# Patient Record
Sex: FEMALE | Race: WHITE | NOT HISPANIC OR LATINO | Employment: FULL TIME | URBAN - METROPOLITAN AREA
[De-identification: names, ages, dates, MRNs, and addresses within clinical notes are randomized per-mention and may not be internally consistent; named-entity substitution may affect disease eponyms.]

---

## 2020-10-06 ENCOUNTER — OFFICE VISIT (OUTPATIENT)
Dept: OBGYN CLINIC | Facility: CLINIC | Age: 30
End: 2020-10-06
Payer: COMMERCIAL

## 2020-10-06 VITALS
HEIGHT: 69 IN | DIASTOLIC BLOOD PRESSURE: 62 MMHG | HEART RATE: 98 BPM | SYSTOLIC BLOOD PRESSURE: 108 MMHG | BODY MASS INDEX: 31.46 KG/M2 | TEMPERATURE: 98.1 F | WEIGHT: 212.4 LBS

## 2020-10-06 DIAGNOSIS — Z3A.09 9 WEEKS GESTATION OF PREGNANCY: ICD-10-CM

## 2020-10-06 DIAGNOSIS — O09.91 HIGH-RISK PREGNANCY, FIRST TRIMESTER: ICD-10-CM

## 2020-10-06 DIAGNOSIS — N91.2 AMENORRHEA: ICD-10-CM

## 2020-10-06 DIAGNOSIS — Z36.89 CONFIRM FETAL CARDIAC ACTIVITY USING ULTRASOUND: ICD-10-CM

## 2020-10-06 DIAGNOSIS — Z32.00 ENCOUNTER FOR CONFIRMATION OF PREGNANCY TEST RESULT WITH PHYSICAL EXAMINATION: Primary | ICD-10-CM

## 2020-10-06 DIAGNOSIS — E10.9 CONTROLLED DIABETES MELLITUS TYPE 1 WITHOUT COMPLICATIONS (HCC): ICD-10-CM

## 2020-10-06 PROBLEM — D27.9 DERMOID CYST OF OVARY AFFECTING PREGNANCY, ANTEPARTUM: Status: ACTIVE | Noted: 2019-03-12

## 2020-10-06 PROBLEM — O34.80 DERMOID CYST OF OVARY AFFECTING PREGNANCY, ANTEPARTUM: Status: ACTIVE | Noted: 2019-03-12

## 2020-10-06 LAB — SL AMB POCT URINE HCG: POSITIVE

## 2020-10-06 PROCEDURE — 99203 OFFICE O/P NEW LOW 30 MIN: CPT | Performed by: OBSTETRICS & GYNECOLOGY

## 2020-10-06 PROCEDURE — 81025 URINE PREGNANCY TEST: CPT | Performed by: OBSTETRICS & GYNECOLOGY

## 2020-10-06 PROCEDURE — 76817 TRANSVAGINAL US OBSTETRIC: CPT | Performed by: OBSTETRICS & GYNECOLOGY

## 2020-10-06 RX ORDER — LANCETS 30 GAUGE
EACH MISCELLANEOUS
COMMUNITY

## 2020-10-06 RX ORDER — INSULIN GLARGINE 100 [IU]/ML
10 INJECTION, SOLUTION SUBCUTANEOUS
COMMUNITY

## 2020-10-07 ENCOUNTER — OB ABSTRACT (OUTPATIENT)
Dept: OBGYN CLINIC | Facility: CLINIC | Age: 30
End: 2020-10-07

## 2020-10-08 ENCOUNTER — TELEPHONE (OUTPATIENT)
Dept: PERINATAL CARE | Facility: CLINIC | Age: 30
End: 2020-10-08

## 2020-10-09 ENCOUNTER — TELEPHONE (OUTPATIENT)
Dept: PERINATAL CARE | Facility: CLINIC | Age: 30
End: 2020-10-09

## 2020-10-12 ENCOUNTER — TELEPHONE (OUTPATIENT)
Dept: PERINATAL CARE | Facility: OTHER | Age: 30
End: 2020-10-12

## 2020-10-12 ENCOUNTER — TELEMEDICINE (OUTPATIENT)
Dept: PERINATAL CARE | Facility: CLINIC | Age: 30
End: 2020-10-12
Payer: COMMERCIAL

## 2020-10-12 VITALS — BODY MASS INDEX: 31.4 KG/M2 | WEIGHT: 212 LBS | HEIGHT: 69 IN

## 2020-10-12 DIAGNOSIS — O24.011 PRE-EXISTING TYPE 1 DIABETES MELLITUS IN PREGNANCY IN FIRST TRIMESTER: ICD-10-CM

## 2020-10-12 DIAGNOSIS — E10.649 HYPOGLYCEMIA DUE TO TYPE 1 DIABETES MELLITUS (HCC): Primary | ICD-10-CM

## 2020-10-12 DIAGNOSIS — Z3A.10 10 WEEKS GESTATION OF PREGNANCY: ICD-10-CM

## 2020-10-12 DIAGNOSIS — O99.211 OBESITY AFFECTING PREGNANCY IN FIRST TRIMESTER: ICD-10-CM

## 2020-10-12 PROBLEM — E10.9 CONTROLLED DIABETES MELLITUS TYPE 1 WITHOUT COMPLICATIONS (HCC): Status: RESOLVED | Noted: 2019-03-12 | Resolved: 2020-10-12

## 2020-10-12 PROCEDURE — 99215 OFFICE O/P EST HI 40 MIN: CPT | Performed by: NURSE PRACTITIONER

## 2020-10-12 RX ORDER — BLOOD-GLUCOSE TRANSMITTER
EACH MISCELLANEOUS
Qty: 1 EACH | Refills: 3 | Status: SHIPPED | OUTPATIENT
Start: 2020-10-12 | End: 2020-10-20 | Stop reason: SDUPTHER

## 2020-10-12 RX ORDER — BLOOD-GLUCOSE,RECEIVER,CONT
EACH MISCELLANEOUS
Qty: 1 DEVICE | Refills: 0 | Status: SHIPPED | OUTPATIENT
Start: 2020-10-12 | End: 2020-10-20 | Stop reason: SDUPTHER

## 2020-10-12 RX ORDER — BLOOD-GLUCOSE SENSOR
EACH MISCELLANEOUS
Qty: 1 EACH | Refills: 12 | Status: SHIPPED | OUTPATIENT
Start: 2020-10-12 | End: 2020-10-20 | Stop reason: SDUPTHER

## 2020-10-13 ENCOUNTER — ULTRASOUND (OUTPATIENT)
Dept: PERINATAL CARE | Facility: OTHER | Age: 30
End: 2020-10-13
Payer: COMMERCIAL

## 2020-10-13 VITALS
HEART RATE: 90 BPM | BODY MASS INDEX: 31.16 KG/M2 | TEMPERATURE: 98.3 F | WEIGHT: 210.4 LBS | HEIGHT: 69 IN | DIASTOLIC BLOOD PRESSURE: 54 MMHG | SYSTOLIC BLOOD PRESSURE: 135 MMHG

## 2020-10-13 DIAGNOSIS — Z3A.10 10 WEEKS GESTATION OF PREGNANCY: ICD-10-CM

## 2020-10-13 DIAGNOSIS — Z3A.09 9 WEEKS GESTATION OF PREGNANCY: ICD-10-CM

## 2020-10-13 DIAGNOSIS — O34.80 DERMOID CYST OF OVARY AFFECTING PREGNANCY, ANTEPARTUM: ICD-10-CM

## 2020-10-13 DIAGNOSIS — E10.9 CONTROLLED DIABETES MELLITUS TYPE 1 WITHOUT COMPLICATIONS (HCC): ICD-10-CM

## 2020-10-13 DIAGNOSIS — O24.011 PRE-EXISTING TYPE 1 DIABETES MELLITUS IN PREGNANCY IN FIRST TRIMESTER: Primary | ICD-10-CM

## 2020-10-13 DIAGNOSIS — D27.9 DERMOID CYST OF OVARY AFFECTING PREGNANCY, ANTEPARTUM: ICD-10-CM

## 2020-10-13 DIAGNOSIS — O09.91 HIGH-RISK PREGNANCY, FIRST TRIMESTER: ICD-10-CM

## 2020-10-13 PROCEDURE — 76801 OB US < 14 WKS SINGLE FETUS: CPT | Performed by: OBSTETRICS & GYNECOLOGY

## 2020-10-13 PROCEDURE — 99241 PR OFFICE CONSULTATION NEW/ESTAB PATIENT 15 MIN: CPT | Performed by: OBSTETRICS & GYNECOLOGY

## 2020-10-14 ENCOUNTER — INITIAL PRENATAL (OUTPATIENT)
Dept: OBGYN CLINIC | Facility: CLINIC | Age: 30
End: 2020-10-14
Payer: COMMERCIAL

## 2020-10-14 VITALS
TEMPERATURE: 97.5 F | HEIGHT: 69 IN | HEART RATE: 88 BPM | DIASTOLIC BLOOD PRESSURE: 72 MMHG | SYSTOLIC BLOOD PRESSURE: 120 MMHG | BODY MASS INDEX: 31.25 KG/M2 | WEIGHT: 211 LBS

## 2020-10-14 DIAGNOSIS — O09.91 SUPERVISION OF HIGH RISK PREGNANCY, ANTEPARTUM, FIRST TRIMESTER: ICD-10-CM

## 2020-10-14 DIAGNOSIS — Z3A.10 10 WEEKS GESTATION OF PREGNANCY: ICD-10-CM

## 2020-10-14 DIAGNOSIS — O99.211 OBESITY AFFECTING PREGNANCY IN FIRST TRIMESTER: ICD-10-CM

## 2020-10-14 DIAGNOSIS — Z11.3 SCREENING FOR STD (SEXUALLY TRANSMITTED DISEASE): ICD-10-CM

## 2020-10-14 DIAGNOSIS — O24.011 PRE-EXISTING TYPE 1 DIABETES MELLITUS IN PREGNANCY IN FIRST TRIMESTER: ICD-10-CM

## 2020-10-14 DIAGNOSIS — Z3A.10 10 WEEKS GESTATION OF PREGNANCY: Primary | ICD-10-CM

## 2020-10-14 DIAGNOSIS — E10.649 HYPOGLYCEMIA DUE TO TYPE 1 DIABETES MELLITUS (HCC): ICD-10-CM

## 2020-10-14 LAB — EXTERNAL CHLAMYDIA SCREEN: NEGATIVE

## 2020-10-14 PROCEDURE — 87591 N.GONORRHOEAE DNA AMP PROB: CPT | Performed by: OBSTETRICS & GYNECOLOGY

## 2020-10-14 PROCEDURE — 87491 CHLMYD TRACH DNA AMP PROBE: CPT | Performed by: OBSTETRICS & GYNECOLOGY

## 2020-10-14 PROCEDURE — 99214 OFFICE O/P EST MOD 30 MIN: CPT | Performed by: OBSTETRICS & GYNECOLOGY

## 2020-10-14 PROCEDURE — OBC: Performed by: OBSTETRICS & GYNECOLOGY

## 2020-10-17 ENCOUNTER — LAB (OUTPATIENT)
Dept: LAB | Facility: MEDICAL CENTER | Age: 30
End: 2020-10-17
Payer: COMMERCIAL

## 2020-10-17 DIAGNOSIS — E10.9 CONTROLLED DIABETES MELLITUS TYPE 1 WITHOUT COMPLICATIONS (HCC): ICD-10-CM

## 2020-10-17 DIAGNOSIS — O24.011 PRE-EXISTING TYPE 1 DIABETES MELLITUS IN PREGNANCY IN FIRST TRIMESTER: ICD-10-CM

## 2020-10-17 DIAGNOSIS — Z3A.10 10 WEEKS GESTATION OF PREGNANCY: ICD-10-CM

## 2020-10-17 DIAGNOSIS — E10.649 HYPOGLYCEMIA DUE TO TYPE 1 DIABETES MELLITUS (HCC): ICD-10-CM

## 2020-10-17 DIAGNOSIS — Z3A.09 9 WEEKS GESTATION OF PREGNANCY: ICD-10-CM

## 2020-10-17 DIAGNOSIS — O09.91 HIGH-RISK PREGNANCY, FIRST TRIMESTER: ICD-10-CM

## 2020-10-17 DIAGNOSIS — O99.211 OBESITY AFFECTING PREGNANCY IN FIRST TRIMESTER: ICD-10-CM

## 2020-10-17 LAB
ABO GROUP BLD: NORMAL
ALBUMIN SERPL BCP-MCNC: 3.8 G/DL (ref 3.5–5)
ALP SERPL-CCNC: 55 U/L (ref 46–116)
ALT SERPL W P-5'-P-CCNC: 16 U/L (ref 12–78)
ANION GAP SERPL CALCULATED.3IONS-SCNC: 4 MMOL/L (ref 4–13)
AST SERPL W P-5'-P-CCNC: 12 U/L (ref 5–45)
BASOPHILS # BLD AUTO: 0.02 THOUSANDS/ΜL (ref 0–0.1)
BASOPHILS NFR BLD AUTO: 0 % (ref 0–1)
BILIRUB SERPL-MCNC: 0.63 MG/DL (ref 0.2–1)
BILIRUB UR QL STRIP: NEGATIVE
BLD GP AB SCN SERPL QL: NEGATIVE
BUN SERPL-MCNC: 7 MG/DL (ref 5–25)
C TRACH DNA SPEC QL NAA+PROBE: NEGATIVE
CALCIUM SERPL-MCNC: 9.2 MG/DL (ref 8.3–10.1)
CHLORIDE SERPL-SCNC: 109 MMOL/L (ref 100–108)
CLARITY UR: ABNORMAL
CO2 SERPL-SCNC: 26 MMOL/L (ref 21–32)
COLOR UR: YELLOW
CREAT SERPL-MCNC: 0.62 MG/DL (ref 0.6–1.3)
CREAT UR-MCNC: 93.8 MG/DL
EOSINOPHIL # BLD AUTO: 0.17 THOUSAND/ΜL (ref 0–0.61)
EOSINOPHIL NFR BLD AUTO: 3 % (ref 0–6)
ERYTHROCYTE [DISTWIDTH] IN BLOOD BY AUTOMATED COUNT: 13.3 % (ref 11.6–15.1)
EST. AVERAGE GLUCOSE BLD GHB EST-MCNC: 103 MG/DL
GFR SERPL CREATININE-BSD FRML MDRD: 123 ML/MIN/1.73SQ M
GLUCOSE SERPL-MCNC: 97 MG/DL (ref 65–140)
GLUCOSE UR STRIP-MCNC: NEGATIVE MG/DL
HBA1C MFR BLD: 5.2 %
HBV SURFACE AG SER QL: NORMAL
HCT VFR BLD AUTO: 37.4 % (ref 34.8–46.1)
HGB BLD-MCNC: 12.5 G/DL (ref 11.5–15.4)
HGB UR QL STRIP.AUTO: NEGATIVE
IMM GRANULOCYTES # BLD AUTO: 0.01 THOUSAND/UL (ref 0–0.2)
IMM GRANULOCYTES NFR BLD AUTO: 0 % (ref 0–2)
KETONES UR STRIP-MCNC: ABNORMAL MG/DL
LEUKOCYTE ESTERASE UR QL STRIP: NEGATIVE
LYMPHOCYTES # BLD AUTO: 1.35 THOUSANDS/ΜL (ref 0.6–4.47)
LYMPHOCYTES NFR BLD AUTO: 20 % (ref 14–44)
MCH RBC QN AUTO: 30.3 PG (ref 26.8–34.3)
MCHC RBC AUTO-ENTMCNC: 33.4 G/DL (ref 31.4–37.4)
MCV RBC AUTO: 91 FL (ref 82–98)
MONOCYTES # BLD AUTO: 0.4 THOUSAND/ΜL (ref 0.17–1.22)
MONOCYTES NFR BLD AUTO: 6 % (ref 4–12)
N GONORRHOEA DNA SPEC QL NAA+PROBE: NEGATIVE
NEUTROPHILS # BLD AUTO: 4.78 THOUSANDS/ΜL (ref 1.85–7.62)
NEUTS SEG NFR BLD AUTO: 71 % (ref 43–75)
NITRITE UR QL STRIP: NEGATIVE
NRBC BLD AUTO-RTO: 0 /100 WBCS
PH UR STRIP.AUTO: 6.5 [PH]
PLATELET # BLD AUTO: 288 THOUSANDS/UL (ref 149–390)
PMV BLD AUTO: 10.5 FL (ref 8.9–12.7)
POTASSIUM SERPL-SCNC: 4.2 MMOL/L (ref 3.5–5.3)
PROT SERPL-MCNC: 7.5 G/DL (ref 6.4–8.2)
PROT UR STRIP-MCNC: NEGATIVE MG/DL
PROT UR-MCNC: 7 MG/DL
PROT/CREAT UR: 0.07 MG/G{CREAT} (ref 0–0.1)
RBC # BLD AUTO: 4.13 MILLION/UL (ref 3.81–5.12)
RH BLD: POSITIVE
RUBV IGG SERPL IA-ACNC: 130.3 IU/ML
SODIUM SERPL-SCNC: 139 MMOL/L (ref 136–145)
SP GR UR STRIP.AUTO: 1.01 (ref 1–1.03)
SPECIMEN EXPIRATION DATE: NORMAL
TSH SERPL DL<=0.05 MIU/L-ACNC: 1.09 UIU/ML (ref 0.36–3.74)
UROBILINOGEN UR QL STRIP.AUTO: 0.2 E.U./DL
WBC # BLD AUTO: 6.73 THOUSAND/UL (ref 4.31–10.16)

## 2020-10-17 PROCEDURE — 84443 ASSAY THYROID STIM HORMONE: CPT

## 2020-10-17 PROCEDURE — 80081 OBSTETRIC PANEL INC HIV TSTG: CPT | Performed by: OBSTETRICS & GYNECOLOGY

## 2020-10-17 PROCEDURE — 84156 ASSAY OF PROTEIN URINE: CPT | Performed by: NURSE PRACTITIONER

## 2020-10-17 PROCEDURE — 87086 URINE CULTURE/COLONY COUNT: CPT | Performed by: OBSTETRICS & GYNECOLOGY

## 2020-10-17 PROCEDURE — 36415 COLL VENOUS BLD VENIPUNCTURE: CPT

## 2020-10-17 PROCEDURE — 81003 URINALYSIS AUTO W/O SCOPE: CPT | Performed by: OBSTETRICS & GYNECOLOGY

## 2020-10-17 PROCEDURE — 82570 ASSAY OF URINE CREATININE: CPT | Performed by: NURSE PRACTITIONER

## 2020-10-17 PROCEDURE — 80053 COMPREHEN METABOLIC PANEL: CPT

## 2020-10-17 PROCEDURE — 83036 HEMOGLOBIN GLYCOSYLATED A1C: CPT

## 2020-10-19 ENCOUNTER — TELEPHONE (OUTPATIENT)
Dept: PERINATAL CARE | Facility: CLINIC | Age: 30
End: 2020-10-19

## 2020-10-19 ENCOUNTER — TELEMEDICINE (OUTPATIENT)
Dept: PERINATAL CARE | Facility: CLINIC | Age: 30
End: 2020-10-19
Payer: COMMERCIAL

## 2020-10-19 DIAGNOSIS — Z3A.11 11 WEEKS GESTATION OF PREGNANCY: ICD-10-CM

## 2020-10-19 DIAGNOSIS — O24.011 PRE-EXISTING TYPE 1 DIABETES MELLITUS DURING PREGNANCY IN FIRST TRIMESTER: Primary | ICD-10-CM

## 2020-10-19 LAB
BACTERIA UR CULT: NORMAL
HIV 1+2 AB+HIV1 P24 AG SERPL QL IA: NORMAL
RPR SER QL: NORMAL

## 2020-10-19 PROCEDURE — G0108 DIAB MANAGE TRN  PER INDIV: HCPCS | Performed by: DIETITIAN, REGISTERED

## 2020-10-20 ENCOUNTER — TELEPHONE (OUTPATIENT)
Dept: PERINATAL CARE | Facility: CLINIC | Age: 30
End: 2020-10-20

## 2020-10-20 ENCOUNTER — DOCUMENTATION (OUTPATIENT)
Dept: PERINATAL CARE | Facility: CLINIC | Age: 30
End: 2020-10-20

## 2020-10-20 DIAGNOSIS — Z3A.11 11 WEEKS GESTATION OF PREGNANCY: ICD-10-CM

## 2020-10-20 DIAGNOSIS — O99.211 OBESITY AFFECTING PREGNANCY IN FIRST TRIMESTER: ICD-10-CM

## 2020-10-20 DIAGNOSIS — E10.649 HYPOGLYCEMIA DUE TO TYPE 1 DIABETES MELLITUS (HCC): ICD-10-CM

## 2020-10-20 DIAGNOSIS — Z3A.10 10 WEEKS GESTATION OF PREGNANCY: ICD-10-CM

## 2020-10-20 DIAGNOSIS — O24.011 PRE-EXISTING TYPE 1 DIABETES MELLITUS IN PREGNANCY IN FIRST TRIMESTER: Primary | ICD-10-CM

## 2020-10-20 DIAGNOSIS — O24.011 PRE-EXISTING TYPE 1 DIABETES MELLITUS IN PREGNANCY IN FIRST TRIMESTER: ICD-10-CM

## 2020-10-20 DIAGNOSIS — Z46.81 INSULIN PUMP TITRATION: ICD-10-CM

## 2020-10-20 DIAGNOSIS — E10.65 PRE-EXISTING TYPE 1 DIABETES MELLITUS WITH HYPERGLYCEMIA DURING PREGNANCY IN FIRST TRIMESTER (HCC): Primary | ICD-10-CM

## 2020-10-20 DIAGNOSIS — Z96.41 INSULIN PUMP IN PLACE: ICD-10-CM

## 2020-10-20 DIAGNOSIS — O24.011 PRE-EXISTING TYPE 1 DIABETES MELLITUS WITH HYPERGLYCEMIA DURING PREGNANCY IN FIRST TRIMESTER (HCC): Primary | ICD-10-CM

## 2020-10-20 RX ORDER — BLOOD-GLUCOSE TRANSMITTER
EACH MISCELLANEOUS
Qty: 1 EACH | Refills: 3 | Status: SHIPPED | OUTPATIENT
Start: 2020-10-20 | End: 2022-03-23 | Stop reason: SDUPTHER

## 2020-10-20 RX ORDER — BLOOD-GLUCOSE,RECEIVER,CONT
EACH MISCELLANEOUS
Qty: 1 DEVICE | Refills: 0 | Status: SHIPPED | OUTPATIENT
Start: 2020-10-20

## 2020-10-20 RX ORDER — BLOOD-GLUCOSE SENSOR
EACH MISCELLANEOUS
Qty: 1 EACH | Refills: 12 | Status: SHIPPED | OUTPATIENT
Start: 2020-10-20 | End: 2022-03-23 | Stop reason: SDUPTHER

## 2020-10-23 ENCOUNTER — TELEPHONE (OUTPATIENT)
Dept: OBGYN CLINIC | Facility: CLINIC | Age: 30
End: 2020-10-23

## 2020-10-26 ENCOUNTER — TELEPHONE (OUTPATIENT)
Dept: OBGYN CLINIC | Facility: CLINIC | Age: 30
End: 2020-10-26

## 2020-10-26 DIAGNOSIS — O26.899 CARPAL TUNNEL SYNDROME DURING PREGNANCY: Primary | ICD-10-CM

## 2020-10-26 DIAGNOSIS — G56.00 CARPAL TUNNEL SYNDROME DURING PREGNANCY: Primary | ICD-10-CM

## 2020-10-27 ENCOUNTER — DOCUMENTATION (OUTPATIENT)
Dept: PERINATAL CARE | Facility: CLINIC | Age: 30
End: 2020-10-27

## 2020-10-27 DIAGNOSIS — O24.011 PRE-EXISTING TYPE 1 DIABETES MELLITUS IN PREGNANCY IN FIRST TRIMESTER: Primary | ICD-10-CM

## 2020-10-27 DIAGNOSIS — E10.649 HYPOGLYCEMIA DUE TO TYPE 1 DIABETES MELLITUS (HCC): ICD-10-CM

## 2020-10-27 PROBLEM — Z3A.12 12 WEEKS GESTATION OF PREGNANCY: Status: ACTIVE | Noted: 2020-10-06

## 2020-11-03 ENCOUNTER — EVALUATION (OUTPATIENT)
Dept: PHYSICAL THERAPY | Facility: CLINIC | Age: 30
End: 2020-11-03
Payer: COMMERCIAL

## 2020-11-03 DIAGNOSIS — O26.899 CARPAL TUNNEL SYNDROME DURING PREGNANCY: ICD-10-CM

## 2020-11-03 DIAGNOSIS — G56.00 CARPAL TUNNEL SYNDROME DURING PREGNANCY: ICD-10-CM

## 2020-11-03 PROCEDURE — 97110 THERAPEUTIC EXERCISES: CPT | Performed by: PHYSICAL THERAPIST

## 2020-11-03 PROCEDURE — 97161 PT EVAL LOW COMPLEX 20 MIN: CPT | Performed by: PHYSICAL THERAPIST

## 2020-11-06 ENCOUNTER — TELEPHONE (OUTPATIENT)
Dept: PERINATAL CARE | Facility: CLINIC | Age: 30
End: 2020-11-06

## 2020-11-09 ENCOUNTER — OFFICE VISIT (OUTPATIENT)
Dept: PHYSICAL THERAPY | Facility: CLINIC | Age: 30
End: 2020-11-09
Payer: COMMERCIAL

## 2020-11-09 DIAGNOSIS — G56.00 CARPAL TUNNEL SYNDROME DURING PREGNANCY: Primary | ICD-10-CM

## 2020-11-09 DIAGNOSIS — O26.899 CARPAL TUNNEL SYNDROME DURING PREGNANCY: Primary | ICD-10-CM

## 2020-11-09 PROCEDURE — 97112 NEUROMUSCULAR REEDUCATION: CPT | Performed by: PHYSICAL THERAPIST

## 2020-11-09 PROCEDURE — 97010 HOT OR COLD PACKS THERAPY: CPT | Performed by: PHYSICAL THERAPIST

## 2020-11-09 PROCEDURE — 97140 MANUAL THERAPY 1/> REGIONS: CPT | Performed by: PHYSICAL THERAPIST

## 2020-11-09 PROCEDURE — 97110 THERAPEUTIC EXERCISES: CPT | Performed by: PHYSICAL THERAPIST

## 2020-11-10 ENCOUNTER — ROUTINE PRENATAL (OUTPATIENT)
Dept: OBGYN CLINIC | Facility: CLINIC | Age: 30
End: 2020-11-10

## 2020-11-10 VITALS
TEMPERATURE: 97.7 F | WEIGHT: 212 LBS | DIASTOLIC BLOOD PRESSURE: 62 MMHG | HEART RATE: 85 BPM | SYSTOLIC BLOOD PRESSURE: 120 MMHG | BODY MASS INDEX: 31.31 KG/M2

## 2020-11-10 DIAGNOSIS — Z3A.14 14 WEEKS GESTATION OF PREGNANCY: ICD-10-CM

## 2020-11-10 DIAGNOSIS — O99.211 OBESITY AFFECTING PREGNANCY IN FIRST TRIMESTER: ICD-10-CM

## 2020-11-10 DIAGNOSIS — G56.00 CARPAL TUNNEL SYNDROME DURING PREGNANCY: ICD-10-CM

## 2020-11-10 DIAGNOSIS — O26.899 CARPAL TUNNEL SYNDROME DURING PREGNANCY: ICD-10-CM

## 2020-11-10 DIAGNOSIS — O09.92 HIGH-RISK PREGNANCY IN SECOND TRIMESTER: Primary | ICD-10-CM

## 2020-11-10 DIAGNOSIS — O24.011 PRE-EXISTING TYPE 1 DIABETES MELLITUS IN PREGNANCY IN FIRST TRIMESTER: ICD-10-CM

## 2020-11-10 PROBLEM — O09.90 HIGH-RISK PREGNANCY: Status: ACTIVE | Noted: 2020-10-06

## 2020-11-10 LAB
SL AMB  POCT GLUCOSE, UA: NEGATIVE
SL AMB POCT URINE PROTEIN: NEGATIVE

## 2020-11-10 PROCEDURE — PNV: Performed by: CLINICAL NURSE SPECIALIST

## 2020-11-10 RX ORDER — ASPIRIN 81 MG/1
162 TABLET, CHEWABLE ORAL DAILY
COMMUNITY
End: 2021-05-02 | Stop reason: HOSPADM

## 2020-11-13 ENCOUNTER — APPOINTMENT (OUTPATIENT)
Dept: PHYSICAL THERAPY | Facility: CLINIC | Age: 30
End: 2020-11-13
Payer: COMMERCIAL

## 2020-11-16 ENCOUNTER — OFFICE VISIT (OUTPATIENT)
Dept: PHYSICAL THERAPY | Facility: CLINIC | Age: 30
End: 2020-11-16
Payer: COMMERCIAL

## 2020-11-16 DIAGNOSIS — O26.899 CARPAL TUNNEL SYNDROME DURING PREGNANCY: Primary | ICD-10-CM

## 2020-11-16 DIAGNOSIS — G56.00 CARPAL TUNNEL SYNDROME DURING PREGNANCY: Primary | ICD-10-CM

## 2020-11-16 PROCEDURE — 97110 THERAPEUTIC EXERCISES: CPT | Performed by: PHYSICAL THERAPIST

## 2020-11-16 PROCEDURE — 97010 HOT OR COLD PACKS THERAPY: CPT | Performed by: PHYSICAL THERAPIST

## 2020-11-16 PROCEDURE — 97112 NEUROMUSCULAR REEDUCATION: CPT | Performed by: PHYSICAL THERAPIST

## 2020-11-16 PROCEDURE — 97140 MANUAL THERAPY 1/> REGIONS: CPT | Performed by: PHYSICAL THERAPIST

## 2020-11-20 ENCOUNTER — OFFICE VISIT (OUTPATIENT)
Dept: PHYSICAL THERAPY | Facility: CLINIC | Age: 30
End: 2020-11-20
Payer: COMMERCIAL

## 2020-11-20 DIAGNOSIS — G56.00 CARPAL TUNNEL SYNDROME DURING PREGNANCY: Primary | ICD-10-CM

## 2020-11-20 DIAGNOSIS — O26.899 CARPAL TUNNEL SYNDROME DURING PREGNANCY: Primary | ICD-10-CM

## 2020-11-20 PROCEDURE — 97112 NEUROMUSCULAR REEDUCATION: CPT | Performed by: PHYSICAL THERAPIST

## 2020-11-20 PROCEDURE — 97140 MANUAL THERAPY 1/> REGIONS: CPT | Performed by: PHYSICAL THERAPIST

## 2020-11-20 PROCEDURE — 97110 THERAPEUTIC EXERCISES: CPT | Performed by: PHYSICAL THERAPIST

## 2020-11-20 PROCEDURE — 97010 HOT OR COLD PACKS THERAPY: CPT | Performed by: PHYSICAL THERAPIST

## 2020-11-23 ENCOUNTER — APPOINTMENT (OUTPATIENT)
Dept: PHYSICAL THERAPY | Facility: CLINIC | Age: 30
End: 2020-11-23
Payer: COMMERCIAL

## 2020-11-27 ENCOUNTER — APPOINTMENT (OUTPATIENT)
Dept: PHYSICAL THERAPY | Facility: CLINIC | Age: 30
End: 2020-11-27
Payer: COMMERCIAL

## 2020-12-07 ENCOUNTER — TELEPHONE (OUTPATIENT)
Dept: PERINATAL CARE | Facility: CLINIC | Age: 30
End: 2020-12-07

## 2020-12-08 ENCOUNTER — ROUTINE PRENATAL (OUTPATIENT)
Dept: PERINATAL CARE | Facility: OTHER | Age: 30
End: 2020-12-08
Payer: COMMERCIAL

## 2020-12-08 ENCOUNTER — TELEPHONE (OUTPATIENT)
Dept: PERINATAL CARE | Facility: CLINIC | Age: 30
End: 2020-12-08

## 2020-12-08 VITALS
WEIGHT: 218.8 LBS | HEART RATE: 80 BPM | BODY MASS INDEX: 32.41 KG/M2 | DIASTOLIC BLOOD PRESSURE: 75 MMHG | SYSTOLIC BLOOD PRESSURE: 107 MMHG | HEIGHT: 69 IN

## 2020-12-08 DIAGNOSIS — O24.011 PRE-EXISTING TYPE 1 DIABETES MELLITUS IN PREGNANCY IN FIRST TRIMESTER: ICD-10-CM

## 2020-12-08 DIAGNOSIS — O99.211 OBESITY AFFECTING PREGNANCY IN FIRST TRIMESTER: ICD-10-CM

## 2020-12-08 DIAGNOSIS — Z3A.18 18 WEEKS GESTATION OF PREGNANCY: ICD-10-CM

## 2020-12-08 DIAGNOSIS — Z36.86 ENCOUNTER FOR ANTENATAL SCREENING FOR CERVICAL LENGTH: ICD-10-CM

## 2020-12-08 PROCEDURE — 76817 TRANSVAGINAL US OBSTETRIC: CPT | Performed by: OBSTETRICS & GYNECOLOGY

## 2020-12-08 PROCEDURE — 99213 OFFICE O/P EST LOW 20 MIN: CPT | Performed by: OBSTETRICS & GYNECOLOGY

## 2020-12-08 PROCEDURE — 76811 OB US DETAILED SNGL FETUS: CPT | Performed by: OBSTETRICS & GYNECOLOGY

## 2020-12-09 ENCOUNTER — ROUTINE PRENATAL (OUTPATIENT)
Dept: OBGYN CLINIC | Facility: CLINIC | Age: 30
End: 2020-12-09

## 2020-12-09 VITALS
HEART RATE: 85 BPM | SYSTOLIC BLOOD PRESSURE: 112 MMHG | TEMPERATURE: 97.5 F | DIASTOLIC BLOOD PRESSURE: 62 MMHG | BODY MASS INDEX: 32.46 KG/M2 | WEIGHT: 219.8 LBS

## 2020-12-09 DIAGNOSIS — O24.011 PRE-EXISTING TYPE 1 DIABETES MELLITUS IN PREGNANCY IN FIRST TRIMESTER: ICD-10-CM

## 2020-12-09 DIAGNOSIS — G56.00 CARPAL TUNNEL SYNDROME DURING PREGNANCY: ICD-10-CM

## 2020-12-09 DIAGNOSIS — E10.649 HYPOGLYCEMIA DUE TO TYPE 1 DIABETES MELLITUS (HCC): ICD-10-CM

## 2020-12-09 DIAGNOSIS — O09.92 HIGH-RISK PREGNANCY IN SECOND TRIMESTER: Primary | ICD-10-CM

## 2020-12-09 DIAGNOSIS — O26.899 CARPAL TUNNEL SYNDROME DURING PREGNANCY: ICD-10-CM

## 2020-12-09 DIAGNOSIS — O99.212 OBESITY AFFECTING PREGNANCY IN SECOND TRIMESTER: ICD-10-CM

## 2020-12-09 DIAGNOSIS — Z3A.18 18 WEEKS GESTATION OF PREGNANCY: ICD-10-CM

## 2020-12-09 LAB
SL AMB  POCT GLUCOSE, UA: NEGATIVE
SL AMB POCT URINE PROTEIN: NEGATIVE

## 2020-12-09 PROCEDURE — PNV: Performed by: OBSTETRICS & GYNECOLOGY

## 2020-12-23 ENCOUNTER — OFFICE VISIT (OUTPATIENT)
Dept: OBGYN CLINIC | Facility: HOSPITAL | Age: 30
End: 2020-12-23
Payer: COMMERCIAL

## 2020-12-23 VITALS
SYSTOLIC BLOOD PRESSURE: 118 MMHG | WEIGHT: 224 LBS | HEART RATE: 84 BPM | HEIGHT: 69 IN | DIASTOLIC BLOOD PRESSURE: 73 MMHG | BODY MASS INDEX: 33.18 KG/M2

## 2020-12-23 DIAGNOSIS — G56.03 CARPAL TUNNEL SYNDROME, BILATERAL: Primary | ICD-10-CM

## 2020-12-23 PROCEDURE — 99204 OFFICE O/P NEW MOD 45 MIN: CPT | Performed by: ORTHOPAEDIC SURGERY

## 2020-12-23 RX ORDER — LIDOCAINE HYDROCHLORIDE AND EPINEPHRINE 10; 10 MG/ML; UG/ML
20 INJECTION, SOLUTION INFILTRATION; PERINEURAL ONCE
Status: CANCELLED | OUTPATIENT
Start: 2020-12-23 | End: 2020-12-23

## 2021-01-06 ENCOUNTER — TELEPHONE (OUTPATIENT)
Dept: PERINATAL CARE | Facility: OTHER | Age: 31
End: 2021-01-06

## 2021-01-06 NOTE — TELEPHONE ENCOUNTER
Attempted to reach patient by phone and left voicemail to confirm appointment for MFM ultrasound  1 support person ( must be over the age of 15) may accompany you for your appointment  If you or your support person have traveled outside the state in the past 2 weeks, please call and notify our office today #[phone]  You and your support person must wear a mask ,covering nose and mouth,during your entire visit  To minimize your exposure in our waiting room, please call our office prior to entering the building  Check in and rooming questions will be done via phone  We will give you directions when to enter for your appointment  [place]: [phone]    IF you are not feeling well- cough, fever, shortness of breath or any flu like symptoms, contact your primary care physician or [phone]  If you are awaiting COVID 19 test results please call and reschedule your appointment    Any questions with these instructions please call Maternal Fetal Medicine nurse line today @ # [phone]

## 2021-01-07 ENCOUNTER — ROUTINE PRENATAL (OUTPATIENT)
Dept: PERINATAL CARE | Facility: OTHER | Age: 31
End: 2021-01-07
Payer: COMMERCIAL

## 2021-01-07 VITALS — HEIGHT: 69 IN | WEIGHT: 231 LBS | BODY MASS INDEX: 34.21 KG/M2

## 2021-01-07 DIAGNOSIS — O99.212 MATERNAL OBESITY, ANTEPARTUM, SECOND TRIMESTER: Primary | ICD-10-CM

## 2021-01-07 DIAGNOSIS — Z3A.22 22 WEEKS GESTATION OF PREGNANCY: ICD-10-CM

## 2021-01-07 DIAGNOSIS — O24.012 PREGNANCY COMPLICATED BY PRE-EXISTING TYPE 1 DIABETES, SECOND TRIMESTER: ICD-10-CM

## 2021-01-07 DIAGNOSIS — Z36.89 ENCOUNTER FOR FETAL ANATOMIC SURVEY: ICD-10-CM

## 2021-01-07 PROCEDURE — 76827 ECHO EXAM OF FETAL HEART: CPT | Performed by: OBSTETRICS & GYNECOLOGY

## 2021-01-07 PROCEDURE — 76816 OB US FOLLOW-UP PER FETUS: CPT | Performed by: OBSTETRICS & GYNECOLOGY

## 2021-01-07 PROCEDURE — 76825 ECHO EXAM OF FETAL HEART: CPT | Performed by: OBSTETRICS & GYNECOLOGY

## 2021-01-07 PROCEDURE — 99214 OFFICE O/P EST MOD 30 MIN: CPT | Performed by: OBSTETRICS & GYNECOLOGY

## 2021-01-07 PROCEDURE — 93325 DOPPLER ECHO COLOR FLOW MAPG: CPT | Performed by: OBSTETRICS & GYNECOLOGY

## 2021-01-07 NOTE — PROGRESS NOTES
Please refer to the [facility] ultrasound report in Ob Procedures for additional information regarding today's visit

## 2021-01-07 NOTE — LETTER
January 7, 2021     Best Rojas, [address] [place] [address] 3460 [address] [facility]    Patient: Justin Philip   YOB: 1990   Date of Visit: 1/7/2021       Dear Dr Madalyn Villarreal: Thank you for referring Justin Philip to me for evaluation  Below are my notes for this consultation  If you have questions, please do not hesitate to call me  I look forward to following your patient along with you  Sincerely,        Humberto Enriquez MD        CC: No Recipients  Humberto Enriquez MD  1/7/2021  8:41 AM  Sign when Signing Visit  Please refer to the [facility] ultrasound report in Ob Procedures for additional information regarding today's visit

## 2021-01-09 ENCOUNTER — LAB (OUTPATIENT)
Dept: LAB | Facility: MEDICAL CENTER | Age: 31
End: 2021-01-09
Payer: COMMERCIAL

## 2021-01-09 DIAGNOSIS — Z3A.18 18 WEEKS GESTATION OF PREGNANCY: ICD-10-CM

## 2021-01-09 LAB
ALBUMIN SERPL BCP-MCNC: 2.9 G/DL (ref 3.5–5)
ALP SERPL-CCNC: 91 U/L (ref 46–116)
ALT SERPL W P-5'-P-CCNC: 17 U/L (ref 12–78)
ANION GAP SERPL CALCULATED.3IONS-SCNC: 5 MMOL/L (ref 4–13)
AST SERPL W P-5'-P-CCNC: 16 U/L (ref 5–45)
BILIRUB SERPL-MCNC: 0.19 MG/DL (ref 0.2–1)
BUN SERPL-MCNC: 8 MG/DL (ref 5–25)
CALCIUM ALBUM COR SERPL-MCNC: 9.9 MG/DL (ref 8.3–10.1)
CALCIUM SERPL-MCNC: 9 MG/DL (ref 8.3–10.1)
CHLORIDE SERPL-SCNC: 109 MMOL/L (ref 100–108)
CO2 SERPL-SCNC: 25 MMOL/L (ref 21–32)
CREAT SERPL-MCNC: 0.68 MG/DL (ref 0.6–1.3)
EST. AVERAGE GLUCOSE BLD GHB EST-MCNC: 94 MG/DL
GFR SERPL CREATININE-BSD FRML MDRD: 119 ML/MIN/1.73SQ M
GLUCOSE P FAST SERPL-MCNC: 125 MG/DL (ref 65–99)
HBA1C MFR BLD: 4.9 %
POTASSIUM SERPL-SCNC: 4 MMOL/L (ref 3.5–5.3)
PROT SERPL-MCNC: 7.1 G/DL (ref 6.4–8.2)
SODIUM SERPL-SCNC: 139 MMOL/L (ref 136–145)

## 2021-01-09 PROCEDURE — 36415 COLL VENOUS BLD VENIPUNCTURE: CPT

## 2021-01-09 PROCEDURE — 80053 COMPREHEN METABOLIC PANEL: CPT

## 2021-01-09 PROCEDURE — 83036 HEMOGLOBIN GLYCOSYLATED A1C: CPT

## 2021-01-13 ENCOUNTER — TELEPHONE (OUTPATIENT)
Dept: PERINATAL CARE | Facility: CLINIC | Age: 31
End: 2021-01-13

## 2021-01-13 ENCOUNTER — ROUTINE PRENATAL (OUTPATIENT)
Dept: OBGYN CLINIC | Facility: CLINIC | Age: 31
End: 2021-01-13

## 2021-01-13 VITALS
BODY MASS INDEX: 34.67 KG/M2 | TEMPERATURE: 97.5 F | HEART RATE: 90 BPM | WEIGHT: 234.8 LBS | SYSTOLIC BLOOD PRESSURE: 122 MMHG | DIASTOLIC BLOOD PRESSURE: 62 MMHG

## 2021-01-13 DIAGNOSIS — O26.899 CARPAL TUNNEL SYNDROME DURING PREGNANCY: ICD-10-CM

## 2021-01-13 DIAGNOSIS — G56.00 CARPAL TUNNEL SYNDROME DURING PREGNANCY: ICD-10-CM

## 2021-01-13 DIAGNOSIS — O24.012 PRE-EXISTING TYPE 1 DIABETES MELLITUS DURING PREGNANCY IN SECOND TRIMESTER: ICD-10-CM

## 2021-01-13 DIAGNOSIS — Z3A.23 23 WEEKS GESTATION OF PREGNANCY: Primary | ICD-10-CM

## 2021-01-13 LAB
SL AMB  POCT GLUCOSE, UA: NEGATIVE
SL AMB POCT URINE PROTEIN: NEGATIVE

## 2021-01-13 PROCEDURE — PNV: Performed by: OBSTETRICS & GYNECOLOGY

## 2021-01-13 NOTE — PROGRESS NOTES
OB/GYN  PN Visit  Puja Sweeney  [id number]  1/13/2021  12:17 PM  Dr Caty Alfredo MD    S: 31 y o  [id number] 23w2d here for PN visit  Chief Complaint   Patient presents with    Routine Prenatal Visit     Labs UTD and declines FLU vaccine  Patient states no problems  OB complaints:  Contractions: no  Leakage: no  Bleeding: no  Fetal movement: yes      O:  /62 (BP Location: Right arm, Cuff Size: Large)   Pulse 90   Temp 97 5 °F (36 4 °C) (Temporal)   Wt 107 kg (234 lb 12 8 oz)   LMP 08/03/2020 (Exact Date)   BMI 34 67 kg/m²       Review of Systems   Constitutional: Negative  HENT: Negative  Eyes: Negative  Respiratory: Negative  Cardiovascular: Negative  Gastrointestinal: Negative  Endocrine: Negative  Genitourinary:        As noted in HPI   Musculoskeletal: Negative  Skin: Negative  Allergic/Immunologic: Negative  Neurological: Negative  Hematological: Negative  Psychiatric/Behavioral: Negative  Physical Exam  Constitutional:       General: She is not in acute distress  Appearance: She is well-developed  Abdominal:      Palpations: Abdomen is soft  Tenderness: There is no abdominal tenderness  There is no guarding  Neurological:      Mental Status: She is alert and oriented to person, place, and time  Skin:     General: Skin is warm and dry     Psychiatric:         Behavior: Behavior normal            Fundal Height (cm): 24 cm  Fetal Heart Rate: 155          A/P:    Problem List Items Addressed This Visit        Endocrine    Pre-existing type 1 diabetes mellitus during pregnancy in second trimester    Relevant Orders    Hemoglobin A1C       Nervous and Auditory    Carpal tunnel syndrome during pregnancy      Other Visit Diagnoses     23 weeks gestation of pregnancy    -  Primary    Relevant Orders    POCT urine dip (Completed)    CBC and differential    RPR          Issues with CGM sharecode and blood glucose reporting but in touch with DM pregnancy program  A1c 4 9  Pt having carpal tunnel surgery under local on 2/3 and 2/23/2020 at [facility] at 26 2/7 weeks under local    Patient had fetal echo at 24 weeks  Ophthalmology evaluation recommended  EKG ordered  On LDASA    NST/VINOD at 32 weeks    Alberta Barnes MD  1/13/2021  12:17 PM      Spoke with MFM (Dr Prisca Nova), if procedure done under local, no fetal monitoring indicated and procedure may be done at [facility]  Will message OB anesthesia to confirm and also notify orthopedic surgeon

## 2021-01-13 NOTE — TELEPHONE ENCOUNTER
Called patient to schedule follow up appointment, as per staff message:  "Marilyn Grover, [address]             Hi,   Please schedule virtual visit with me 60 minutes T1DM on insulin pump  "       Left voicemail request patient to call back to schedule at [phone]

## 2021-01-13 NOTE — PATIENT INSTRUCTIONS
Pregnancy at 23 to 26 [address]:   You are now close to or at the beginning of the third trimester  The third trimester starts at 24 weeks and ends with delivery  As your baby gets larger, you may develop certain symptoms  These may include pain in your back or down the sides of your abdomen  You may also have stretch marks on your abdomen, breasts, thighs, or buttocks  You may also have constipation  DISCHARGE INSTRUCTIONS:   Return to the emergency department if:   · You develop a severe headache that does not go away  · You have new or increased vision changes, such as blurred or spotted vision  · You have new or increased swelling in your face or hands  · You have vaginal spotting or bleeding  · Your water broke or you feel warm water gushing or trickling from your vagina  Contact your healthcare provider if:   · You have abdominal cramps, pressure, or tightening  · You have a change in vaginal discharge  · You have light bleeding  · You have chills or a fever  · You have vaginal itching, burning, or pain  · You have yellow, green, white, or foul-smelling vaginal discharge  · You have pain or burning when you urinate, less urine than usual, or pink or bloody urine  · You have questions or concerns about your condition or care  How to care for yourself at this stage of your pregnancy:   · Eat a variety of healthy foods  Healthy foods include fruits, vegetables, whole-grain breads, low-fat dairy foods, beans, lean meats, and fish  Drink liquids as directed  Ask how much liquid to drink each day and which liquids are best for you  Limit caffeine to less than 200 milligrams each day  Limit your intake of fish to 2 servings each week  Choose fish low in mercury such as canned light tuna, shrimp, salmon, cod, or tilapia  Do not  eat fish high in mercury such as swordfish, tilefish, oscar mackerel, and shark  · Manage back pain    Do not stand for long periods of time or lift heavy items  Use good posture while you stand, squat, or bend  Wear low-heeled shoes with good support  Rest may also help to relieve back pain  Ask your healthcare provider about exercises you can do to strengthen your back muscles  · Take prenatal vitamins as directed  Your need for certain vitamins and minerals, such as folic acid, increases during pregnancy  Prenatal vitamins provide some of the extra vitamins and minerals you need  Prenatal vitamins may also help to decrease the risk of certain birth defects  · Talk to your healthcare provider about exercise  Moderate exercise can help you stay fit  Your healthcare provider will help you plan an exercise program that is safe for you during pregnancy  · Do not smoke  Smoking increases your risk of a miscarriage and other health problems during your pregnancy  Smoking can cause your baby to be born too early or weigh less at birth  Ask your healthcare provider for information if you need help quitting  · Do not drink alcohol  Alcohol passes from your body to your baby through the placenta  It can affect your baby's brain development and cause fetal alcohol syndrome (FAS)  FAS is a group of conditions that causes mental, behavior, and growth problems  · Talk to your healthcare provider before you take any medicines  Many medicines may harm your baby if you take them when you are pregnant  Do not take any medicines, vitamins, herbs, or supplements without first talking to your healthcare provider  Never use illegal or street drugs (such as marijuana or cocaine) while you are pregnant  Safety tips:   · Avoid hot tubs and saunas  Do not use a hot tub or sauna while you are pregnant, especially during your first trimester  Hot tubs and saunas may raise your baby's temperature and increase the risk of birth defects  · Avoid toxoplasmosis    This is an infection caused by eating raw meat or being around infected cat feces  It can cause birth defects, miscarriages, and other problems  Wash your hands after you touch raw meat  Make sure any meat is well-cooked before you eat it  Avoid raw eggs and unpasteurized milk  Use gloves or ask someone else to clean your cat's litter box while you are pregnant  Changes that are happening with your baby:  By 26 weeks, your baby will weigh about 2 pounds  Your baby will be about 10 inches long from the top of the head to the rump (baby's bottom)  Your baby's movements are much stronger now  Your baby's eyes are almost completely formed and can partially open  Your baby also sleeps and wakes up  What you need to know about prenatal care: Your healthcare provider will check your blood pressure and weight  You may also need the following:  · A urine test  may also be done to check for sugar and protein  These can be signs of gestational diabetes or infection  Protein in your urine may also be a sign of preeclampsia  Preeclampsia is a condition that can develop during week 20 or later of your pregnancy  It causes high blood pressure, and it can cause problems with your kidneys and other organs  · Fundal height  is a measurement of your uterus to check your baby's growth  This number is usually the same as the number of weeks that you have been pregnant  · Your baby's heart rate  will be checked  © Copyright [address] Information is for End User's use only and may not be sold, redistributed or otherwise used for commercial purposes  All illustrations and images included in CareNotes® are the copyrighted property of A D A M , Inc  or [facility] Gareth Logan   The above information is an  only  It is not intended as medical advice for individual conditions or treatments  Talk to your doctor, nurse or pharmacist before following any medical regimen to see if it is safe and effective for you

## 2021-01-15 ENCOUNTER — DOCUMENTATION (OUTPATIENT)
Dept: PERINATAL CARE | Facility: CLINIC | Age: 31
End: 2021-01-15

## 2021-01-15 NOTE — PROGRESS NOTES
Areta Canavan is a 31 y o  female on a/an Medtronic 530G insulin pump  Estimated Date of Delivery: 5/10/21   Currently: 12w1d  No diagnosis found  Current Insulin pump settings:  Basal rate: MN@ 0 95 units/hour; 3 AM@ 0 95 units/hour, 6 AM@ 1 45 units/hour; 9 AM@ 1 60 units/hour; 12:30 PM @ 1 45 units/hour; 5 PM @ 1 45 units/hour and 10 PM@ 0 95 units/hour  Insulin to carb ratio:MN@ 9; 9:30 AM @ 9 5  Insulin sensitivity factor:35  BG target:100  Type of insulin:Humalog  Self-monitoring blood glucose reported by patient:       See attachment      CGM: no   Glucose ranges: overnight glucose readings less than 60 noted and 3 to 4 PM glucose readings above goal noted  Most readings are within goal range  Diet:GDM diet with 3 meals and 3 snacks including recommended combination of carb, protein and fat per meal/snack  Discussed with patient in case of malfunctioning of the pump to use basal and bolus therapy as backup which is prescribed to the patient  Also notified patient to call office with any issues  Plan:  Prior basal rate settings adjusted on 10/23/20 per patient [employer] message  Due to hypoglycemia, decrease basal rates at MN@ to 0 85 units/hour; 3 AM to 0 85 units/hour; 6 AM to 1 30 units/hour; 9 AM to 1 45 units/hour; 12:30 PM@ 1 45 units/hour; 5 PM@ 1 45 units/hour and decrease 10 PM to 0 85 units/hour  Report glucose readings on Friday, 10/30/20  Medtronic user name and password requested  Dexcom G6 to be started and share code requested  10/17/20 A1c 5 2%

## 2021-01-18 ENCOUNTER — TELEPHONE (OUTPATIENT)
Dept: PERINATAL CARE | Facility: CLINIC | Age: 31
End: 2021-01-18

## 2021-01-18 NOTE — TELEPHONE ENCOUNTER
Called patient to schedule follow up VIRTUAL appointment, as per staff message:  "Can someone set up a 60 minute appointment with Tika Marcelino, and a 30 minute appointment with dietitian Rosario Hughes, Ang Robb, or Marcelo Contreras) ideally for next week or week after  "    Left voicemail request patient to call back to schedule at [phone]

## 2021-01-19 ENCOUNTER — TELEPHONE (OUTPATIENT)
Dept: OTHER | Facility: HOSPITAL | Age: 31
End: 2021-01-19

## 2021-01-19 NOTE — TELEPHONE ENCOUNTER
----- Message from Kyleigh Guzman MD sent at 1/19/2021 12:33 PM EST -----  Regarding: RE: question  Hi! Not a problem I will communicate with our team here at [place]  I agree that this minor surgery can be performed at [facility] without fetal monitoring  Thanks! Rosalina Betsy  ----- Message -----  From: Severo Alvarez MD  Sent: 1/13/2021  12:33 PM EST  To: Kyleigh Guzman MD  Subject: question                                         Hi Dr Marly Hansen,      I have a patient who is currently 23 weeks pregnant and is about to have surgery at [facility] on February 3rd and February 23rd consecutively for carpal tunnel release, both procedures to be done under local anesthesia by Dr Diony Naqvi  I confirmed with Maternal-Fetal Medicine that this procedure was okay to be performed at [facility] in that there is no indication for fetal monitoring  I just wanted to touch base now to see if you have any additional recommendations and to possibly help us communicate this with Anesthesia team at [facility]? I will forward all this information to her surgeon once we have confirmed      Severo Alvarez MD

## 2021-01-21 ENCOUNTER — TELEMEDICINE (OUTPATIENT)
Dept: PERINATAL CARE | Facility: CLINIC | Age: 31
End: 2021-01-21
Payer: COMMERCIAL

## 2021-01-21 VITALS — WEIGHT: 234 LBS | HEIGHT: 69 IN | BODY MASS INDEX: 34.66 KG/M2

## 2021-01-21 DIAGNOSIS — O99.212 OBESITY AFFECTING PREGNANCY IN SECOND TRIMESTER: ICD-10-CM

## 2021-01-21 DIAGNOSIS — O24.012 PRE-EXISTING TYPE 1 DIABETES MELLITUS DURING PREGNANCY IN SECOND TRIMESTER: Primary | ICD-10-CM

## 2021-01-21 DIAGNOSIS — E10.649 HYPOGLYCEMIA DUE TO TYPE 1 DIABETES MELLITUS (HCC): ICD-10-CM

## 2021-01-21 DIAGNOSIS — Z46.81 INSULIN PUMP TITRATION: ICD-10-CM

## 2021-01-21 PROCEDURE — 99215 OFFICE O/P EST HI 40 MIN: CPT | Performed by: NURSE PRACTITIONER

## 2021-01-21 NOTE — PATIENT INSTRUCTIONS
-Last A1c 4 9% better than goal   -Continue Humalog via Medtronic 530G insulin pump and Dexcom G6   -Try to download insulin pump from home and send in a download every Tuesday  -SMBG fasting, 2 hours post meal, with hypoglycemia and before driving   -Try to follow GDM diet with 3 meals and 3 snacks including recommended combination of carb, protein and fat per meal/snack   -Keep dietitian as scheduled  -Fetal growth ultrasound every 4 weeks or as recommended   -Continue follow up with OB and MFM as recommended   -Follow up in 1 month

## 2021-01-21 NOTE — ASSESSMENT & PLAN NOTE
-Starting weight 212 lbs   -Starting BMI 31 31   -Current weight 234 lbs  -Current BMI 34 56  Keep follow up with dietitian as scheduled

## 2021-01-21 NOTE — ASSESSMENT & PLAN NOTE
-On Humalog via Medtronic 530G basal settings: [email or web address] units/hour; 3 AM@ 2 00 units/hour; 6 AM@ 2 20 units/hour; 9 AM@ 2 70 units/hour; 12:30 PM@ 2 65 units/hour; 4 PM@ 2 65 units/hour and 10 PM@ 1 80 units/hour  Total daily basal 56 8 units  Bolus settings: ICR MN@ 9; 9:30 PM@ 9 5; 5 PM@ 8 5; ISF 35;  and active insulin 3 hours     Lab Results   Component Value Date    HGBA1C 4 9 01/09/2021

## 2021-01-21 NOTE — PROGRESS NOTES
Virtual Regular Visit      Assessment/Plan:    Problem List Items Addressed This Visit        Endocrine    Pre-existing type 1 diabetes mellitus during pregnancy in second trimester - Primary     -On Humalog via Medtronic 530G basal settings: [email or web address] units/hour; 3 AM@ 2 00 units/hour; 6 AM@ 2 20 units/hour; 9 AM@ 2 70 units/hour; 12:30 PM@ 2 65 units/hour; 4 PM@ 2 65 units/hour and 10 PM@ 1 80 units/hour  Total daily basal 56 8 units  Bolus settings: ICR MN@ 9; 9:30 PM@ 9 5; 5 PM@ 8 5; ISF 35;  and active insulin 3 hours  Lab Results   Component Value Date    HGBA1C 4 9 01/09/2021            Hypoglycemia due to type 1 diabetes mellitus ([facility] [address] )       Other    Obesity affecting pregnancy in second trimester     -Starting weight 212 lbs   -Starting BMI 31 31   -Current weight 234 lbs  -Current BMI 34 56  Insulin pump titration    BMI 34 0-34 9,adult        -Last A1c 4 9% better than goal   -Continue Humalog via Medtronic 530G insulin pump and Dexcom G6   -Try to download insulin pump from home and send in a download every Tuesday  -SMBG fasting, 2 hours post meal, with hypoglycemia and before driving   -Try to follow GDM diet with 3 meals and 3 snacks including recommended combination of carb, protein and fat per meal/snack   -Keep dietitian as scheduled  -Fetal growth ultrasound every 4 weeks or as recommended   -Continue follow up with OB and MFM as recommended   -Follow up in 1 month  Pending Medtronic insulin pump download for review for recommendations  No changes to insulin pump settings over the last month  Uses 20% less basal rates during work hours between 5 PM to 11:30 PM   Will need to assess if glucose readings less than 60 are related to basal rates or secondary to bolus settings  Reason for visit is T1DM     Chief Complaint   Patient presents with    Virtual Regular Visit    Diabetes Type 1        Encounter provider Monty Gamboa    Provider located at [place] [address] [facility] [address] [place] [address] [address] [id number] [phone]      Recent Visits  Date Type Provider Dept   01/18/21 Telephone Stephen Reddy, [address]    Showing recent visits within past 7 days and meeting all other requirements     Today's Visits  Date Type Provider Dept   01/21/21 Telemedicine Sol Stark, [facility] today's visits and meeting all other requirements     Future Appointments  No visits were found meeting these conditions  Showing future appointments within next 150 days and meeting all other requirements        The patient was identified by name and date of birth  Darwin Leiva was informed that this is a telemedicine visit and that the visit is being conducted through [employer] and patient was informed that this is a secure, HIPAA-compliant platform  She agrees to proceed     My office door was closed  No one else was in the room  She acknowledged consent and understanding of privacy and security of the video platform  The patient has agreed to participate and understands they can discontinue the visit at any time  Patient is aware this is a billable service  Subjective  Darwin Leiva is a 31 y o  female 24 3/7 weeks gestation, pre-existing T1DM on Humalog via Medtronic 530G and Dexcom G6  Self adjusted basal settings twice and last adjustment done about 1 month ago  During work hours will use temporary basal rates decreased by 20% from 5 PM to 11:30 PM to avoid low glucose readings  Reports eating chips for mid-afternoon snack causing higher glucose readings  Has issues with glucose readings less 60 after getting home from work but only needs 5 to 7 grams of carbohydrates to bring up glucose readings  Prefers to run lower glucose readings overnight and reports CGM does read lower than actual finger stick  SMBG mostly twice a day fasting and in pm after a meal plus if CGM readings less than 60 or above 120  Medtronic pump linked to [employer] account now, no recent download but currently is trying to download uploader for weekly downloads  Will send message if successful for download review  Follow up T1DM  Past Medical History:   Diagnosis Date    Diabetes ([facility] [address] ) 1994    type 1    Diabetes mellitus ([facility] [address] )        Past Surgical History:   Procedure Laterality Date    WISDOM TOOTH EXTRACTION         Current Outpatient Medications   Medication Sig Dispense Refill    acetone, urine, test strip 1 strip by Does not apply route as needed for high blood sugar 25 each 3    aspirin 81 mg chewable tablet Chew 162 mg daily       Continuous Blood Gluc  (Dexcom G6 ) DENIZ Use as directed  1 Device 0    Continuous Blood Gluc Sensor (Dexcom G6 Sensor) MISC 1 box=1 month supply or 3 sensors, use 1 sensor every 10 days  1 each 12    Continuous Blood Gluc Transmit (Dexcom G6 Transmitter) MISC Transmitter change every 90 days  1 each 3    glucose blood test strip 1 each by Other route 6 (six) times a day 8 to 12 times a day  Use as instructed      insulin lispro (HumaLOG) 100 units/mL injection Use up to 200 units via insulin pump daily  60 mL 0    Lancets MISC by Does not apply route      Prenatal Vit-Fe Fumarate-FA (PRENATAL VITAMIN PO) Take 1 tablet by mouth daily      insulin glargine (LANTUS) 100 units/mL subcutaneous injection Inject 10 Units under the skin       No current facility-administered medications for this visit  No Known Allergies    Review of Systems   Constitutional: Positive for fatigue  Negative for fever  Eyes: Negative for visual disturbance  Respiratory: Negative for cough and shortness of breath  Cardiovascular: Negative for chest pain, palpitations and leg swelling  Gastrointestinal: Negative for constipation, nausea and vomiting  Endocrine: Negative for polydipsia, polyphagia and polyuria  Genitourinary: Negative for difficulty urinating and vaginal bleeding  Neurological: Negative for headaches  Carpal tunnel syndrome and will be having surgery  Psychiatric/Behavioral: Positive for sleep disturbance  Video Exam  Refer to attached Dexcom report  No Medtronic download available at this time for adjustments  Vitals:    01/21/21 1034   Weight: 106 kg (234 lb)   Height: 5' 9" (1 753 m)       Physical Exam  Constitutional:       Appearance: She is obese  HENT:      Head: Normocephalic  Nose: Nose normal    Eyes:      Conjunctiva/sclera: Conjunctivae normal    Neck:      Musculoskeletal: Normal range of motion  Pulmonary:      Effort: Pulmonary effort is normal    Neurological:      Mental Status: She is alert and oriented to person, place, and time  Psychiatric:         Mood and Affect: Mood normal          Behavior: Behavior normal          Thought Content: Thought content normal          Judgment: Judgment normal           I spent 55 minutes with patient today in which greater than 50% of the time was spent in counseling/coordination of care regarding 10 minutes  VIRTUAL VISIT DISCLAIMER    Grant Trevino acknowledges that she has consented to an online visit or consultation  She understands that the online visit is based solely on information provided by her, and that, in the absence of a face-to-face physical evaluation by the physician, the diagnosis she receives is both limited and provisional in terms of accuracy and completeness  This is not intended to replace a full medical face-to-face evaluation by the physician  Grant Trevino understands and accepts these terms

## 2021-01-26 ENCOUNTER — TELEPHONE (OUTPATIENT)
Dept: PERINATAL CARE | Facility: CLINIC | Age: 31
End: 2021-01-26

## 2021-01-26 NOTE — TELEPHONE ENCOUNTER
A voicemail message was left for patient to call [facility]  at [phone] for assistance if having difficulty connecting to 11:00 AM telemedicine appointment today  Patient was a no show for today's appointment  An in-basket message was sent to  to try and reschedule patient  A [employer] message was also sent to patient requesting her to download her Medtronic 530 G insulin pump  Noted CGM report which was discussed with MARCO Baig

## 2021-01-27 ENCOUNTER — TELEPHONE (OUTPATIENT)
Dept: PERINATAL CARE | Facility: CLINIC | Age: 31
End: 2021-01-27

## 2021-01-27 NOTE — TELEPHONE ENCOUNTER
Called patient to reschedule VIRTUAL follow up appointment, per staff message:    Petros Butt [address] [facility]; [facility]   Please try to reschedule Antoinette for 1 hour appointment in the next week or so  She was a no show for today's appointment  She can be scheduled with a dietitian,        Left voicemail request patient to call back to schedule at [phone]

## 2021-02-03 ENCOUNTER — HOSPITAL ENCOUNTER (OUTPATIENT)
Facility: HOSPITAL | Age: 31
Setting detail: OUTPATIENT SURGERY
Discharge: HOME | End: 2021-02-03
Attending: ORTHOPAEDIC SURGERY | Admitting: ORTHOPAEDIC SURGERY
Payer: COMMERCIAL

## 2021-02-03 VITALS
OXYGEN SATURATION: 98 % | TEMPERATURE: 97.9 F | RESPIRATION RATE: 20 BRPM | BODY MASS INDEX: 34.66 KG/M2 | HEART RATE: 77 BPM | HEIGHT: 69 IN | WEIGHT: 234 LBS | SYSTOLIC BLOOD PRESSURE: 107 MMHG | DIASTOLIC BLOOD PRESSURE: 57 MMHG

## 2021-02-03 DIAGNOSIS — G56.03 CARPAL TUNNEL SYNDROME, BILATERAL: Primary | ICD-10-CM

## 2021-02-03 LAB — GLUCOSE SERPL-MCNC: 119 MG/DL (ref 65–140)

## 2021-02-03 PROCEDURE — 29848 WRIST ENDOSCOPY/SURGERY: CPT | Performed by: ORTHOPAEDIC SURGERY

## 2021-02-03 PROCEDURE — 82948 REAGENT STRIP/BLOOD GLUCOSE: CPT

## 2021-02-03 PROCEDURE — NC001 PR NO CHARGE: Performed by: ORTHOPAEDIC SURGERY

## 2021-02-03 RX ORDER — NAPROXEN SODIUM 220 MG
220 TABLET ORAL 2 TIMES DAILY WITH MEALS
Qty: 10 TABLET | Refills: 0 | Status: SHIPPED | OUTPATIENT
Start: 2021-02-03 | End: 2021-02-11 | Stop reason: ALTCHOICE

## 2021-02-03 RX ORDER — SENNOSIDES 8.6 MG
650 CAPSULE ORAL EVERY 8 HOURS
Qty: 15 TABLET | Refills: 0 | Status: SHIPPED | OUTPATIENT
Start: 2021-02-03 | End: 2021-02-11 | Stop reason: ALTCHOICE

## 2021-02-03 RX ORDER — MAGNESIUM HYDROXIDE 1200 MG/15ML
LIQUID ORAL AS NEEDED
Status: DISCONTINUED | OUTPATIENT
Start: 2021-02-03 | End: 2021-02-03 | Stop reason: HOSPADM

## 2021-02-03 RX ORDER — LIDOCAINE HYDROCHLORIDE AND EPINEPHRINE 10; 10 MG/ML; UG/ML
20 INJECTION, SOLUTION INFILTRATION; PERINEURAL ONCE
Status: DISCONTINUED | OUTPATIENT
Start: 2021-02-03 | End: 2021-02-03 | Stop reason: HOSPADM

## 2021-02-03 RX ADMIN — SODIUM BICARBONATE: 84 INJECTION, SOLUTION INTRAVENOUS at 10:04

## 2021-02-03 NOTE — H&P (VIEW-ONLY)
H&P Exam - Orthopedics   Charisse Powell 31 y o  female MRN: [id number]  Unit/Bed#: [facility] 09    Assessment/Plan   Assessment:  Bilateral carpal tunnel syndrome  Plan:  Staged carpal tunnel release right then left    History of Present Illness   HPI:  Charisse Powell is a 31 y o  female who presents with bilateral carpal tunnel syndrome with numbness      Historical Information  Review Of Systems:   · Skin: Normal  · Neuro: See HPI  · Musculoskeletal: See HPI  · 14 point review of systems negative except as stated above     Past Medical History:   Past Medical History:   Diagnosis Date    Diabetes ([facility] ) 1994    type 1    Diabetes mellitus ([facility] )        Past Surgical History:   Past Surgical History:   Procedure Laterality Date    WISDOM TOOTH EXTRACTION         Family History:  Family history reviewed and non-contributory  Family History   Problem Relation Age of Onset    Hypertension Father     Other Father         bladder cancer    No Known Problems Sister     No Known Problems Brother     No Known Problems Son     Diabetes type I Maternal Grandfather     Breast cancer Other     Ovarian cancer Neg Hx        Social History:  Social History     Socioeconomic History    Marital status: /Civil Union     Spouse name: Not on file    Number of children: Not on file    Years of education: Not on file    Highest education level: Not on file   Occupational History    Not on file   Social Needs    Financial resource strain: Not on file    Food insecurity     Worry: Not on file     Inability: Not on file   [employer] needs     Medical: Not on file     Non-medical: Not on file   Tobacco Use    Smoking status: Never Smoker    Smokeless tobacco: Never Used   Substance and Sexual Activity    Alcohol use: Not Currently    Drug use: Never    Sexual activity: Yes     Partners: Male   Lifestyle    Physical activity     Days per week: Not on file     Minutes per session: Not on file    Stress: Not on file Relationships    Social connections     Talks on phone: Not on file     Gets together: Not on file     Attends [employer] service: Not on file     Active member of club or organization: Not on file     Attends meetings of clubs or organizations: Not on file     Relationship status: Not on file    Intimate partner violence     Fear of current or ex partner: Not on file     Emotionally abused: Not on file     Physically abused: Not on file     Forced sexual activity: Not on file   Other Topics Concern    Not on file   Social History Narrative    Not on file       Allergies:   No Known Allergies        Labs:  0   Lab Value Date/Time    HCT 37 4 10/17/2020 1137    HGB 12 5 10/17/2020 1137    WBC 6 73 10/17/2020 1137       Meds:    Current Facility-Administered Medications:     lidocaine-epinephrine (XYLOCAINE/EPINEPHRINE) 1 %-1:100,000 20 mL, sodium bicarbonate 2 mEq infiltration, , Infiltration, Once, Soha Carrasquillo MD    lidocaine-epinephrine (XYLOCAINE/EPINEPHRINE) 1 %-1:100,000 injection 20 mL, 20 mL, Infiltration, Once, Soha Carrasquillo MD    Blood Culture:   No results found for: BLOODCX    Wound Culture:   No results found for: WOUNDCULT    Ins and Outs:  No intake/output data recorded  Physical Exam  /81   Pulse 100   Temp 98 7 °F (37 1 °C) (Tympanic)   Resp 18   Ht 5' 9" (1 753 m)   Wt 106 kg (234 lb)   LMP 08/03/2020 (Exact Date)   SpO2 98%   BMI 34 56 kg/m²   /81   Pulse 100   Temp 98 7 °F (37 1 °C) (Tympanic)   Resp 18   Ht 5' 9" (1 753 m)   Wt 106 kg (234 lb)   LMP 08/03/2020 (Exact Date)   SpO2 98%   BMI 34 56 kg/m²   Gen: Alert and oriented to person, place, time  HEENT: EOMI, eyes clear, moist mucus membranes, hearing intact  Respiratory: Bilateral chest rise   No audible wheezing found  Cardiovascular: Regular Rate and Rhythm  Abdomen: soft nontender/nondistended  Ortho Exam: + tinel's, + carpal tunnel compression, decreased sensation median nerve bilaterally  Neuro Exam: ulnar and radial nerve intact    Lab Results: Reviewed  Imaging: Reviewed

## 2021-02-03 NOTE — H&P
H&P Exam - Orthopedics   Leroy Brewster 31 y o  female MRN: [id number]  Unit/Bed#: [facility] 09    Assessment/Plan   Assessment:  Bilateral carpal tunnel syndrome  Plan:  Staged carpal tunnel release right then left    History of Present Illness   HPI:  Leroy Brewster is a 31 y o  female who presents with bilateral carpal tunnel syndrome with numbness      Historical Information  Review Of Systems:   · Skin: Normal  · Neuro: See HPI  · Musculoskeletal: See HPI  · 14 point review of systems negative except as stated above     Past Medical History:   Past Medical History:   Diagnosis Date    Diabetes ([facility] ) 1994    type 1    Diabetes mellitus ([facility] )        Past Surgical History:   Past Surgical History:   Procedure Laterality Date    WISDOM TOOTH EXTRACTION         Family History:  Family history reviewed and non-contributory  Family History   Problem Relation Age of Onset    Hypertension Father     Other Father         bladder cancer    No Known Problems Sister     No Known Problems Brother     No Known Problems Son     Diabetes type I Maternal Grandfather     Breast cancer Other     Ovarian cancer Neg Hx        Social History:  Social History     Socioeconomic History    Marital status: /Civil Union     Spouse name: Not on file    Number of children: Not on file    Years of education: Not on file    Highest education level: Not on file   Occupational History    Not on file   Social Needs    Financial resource strain: Not on file    Food insecurity     Worry: Not on file     Inability: Not on file   [employer] needs     Medical: Not on file     Non-medical: Not on file   Tobacco Use    Smoking status: Never Smoker    Smokeless tobacco: Never Used   Substance and Sexual Activity    Alcohol use: Not Currently    Drug use: Never    Sexual activity: Yes     Partners: Male   Lifestyle    Physical activity     Days per week: Not on file     Minutes per session: Not on file    Stress: Not on file Relationships    Social connections     Talks on phone: Not on file     Gets together: Not on file     Attends Ep[employer] service: Not on file     Active member of club or organization: Not on file     Attends meetings of clubs or organizations: Not on file     Relationship status: Not on file    Intimate partner violence     Fear of current or ex partner: Not on file     Emotionally abused: Not on file     Physically abused: Not on file     Forced sexual activity: Not on file   Other Topics Concern    Not on file   Social History Narrative    Not on file       Allergies:   No Known Allergies        Labs:  0   Lab Value Date/Time    HCT 37 4 10/17/2020 1137    HGB 12 5 10/17/2020 1137    WBC 6 73 10/17/2020 1137       Meds:    Current Facility-Administered Medications:     lidocaine-epinephrine (XYLOCAINE/EPINEPHRINE) 1 %-1:100,000 20 mL, sodium bicarbonate 2 mEq infiltration, , Infiltration, Once, Chyna Box MD    lidocaine-epinephrine (XYLOCAINE/EPINEPHRINE) 1 %-1:100,000 injection 20 mL, 20 mL, Infiltration, Once, Chyna Box MD    Blood Culture:   No results found for: BLOODCX    Wound Culture:   No results found for: WOUNDCULT    Ins and Outs:  No intake/output data recorded  Physical Exam  /81   Pulse 100   Temp 98 7 °F (37 1 °C) (Tympanic)   Resp 18   Ht 5' 9" (1 753 m)   Wt 106 kg (234 lb)   LMP 08/03/2020 (Exact Date)   SpO2 98%   BMI 34 56 kg/m²   /81   Pulse 100   Temp 98 7 °F (37 1 °C) (Tympanic)   Resp 18   Ht 5' 9" (1 753 m)   Wt 106 kg (234 lb)   LMP 08/03/2020 (Exact Date)   SpO2 98%   BMI 34 56 kg/m²   Gen: Alert and oriented to person, place, time  HEENT: EOMI, eyes clear, moist mucus membranes, hearing intact  Respiratory: Bilateral chest rise   No audible wheezing found  Cardiovascular: Regular Rate and Rhythm  Abdomen: soft nontender/nondistended  Ortho Exam: + tinel's, + carpal tunnel compression, decreased sensation median nerve bilaterally  Neuro Exam: ulnar and radial nerve intact    Lab Results: Reviewed  Imaging: Reviewed

## 2021-02-03 NOTE — DISCHARGE INSTRUCTIONS
Post Operative Instructions    You have had surgery on your arm today, please read and follow the information below:  · Elevate your hand above your elbow during the next 24-48 hours to help with swelling  · Place your hand and arm over your head with motion at your shoulder three times a day  · Do not apply any cream/ointment/oil to your incisions including antibiotics  · Do not soak your hands in standing water (dishwater, tubs, Jacuzzi's, pools, etc ) until given permission (typically 2-3 weeks after injury)    Call the office if you notice any:  · Increased numbness or tingling of your hand or fingers that is not relieved with elevation  · Increasing pain that is not controlled with medication  · Difficulty chewing, breathing, swallowing  · Chest pains or shortness of breath  · Fever over 101 4 degrees  Bandage: Remove bandage after 5 days  Motion: Move fingers into a fist 5 times a day, DO NOT move any splinted fingers  Weight bearing status: Avoid heavy lifting (>5 pounds) with the extremity that was operated on until follow up appointment  Normal activities of daily living are OK  Ice: Ice for 10 minutes every hour as needed for swelling x 24 hours  Sling: No sling necessary  Medications:   Naproxen 220 mg two times a day   Tylenol Extended Release 650 mg every 8 hours     Follow-up Appointment: 7-10 days  Please call the office if you have any questions or concerns regarding your post-operative care

## 2021-02-03 NOTE — OP NOTE
OPERATIVE REPORT  PATIENT NAME: Mago Márquez  :  1990  MRN: [id number]  Pt Location: [facility]    SURGERY DATE: 21    Surgeon(s) and Role:     * Darcy Carpenter MD - Primary    Pre-Op Diagnosis:  Carpal tunnel syndrome, bilateral [G56 03]    Post-Op Diagnosis:   Carpal tunnel syndrome, bilateral [G56 03]    Procedure(s) (LRB):  RELEASE CARPAL TUNNEL ENDOSCOPIC (Right)    Specimen(s):  * No orders in the log *    Estimated Blood Loss:   Minimal      Anesthesia Type:   Local    Operative Indications: The patient has a history of Carpal Tunnel Syndrome  right that was recalcitrant to conservative management  The decision was made to bring the patient to the operating room for Endoscopic Carpal Tunnel Release  right  Risks of the procedure were explained which include, but are not limited to bleeding; infection; damage to nerves, arteries,veins, tendons; scar; pain; need for reoperation; failure to give desired result; and risks of anaesthesia  All questions were answered to satisfaction and they were willing to proceed  Operative Findings:  Right carpal tunnel syndrome    Complications:   None    Procedure and Technique:  After the patient, site, and procedure were identified, the patient was brought into the operating room in a supine position  Local anaesthesia was adminstered in the preoperative holding area  A tourniquet was not used  The  right upper extremity was then prepped and drapped in a normal, sterile, orthopedic fashion  After reconfirmation of the patient, site, and surgical procedure, which was agreed upon by the entire surgical team, attention was turned to the right wrist   The sites of the proximal and distal incisions were marked    The emily of the proximal incision was placed horizontally at the midline of the wrist   The distal incision emily was longitudinal extending distally from the point of intersection of the line between the long finger and ring finger and the line along the distal border of the fully abducted thumb  The proximal incision was performed  Subcutaneous tissues were dissected  Then the transverse volar antebrachial fascia was perforated with a scalpel  The edges of the skin incision where retracted and the forearm fascia was incised for approximately 1 5 cm proximally with care taken to identify and protect the median nerve  Retractors were used to inspect the transverse carpal ligament distally  A curved Raymond dissector was used to glide under the transverse carpal ligament and superficial to the median nerve with confirmation via the washboard feeling  Then the curved Raymond was pushed into the palm toward the distal incision site  When the location of the distal skin emily was adequate, the distal incision was made  Then with retraction of the skin, further dissection and perforation of the palmar fascia was performed with the use of tenotomy scissors  The curved Raymond was guided from proximal to distal out the distal incisions without any twisting to allow for dilation of the tract  The curved Raymond was removed, and the cannula for the camera was inserted along the same tract, making sure to keep the alignment post on the cannula perpendicular to the plane of the hand without twisting  Then while keeping the wrist in extension, and holding the cannula of the camera in place, the wrist was placed on the hyperextension board  The scope was inserted distally, and a cotton-tip applicator was used proximally to clean the tract as well as the scope  A curved cutting knife was introduced from proximal to distal while keeping visualization with the use of the camera  Without twisting of the canula, the knife was used to cut the transverse carpal ligament completely, making sure there were no remnant fibers  Then after this was accomplished, the hand was removed from the extension block    Three maneuvers were used to confirm the full release of the transverse carpal ligament  First, the ease of twisting the trocar of the camera confirmed the release of the ligament  Second, the curved Raymond was introduced to make sure there were no remnant fibers that could be felt palmarly  Third, the scope was introduced again to visualize that the whole ligament was released proximally to distally  Additional confirmation of full release included retraction and inspection in the distal and proximal incisions to make sure there were no remnant fibers distally or proximally respectively  At the completion of the procedure, hemostasis was obtained with cautery and direct pressure  The wounds were copiously irrigated with sterile solution  The wounds were closed with Prolene  Sterile dressings were applied, including Xeroform, gauze, tweeners, webril, ACE  Please note, all sponge, needle, and instrument counts were correct prior to closure  Loupe magnification was utilized  The patient tolerated the procedure well       I was present for the entire procedure and A qualified resident physician was not available    Patient Disposition:  [facility] and hemodynamically stable    SIGNATURE: Sonia Matute MD  DATE: 02/03/21  TIME: 11:00 AM

## 2021-02-08 ENCOUNTER — ROUTINE PRENATAL (OUTPATIENT)
Dept: OBGYN CLINIC | Facility: CLINIC | Age: 31
End: 2021-02-08

## 2021-02-08 VITALS
TEMPERATURE: 97.5 F | DIASTOLIC BLOOD PRESSURE: 70 MMHG | SYSTOLIC BLOOD PRESSURE: 120 MMHG | BODY MASS INDEX: 35.21 KG/M2 | WEIGHT: 238.4 LBS | HEART RATE: 91 BPM

## 2021-02-08 DIAGNOSIS — O24.013 PRE-EXISTING TYPE 1 DIABETES MELLITUS DURING PREGNANCY IN THIRD TRIMESTER: ICD-10-CM

## 2021-02-08 DIAGNOSIS — O09.93 HIGH-RISK PREGNANCY IN THIRD TRIMESTER: Primary | ICD-10-CM

## 2021-02-08 DIAGNOSIS — Z3A.27 27 WEEKS GESTATION OF PREGNANCY: ICD-10-CM

## 2021-02-08 DIAGNOSIS — O99.212 OBESITY AFFECTING PREGNANCY IN SECOND TRIMESTER: ICD-10-CM

## 2021-02-08 LAB
SL AMB  POCT GLUCOSE, UA: NEGATIVE
SL AMB POCT URINE PROTEIN: NEGATIVE

## 2021-02-08 PROCEDURE — PNV: Performed by: CLINICAL NURSE SPECIALIST

## 2021-02-08 NOTE — ASSESSMENT & PLAN NOTE
Doing well and at goal r/t BG values  Adjust insulin pump as needed  Still need to download pump dose so MFM can see

## 2021-02-08 NOTE — ASSESSMENT & PLAN NOTE
Weight gain again creeping up- 28 Lbs so far  80 lb weight gain with last pregnancy   She is taking LDA for pre- e ppx

## 2021-02-08 NOTE — ASSESSMENT & PLAN NOTE
27w0d  Doing well in general   Healing well from recent carpal tunnel surgery  Still needs 3rd trimester labs- encouraged to do before next appt  Reviewed rationale for tdap vaccine in 3rd trimester and she did not received Tdap vaccine today  Deferred to next visit     Warning signs in 3rd trimester of pregnancy reviewed and advised patient to notify provider immediately if she experiences any of the following:   · Fever  · vaginal bleeding  · baby moving less than normal or not at all  · nausea, vomiting, diarrhea  lasting more than a day  · S/S pre-eclampsia-severe headache, changes in vision, such as seeing spots or double vision, increased swelling in face or hands, abdominal pain, or weight gain more than 5 pounds in one week

## 2021-02-08 NOTE — PROGRESS NOTES
Prenatal Visit  Subjective:   Hussain Young is a 31 y o  [id number] 27w0d here for Routine Prenatal Visit (Declines FLU vaccine and due for BW  Patient states no problems  )    Patient denies contractions or vaginal bleeding  Also denies LOF or unusual vaginal discharge  She describes the fetus as active        Has not completed 3rd trimester lab work   Type 1 IDDM- appears overdue to download pump/send BG in  When discussing with pt she reports she was able to share glucose readings w/ NP with MFM and they are mostly normal- on the lower side if anything  She does however state she is having a software issue b/t insulin pump and computer to be able to download pump readings  Scheduled for Fetal growth US later this week with  center      She recently had Right Carpal tunnel release surg on 2/3 and is scheduled for Left side   Reports she is doing well and denies complications    Objective:  Pregravid Weight/BMI: 95 3 kg (210 lb) (BMI 31 00)  Current Weight: 108 kg (238 lb 6 4 oz)   Total Weight Gain: 12 9 kg (28 lb 6 4 oz)   Pre- Vitals      Most Recent Value   Prenatal Assessment   Fetal Heart Rate  155   Fundal Height (cm)  28 cm   Movement  Present   Prenatal Vitals   Blood Pressure  120/70   Weight - Scale  108 kg (238 lb 6 4 oz)   Urine Albumin/Glucose   Dilation/Effacement/Station   Vaginal Drainage   Edema         Physical Exam  Constitutional:       General: She is not in acute distress  Appearance: Normal appearance  Genitourinary:      Genitourinary Comments: Pelvic exam deferred     Cardiovascular:      Rate and Rhythm: Normal rate  Pulmonary:      Effort: Pulmonary effort is normal    Abdominal:      Palpations: Abdomen is soft  Comments: Gravid abd  Fundal Height (cm): 28 cm  Fetal Heart Rate: 155   Musculoskeletal: Normal range of motion  Neurological:      Mental Status: She is alert and oriented to person, place, and time  Skin:     General: Skin is warm and dry  Comments: Right wrist/hand -inner side with bandage noted  No bruising or erythema noted  Psychiatric:         Mood and Affect: Mood normal          Behavior: Behavior normal          Assessment & Plan:    1  High-risk pregnancy in third trimester  Assessment & Plan:  27w0d  Doing well in general   Healing well from recent carpal tunnel surgery  Still needs 3rd trimester labs- encouraged to do before next appt  Reviewed rationale for tdap vaccine in 3rd trimester and she did not received Tdap vaccine today  Deferred to next visit  Warning signs in 3rd trimester of pregnancy reviewed and advised patient to notify provider immediately if she experiences any of the following:   · Fever  · vaginal bleeding  · baby moving less than normal or not at all  · nausea, vomiting, diarrhea  lasting more than a day  · S/S pre-eclampsia-severe headache, changes in vision, such as seeing spots or double vision, increased swelling in face or hands, abdominal pain, or weight gain more than 5 pounds in one week      2  Pre-existing type 1 diabetes mellitus during pregnancy in third trimester  Assessment & Plan:  Doing well and at goal r/t BG values  Adjust insulin pump as needed  Still need to download pump dose so MFM can see           3  Obesity affecting pregnancy in second trimester  Assessment & Plan:  Weight gain again creeping up- 28 Lbs so far  80 lb weight gain with last pregnancy   She is taking LDA for pre- e ppx      4  27 weeks gestation of pregnancy  -     POCT urine dip      MARCO Spencer  2/8/2021

## 2021-02-10 ENCOUNTER — OFFICE VISIT (OUTPATIENT)
Dept: OBGYN CLINIC | Facility: HOSPITAL | Age: 31
End: 2021-02-10

## 2021-02-10 ENCOUNTER — TELEPHONE (OUTPATIENT)
Dept: PERINATAL CARE | Facility: CLINIC | Age: 31
End: 2021-02-10

## 2021-02-10 VITALS
HEIGHT: 69 IN | BODY MASS INDEX: 35.25 KG/M2 | HEART RATE: 91 BPM | DIASTOLIC BLOOD PRESSURE: 75 MMHG | SYSTOLIC BLOOD PRESSURE: 133 MMHG | WEIGHT: 238 LBS

## 2021-02-10 DIAGNOSIS — G56.03 CARPAL TUNNEL SYNDROME, BILATERAL: ICD-10-CM

## 2021-02-10 DIAGNOSIS — Z47.89 AFTERCARE FOLLOWING SURGERY OF THE MUSCULOSKELETAL SYSTEM: Primary | ICD-10-CM

## 2021-02-10 PROCEDURE — 99024 POSTOP FOLLOW-UP VISIT: CPT | Performed by: ORTHOPAEDIC SURGERY

## 2021-02-10 NOTE — LETTER
February 10, 2021     Patient: Yusuf Price   YOB: 1990   Date of Visit: 2/10/2021       To Whom it May Concern:    Yusuf Price is under my professional care  She was seen in my office on 2/10/2021  She is not cleared to return to work at this time  She is scheduled for surgery on 2/23/2021  We will see her 2 weeks after the surgery for follow up and make further recommendations  If you have any questions or concerns, please don't hesitate to call           Sincerely,          Alberto Dorantes MD        CC: No Recipients

## 2021-02-10 NOTE — TELEPHONE ENCOUNTER
Spoke with patient and confirmed appointment with [facility]  1 support person ( must be over age of 15) may accompany patient  Will you and your support person be able to wear a mask ,without a valve , during entire appointment? YES   To minimize your exposure in our waiting area,check in and rooming questions will be done via phone  When you arrive in the parking lot please call the following inside line # prior to entering office:    Naun Cabrera: [phone]    Have you or your support person traveled outside the state in the last 2 weeks? NO  If yes, what state did you travel to? Do you or your support person have:  Fever or flu- like symptoms? NO  Symptoms of upper respiratory infection like runny nose, sore throat or cough? NO  Do you have new headache that you have not had in the past?NO  Have you experienced any new shortness of breath recently? NO  Do you have any new loss of taste or smell? NO  Do you have any new diarrhea, nausea or vomiting? NO  Have you recently been in contact with anyone who has been sick or diagnosed with COVID-19 infection? NO  Have you been recommended to quarantine because of an exposure to a confirmed positive COVID19 person? NO  Have you recently been tested for COVID19? NO    Patient verbalized understanding of all instructions   -------------------------------------------------------------

## 2021-02-10 NOTE — PROGRESS NOTES
Assessment:   S/P Release Carpal Tunnel Endoscopic - Right on 2/3/2021    Plan:   Resume activities as tolerated, therapy and scar tissue massage  - patient saw On site hand therapist for directed management     patient is scheduled for carpal tunnel surgery on the left side    Follow Up: After Surgery    To Do Next Visit:  Sutures out      CHIEF COMPLAINT:  Chief Complaint   Patient presents with    Right Wrist - Post-op, Suture / Staple Removal         SUBJECTIVE:  Niurka Chin is a 31 y o  female who presents for follow up after Release Carpal Tunnel Endoscopic - Right on 2/3/2021  Today patient has  Some mild swelling and discomfort  Patient states that the nighttime pain has improved  The numbness and tingling has mostly resolved  She does have some slight tingling in the fingertips  She denies any other acute complaints          PHYSICAL EXAMINATION:  Vital signs: /75   Pulse 91   Ht 5' 9" (1 753 m)   Wt 108 kg (238 lb)   LMP 08/03/2020 (Exact Date)   BMI 35 15 kg/m²   General: well developed and well nourished, alert, oriented times 3 and appears comfortable  Psychiatric: Normal    MUSCULOSKELETAL EXAMINATION:  Incision: Clean, dry, intact  Range of Motion: Limited due to stiffness  Neurovascular status: Neuro intact, good cap refill;   Palmar cutaneous nerve intact  Activity Restrictions: No restrictions  Done today: Sutures out and Steri strips applied      STUDIES REVIEWED:  No Studies to review      PROCEDURES PERFORMED:  Procedures  No Procedures performed today

## 2021-02-11 ENCOUNTER — ULTRASOUND (OUTPATIENT)
Dept: PERINATAL CARE | Facility: OTHER | Age: 31
End: 2021-02-11
Payer: COMMERCIAL

## 2021-02-11 VITALS
SYSTOLIC BLOOD PRESSURE: 134 MMHG | DIASTOLIC BLOOD PRESSURE: 81 MMHG | WEIGHT: 238.9 LBS | BODY MASS INDEX: 35.38 KG/M2 | HEART RATE: 65 BPM | HEIGHT: 69 IN

## 2021-02-11 DIAGNOSIS — O99.212 OBESITY AFFECTING PREGNANCY IN SECOND TRIMESTER: ICD-10-CM

## 2021-02-11 DIAGNOSIS — O24.013 PRE-EXISTING TYPE 1 DIABETES MELLITUS DURING PREGNANCY IN THIRD TRIMESTER: Primary | ICD-10-CM

## 2021-02-11 DIAGNOSIS — Z3A.27 27 WEEKS GESTATION OF PREGNANCY: ICD-10-CM

## 2021-02-11 PROCEDURE — 76816 OB US FOLLOW-UP PER FETUS: CPT | Performed by: OBSTETRICS & GYNECOLOGY

## 2021-02-11 NOTE — PROGRESS NOTES
Antoinette Hernandez has no complaints today  She reports regular fetal movements and does not report any problems  Problem list:  1   type 1 diabetic managed on insulin pump  Last hemoglobin A1c 4 9 on January 9th  2   maternal dermoid    Ultrasound findings: The ultrasound today shows normal interval fetal growth and fluid  We did not review her dermoid today  Pregnancy ultrasound has limitations and is unable to detect all forms of fetal congenital abnormalities  The inaccuracy in the EFW can be off by 1 lb either way in the third trimester  Recommend a follow-up ultrasound for growth in 4 weeks and will start twice weekly NSTs and weekly VINOD in 5 weeks        Jennifer Plata MD

## 2021-02-11 NOTE — LETTER
February 11, 2021     MD Emilio Dominguez [id number]  [address] [facility]    Patient: Hoa Murray   YOB: 1990   Date of Visit: 2/11/2021       Dear Dr Nunu Hart: Thank you for referring Hoa Murray to me for evaluation  Below are my notes for this consultation  If you have questions, please do not hesitate to call me  I look forward to following your patient along with you  Sincerely,        Bianca Benjamin MD        CC: No Recipients  Bianca Benjamin MD  2/11/2021  2:35 PM  Sign when Signing Visit  Hoa Murray has no complaints today  She reports regular fetal movements and does not report any problems  Problem list:  1   type 1 diabetic managed on insulin pump  Last hemoglobin A1c 4 9 on January 9th  2   maternal dermoid    Ultrasound findings: The ultrasound today shows normal interval fetal growth and fluid  We did not review her dermoid today  Pregnancy ultrasound has limitations and is unable to detect all forms of fetal congenital abnormalities  The inaccuracy in the EFW can be off by 1 lb either way in the third trimester  Recommend a follow-up ultrasound for growth in 4 weeks and will start twice weekly NSTs and weekly VINOD in 5 weeks        Bianca Benjamin MD

## 2021-02-16 ENCOUNTER — TELEPHONE (OUTPATIENT)
Dept: OBGYN CLINIC | Facility: HOSPITAL | Age: 31
End: 2021-02-16

## 2021-02-16 NOTE — TELEPHONE ENCOUNTER
DR Eddi Hong  Re: received paperwork?      Patient called stating "attending physician statement" was faxed over multiple times to our office    Patient would like to confirm our disability team received this paper work

## 2021-02-20 ENCOUNTER — LAB (OUTPATIENT)
Dept: LAB | Facility: MEDICAL CENTER | Age: 31
End: 2021-02-20
Payer: COMMERCIAL

## 2021-02-20 ENCOUNTER — CLINICAL SUPPORT (OUTPATIENT)
Dept: URGENT CARE | Facility: MEDICAL CENTER | Age: 31
End: 2021-02-20
Payer: COMMERCIAL

## 2021-02-20 DIAGNOSIS — O24.012 PRE-EXISTING TYPE 1 DIABETES MELLITUS DURING PREGNANCY IN SECOND TRIMESTER: ICD-10-CM

## 2021-02-20 DIAGNOSIS — Z3A.23 23 WEEKS GESTATION OF PREGNANCY: ICD-10-CM

## 2021-02-20 LAB
ATRIAL RATE: 77 BPM
BASOPHILS # BLD AUTO: 0.04 THOUSANDS/ΜL (ref 0–0.1)
BASOPHILS NFR BLD AUTO: 0 % (ref 0–1)
EOSINOPHIL # BLD AUTO: 0.23 THOUSAND/ΜL (ref 0–0.61)
EOSINOPHIL NFR BLD AUTO: 2 % (ref 0–6)
ERYTHROCYTE [DISTWIDTH] IN BLOOD BY AUTOMATED COUNT: 12 % (ref 11.6–15.1)
EST. AVERAGE GLUCOSE BLD GHB EST-MCNC: 100 MG/DL
HBA1C MFR BLD: 5.1 %
HCT VFR BLD AUTO: 33.1 % (ref 34.8–46.1)
HGB BLD-MCNC: 10.8 G/DL (ref 11.5–15.4)
IMM GRANULOCYTES # BLD AUTO: 0.16 THOUSAND/UL (ref 0–0.2)
IMM GRANULOCYTES NFR BLD AUTO: 1 % (ref 0–2)
LYMPHOCYTES # BLD AUTO: 1.65 THOUSANDS/ΜL (ref 0.6–4.47)
LYMPHOCYTES NFR BLD AUTO: 11 % (ref 14–44)
MCH RBC QN AUTO: 28.6 PG (ref 26.8–34.3)
MCHC RBC AUTO-ENTMCNC: 32.6 G/DL (ref 31.4–37.4)
MCV RBC AUTO: 88 FL (ref 82–98)
MONOCYTES # BLD AUTO: 0.85 THOUSAND/ΜL (ref 0.17–1.22)
MONOCYTES NFR BLD AUTO: 6 % (ref 4–12)
NEUTROPHILS # BLD AUTO: 11.73 THOUSANDS/ΜL (ref 1.85–7.62)
NEUTS SEG NFR BLD AUTO: 80 % (ref 43–75)
NRBC BLD AUTO-RTO: 0 /100 WBCS
P AXIS: 27 DEGREES
PLATELET # BLD AUTO: 258 THOUSANDS/UL (ref 149–390)
PMV BLD AUTO: 11.4 FL (ref 8.9–12.7)
PR INTERVAL: 126 MS
QRS AXIS: 62 DEGREES
QRSD INTERVAL: 78 MS
QT INTERVAL: 386 MS
QTC INTERVAL: 436 MS
RBC # BLD AUTO: 3.77 MILLION/UL (ref 3.81–5.12)
T WAVE AXIS: 24 DEGREES
VENTRICULAR RATE: 77 BPM
WBC # BLD AUTO: 14.66 THOUSAND/UL (ref 4.31–10.16)

## 2021-02-20 PROCEDURE — 93005 ELECTROCARDIOGRAM TRACING: CPT

## 2021-02-20 PROCEDURE — 86592 SYPHILIS TEST NON-TREP QUAL: CPT

## 2021-02-20 PROCEDURE — 85025 COMPLETE CBC W/AUTO DIFF WBC: CPT

## 2021-02-20 PROCEDURE — 93010 ELECTROCARDIOGRAM REPORT: CPT | Performed by: INTERNAL MEDICINE

## 2021-02-20 PROCEDURE — 36415 COLL VENOUS BLD VENIPUNCTURE: CPT

## 2021-02-20 PROCEDURE — 83036 HEMOGLOBIN GLYCOSYLATED A1C: CPT

## 2021-02-21 PROBLEM — O99.213 OBESITY AFFECTING PREGNANCY IN THIRD TRIMESTER: Status: ACTIVE | Noted: 2020-10-12

## 2021-02-21 NOTE — PATIENT INSTRUCTIONS
Kick Counts in Pregnancy   WHAT YOU NEED TO KNOW:   Kick counts measure how much your baby is moving in your womb  A kick from your baby can be felt as a twist, turn, swish, roll, or jab  It is common to feel your baby kicking at 26 to 28 weeks of pregnancy  You may feel your baby kick as early as 20 weeks of pregnancy  You may want to start counting at 28 weeks  DISCHARGE INSTRUCTIONS:   Contact your healthcare provider immediately if:   · You feel a change in the number of kicks or movements of your baby  · You feel fewer than 10 kicks within 2 hours  · You have questions or concerns about your baby's movements  Why measure kick counts:  Your baby's movement may provide information about your baby's health  He or she may move less, or not at all, if there are problems  Your baby may move less if he or she is not getting enough oxygen or nutrition from the placenta  Do not smoke while you are pregnant  Smoking decreases the amount of oxygen that gets to your baby  Talk to your healthcare provider if you need help to quit smoking  Tell your healthcare provider as soon as you feel a change in your baby's movements  When to measure kick counts:   · Measure kick counts at the same time every day  · Measure kick counts when your baby is awake and most active  Your baby may be most active in the evening  How to measure kick counts:  Check that your baby is awake before you measure kick counts  You can wake up your baby by lightly pushing on your belly, walking, or drinking something cold  Your healthcare provider may tell you different ways to measure kick counts  You may be told to do the following:  · Use a chart or clock to keep track of the time you start and finish counting  · Sit in a chair or lie on your left side  · Place your hands on the largest part of your belly  · Count until you reach 10 kicks  Write down how much time it takes to count 10 kicks       · It may take 30 minutes to 2 hours to count 10 kicks  It should not take more than 2 hours to count 10 kicks  Follow up with your healthcare provider as directed:  Write down your questions so you remember to ask them during your visits  © Copyright [address] Information is for End User's use only and may not be sold, redistributed or otherwise used for commercial purposes  All illustrations and images included in CareNotes® are the copyrighted property of A D A M , Inc  or Natalia Logan   The above information is an  only  It is not intended as medical advice for individual conditions or treatments  Talk to your doctor, nurse or pharmacist before following any medical regimen to see if it is safe and effective for you  Pregnancy at 27 to 30 [address]:   You may notice new symptoms such as shortness of breath, heartburn, or swelling of your ankles and feet  You may also have trouble sleeping or contractions  DISCHARGE INSTRUCTIONS:   Return to the emergency department if:   · You develop a severe headache that does not go away  · You have new or increased vision changes, such as blurred or spotted vision  · You have new or increased swelling in your face or hands  · You have vaginal spotting or bleeding  · Your water broke or you feel warm water gushing or trickling from your vagina  Contact your healthcare provider if:   · You have more than 5 contractions in 1 hour  · You notice any changes in your baby's movements  · You have abdominal cramps, pressure, or tightening  · You have a change in vaginal discharge  · You have chills or a fever  · You have vaginal itching, burning, or pain  · You have yellow, green, white, or foul-smelling vaginal discharge  · You have pain or burning when you urinate, less urine than usual, or pink or bloody urine  · You have questions or concerns about your condition or care      How to care for yourself at this stage of your pregnancy:   · Eat a variety of healthy foods  Healthy foods include fruits, vegetables, whole-grain breads, low-fat dairy foods, beans, lean meats, and fish  Drink liquids as directed  Ask how much liquid to drink each day and which liquids are best for you  Limit caffeine to less than 200 milligrams each day  Limit your intake of fish to 2 servings each week  Choose fish low in mercury such as canned light tuna, shrimp, salmon, cod, or tilapia  Do not  eat fish high in mercury such as swordfish, tilefish, oscar mackerel, and shark  · Manage heartburn  by eating 4 or 5 small meals each day instead of large meals  Avoid spicy food  · Manage swelling  by lying down and putting your feet up  · Take prenatal vitamins as directed  Your need for certain vitamins and minerals, such as folic acid, increases during pregnancy  Prenatal vitamins provide some of the extra vitamins and minerals you need  Prenatal vitamins may also help to decrease the risk of certain birth defects  · Talk to your healthcare provider about exercise  Moderate exercise can help you stay fit  Your healthcare provider will help you plan an exercise program that is safe for you during pregnancy  · Do not smoke  Smoking increases your risk of a miscarriage and other health problems during your pregnancy  Smoking can cause your baby to be born too early or weigh less at birth  Ask your healthcare provider for information if you need help quitting  · Do not drink alcohol  Alcohol passes from your body to your baby through the placenta  It can affect your baby's brain development and cause fetal alcohol syndrome (FAS)  FAS is a group of conditions that causes mental, behavior, and growth problems  · Talk to your healthcare provider before you take any medicines  Many medicines may harm your baby if you take them when you are pregnant   Do not take any medicines, vitamins, herbs, or supplements without first talking to your healthcare provider  Never use illegal or street drugs (such as marijuana or cocaine) while you are pregnant  Safety tips during pregnancy:   · Avoid hot tubs and saunas  Do not use a hot tub or sauna while you are pregnant, especially during your first trimester  Hot tubs and saunas may raise your baby's temperature and increase the risk of birth defects  · Avoid toxoplasmosis  This is an infection caused by eating raw meat or being around infected cat feces  It can cause birth defects, miscarriages, and other problems  Wash your hands after you touch raw meat  Make sure any meat is well-cooked before you eat it  Avoid raw eggs and unpasteurized milk  Use gloves or ask someone else to clean your cat's litter box while you are pregnant  Changes that are happening with your baby:  By 30 weeks, your baby may weigh more than 3 pounds  Your baby may be about 11 inches long from the top of the head to the rump (baby's bottom)  Your baby's eyes open and close now  Your baby's kicks and movements are more forceful at this time  What you need to know about prenatal care: Your healthcare provider will check your blood pressure and weight  You may also need the following:  · Blood tests  may be done to check for anemia or blood type  · A urine test  may also be done to check for sugar and protein  These can be signs of gestational diabetes or infection  Protein in your urine may also be a sign of preeclampsia  Preeclampsia is a condition that can develop during week 20 or later of your pregnancy  It causes high blood pressure, and it can cause problems with your kidneys and other organs  · A Tdap vaccine and flu vaccine  may be recommended by your healthcare provider  · A gestational diabetes screen  will be done using an oral glucose tolerance test (OGTT)  An OGTT starts with a blood sugar level check after you have not eaten for 8 hours   You are then given a glucose drink  Your blood sugar level is checked after 1 hour, 2 hours, and sometimes 3 hours  Healthcare providers look at how much your blood sugar level increases from the first check  · Fundal height  is a measurement of your uterus to check your baby's growth  This number is usually the same as the number of weeks that you have been pregnant  Your healthcare provider may also check your baby's position  · Your baby's heart rate  will be checked  © Copyright [address] Information is for End User's use only and may not be sold, redistributed or otherwise used for commercial purposes  All illustrations and images included in CareNotes® are the copyrighted property of A D A M , Inc  or [address] [place] [address]  The above information is an  only  It is not intended as medical advice for individual conditions or treatments  Talk to your doctor, nurse or pharmacist before following any medical regimen to see if it is safe and effective for you

## 2021-02-21 NOTE — PROGRESS NOTES
OB/GYN  PN Visit  Diana Ramírez  [id number]  2/22/2021  11:01 AM  Dr Wille Galeazzi, MD     S: 31 y o  [id number] 29 weeks here for PN visit  Chief Complaint   Patient presents with    Routine Prenatal Visit     Due for BW and TDAP, declines FLU vaccine  Patient states no problems  OB complaints:  Contractions: no  Leakage: no  Bleeding: no  Fetal movement: yes      O:  /62 (BP Location: Right arm, Cuff Size: Standard)   Pulse 102   Temp 97 5 °F (36 4 °C)   Wt 110 kg (243 lb 6 4 oz)   LMP 08/03/2020 (Exact Date)   BMI 35 94 kg/m²       Review of Systems   Constitutional: Negative  HENT: Negative  Eyes: Negative  Respiratory: Negative  Cardiovascular: Negative  Gastrointestinal: Negative  Endocrine: Negative  Genitourinary:        As noted in HPI   Musculoskeletal: Negative  Skin: Negative  Allergic/Immunologic: Negative  Neurological: Negative  Hematological: Negative  Psychiatric/Behavioral: Negative  Physical Exam  Constitutional:       General: She is not in acute distress  Appearance: She is well-developed  Abdominal:      Palpations: Abdomen is soft  Tenderness: There is no abdominal tenderness  There is no guarding  Neurological:      Mental Status: She is alert and oriented to person, place, and time  Skin:     General: Skin is warm and dry     Psychiatric:         Behavior: Behavior normal                CBC Hgb 10 8 on 2/20/21  A1c 5 1          A/P:    Problem List Items Addressed This Visit        Endocrine    Pre-existing type 1 diabetes mellitus during pregnancy in third trimester       Nervous and Auditory    Carpal tunnel syndrome, bilateral      Other Visit Diagnoses     Supervision of high-risk pregnancy, third trimester    -  Primary    29 weeks gestation of pregnancy        Relevant Orders    POCT urine dip (Completed)    Need for Tdap vaccination        Relevant Orders    TDAP Vaccine greater than or equal to 6yo Maternal anemia in pregnancy, antepartum, third trimester        Relevant Medications    ferrous sulfate 324 (65 Fe) mg    docusate sodium (COLACE) 100 mg capsule        Advised iron, stool softener, recheck CBC in 1 month    Growth US 3/12  Antepartum surveillance to start 3/16    Pt communicating with DM educator re glucose pump, DEXCOM meeting  Due to have carpal tunnel surgery tomorrow under local     Declines flu vaccine  Agrees to TDAP today    Delivery paperwork  Consent to procedures spontaneous assisted vaginal delivery or operative vaginal delivery or  birth - signed  Visitation policy  Birth certificate information mother's work sheet  Birth plan guide  Authorization for release of protected health information for photographers  Birthing room support person rules and acknowledgement    Education  Perineal massage  Last weeks of pregnancy and when to notify the doctor    Advised NST and VINOD weeky at [facility] and NST and PNV weekly here beginning 32 weeks    Follow-up in 2 weeks        Severo Alvarez MD  2021  11:01 AM

## 2021-02-22 ENCOUNTER — ROUTINE PRENATAL (OUTPATIENT)
Dept: OBGYN CLINIC | Facility: CLINIC | Age: 31
End: 2021-02-22
Payer: COMMERCIAL

## 2021-02-22 VITALS
DIASTOLIC BLOOD PRESSURE: 62 MMHG | TEMPERATURE: 97.5 F | SYSTOLIC BLOOD PRESSURE: 128 MMHG | WEIGHT: 243.4 LBS | BODY MASS INDEX: 35.94 KG/M2 | HEART RATE: 102 BPM

## 2021-02-22 DIAGNOSIS — Z3A.29 29 WEEKS GESTATION OF PREGNANCY: ICD-10-CM

## 2021-02-22 DIAGNOSIS — G56.03 CARPAL TUNNEL SYNDROME, BILATERAL: ICD-10-CM

## 2021-02-22 DIAGNOSIS — O24.013 PRE-EXISTING TYPE 1 DIABETES MELLITUS DURING PREGNANCY IN THIRD TRIMESTER: ICD-10-CM

## 2021-02-22 DIAGNOSIS — Z23 NEED FOR TDAP VACCINATION: ICD-10-CM

## 2021-02-22 DIAGNOSIS — O99.013 MATERNAL ANEMIA IN PREGNANCY, ANTEPARTUM, THIRD TRIMESTER: ICD-10-CM

## 2021-02-22 DIAGNOSIS — O09.93 SUPERVISION OF HIGH-RISK PREGNANCY, THIRD TRIMESTER: Primary | ICD-10-CM

## 2021-02-22 LAB
RPR SER QL: NORMAL
SL AMB  POCT GLUCOSE, UA: NEGATIVE
SL AMB POCT URINE PROTEIN: NEGATIVE

## 2021-02-22 PROCEDURE — 90715 TDAP VACCINE 7 YRS/> IM: CPT | Performed by: OBSTETRICS & GYNECOLOGY

## 2021-02-22 PROCEDURE — PNV: Performed by: OBSTETRICS & GYNECOLOGY

## 2021-02-22 PROCEDURE — 90471 IMMUNIZATION ADMIN: CPT | Performed by: OBSTETRICS & GYNECOLOGY

## 2021-02-22 RX ORDER — FERROUS SULFATE TAB EC 324 MG (65 MG FE EQUIVALENT) 324 (65 FE) MG
325 TABLET DELAYED RESPONSE ORAL
Qty: 30 TABLET | Refills: 6 | Status: SHIPPED | OUTPATIENT
Start: 2021-02-22 | End: 2021-05-02 | Stop reason: HOSPADM

## 2021-02-22 RX ORDER — DOCUSATE SODIUM 100 MG/1
100 CAPSULE, LIQUID FILLED ORAL 2 TIMES DAILY
Qty: 60 CAPSULE | Refills: 6 | Status: SHIPPED | OUTPATIENT
Start: 2021-02-22 | End: 2022-01-20 | Stop reason: ALTCHOICE

## 2021-02-23 ENCOUNTER — HOSPITAL ENCOUNTER (OUTPATIENT)
Facility: HOSPITAL | Age: 31
Setting detail: OUTPATIENT SURGERY
Discharge: HOME | End: 2021-02-23
Attending: ORTHOPAEDIC SURGERY | Admitting: ORTHOPAEDIC SURGERY
Payer: COMMERCIAL

## 2021-02-23 VITALS
DIASTOLIC BLOOD PRESSURE: 83 MMHG | HEART RATE: 75 BPM | SYSTOLIC BLOOD PRESSURE: 141 MMHG | HEIGHT: 69 IN | OXYGEN SATURATION: 100 % | TEMPERATURE: 96.1 F | WEIGHT: 240 LBS | BODY MASS INDEX: 35.55 KG/M2 | RESPIRATION RATE: 20 BRPM

## 2021-02-23 DIAGNOSIS — G56.03 CARPAL TUNNEL SYNDROME, BILATERAL: Primary | ICD-10-CM

## 2021-02-23 LAB — GLUCOSE SERPL-MCNC: 97 MG/DL (ref 65–140)

## 2021-02-23 PROCEDURE — 82948 REAGENT STRIP/BLOOD GLUCOSE: CPT

## 2021-02-23 PROCEDURE — 29848 WRIST ENDOSCOPY/SURGERY: CPT | Performed by: PHYSICIAN ASSISTANT

## 2021-02-23 PROCEDURE — 29848 WRIST ENDOSCOPY/SURGERY: CPT | Performed by: ORTHOPAEDIC SURGERY

## 2021-02-23 RX ORDER — SENNOSIDES 8.6 MG
650 CAPSULE ORAL EVERY 8 HOURS PRN
Qty: 30 EACH | Refills: 0 | Status: SHIPPED | OUTPATIENT
Start: 2021-02-23 | End: 2021-04-09 | Stop reason: ALTCHOICE

## 2021-02-23 RX ORDER — LIDOCAINE HYDROCHLORIDE AND EPINEPHRINE 10; 10 MG/ML; UG/ML
20 INJECTION, SOLUTION INFILTRATION; PERINEURAL ONCE
Status: DISCONTINUED | OUTPATIENT
Start: 2021-02-23 | End: 2021-02-23 | Stop reason: HOSPADM

## 2021-02-23 RX ADMIN — SODIUM BICARBONATE: 84 INJECTION, SOLUTION INTRAVENOUS at 08:05

## 2021-02-23 NOTE — INTERVAL H&P NOTE
Today patient will undergo the LEFT ECTR    H&P reviewed  After examining the patient I find no changes in the patients condition since the H&P had been written  There were no vitals filed for this visit

## 2021-02-23 NOTE — DISCHARGE INSTRUCTIONS
Post Operative Instructions    You have had surgery on your arm today, please read and follow the information below:  · Elevate your hand above your elbow during the next 24-48 hours to help with swelling  · Place your hand and arm over your head with motion at your shoulder three times a day  · Do not apply any cream/ointment/oil to your incisions including antibiotics  · Do not soak your hands in standing water (dishwater, tubs, Jacuzzi's, pools, etc ) until given permission (typically 2-3 weeks after injury)    Call the office if you notice any:  · Increased numbness or tingling of your hand or fingers that is not relieved with elevation  · Increasing pain that is not controlled with medication  · Difficulty chewing, breathing, swallowing  · Chest pains or shortness of breath  · Fever over 101 4 degrees  Bandage: Remove bandage after 5 days  Motion: Move fingers into a fist 5 times a day, DO NOT move any splinted fingers  Weight bearing status: Avoid heavy lifting (>5 pounds) with the extremity that was operated on until follow up appointment  Normal activities of daily living are OK  Ice: Ice for 10 minutes every hour as needed for swelling x 24 hours  Sling: No sling necessary  Medications:   Tylenol Extended Release 650 mg every 8 hours      Follow-up Appointment: 7-10 days  Please call the office if you have any questions or concerns regarding your post-operative care

## 2021-02-23 NOTE — OP NOTE
OPERATIVE REPORT  PATIENT NAME: Yue Esteban  :  1990  MRN: [id number]  Pt Location: [facility]    SURGERY DATE: 21    Surgeon(s) and Role:     * Laura Mendez MD - Primary     * Gertrude Raman PA-C - Assisting    Pre-Op Diagnosis:  Carpal tunnel syndrome, bilateral [G56 03]    Post-Op Diagnosis:   Carpal tunnel syndrome, bilateral [G56 03]    Procedure(s) (LRB):  RELEASE CARPAL TUNNEL ENDOSCOPIC (Left)    Specimen(s):  * No orders in the log *    Estimated Blood Loss:   Minimal      Anesthesia Type:   Local    Operative Indications: The patient has a history of Carpal Tunnel Syndrome  left that was recalcitrant to conservative management  The decision was made to bring the patient to the operating room for Endoscopic Carpal Tunnel Release  left  Risks of the procedure were explained which include, but are not limited to bleeding; infection; damage to nerves, arteries,veins, tendons; scar; pain; need for reoperation; failure to give desired result; and risks of anaesthesia  All questions were answered to satisfaction and they were willing to proceed  Operative Findings:  Left carpal tunnel    Complications:   None    Procedure and Technique:  After the patient, site, and procedure were identified, the patient was brought into the operating room in a supine position  Local anaesthesia was adminstered in the preoperative holding area  A tourniquet was not used  The  left upper extremity was then prepped and drapped in a normal, sterile, orthopedic fashion  After reconfirmation of the patient, site, and surgical procedure, which was agreed upon by the entire surgical team, attention was turned to the left wrist   The sites of the proximal and distal incisions were marked    The emily of the proximal incision was placed horizontally at the midline of the wrist   The distal incision emily was longitudinal extending distally from the point of intersection of the line between the long finger and ring finger and the line along the distal border of the fully abducted thumb  The proximal incision was performed  Subcutaneous tissues were dissected  Then the transverse volar antebrachial fascia was perforated with a scalpel  The edges of the skin incision where retracted and the forearm fascia was incised for approximately 1 5 cm proximally with care taken to identify and protect the median nerve  Retractors were used to inspect the transverse carpal ligament distally  A curved Raymond dissector was used to glide under the transverse carpal ligament and superficial to the median nerve with confirmation via the washboard feeling  Then the curved Raymond was pushed into the palm toward the distal incision site  When the location of the distal skin emily was adequate, the distal incision was made  Then with retraction of the skin, further dissection and perforation of the palmar fascia was performed with the use of tenotomy scissors  The curved Raymond was guided from proximal to distal out the distal incisions without any twisting to allow for dilation of the tract  The curved Raymond was removed, and the cannula for the camera was inserted along the same tract, making sure to keep the alignment post on the cannula perpendicular to the plane of the hand without twisting  Then while keeping the wrist in extension, and holding the cannula of the camera in place, the wrist was placed on the hyperextension board  The scope was inserted distally, and a cotton-tip applicator was used proximally to clean the tract as well as the scope  A curved cutting knife was introduced from proximal to distal while keeping visualization with the use of the camera  Without twisting of the canula, the knife was used to cut the transverse carpal ligament completely, making sure there were no remnant fibers  Then after this was accomplished, the hand was removed from the extension block    Three maneuvers were used to confirm the full release of the transverse carpal ligament  First, the ease of twisting the trocar of the camera confirmed the release of the ligament  Second, the curved Raymond was introduced to make sure there were no remnant fibers that could be felt palmarly  Third, the scope was introduced again to visualize that the whole ligament was released proximally to distally  Additional confirmation of full release included retraction and inspection in the distal and proximal incisions to make sure there were no remnant fibers distally or proximally respectively  1    At the completion of the procedure, hemostasis was obtained with cautery and direct pressure  The wounds were copiously irrigated with sterile solution  The wounds were closed with Prolene  Sterile dressings were applied, including Xeroform, gauze, tweeners, webril, ACE  Please note, all sponge, needle, and instrument counts were correct prior to closure  Loupe magnification was utilized  The patient tolerated the procedure well       I was present for all critical portions of the procedure, A qualified resident physician was not available and A physician assistant was required during the procedure for retraction tissue handling,dissection and suturing    Patient Disposition:  APU and hemodynamically stable    SIGNATURE: Arnoldo Arndt MD  DATE: 02/23/21  TIME: 9:29 AM

## 2021-03-04 ENCOUNTER — OFFICE VISIT (OUTPATIENT)
Dept: OBGYN CLINIC | Facility: HOSPITAL | Age: 31
End: 2021-03-04

## 2021-03-04 VITALS
WEIGHT: 248.2 LBS | HEIGHT: 69 IN | DIASTOLIC BLOOD PRESSURE: 73 MMHG | HEART RATE: 70 BPM | SYSTOLIC BLOOD PRESSURE: 126 MMHG | BODY MASS INDEX: 36.76 KG/M2

## 2021-03-04 DIAGNOSIS — G56.03 CARPAL TUNNEL SYNDROME, BILATERAL: Primary | ICD-10-CM

## 2021-03-04 PROCEDURE — 99024 POSTOP FOLLOW-UP VISIT: CPT | Performed by: PHYSICIAN ASSISTANT

## 2021-03-04 NOTE — PROGRESS NOTES
Assessment:   S/P Release Carpal Tunnel Endoscopic - Left on 2/23/2021    Plan:   Resume activities as tolerated    Follow Up:  PRN    To Do Next Visit:         CHIEF COMPLAINT:  Chief Complaint   Patient presents with    Left Wrist - Post-op         SUBJECTIVE:  Brayan Sanders is a 31 y o  female who presents for follow up after Release Carpal Tunnel Endoscopic - Left on 2/23/2021  Today patient has No Complaints  Her pain and numbness have completely resolved        PHYSICAL EXAMINATION:  Vital signs: /73   Pulse 70   Ht 5' 9" (1 753 m)   Wt 113 kg (248 lb 3 2 oz)   LMP 08/03/2020 (Exact Date)   BMI 36 65 kg/m²   General: well developed and well nourished, alert, oriented times 3 and appears comfortable  Psychiatric: Normal    MUSCULOSKELETAL EXAMINATION:  Incision: Clean, dry, intact  Range of Motion: As expected  Neurovascular status: Neuro intact, good cap refill  Activity Restrictions: No restrictions  Done today: Sutures out and Steri strips applied      STUDIES REVIEWED:  No Studies to review      PROCEDURES PERFORMED:  Procedures  No Procedures performed today

## 2021-03-10 NOTE — PROGRESS NOTES
Please refer to the [facility] ultrasound report in Ob Procedures for additional information regarding today's visit

## 2021-03-11 ENCOUNTER — ROUTINE PRENATAL (OUTPATIENT)
Dept: OBGYN CLINIC | Facility: CLINIC | Age: 31
End: 2021-03-11

## 2021-03-11 ENCOUNTER — ULTRASOUND (OUTPATIENT)
Dept: PERINATAL CARE | Facility: OTHER | Age: 31
End: 2021-03-11
Payer: COMMERCIAL

## 2021-03-11 VITALS
HEART RATE: 102 BPM | BODY MASS INDEX: 38.25 KG/M2 | WEIGHT: 252.4 LBS | SYSTOLIC BLOOD PRESSURE: 138 MMHG | DIASTOLIC BLOOD PRESSURE: 80 MMHG | HEIGHT: 68 IN

## 2021-03-11 VITALS
SYSTOLIC BLOOD PRESSURE: 122 MMHG | WEIGHT: 251.2 LBS | TEMPERATURE: 97.5 F | BODY MASS INDEX: 38.19 KG/M2 | DIASTOLIC BLOOD PRESSURE: 62 MMHG | HEART RATE: 90 BPM

## 2021-03-11 DIAGNOSIS — O24.013 PRE-EXISTING TYPE 1 DIABETES MELLITUS DURING PREGNANCY IN THIRD TRIMESTER: ICD-10-CM

## 2021-03-11 DIAGNOSIS — O24.013 PRE-EXISTING TYPE 1 DIABETES MELLITUS DURING PREGNANCY IN THIRD TRIMESTER: Primary | ICD-10-CM

## 2021-03-11 DIAGNOSIS — O99.213 OBESITY AFFECTING PREGNANCY IN THIRD TRIMESTER: ICD-10-CM

## 2021-03-11 DIAGNOSIS — Z3A.31 31 WEEKS GESTATION OF PREGNANCY: ICD-10-CM

## 2021-03-11 DIAGNOSIS — Z3A.31 31 WEEKS GESTATION OF PREGNANCY: Primary | ICD-10-CM

## 2021-03-11 DIAGNOSIS — O09.93 HIGH-RISK PREGNANCY IN THIRD TRIMESTER: ICD-10-CM

## 2021-03-11 LAB
SL AMB  POCT GLUCOSE, UA: NEGATIVE
SL AMB POCT URINE PROTEIN: NEGATIVE

## 2021-03-11 PROCEDURE — 76816 OB US FOLLOW-UP PER FETUS: CPT | Performed by: OBSTETRICS & GYNECOLOGY

## 2021-03-11 PROCEDURE — 99213 OFFICE O/P EST LOW 20 MIN: CPT | Performed by: OBSTETRICS & GYNECOLOGY

## 2021-03-11 PROCEDURE — PNV: Performed by: CLINICAL NURSE SPECIALIST

## 2021-03-11 NOTE — ASSESSMENT & PLAN NOTE
Doing well obstetrically   No s/s PTL and no s/s Pre-ecalmpsia  Did start on iron due to mild anemia  Also started colace to manage constipation side effect   Will recheck CBC in 4 wks  Is maintaining LDA for pree ppx

## 2021-03-11 NOTE — PROGRESS NOTES
Prenatal Visit  Subjective:   Ashleigh Rod is a 31 y o  [id number] 31w3d here for Routine Prenatal Visit (Labs UTD, already had TDAP, and declines FLU vaccine  Patient states no problems  )     No OB complaints  Denies ctx,LOF or VB and reports fetus is active  Just came from [facility] from having US for fetal growth (nml)  Reports BG are stable- does experience some lows which she is working with diab ed to minimize    Objective:  Pregravid Weight/BMI: 95 3 kg (210 lb) (BMI 31 94)  Current Weight: 114 kg (251 lb 3 2 oz)   Total Weight Gain: 18 7 kg (41 lb 3 2 oz)   Pre-Enoc Vitals      Most Recent Value   Prenatal Assessment   Fetal Heart Rate  140   Fundal Height (cm)  32 cm   Movement  Present   Presentation  Vertex   Prenatal Vitals   Blood Pressure  122/62   Weight - Scale  114 kg (251 lb 3 2 oz)   Urine Albumin/Glucose   Dilation/Effacement/Station   Vaginal Drainage   Edema         Physical Exam  Constitutional:       General: She is not in acute distress  Appearance: Normal appearance  Genitourinary:      Genitourinary Comments: Pelvic exam deferred     Cardiovascular:      Rate and Rhythm: Normal rate  Pulmonary:      Effort: Pulmonary effort is normal    Abdominal:      Palpations: Abdomen is soft  Comments: Gravid abd  Fundal Height (cm): 32 cm  Fetal Heart Rate: 140   Musculoskeletal: Normal range of motion  Right lower leg: No edema  Left lower leg: No edema  Neurological:      Mental Status: She is alert and oriented to person, place, and time  Skin:     General: Skin is warm and dry  Psychiatric:         Mood and Affect: Mood normal          Behavior: Behavior normal          Assessment & Plan:    1  31 weeks gestation of pregnancy  -     POCT urine dip    2  High-risk pregnancy in third trimester  Assessment & Plan:  Doing well obstetrically   No s/s PTL and no s/s Pre-ecalmpsia  Did start on iron due to mild anemia  Also started colace to manage constipation side effect   Will recheck CBC in 4 wks  Is maintaining LDA for pree ppx      3  Pre-existing type 1 diabetes mellitus during pregnancy in third trimester  Assessment & Plan:  Following closely with MFM regarding BG  Denies high BG, but does report some lows  Us fro fetal growth today wnl - no accelerated growth and nml VINOD  Reviewed importance of good BG control as she moved through the 3rd trimester  as that is typically when increased BG variation/less control occur  Due to start a/n testing next week     Pt plans to do NST her with us and VINOD/NST with [address] [place] [address] MARCO Parsons  3/11/2021

## 2021-03-11 NOTE — PATIENT INSTRUCTIONS
Anesthetic History   No history of anesthetic complications            Review of Systems / Medical History  Patient summary reviewed, nursing notes reviewed and pertinent labs reviewed    Pulmonary  Within defined limits                 Neuro/Psych   Within defined limits           Cardiovascular            Dysrhythmias (palpitations)       Exercise tolerance: >4 METS  Comments: No CP/syncope   GI/Hepatic/Renal  Within defined limits              Endo/Other  Within defined limits           Other Findings              Physical Exam    Airway  Mallampati: II  TM Distance: > 6 cm  Neck ROM: normal range of motion   Mouth opening: Normal     Cardiovascular  Regular rate and rhythm,  S1 and S2 normal,  no murmur, click, rub, or gallop             Dental  No notable dental hx       Pulmonary  Breath sounds clear to auscultation               Abdominal         Other Findings            Anesthetic Plan    ASA: 2  Anesthesia type: general            Anesthetic plan and risks discussed with: Patient Kick Counts in Pregnancy   WHAT YOU NEED TO KNOW:   Kick counts measure how much your baby is moving in your womb  A kick from your baby can be felt as a twist, turn, swish, roll, or jab  It is common to feel your baby kicking at 26 to 28 weeks of pregnancy  You may feel your baby kick as early as 20 weeks of pregnancy  You may want to start counting at 28 weeks  DISCHARGE INSTRUCTIONS:   Contact your healthcare provider immediately if:   · You feel a change in the number of kicks or movements of your baby  · You feel fewer than 10 kicks within 2 hours  · You have questions or concerns about your baby's movements  Why measure kick counts:  Your baby's movement may provide information about your baby's health  He or she may move less, or not at all, if there are problems  Your baby may move less if he or she is not getting enough oxygen or nutrition from the placenta  Do not smoke while you are pregnant  Smoking decreases the amount of oxygen that gets to your baby  Talk to your healthcare provider if you need help to quit smoking  Tell your healthcare provider as soon as you feel a change in your baby's movements  When to measure kick counts:   · Measure kick counts at the same time every day  · Measure kick counts when your baby is awake and most active  Your baby may be most active in the evening  How to measure kick counts:  Check that your baby is awake before you measure kick counts  You can wake up your baby by lightly pushing on your belly, walking, or drinking something cold  Your healthcare provider may tell you different ways to measure kick counts  You may be told to do the following:  · Use a chart or clock to keep track of the time you start and finish counting  · Sit in a chair or lie on your left side  · Place your hands on the largest part of your belly  · Count until you reach 10 kicks  Write down how much time it takes to count 10 kicks       · It may take 30 minutes to 2 hours to count 10 kicks  It should not take more than 2 hours to count 10 kicks  Follow up with your healthcare provider as directed:  Write down your questions so you remember to ask them during your visits  © Copyright [address] Information is for End User's use only and may not be sold, redistributed or otherwise used for commercial purposes  All illustrations and images included in CareNotes® are the copyrighted property of A D A M , Inc  or [facility] [place] Gareth Logan   The above information is an  only  It is not intended as medical advice for individual conditions or treatments  Talk to your doctor, nurse or pharmacist before following any medical regimen to see if it is safe and effective for you

## 2021-03-11 NOTE — ASSESSMENT & PLAN NOTE
Following closely with MFM regarding BG  Denies high BG, but does report some lows  Us fro fetal growth today wnl - no accelerated growth and nml VINOD  Reviewed importance of good BG control as she moved through the 3rd trimester  as that is typically when increased BG variation/less control occur  Due to start a/n testing next week     Pt plans to do NST her with us and VINOD/NST with [facility]

## 2021-03-11 NOTE — LETTER
March 11, 2021     MD Emilio Turcios [address]  [address] [facility]    Patient: Mago Márquez   YOB: 1990   Date of Visit: 3/11/2021       Dear Dr Servando Montoya: Thank you for referring Mago Márquez to me for evaluation  Below are my notes for this consultation  If you have questions, please do not hesitate to call me  I look forward to following your patient along with you  Sincerely,        Yuan Menchaca MD        CC: No Recipients  Yuan Menchaca MD  3/10/2021  5:51 PM  Sign when Signing Visit   Please refer to the [facility] ultrasound report in Ob Procedures for additional information regarding today's visit

## 2021-03-16 ENCOUNTER — ULTRASOUND (OUTPATIENT)
Dept: PERINATAL CARE | Facility: OTHER | Age: 31
End: 2021-03-16
Payer: COMMERCIAL

## 2021-03-16 VITALS
SYSTOLIC BLOOD PRESSURE: 130 MMHG | WEIGHT: 256.6 LBS | BODY MASS INDEX: 38.89 KG/M2 | DIASTOLIC BLOOD PRESSURE: 66 MMHG | HEIGHT: 68 IN | HEART RATE: 90 BPM

## 2021-03-16 DIAGNOSIS — O24.013 PRE-EXISTING TYPE 1 DIABETES MELLITUS DURING PREGNANCY IN THIRD TRIMESTER: Primary | ICD-10-CM

## 2021-03-16 PROCEDURE — 76815 OB US LIMITED FETUS(S): CPT | Performed by: OBSTETRICS & GYNECOLOGY

## 2021-03-16 PROCEDURE — 59025 FETAL NON-STRESS TEST: CPT | Performed by: OBSTETRICS & GYNECOLOGY

## 2021-03-16 NOTE — PATIENT INSTRUCTIONS
Nonstress Test for Pregnancy   WHAT YOU NEED TO KNOW:   What do I need to know about a nonstress test?  A nonstress test measures your baby's heart rate and movements  Nonstress means that no stress will be placed on your baby during the test    How do I prepare for a nonstress test?  Your healthcare provider will talk to you about how to prepare for this test  He may tell you to eat and drink plenty of fluids before your test  If you smoke, you may be asked not to smoke within 2 hours before the test  He will also tell you what medicines to take or not take on the day of your test    What will happen during a nonstress test?  You may be asked to lie down or recline back for the test on a bed  One or two belts with sensors will be placed around your abdomen  Your baby's heart rate will be recorded with a machine  If your baby does not move, your baby may be asleep  Your healthcare provider may make a noise near your abdomen to try to wake your baby  The test usually takes about 20 minutes, but can take longer if your baby needs to be awakened  What do I need to know about the test results? Your baby will be expected to move at least twice for a certain amount of time  Your baby's heart rate will be expected to go up by a certain number of beats per minute during movement  If your baby does not move as expected, the test may need to be repeated or you may need other tests  CARE AGREEMENT:   You have the right to help plan your care  Learn about your health condition and how it may be treated  Discuss treatment options with your healthcare providers to decide what care you want to receive  You always have the right to refuse treatment  The above information is an  only  It is not intended as medical advice for individual conditions or treatments  Talk to your doctor, nurse or pharmacist before following any medical regimen to see if it is safe and effective for you    © Copyright [address] [place] [address] Drive Information is for End User's use only and may not be sold, redistributed or otherwise used for commercial purposes   All illustrations and images included in CareNotes® are the copyrighted property of A D A M , Inc  or [facility] Gareth Escobar

## 2021-03-16 NOTE — PROGRESS NOTES
NST procedure and expected outcome explained to patient  Daily fetal kick count discussed with handout given  Patient verbalized understanding of all and was receptive      Mj Feliciano RN

## 2021-03-16 NOTE — PROGRESS NOTES
[address]: Ms Emanuel Mendez was seen today at 32w1d for NST (found under the pregnancy episode) which I reviewed the RN assessment and agree, and VINOD (see ultrasound report under OB procedures tab)  Please don't hesitate to contact our office with any concerns or questions    Yolanda Boyd MD

## 2021-03-18 ENCOUNTER — OFFICE VISIT (OUTPATIENT)
Dept: PERINATAL CARE | Facility: CLINIC | Age: 31
End: 2021-03-18
Payer: COMMERCIAL

## 2021-03-18 ENCOUNTER — ROUTINE PRENATAL (OUTPATIENT)
Dept: PERINATAL CARE | Facility: OTHER | Age: 31
End: 2021-03-18
Payer: COMMERCIAL

## 2021-03-18 VITALS
WEIGHT: 254 LBS | DIASTOLIC BLOOD PRESSURE: 77 MMHG | SYSTOLIC BLOOD PRESSURE: 118 MMHG | HEIGHT: 68 IN | BODY MASS INDEX: 38.49 KG/M2

## 2021-03-18 VITALS
BODY MASS INDEX: 38.55 KG/M2 | HEART RATE: 80 BPM | DIASTOLIC BLOOD PRESSURE: 77 MMHG | WEIGHT: 254.4 LBS | SYSTOLIC BLOOD PRESSURE: 118 MMHG | HEIGHT: 68 IN

## 2021-03-18 DIAGNOSIS — O99.213 OBESITY AFFECTING PREGNANCY IN THIRD TRIMESTER: ICD-10-CM

## 2021-03-18 DIAGNOSIS — O24.013 PRE-EXISTING TYPE 1 DIABETES MELLITUS DURING PREGNANCY IN THIRD TRIMESTER: Primary | ICD-10-CM

## 2021-03-18 DIAGNOSIS — Z46.81 INSULIN PUMP TITRATION: ICD-10-CM

## 2021-03-18 DIAGNOSIS — Z3A.32 32 WEEKS GESTATION OF PREGNANCY: ICD-10-CM

## 2021-03-18 PROCEDURE — 99214 OFFICE O/P EST MOD 30 MIN: CPT | Performed by: NURSE PRACTITIONER

## 2021-03-18 PROCEDURE — 59025 FETAL NON-STRESS TEST: CPT | Performed by: OBSTETRICS & GYNECOLOGY

## 2021-03-18 NOTE — ASSESSMENT & PLAN NOTE
-First appointment weight 212 lbs  -Current weight 254 lbs  -Current BMI 38 62   -Gained 80 lbs with last pregnancy    -Encouraged to follow GDM diet

## 2021-03-18 NOTE — PROGRESS NOTES
Assessment/Plan:     Diagnoses and all orders for this visit:    Pre-existing type 1 diabetes mellitus during pregnancy in third trimester    Obesity affecting pregnancy in third trimester    BMI 38 0-38 9,adult    Insulin pump titration      -Humalog via Medtronic 530G insulin pump and Dexcom G6   -Current basal settings: MN@ 4 55 units/hour; 3 AM@ 4 40 units/hour; 6 AM@ 4 20 units/hour; 9 AM@ 6 85 units/hour; 12:30 PM@ 6 45 units/hour; 5 PM@ 5 30 units/hour; 10 PM@ 4 15 units    -Current bolus settings: ISF 35; ICR MN@ 9; 9:30 AM@ 6 5; 5 PM@ 7 5; BG target 100 and active insulin time 3 hours  Due to 2 hours PP>120, decrease ISF to 10 and ICR to 4  -Encouraged not to correct insulin and only bolus before meals   -Continue GDM diet and SMBG  -Download insulin pump 3/25/21   -NST twice a week and VINOD weekly  -Fetal growth ultrasound every 4 weeks    -Continue follow up with OB and MFM as recommended  First appointment weight 212 lbs  -Current weight 254 lbs  -Current BMI 38 62   -Gained 80 lbs with last pregnancy    -Encouraged to follow GDM diet  Subjective:      Patient ID: Mago Márquez is a 31 y o  female , 28 3/7 weeks gestation, T1DM on Humalog via Medtronic 530G and Dexcom G6  Has been self adjusting insulin pump settings  Per CGM 85% of time glucose readings range   Reports CGM reads lower than actual finger sticks  NST twice a week and VINOD weekly  Positive fetal movement         The following portions of the patient's history were reviewed and updated as appropriate: allergies, current medications, past family history, past medical history, past social history, past surgical history and problem list     No Known Allergies  Current Outpatient Medications on File Prior to Visit   Medication Sig Dispense Refill    acetaminophen (TYLENOL) 650 mg CR tablet Take 1 tablet (650 mg total) by mouth every 8 (eight) hours as needed for mild pain (Patient not taking: Reported on 3/11/2021) 30 each 0  acetone, urine, test strip 1 strip by Does not apply route as needed for high blood sugar 25 each 3    aspirin 81 mg chewable tablet Chew 162 mg daily       Continuous Blood Gluc  (Dexcom G6 ) DENIZ Use as directed  1 Device 0    Continuous Blood Gluc Sensor (Dexcom G6 Sensor) MISC 1 box=1 month supply or 3 sensors, use 1 sensor every 10 days  1 each 12    Continuous Blood Gluc Transmit (Dexcom G6 Transmitter) MISC Transmitter change every 90 days  1 each 3    docusate sodium (COLACE) 100 mg capsule Take 1 capsule (100 mg total) by mouth 2 (two) times a day 60 capsule 6    ferrous sulfate 324 (65 Fe) mg Take 1 tablet (325 mg total) by mouth 2 (two) times a day before meals 30 tablet 6    glucose blood test strip 1 each by Other route 6 (six) times a day 8 to 12 times a day  Use as instructed      insulin glargine (LANTUS) 100 units/mL subcutaneous injection Inject 10 Units under the skin      insulin lispro (HumaLOG) 100 units/mL injection Use up to 200 units via insulin pump daily  60 mL 0    Lancets MISC by Does not apply route      Prenatal Vit-Fe Fumarate-FA (PRENATAL VITAMIN PO) Take 1 tablet by mouth daily       No current facility-administered medications on file prior to visit  Review of Systems   Constitutional: Negative for fatigue and fever  Eyes: Negative for visual disturbance  Respiratory: Negative for cough and shortness of breath  Cardiovascular: Negative for chest pain and palpitations  Gastrointestinal: Negative for constipation, nausea and vomiting  Endocrine: Negative for polydipsia, polyphagia and polyuria  Genitourinary: Negative for vaginal bleeding  Neurological: Negative for headaches  Psychiatric/Behavioral: Negative for sleep disturbance           Objective:        Component Value Date/Time    HGBA1C 5 1 02/20/2021 0900    HGBA1C 4 9 01/09/2021 1051    HGBA1C 5 9 (H) 03/06/2019 1120      /77   Ht 5' 8" (1 727 m)   Wt 115 kg (254 lb)   LMP 08/03/2020 (Exact Date)   BMI 38 62 kg/m²          Physical Exam  Constitutional:       Appearance: She is obese  HENT:      Head: Normocephalic  Pulmonary:      Effort: Pulmonary effort is normal    Neurological:      Mental Status: She is alert and oriented to person, place, and time  Psychiatric:         Mood and Affect: Mood normal          Behavior: Behavior normal          Thought Content: Thought content normal          Judgment: Judgment normal            Time spent with patient 20 minutes and another 30 minutes downloading plus interpreting data

## 2021-03-18 NOTE — PATIENT INSTRUCTIONS
-Continue Humalog via Medtronic 530G and Dexcom G6   -Due to 2 hours post meal above 120, adjust bolus settings ISF 1:10; ICR 1:4    -Please try not to correct high glucose readings and only bolus before meals    -Continue GDM diet and testing  -NST twice a week and VINOD weekly  -Download insulin pump in 1 week  -Continue follow up with OB and MFM as recommended

## 2021-03-18 NOTE — PATIENT INSTRUCTIONS
Kick Counts in Pregnancy   AMBULATORY CARE:   Kick counts  measure how much your baby is moving in your womb  A kick from your baby can be felt as a twist, turn, swish, roll, or jab  It is common to feel your baby kicking at 26 to 28 weeks of pregnancy  You may feel your baby kick as early as 20 weeks of pregnancy  You may want to start counting at 28 weeks  Contact your healthcare provider immediately if:   · You feel a change in the number of kicks or movements of your baby  · You feel fewer than 10 kicks within 2 hours  · You have questions or concerns about your baby's movements  Why measure kick counts:  Your baby's movement may provide information about your baby's health  He or she may move less, or not at all, if there are problems  Your baby may move less if he or she is not getting enough oxygen or nutrition from the placenta  Do not smoke while you are pregnant  Smoking decreases the amount of oxygen that gets to your baby  Talk to your healthcare provider if you need help to quit smoking  Tell your healthcare provider as soon as you feel a change in your baby's movements  When to measure kick counts:   · Measure kick counts at the same time every day  · Measure kick counts when your baby is awake and most active  Your baby may be most active in the evening  How to measure kick counts:  Check that your baby is awake before you measure kick counts  You can wake up your baby by lightly pushing on your belly, walking, or drinking something cold  Your healthcare provider may tell you different ways to measure kick counts  You may be told to do the following:  · Use a chart or clock to keep track of the time you start and finish counting  · Sit in a chair or lie on your left side  · Place your hands on the largest part of your belly  · Count until you reach 10 kicks  Write down how much time it takes to count 10 kicks  · It may take 30 minutes to 2 hours to count 10 kicks  It should not take more than 2 hours to count 10 kicks  Follow up with your healthcare provider as directed:  Write down your questions so you remember to ask them during your visits  © Copyright [address] Information is for End User's use only and may not be sold, redistributed or otherwise used for commercial purposes  All illustrations and images included in CareNotes® are the copyrighted property of A D A M , Inc  or [facility] Gareth Logan   The above information is an  only  It is not intended as medical advice for individual conditions or treatments  Talk to your doctor, nurse or pharmacist before following any medical regimen to see if it is safe and effective for you

## 2021-03-18 NOTE — ASSESSMENT & PLAN NOTE
-Humalog via Medtronic 530G insulin pump and Dexcom G6   -Current basal settings: MN@ 4 55 units/hour; 3 AM@ 4 40 units/hour; 6 AM@ 4 20 units/hour; 9 AM@ 6 85 units/hour; 12:30 PM@ 6 45 units/hour; 5 PM@ 5 30 units/hour; 10 PM@ 4 15 units    -Current bolus settings: ISF 35; ICR MN@ 9; 9:30 AM@ 6 5; 5 PM@ 7 5; BG target 100 and active insulin time 3 hours  Due to 2 hours PP>120, decrease ISF to 10 and ICR to 4  -Encouraged not to correct insulin and only bolus before meals   -Continue GDM diet and SMBG  -Download insulin pump 3/25/21   -NST twice a week and VINOD weekly  -Fetal growth ultrasound every 4 weeks    -Continue follow up with OB and MFM as recommended       Lab Results   Component Value Date    HGBA1C 5 1 02/20/2021

## 2021-03-22 ENCOUNTER — ROUTINE PRENATAL (OUTPATIENT)
Dept: OBGYN CLINIC | Facility: CLINIC | Age: 31
End: 2021-03-22
Payer: COMMERCIAL

## 2021-03-22 ENCOUNTER — TELEPHONE (OUTPATIENT)
Dept: OBGYN CLINIC | Facility: HOSPITAL | Age: 31
End: 2021-03-22

## 2021-03-22 VITALS
DIASTOLIC BLOOD PRESSURE: 62 MMHG | BODY MASS INDEX: 39.53 KG/M2 | WEIGHT: 260 LBS | SYSTOLIC BLOOD PRESSURE: 138 MMHG | HEART RATE: 88 BPM | TEMPERATURE: 97.8 F

## 2021-03-22 DIAGNOSIS — O99.019 MATERNAL ANEMIA, ANTEPARTUM: ICD-10-CM

## 2021-03-22 DIAGNOSIS — O24.013 PRE-EXISTING TYPE 1 DIABETES MELLITUS DURING PREGNANCY IN THIRD TRIMESTER: ICD-10-CM

## 2021-03-22 DIAGNOSIS — Z3A.33 33 WEEKS GESTATION OF PREGNANCY: ICD-10-CM

## 2021-03-22 DIAGNOSIS — O09.93 HIGH-RISK PREGNANCY IN THIRD TRIMESTER: Primary | ICD-10-CM

## 2021-03-22 LAB
SL AMB  POCT GLUCOSE, UA: NEGATIVE
SL AMB POCT URINE PROTEIN: ABNORMAL

## 2021-03-22 PROCEDURE — 59025 FETAL NON-STRESS TEST: CPT | Performed by: CLINICAL NURSE SPECIALIST

## 2021-03-22 PROCEDURE — PNV: Performed by: CLINICAL NURSE SPECIALIST

## 2021-03-22 PROCEDURE — 76815 OB US LIMITED FETUS(S): CPT | Performed by: CLINICAL NURSE SPECIALIST

## 2021-03-22 NOTE — PROGRESS NOTES
Theodore Zimmerman is a 31 y o  [id number] 33w0d here for Routine Prenatal Visit (NST/VINOD- Labs UTD, already had TDAP, and declines FLU vaccine  Patient states no problems  )    Prenatal Visit  Subjective:   Denies unusual vaginal discharge, LOF, VB, or ctx  Reports Fetus is active  Taking iron for anemia- no further c/o constipation since being on colace  Due for repeat CBC  Taking LDA for pre-e ppx     T1 DM- insulin pump and GCM- managed by MFM  Started twice weekly a/n testing- having NST/VINOD today     Objective:  Pregravid Weight/BMI: 95 3 kg (210 lb) (BMI 31 94)  Current Weight: 118 kg (260 lb)   Total Weight Gain: 22 7 kg (50 lb)   Pre-Enoc Vitals      Most Recent Value   Prenatal Assessment   Prenatal Vitals   Blood Pressure  138/62   Weight - Scale  118 kg (260 lb)   Urine Albumin/Glucose   Dilation/Effacement/Station   Vaginal Drainage   Edema         OBGyn Exam    2nd/3rd TRIMESTER OBSTETRIC ULTRASOUND  3/22/2021  Theodore Hernandez is a [id number] at 33w0d  INDICATION: VINOD/T1DM     TECHNIQUE:   Limited Transabdominal imaging was performed  The study includes volumetric sweeps and traditional still imaging technique  FINDINGS:  A single intrauterine gestation is identified  Cardiac activity detected   Fetal presentation:  cephalic  Cervical length:  N/A;  Amniotic Fluid Index: Total 12 94cm  Q1: 2 49cm  Q2: 6 50cm  Q3: 2 02cm  Q4: 1 92cm       IMPRESSION:     Single intrauterine pregnancy at 33w0d  Fetal cardiac activity detected  VINOD 12 94- WNL          Assessment & Plan:    1  High-risk pregnancy in third trimester  Assessment & Plan:    [id number], 33w0d  Reviewed the following benefits of skin-to-skin immediately after the baby is born  [place] good for bonding   helps initiating breastfeeding if desired   keeps the baby warm   calms both mom and baby   helps to maintain the baby's blood sugar and helps to regulate heart rate and respirations       Reminded pt to choose a pediatrician    We again reviewed the S/S PTL and importance of consistent/regular FM  Reviewed process for [facility] and encouraged pt to call with any decreases in fetal movement      Orders:  -     CBC; Future    2  Maternal anemia, antepartum  Assessment & Plan:  Continue iron for anemia  Constipation side effect improved  Colace PRN constipation  Script for repeat CBC given today    Orders:  -     CBC; Future    3  Pre-existing type 1 diabetes mellitus during pregnancy in third trimester  Assessment & Plan:  BG levels reports as well controlled, per MFM notes some changes made to insulin regimen due to pp elevations  Recent MFM US for growth 3/11 EFW 39%  Starting twice weekly a/n testing     Last VINOD 3/16 nml  NST/VINOD Today  [id number] 94  Lab Results   Component Value Date    HGBA1C 5 1 02/20/2021         4  33 weeks gestation of pregnancy  -     POCT urine dip            MARCO Almaraz  3/22/2021

## 2021-03-22 NOTE — ASSESSMENT & PLAN NOTE
Continue iron for anemia  Constipation side effect improved     Colace PRN constipation  Script for repeat CBC given today

## 2021-03-22 NOTE — LETTER
Work Letter    Nguyen Hernandez  1990  [address] [id number]    03/22/21    Your employee is a patient at [facility]  We recommend that all pregnant women:    1  Have a well-ventilated workspace  2  Wear low-heeled shoes  3  Work no more than 40 hours per week  4  Have a 15 minute break every 2 hours and at least 30 minutes for a meal break  5  Use good body mechanics by bending at your knees to avoid back strain and lift no more than 20 pounds without assistance  Will need assistance with lifting over 20 lbs  6  If possible do work in a seated position to aid comfort as pregnancy progresses  7  Have ready access to bathrooms and water  She may continue to work until her due date unless medical complications arise  We anticipate she may return to work in 6-8 weeks after delivery       Sincerely,

## 2021-03-22 NOTE — ASSESSMENT & PLAN NOTE
BG levels reports as well controlled, per MFM notes some changes made to insulin regimen due to pp elevations  Recent MFM US for growth 3/11 EFW 39%  Starting twice weekly a/n testing     Last VINOD 3/16 nml  NST/VINOD Today  [id number] 94  Lab Results   Component Value Date    HGBA1C 5 1 02/20/2021

## 2021-03-22 NOTE — ASSESSMENT & PLAN NOTE
[id number], 33w0d  Reviewed the following benefits of skin-to-skin immediately after the baby is born  Beth Harding good for bonding   helps initiating breastfeeding if desired   keeps the baby warm   calms both mom and baby   helps to maintain the baby's blood sugar and helps to regulate heart rate and respirations  Reminded pt to choose a pediatrician    We again reviewed the S/S PTL and importance of consistent/regular FM   Reviewed process for [address] and encouraged pt to call with any decreases in fetal movement

## 2021-03-22 NOTE — TELEPHONE ENCOUNTER
Caller is requesting a work letter stating she is released to return to work on Thursday 03/25/21  She can  the letter when ready from the [facility] ([facility]) office  Please advise      Callback FN#[phone]

## 2021-03-24 NOTE — TELEPHONE ENCOUNTER
Patient is calling to check on the status of the paperwork for the [employer]  Patient was advised it is in the queue and being worked on  Patient is returning to work tomorrow, 03-25-21 and she doesn't;'t think this paperwork affects that

## 2021-03-25 ENCOUNTER — OFFICE VISIT (OUTPATIENT)
Dept: PERINATAL CARE | Facility: CLINIC | Age: 31
End: 2021-03-25
Payer: COMMERCIAL

## 2021-03-25 ENCOUNTER — ULTRASOUND (OUTPATIENT)
Dept: PERINATAL CARE | Facility: OTHER | Age: 31
End: 2021-03-25
Payer: COMMERCIAL

## 2021-03-25 VITALS
SYSTOLIC BLOOD PRESSURE: 119 MMHG | DIASTOLIC BLOOD PRESSURE: 74 MMHG | HEIGHT: 68 IN | HEART RATE: 98 BPM | WEIGHT: 257.8 LBS | BODY MASS INDEX: 39.07 KG/M2

## 2021-03-25 DIAGNOSIS — Z3A.33 33 WEEKS GESTATION OF PREGNANCY: ICD-10-CM

## 2021-03-25 DIAGNOSIS — O99.213 OBESITY AFFECTING PREGNANCY IN THIRD TRIMESTER: ICD-10-CM

## 2021-03-25 DIAGNOSIS — O24.013 PRE-EXISTING TYPE 1 DIABETES MELLITUS DURING PREGNANCY IN THIRD TRIMESTER: Primary | ICD-10-CM

## 2021-03-25 DIAGNOSIS — E10.649 HYPOGLYCEMIA DUE TO TYPE 1 DIABETES MELLITUS (HCC): ICD-10-CM

## 2021-03-25 PROCEDURE — 76815 OB US LIMITED FETUS(S): CPT | Performed by: OBSTETRICS & GYNECOLOGY

## 2021-03-25 PROCEDURE — 99213 OFFICE O/P EST LOW 20 MIN: CPT | Performed by: NURSE PRACTITIONER

## 2021-03-25 PROCEDURE — 59025 FETAL NON-STRESS TEST: CPT | Performed by: OBSTETRICS & GYNECOLOGY

## 2021-03-25 NOTE — PATIENT INSTRUCTIONS
Kick Counts in Pregnancy   AMBULATORY CARE:   Kick counts  measure how much your baby is moving in your womb  A kick from your baby can be felt as a twist, turn, swish, roll, or jab  It is common to feel your baby kicking at 26 to 28 weeks of pregnancy  You may feel your baby kick as early as 20 weeks of pregnancy  You may want to start counting at 28 weeks  Contact your healthcare provider immediately if:   · You feel a change in the number of kicks or movements of your baby  · You feel fewer than 10 kicks within 2 hours  · You have questions or concerns about your baby's movements  Why measure kick counts:  Your baby's movement may provide information about your baby's health  He or she may move less, or not at all, if there are problems  Your baby may move less if he or she is not getting enough oxygen or nutrition from the placenta  Do not smoke while you are pregnant  Smoking decreases the amount of oxygen that gets to your baby  Talk to your healthcare provider if you need help to quit smoking  Tell your healthcare provider as soon as you feel a change in your baby's movements  When to measure kick counts:   · Measure kick counts at the same time every day  · Measure kick counts when your baby is awake and most active  Your baby may be most active in the evening  How to measure kick counts:  Check that your baby is awake before you measure kick counts  You can wake up your baby by lightly pushing on your belly, walking, or drinking something cold  Your healthcare provider may tell you different ways to measure kick counts  You may be told to do the following:  · Use a chart or clock to keep track of the time you start and finish counting  · Sit in a chair or lie on your left side  · Place your hands on the largest part of your belly  · Count until you reach 10 kicks  Write down how much time it takes to count 10 kicks  · It may take 30 minutes to 2 hours to count 10 kicks  It should not take more than 2 hours to count 10 kicks  Follow up with your healthcare provider as directed:  Write down your questions so you remember to ask them during your visits  © Copyright [address] Information is for End User's use only and may not be sold, redistributed or otherwise used for commercial purposes  All illustrations and images included in CareNotes® are the copyrighted property of A D A M , Inc  or [facility] Gareth Logan   The above information is an  only  It is not intended as medical advice for individual conditions or treatments  Talk to your doctor, nurse or pharmacist before following any medical regimen to see if it is safe and effective for you

## 2021-03-28 NOTE — PROGRESS NOTES
Tatiana Schaefer is a 31 y o  female on a/an Medtronic 530G insulin pump  Estimated Date of Delivery: 5/10/21   Currently: 33w6d  Encounter Diagnosis     ICD-10-CM    1  Pre-existing type 1 diabetes mellitus during pregnancy in third trimester  O24 013    2  Hypoglycemia due to type 1 diabetes mellitus ([facility] [address] )  E10 649    3  Obesity affecting pregnancy in third trimester  O99 213    4  BMI 39 0-39 9,adult  Z68 39         Current Insulin pump settings:  Basal rate: MN@ 4 55 units/hour, 3 AM@ 4 40 units/hour, 6 AM@ 4 20 units/hour, 9 AM@ 6 85 units/hour, 12:30 PM@ 6 45 units/hour, 5 PM@ 5 30 units/hour and 10 PM@ 4 15 units/hour  Insulin to carb ratio:MN 4; 9:30 AM@ 4; 5 PM@ 4  Insulin sensitivity factor:10  BG target:100  Active insulin time 3 hours  Type of insulin:Humalog  Self-monitoring blood glucose: before meals; 2 hours post meal; with hypoglycemia  CGM: yes, Dexcom G6  Glucose ranges: Too tight control noted by CGM report 88% of time  and 8% of the time less than 8% per Dexcom report  Carly Meredith is a 30 yo  female currently 35 3/7 weeks gestation with pre-existing T1DM on Humalog via Medtronic 530G insulin pump and Dexcom G6  Plans to upgrade insulin pump post delivery with T slim  Reports CGM does read lower than actual finger sticks especially with glucose readings less than 60    Episodes of glucose readings less than 60 via finger stick noted post using a dual square bolus for bedtime snack such as butter popcorn  Encouraged to avoid using square bolus dose for popcorn and follow bolus advisor since last visit bolus settings adjustments made to increase bolus insulin units  Recommend including protein with all snacks  Currently still using 70% of basal vs 30% bolus insulin despite bolus settings adjustments made last week  Pre-existing T1DM usually require greater bolus insulin vs basal insulin in 3rd trimester of pregnancy  Carly Meredith has self titrated to current basal settings   Personally believe basal settings are too high and glucose readings via finger stick less than 60 should be avoided for safety reasons  Abrupt changes in glucose readings are not recommended  Cj Blanco has maintained pretty tight glucose control and fetal surveillance have been within normal thus far  Will continue to download insulin pump once a week and make recommendations  Goal is to avoid actual finger stick readings less than 60 and find balance between basal/bolus insulin anywhere from 60 to 40% vs 40 to 60% favoring higher bolus insulin towards end of pregnancy  Increase insulin resistance noted with increase in weight and gestation  Repeat A1c, CMP and spot urine for protein/creatinine ratio needed with next visit  Refer to attachments

## 2021-03-29 ENCOUNTER — TELEPHONE (OUTPATIENT)
Dept: OBGYN CLINIC | Facility: MEDICAL CENTER | Age: 31
End: 2021-03-29

## 2021-03-29 NOTE — TELEPHONE ENCOUNTER
Message sent to patient via my chart as reminder to do CBC prior to next appointment coming up this Friday

## 2021-03-30 ENCOUNTER — LAB (OUTPATIENT)
Dept: LAB | Facility: MEDICAL CENTER | Age: 31
End: 2021-03-30
Payer: COMMERCIAL

## 2021-03-30 ENCOUNTER — ULTRASOUND (OUTPATIENT)
Dept: PERINATAL CARE | Facility: OTHER | Age: 31
End: 2021-03-30
Payer: COMMERCIAL

## 2021-03-30 VITALS
HEART RATE: 90 BPM | SYSTOLIC BLOOD PRESSURE: 134 MMHG | DIASTOLIC BLOOD PRESSURE: 70 MMHG | WEIGHT: 257.6 LBS | BODY MASS INDEX: 38.16 KG/M2 | HEIGHT: 69 IN

## 2021-03-30 DIAGNOSIS — Z3A.34 34 WEEKS GESTATION OF PREGNANCY: ICD-10-CM

## 2021-03-30 DIAGNOSIS — O99.213 OBESITY AFFECTING PREGNANCY IN THIRD TRIMESTER: ICD-10-CM

## 2021-03-30 DIAGNOSIS — O99.019 MATERNAL ANEMIA, ANTEPARTUM: ICD-10-CM

## 2021-03-30 DIAGNOSIS — O24.013 PRE-EXISTING TYPE 1 DIABETES MELLITUS DURING PREGNANCY IN THIRD TRIMESTER: Primary | ICD-10-CM

## 2021-03-30 DIAGNOSIS — O09.93 HIGH-RISK PREGNANCY IN THIRD TRIMESTER: ICD-10-CM

## 2021-03-30 LAB
ERYTHROCYTE [DISTWIDTH] IN BLOOD BY AUTOMATED COUNT: 13.2 % (ref 11.6–15.1)
HCT VFR BLD AUTO: 32 % (ref 34.8–46.1)
HGB BLD-MCNC: 10.2 G/DL (ref 11.5–15.4)
MCH RBC QN AUTO: 26.8 PG (ref 26.8–34.3)
MCHC RBC AUTO-ENTMCNC: 31.9 G/DL (ref 31.4–37.4)
MCV RBC AUTO: 84 FL (ref 82–98)
PLATELET # BLD AUTO: 273 THOUSANDS/UL (ref 149–390)
PMV BLD AUTO: 11.3 FL (ref 8.9–12.7)
RBC # BLD AUTO: 3.8 MILLION/UL (ref 3.81–5.12)
WBC # BLD AUTO: 12.51 THOUSAND/UL (ref 4.31–10.16)

## 2021-03-30 PROCEDURE — 85027 COMPLETE CBC AUTOMATED: CPT

## 2021-03-30 PROCEDURE — 59025 FETAL NON-STRESS TEST: CPT | Performed by: OBSTETRICS & GYNECOLOGY

## 2021-03-30 PROCEDURE — 76815 OB US LIMITED FETUS(S): CPT | Performed by: OBSTETRICS & GYNECOLOGY

## 2021-03-30 PROCEDURE — 36415 COLL VENOUS BLD VENIPUNCTURE: CPT

## 2021-03-30 NOTE — PATIENT INSTRUCTIONS
Kick Counts in Pregnancy   AMBULATORY CARE:   Kick counts  measure how much your baby is moving in your womb  A kick from your baby can be felt as a twist, turn, swish, roll, or jab  It is common to feel your baby kicking at 26 to 28 weeks of pregnancy  You may feel your baby kick as early as 20 weeks of pregnancy  You may want to start counting at 28 weeks  Contact your healthcare provider immediately if:   · You feel a change in the number of kicks or movements of your baby  · You feel fewer than 10 kicks within 2 hours  · You have questions or concerns about your baby's movements  Why measure kick counts:  Your baby's movement may provide information about your baby's health  He or she may move less, or not at all, if there are problems  Your baby may move less if he or she is not getting enough oxygen or nutrition from the placenta  Do not smoke while you are pregnant  Smoking decreases the amount of oxygen that gets to your baby  Talk to your healthcare provider if you need help to quit smoking  Tell your healthcare provider as soon as you feel a change in your baby's movements  When to measure kick counts:   · Measure kick counts at the same time every day  · Measure kick counts when your baby is awake and most active  Your baby may be most active in the evening  How to measure kick counts:  Check that your baby is awake before you measure kick counts  You can wake up your baby by lightly pushing on your belly, walking, or drinking something cold  Your healthcare provider may tell you different ways to measure kick counts  You may be told to do the following:  · Use a chart or clock to keep track of the time you start and finish counting  · Sit in a chair or lie on your left side  · Place your hands on the largest part of your belly  · Count until you reach 10 kicks  Write down how much time it takes to count 10 kicks  · It may take 30 minutes to 2 hours to count 10 kicks  It should not take more than 2 hours to count 10 kicks  Follow up with your healthcare provider as directed:  Write down your questions so you remember to ask them during your visits  © Copyright [address] Information is for End User's use only and may not be sold, redistributed or otherwise used for commercial purposes  All illustrations and images included in CareNotes® are the copyrighted property of A D A M , Inc  or [facility] Gareth Logan   The above information is an  only  It is not intended as medical advice for individual conditions or treatments  Talk to your doctor, nurse or pharmacist before following any medical regimen to see if it is safe and effective for you

## 2021-03-30 NOTE — LETTER
March 30, 2021     Mary Beth Perez, [address] [place], [place] [address] [facility]    Patient: Héctor Verde   YOB: 1990   Date of Visit: 3/30/2021       Dear Dr Ruma Hoyt: Thank you for referring Héctor Verde to me for evaluation  Below are my notes for this consultation  If you have questions, please do not hesitate to call me  I look forward to following your patient along with you  Sincerely,        Callie Novak MD        CC: No Recipients  Callie Novak MD  3/30/2021  9:59 PM  Sign when Signing Visit   NST is reactive   Callie Novak MD

## 2021-03-31 NOTE — RESULT ENCOUNTER NOTE
No improvement in H/H  Slight drop, but still above 10 (hgb)   message sent via my chart-to determine if she is taking regularly etc, Advised pt results and possible need for IV iron infusions  can discuss this at upcoming appointment tomorrow with Dr Desire Walker   (sent as FYI)

## 2021-04-01 ENCOUNTER — ROUTINE PRENATAL (OUTPATIENT)
Dept: OBGYN CLINIC | Facility: CLINIC | Age: 31
End: 2021-04-01
Payer: COMMERCIAL

## 2021-04-01 VITALS
HEART RATE: 96 BPM | TEMPERATURE: 97.5 F | SYSTOLIC BLOOD PRESSURE: 132 MMHG | DIASTOLIC BLOOD PRESSURE: 68 MMHG | BODY MASS INDEX: 38.19 KG/M2 | WEIGHT: 258.6 LBS

## 2021-04-01 DIAGNOSIS — O09.93 SUPERVISION OF HIGH-RISK PREGNANCY, THIRD TRIMESTER: Primary | ICD-10-CM

## 2021-04-01 DIAGNOSIS — Z3A.34 34 WEEKS GESTATION OF PREGNANCY: ICD-10-CM

## 2021-04-01 DIAGNOSIS — O99.019 MATERNAL ANEMIA, ANTEPARTUM: ICD-10-CM

## 2021-04-01 DIAGNOSIS — O24.013 PRE-EXISTING TYPE 1 DIABETES MELLITUS DURING PREGNANCY IN THIRD TRIMESTER: ICD-10-CM

## 2021-04-01 LAB
SL AMB  POCT GLUCOSE, UA: NEGATIVE
SL AMB POCT URINE PROTEIN: NEGATIVE

## 2021-04-01 PROCEDURE — 59025 FETAL NON-STRESS TEST: CPT | Performed by: OBSTETRICS & GYNECOLOGY

## 2021-04-01 PROCEDURE — 99910: CPT | Performed by: OBSTETRICS & GYNECOLOGY

## 2021-04-01 PROCEDURE — PNV: Performed by: OBSTETRICS & GYNECOLOGY

## 2021-04-01 NOTE — PROGRESS NOTES
OB/GYN  PN Visit  Go Zimmerman  [id number]  4/1/2021  11:36 AM  Dr Ramon Galindo MD    S: 31 y o  [id number] 34w3d here for PN visit  Chief Complaint   Patient presents with    Routine Prenatal Visit     NST/VINOD -  Labs UTD, already had TDAP, and declines FLU vaccine  Patient states no problems  OB complaints:  Contractions: no  Leakage: no  Bleeding: no  Fetal movement: yes      O:  /68 (BP Location: Left arm, Cuff Size: Large)   Pulse 96   Temp 97 5 °F (36 4 °C) (Temporal)   Wt 117 kg (258 lb 9 6 oz)   LMP 08/03/2020 (Exact Date)   BMI 38 19 kg/m²       Review of Systems   Constitutional: Negative  HENT: Negative  Eyes: Negative  Respiratory: Negative  Cardiovascular: Negative  Gastrointestinal: Negative  Endocrine: Negative  Genitourinary:        As noted in HPI   Musculoskeletal: Negative  Skin: Negative  Allergic/Immunologic: Negative  Neurological: Negative  Hematological: Negative  Psychiatric/Behavioral: Negative  Physical Exam  Constitutional:       General: She is not in acute distress  Appearance: She is well-developed  Abdominal:      Palpations: Abdomen is soft  Tenderness: There is no abdominal tenderness  There is no guarding  Neurological:      Mental Status: She is alert and oriented to person, place, and time  Skin:     General: Skin is warm and dry     Psychiatric:         Behavior: Behavior normal            Fundal Height (cm): 35 cm  Fetal Heart Rate: 120          A/P:    Problem List Items Addressed This Visit        Endocrine    Pre-existing type 1 diabetes mellitus during pregnancy in third trimester    Relevant Orders    HEMOGLOBIN A1C W/ EAG ESTIMATION    Comprehensive metabolic panel       Other    Maternal anemia, antepartum    Relevant Orders    CBC      Other Visit Diagnoses     Supervision of high-risk pregnancy, third trimester    -  Primary    34 weeks gestation of pregnancy        Relevant Orders POCT urine dip (Completed)        Had VINOD at [facility] 3/30 and has another appt on 4/5      Pt works at [place] [address] and requested restrictions at work ([employer] and wishes to have paperwork    Filled out restrictions per 3/22/21 letter  Employees may seek FMLA as they see fit, keeping in mind that FMLA (when eligible) is finite in length and affords job protection (not income protection)  We encourage you to discuss with your employer reasonable job accomodations if you feel they are necessary  Work accomodations are modifications that allow a person to continue to safely perform the essential functions of the job, to allow continued pay and benefits  Please note that there is no federal law guaranteeing comprehensive accomdations for pregnant women and postpartum workers  Please also note that if your employer cannot suhas accomodations then you may be placed on medical leave, and if you are not eligible for medical leave then you may be terminated       Advised iron twice daily, with vitamin C, CBC in 2 weeks for Hgb 10 2, states she was only taking once daily    Discussed breech presentation,e expectant management for césar Medrano MD  4/1/2021  11:36 AM

## 2021-04-05 ENCOUNTER — ULTRASOUND (OUTPATIENT)
Dept: PERINATAL CARE | Facility: OTHER | Age: 31
End: 2021-04-05
Payer: COMMERCIAL

## 2021-04-05 ENCOUNTER — ROUTINE PRENATAL (OUTPATIENT)
Dept: PERINATAL CARE | Facility: OTHER | Age: 31
End: 2021-04-05
Payer: COMMERCIAL

## 2021-04-05 VITALS
HEART RATE: 82 BPM | SYSTOLIC BLOOD PRESSURE: 126 MMHG | WEIGHT: 264 LBS | DIASTOLIC BLOOD PRESSURE: 78 MMHG | BODY MASS INDEX: 39.1 KG/M2 | HEIGHT: 69 IN

## 2021-04-05 DIAGNOSIS — O24.013 PRE-EXISTING TYPE 1 DIABETES MELLITUS DURING PREGNANCY IN THIRD TRIMESTER: Primary | ICD-10-CM

## 2021-04-05 DIAGNOSIS — Z36.89 ENCOUNTER FOR ULTRASOUND TO CHECK FETAL GROWTH: ICD-10-CM

## 2021-04-05 PROCEDURE — 59025 FETAL NON-STRESS TEST: CPT | Performed by: OBSTETRICS & GYNECOLOGY

## 2021-04-05 PROCEDURE — 76816 OB US FOLLOW-UP PER FETUS: CPT | Performed by: OBSTETRICS & GYNECOLOGY

## 2021-04-05 PROCEDURE — NC001 PR NO CHARGE: Performed by: OBSTETRICS & GYNECOLOGY

## 2021-04-05 NOTE — LETTER
April 5, 2021     Jeanne Joseph, [address] [place] [id number] [facility]    Patient: Judy Ortiz   YOB: 1990   Date of Visit: 4/5/2021       Dear Dr Tracy Calix: Thank you for referring Judy Ortiz to me for evaluation  Below are my notes for this consultation  If you have questions, please do not hesitate to call me  I look forward to following your patient along with you  Sincerely,        Vanessa Posey MD        CC: No Recipients  Vanessa Posey MD  4/5/2021  2:03 PM  Sign when Signing Visit  [address] [employer]: Ms Floresita Francisco was seen today at 35w0d for NST (found under the pregnancy episode) which I reviewed the RN assessment and agree, and fetal growth ultrasound (see ultrasound report under OB procedures tab)  Please don't hesitate to contact our office with any concerns or questions    Vanessa Posey MD Tranexamic Acid Counseling:  Patient advised of the small risk of bleeding problems with tranexamic acid. They were also instructed to call if they developed any nausea, vomiting or diarrhea. All of the patient's questions and concerns were addressed.

## 2021-04-05 NOTE — PROGRESS NOTES
[address]: Ms Ruperto Maddox was seen today at 35w0d for NST (found under the pregnancy episode) which I reviewed the RN assessment and agree, and fetal growth ultrasound (see ultrasound report under OB procedures tab)  Please don't hesitate to contact our office with any concerns or questions    Janice King MD

## 2021-04-05 NOTE — PATIENT INSTRUCTIONS
Thank you for choosing us for your  care today  If you have any questions about your ultrasound or care, please do not hesitate to contact us or your primary obstetrician  Please communicate your blood sugars at least weekly with the diabetic educators at the [address] [place] [address] Diabetes in Pregnancy program   The telephone number is [phone]  The e-mail address is [email or web address]  If you do not hear back from the program within 48 hours of sending your blood sugars, please call MARCO Lee at [phone]  Thank you  Some general instructions for your pregnancy are:     Protect against coronavirus: Continue to practice social distancing, wear a mask, and wash your hands often  Pregnant women are increased risk of severe COVID  Notify your primary care doctor if you have any symptoms including cough, shortness of breath or difficulty breathing, fever, chills, muscle pain, sore throat, or loss of taste or smell  Pregnant women can receive the coronavirus vaccine   Exercise: Aim for 22 minutes per day (150 minutes per week) of regular exercise  Walking is great!  Nutrition: aim for calcium-rich and iron-rich foods as well as healthy sources of protein   Protect against the flu: get yourself and your entire household vaccinated against influenza  This will protect your baby   Learn about Preeclampsia: preeclampsia is a common, serious high blood pressure complication in pregnancy  A blood pressure of 118ZCLQ (systolic or top number) or 87PISI (diastolic or bottom number) is not normal and needs evaluation by your doctor   If you smoke, try to reduce how many cigarettes you smoke or try to quit completely  Do not vape       Other warning signs to watch out for in pregnancy or postpartum: chest pain, obstructed breathing or shortness of breath, seizures, thoughts of hurting yourself or your baby, bleeding, a painful or swollen leg, fever, or headache (see [facility] POST-BIRTH Warning Signs campaign)  If these happen call 911  Itching is also not normal in pregnancy and if you experience this, especially over your hands and feet, potentially worse at night, notify your doctors   Lastly, if you are contacted regarding participation in a survey about your experience in our office, please know that we take any feedback you provide seriously and use it to improve how we deliver care through our center

## 2021-04-09 ENCOUNTER — ROUTINE PRENATAL (OUTPATIENT)
Dept: OBGYN CLINIC | Facility: CLINIC | Age: 31
End: 2021-04-09

## 2021-04-09 VITALS
DIASTOLIC BLOOD PRESSURE: 68 MMHG | BODY MASS INDEX: 38.69 KG/M2 | WEIGHT: 262 LBS | HEART RATE: 85 BPM | TEMPERATURE: 97.5 F | SYSTOLIC BLOOD PRESSURE: 134 MMHG

## 2021-04-09 DIAGNOSIS — Z3A.35 35 WEEKS GESTATION OF PREGNANCY: ICD-10-CM

## 2021-04-09 DIAGNOSIS — O09.93 HIGH-RISK PREGNANCY IN THIRD TRIMESTER: Primary | ICD-10-CM

## 2021-04-09 DIAGNOSIS — O99.019 MATERNAL ANEMIA, ANTEPARTUM: ICD-10-CM

## 2021-04-09 DIAGNOSIS — O99.213 OBESITY AFFECTING PREGNANCY IN THIRD TRIMESTER: ICD-10-CM

## 2021-04-09 DIAGNOSIS — O24.013 PRE-EXISTING TYPE 1 DIABETES MELLITUS DURING PREGNANCY IN THIRD TRIMESTER: ICD-10-CM

## 2021-04-09 PROCEDURE — 87150 DNA/RNA AMPLIFIED PROBE: CPT | Performed by: CLINICAL NURSE SPECIALIST

## 2021-04-09 PROCEDURE — PNV: Performed by: CLINICAL NURSE SPECIALIST

## 2021-04-09 NOTE — PATIENT INSTRUCTIONS
How Will I Know if Im in Labor?  Abdominal contractions will be strong and regular   The contractions will be strong enough that it will be difficult to walk, talk or breathe through them   Your water breaks - may be a gush or a slow leak    o To differentiate between amniotic fluid or urine perform a kegel exercise  - If its urine, when performing a kegel, the flow of urine will stop  - If its amniotic fluid, when performing a kegel, the flow of fluid will not stop    For a first time mom, think of the 511 rule   Your contractions are 5 minutes apart   The contractions last 1 minutes each   The contractions have been in that pattern for 1 hour    For a mom who has been through the birth process before   Contractions that occur every at least every 5-8 minutes apart and are becoming progressively  more uncomfortable  Please always call the office prior to going to the hospital   Pt currently denies HA, vision changes or RUQ pain  No significant swelling noted  BP:  Urine : Wt: Signs and symptoms of Pre-eclampsia - a disorder in pregnancy involving elevated Blood pressure in pregnancy  Please call immediately with any of the following:   Severe headache that does not improve with tylenol/rest/increased fluids   Vision disturances such as blurry vision, white spots or flashing lights in vision   Abdominal pain in the right upper quadrant- unrelated to fetal movement   Weight gain > 2-3 pounds in one week   Severe swelling- particularly of the hands or face          Pregnancy at 35 to 38 Weeks   AMBULATORY CARE:   What changes are happening to your body: You are considered full term at the beginning of 37 weeks  Your breathing may be easier if your baby has moved down into a head-down position  You may need to urinate more often because the baby may be pressing on your bladder  You may also feel more discomfort and get tired easily     Seek care immediately if:   · You develop a severe headache that does not go away  · You have new or increased vision changes, such as blurred or spotted vision  · You have new or increased swelling in your face or hands  · You have vaginal spotting or bleeding  · Your water broke or you feel warm water gushing or trickling from your vagina  Contact your healthcare provider if:   · You have more than 5 contractions in 1 hour  · You notice any changes in your baby's movements  · You have abdominal cramps, pressure, or tightening  · You have a change in vaginal discharge  · You have chills or a fever  · You have vaginal itching, burning, or pain  · You have yellow, green, white, or foul-smelling vaginal discharge  · You have pain or burning when you urinate, less urine than usual, or pink or bloody urine  · You have questions or concerns about your condition or care  How to care for yourself at this stage of your pregnancy:   · Eat a variety of healthy foods  Healthy foods include fruits, vegetables, whole-grain breads, low-fat dairy foods, beans, lean meats, and fish  Drink liquids as directed  Ask how much liquid to drink each day and which liquids are best for you  Limit caffeine to less than 200 milligrams each day  Limit your intake of fish to 2 servings each week  Choose fish low in mercury such as canned light tuna, shrimp, salmon, cod, or tilapia  Do not  eat fish high in mercury such as swordfish, tilefish, oscar mackerel, and shark  · Take prenatal vitamins as directed  Your need for certain vitamins and minerals, such as folic acid, increases during pregnancy  Prenatal vitamins provide some of the extra vitamins and minerals you need  Prenatal vitamins may also help to decrease the risk of certain birth defects  · Rest as needed  Put your feet up if you have swelling in your ankles and feet  · Do not smoke    Smoking increases your risk of a miscarriage and other health problems during your pregnancy  Smoking can cause your baby to be born early or weigh less at birth  Ask your healthcare provider for information if you need help quitting  · Do not drink alcohol  Alcohol passes from your body to your baby through the placenta  It can affect your baby's brain development and cause fetal alcohol syndrome (FAS)  FAS is a group of conditions that causes mental, behavior, and growth problems  · Talk to your healthcare provider before you take any medicines  Many medicines may harm your baby if you take them when you are pregnant  Do not take any medicines, vitamins, herbs, or supplements without first talking to your healthcare provider  Never use illegal or street drugs (such as marijuana or cocaine) while you are pregnant  · Talk to your healthcare provider before you travel  You may not be able to travel in an airplane after 36 weeks  He may also recommend that you avoid long road trips  Safety tips during pregnancy:   · Avoid hot tubs and saunas  Do not use a hot tub or sauna while you are pregnant, especially during your first trimester  Hot tubs and saunas may raise your baby's temperature and increase the risk of birth defects  · Avoid toxoplasmosis  This is an infection caused by eating raw meat or being around infected cat feces  It can cause birth defects, miscarriages, and other problems  Wash your hands after you touch raw meat  Make sure any meat is well-cooked before you eat it  Avoid raw eggs and unpasteurized milk  Use gloves or ask someone else to clean your cat's litter box while you are pregnant  · Ask your healthcare provider about travel  The most comfortable time to travel is during the second trimester  Ask your healthcare provider if you can travel after 36 weeks  You may not be able to travel in an airplane after 36 weeks  He may also recommend that you avoid long road trips      Changes that are happening with your baby:  By 38 weeks, your baby may weigh between 6 and 9 pounds  Your baby may be about 14 inches long from the top of the head to the rump (baby's bottom)  Your baby hears well enough to know your voice  As your baby gets larger, you may feel fewer kicks and more stretching and rolling  Your baby may move into a head-down position  Your baby will also rest lower in your abdomen  What you need to know about prenatal care: Your healthcare provider will check your blood pressure and weight  You may also need the following:  · A urine test  may also be done to check for sugar and protein  These can be signs of gestational diabetes or infection  Protein in your urine may also be a sign of preeclampsia  Preeclampsia is a condition that can develop during week 20 or later of your pregnancy  It causes high blood pressure, and it can cause problems with your kidneys and other organs  · A blood test  may be done to check for anemia (low iron level)  · A Tdap vaccine  may be recommended by your healthcare provider  · A group B strep test  is a test that is done to check for group B strep infection  Group B strep is a type of bacteria that may be found in the vagina or rectum  It can be passed to your baby during delivery if you have it  Your healthcare provider will take swab your vagina or rectum and send the sample to the lab for tests  · Fundal height  is a measurement of your uterus to check your baby's growth  This number is usually the same as the number of weeks that you have been pregnant  Your healthcare provider may also check your baby's position  · Your baby's heart rate  will be checked  © Copyright [address] Information is for End User's use only and may not be sold, redistributed or otherwise used for commercial purposes  All illustrations and images included in CareNotes® are the copyrighted property of [employer] [facility]  or [facility] Gareth Logan   The above information is an  only   It is not intended as medical advice for individual conditions or treatments  Talk to your doctor, nurse or pharmacist before following any medical regimen to see if it is safe and effective for you

## 2021-04-09 NOTE — ASSESSMENT & PLAN NOTE
Recent growth US 4/5- showing EFW 76% no accelerated AC growth and nml VINOD  Pt reports good glucose control and working closely with MFM regarding DM management  As long as BG control remains good w/ nml A/N testing and barring any other complications will plan IOL around 39 wks   Will d/w Dr Aaron Caraballo next week

## 2021-04-09 NOTE — PROGRESS NOTES
Prenatal Visit  Subjective:   Shaji Rubio is a 31 y o  [id number] 35w4d here for Routine Prenatal Visit (NST/IVNOD- Due for GBS and BW, already had TDAP and decline FLU vaccine  Patient states no problems  )    Ob Complaints:   Denies regular ctx  Denies VB or LOF  Fetus is reported as active  IDDM with insulin pump and CGM  BG control appears to remain at goal       now taking iron twice daily  Any side effects  Taking Colace prophylactically to prevent constipation and doing well so far      Objective:  Pregravid Weight/BMI: 95 3 kg (210 lb) (BMI 31 00)  Current Weight: 119 kg (262 lb)   Total Weight Gain: 23 6 kg (52 lb)   Pre-Enoc Vitals      Most Recent Value   Prenatal Assessment   Fetal Heart Rate  NST   Fundal Height (cm)  36 cm   Movement  Present   Prenatal Vitals   Blood Pressure  134/68   Weight - Scale  119 kg (262 lb)   Urine Albumin/Glucose   Dilation/Effacement/Station   Cervical Dilation  0   Cervical Effacement  0   Fetal Station  -5   Vaginal Drainage   Edema   LLE Edema  +1   RLE Edema  +1   Facial Edema  None         Physical Exam  Constitutional:       General: She is not in acute distress  Appearance: Normal appearance  She is obese  Genitourinary:      Genitourinary Comments: Cervix closed/th/hi     Cardiovascular:      Rate and Rhythm: Normal rate  Pulmonary:      Effort: Pulmonary effort is normal    Abdominal:      Palpations: Abdomen is soft  Comments: Gravid abd  Fundal Height (cm): 36 cm  Fetal Heart Rate: NST   Musculoskeletal: Normal range of motion  Right lower leg: Edema (+1) present  Left lower leg: Edema (+1) present  Neurological:      Mental Status: She is alert and oriented to person, place, and time  Skin:     General: Skin is warm and dry  Psychiatric:         Mood and Affect: Mood normal          Behavior: Behavior normal          Assessment & Plan:    1   High-risk pregnancy in third trimester  Assessment & Plan:  No S/S Labor  GBS culture was collected at this visit  Rescreen for GT/CT was not collected/ordered-not indicated    We reviewed the following today:  · Labor: I have reviewed the signs and symptoms of labor with the patient, including contractions q4-5 minutes for greater than 1 hour, vaginal bleeding, leaking fluid and decreased fetal movement  I have emphasized the continued importance of paying close attention to the baby's movements  I have instructed the patient to call the office with any of the above symptoms prior to coming to the hospital    · Anesthesia: I have reviewed the options for anesthesia in labor, including IV medications in early labor, regional anesthesia with an epidural or spinal, local anesthesia or general anesthesia if needed for a   I have reviewed that the anesthesia staff will see every patient on the labor floor to be prepared in the event of an emergency  They will review the risks and benefits of each option and sign an anesthesia consent  · Breastfeeding on Demand: I have reviewed the benefits to both mom and baby of breastfeeding on demand as well as exclusive breastfeeding within the first 6 months of life  2  Pre-existing type 1 diabetes mellitus during pregnancy in third trimester  Assessment & Plan:  Recent growth US - showing EFW 76% no accelerated AC growth and nml VINOD  Pt reports good glucose control and working closely with MFM regarding DM management  As long as BG control remains good w/ nml A/N testing and barring any other complications will plan IOL around 39 wks  Will d/w Dr Pinky Mac next week      3  Maternal anemia, antepartum  Assessment & Plan:    Recent CBC showed decrease from the month prior  Discussed at last visit and was increased iron supplementation from once daily to twice daily     She is compliant taking vitamin-C /orange juice and is already on Colace for constipation prevention  Component      Latest Ref Rng & Units 2021 3/30/2021   Hemoglobin 11 5 - 15 4 g/dL 10 8 (L) 10 2 (L)   HCT      34 8 - 46 1 % 33 1 (L) 32 0 (L)         4  Obesity affecting pregnancy in third trimester    5  35 weeks gestation of pregnancy  -     POCT urine dip  -     Strep B DNA probe, amplification      MARCO Garcia  4/12/2021

## 2021-04-09 NOTE — ASSESSMENT & PLAN NOTE
No S/S Labor  GBS culture was collected at this visit  Rescreen for GT/CT was not collected/ordered-not indicated    We reviewed the following today:  · Labor: I have reviewed the signs and symptoms of labor with the patient, including contractions q4-5 minutes for greater than 1 hour, vaginal bleeding, leaking fluid and decreased fetal movement  I have emphasized the continued importance of paying close attention to the baby's movements  I have instructed the patient to call the office with any of the above symptoms prior to coming to the hospital    · Anesthesia: I have reviewed the options for anesthesia in labor, including IV medications in early labor, regional anesthesia with an epidural or spinal, local anesthesia or general anesthesia if needed for a   I have reviewed that the anesthesia staff will see every patient on the labor floor to be prepared in the event of an emergency  They will review the risks and benefits of each option and sign an anesthesia consent  · Breastfeeding on Demand: I have reviewed the benefits to both mom and baby of breastfeeding on demand as well as exclusive breastfeeding within the first 6 months of life

## 2021-04-09 NOTE — ASSESSMENT & PLAN NOTE
Recent CBC showed decrease from the month prior  Discussed at last visit and was increased iron supplementation from once daily to twice daily     She is compliant taking vitamin-C /orange juice and is already on Colace for constipation prevention  Component      Latest Ref Rng & Units 2/20/2021 3/30/2021   Hemoglobin      11 5 - 15 4 g/dL 10 8 (L) 10 2 (L)   HCT      34 8 - 46 1 % 33 1 (L) 32 0 (L)

## 2021-04-12 PROBLEM — O99.820 GBS (GROUP B STREPTOCOCCUS CARRIER), +RV CULTURE, CURRENTLY PREGNANT: Status: ACTIVE | Noted: 2021-04-12

## 2021-04-12 LAB — GP B STREP DNA SPEC QL NAA+PROBE: POSITIVE

## 2021-04-13 ENCOUNTER — ULTRASOUND (OUTPATIENT)
Dept: PERINATAL CARE | Facility: OTHER | Age: 31
End: 2021-04-13
Payer: COMMERCIAL

## 2021-04-13 ENCOUNTER — TELEPHONE (OUTPATIENT)
Dept: PERINATAL CARE | Facility: CLINIC | Age: 31
End: 2021-04-13

## 2021-04-13 VITALS
DIASTOLIC BLOOD PRESSURE: 77 MMHG | WEIGHT: 264 LBS | BODY MASS INDEX: 39.1 KG/M2 | HEART RATE: 101 BPM | HEIGHT: 69 IN | SYSTOLIC BLOOD PRESSURE: 116 MMHG

## 2021-04-13 DIAGNOSIS — O99.213 OBESITY AFFECTING PREGNANCY IN THIRD TRIMESTER: ICD-10-CM

## 2021-04-13 DIAGNOSIS — Z46.81 INSULIN PUMP TITRATION: ICD-10-CM

## 2021-04-13 DIAGNOSIS — O99.820 GBS (GROUP B STREPTOCOCCUS CARRIER), +RV CULTURE, CURRENTLY PREGNANT: ICD-10-CM

## 2021-04-13 DIAGNOSIS — Z3A.36 36 WEEKS GESTATION OF PREGNANCY: ICD-10-CM

## 2021-04-13 DIAGNOSIS — G56.03 CARPAL TUNNEL SYNDROME, BILATERAL: ICD-10-CM

## 2021-04-13 DIAGNOSIS — O99.019 MATERNAL ANEMIA, ANTEPARTUM: ICD-10-CM

## 2021-04-13 DIAGNOSIS — O24.013 PRE-EXISTING TYPE 1 DIABETES MELLITUS DURING PREGNANCY IN THIRD TRIMESTER: Primary | ICD-10-CM

## 2021-04-13 DIAGNOSIS — E10.649 HYPOGLYCEMIA DUE TO TYPE 1 DIABETES MELLITUS (HCC): ICD-10-CM

## 2021-04-13 PROCEDURE — 59025 FETAL NON-STRESS TEST: CPT | Performed by: OBSTETRICS & GYNECOLOGY

## 2021-04-13 PROCEDURE — 76815 OB US LIMITED FETUS(S): CPT | Performed by: OBSTETRICS & GYNECOLOGY

## 2021-04-13 NOTE — PATIENT INSTRUCTIONS
Kick Counts in Pregnancy   AMBULATORY CARE:   Kick counts  measure how much your baby is moving in your womb  A kick from your baby can be felt as a twist, turn, swish, roll, or jab  It is common to feel your baby kicking at 26 to 28 weeks of pregnancy  You may feel your baby kick as early as 20 weeks of pregnancy  You may want to start counting at 28 weeks  Contact your healthcare provider immediately if:   · You feel a change in the number of kicks or movements of your baby  · You feel fewer than 10 kicks within 2 hours  · You have questions or concerns about your baby's movements  Why measure kick counts:  Your baby's movement may provide information about your baby's health  He or she may move less, or not at all, if there are problems  Your baby may move less if he or she is not getting enough oxygen or nutrition from the placenta  Do not smoke while you are pregnant  Smoking decreases the amount of oxygen that gets to your baby  Talk to your healthcare provider if you need help to quit smoking  Tell your healthcare provider as soon as you feel a change in your baby's movements  When to measure kick counts:   · Measure kick counts at the same time every day  · Measure kick counts when your baby is awake and most active  Your baby may be most active in the evening  How to measure kick counts:  Check that your baby is awake before you measure kick counts  You can wake up your baby by lightly pushing on your belly, walking, or drinking something cold  Your healthcare provider may tell you different ways to measure kick counts  You may be told to do the following:  · Use a chart or clock to keep track of the time you start and finish counting  · Sit in a chair or lie on your left side  · Place your hands on the largest part of your belly  · Count until you reach 10 kicks  Write down how much time it takes to count 10 kicks  · It may take 30 minutes to 2 hours to count 10 kicks  It should not take more than 2 hours to count 10 kicks  Follow up with your healthcare provider as directed:  Write down your questions so you remember to ask them during your visits  © Copyright [address] Information is for End User's use only and may not be sold, redistributed or otherwise used for commercial purposes  All illustrations and images included in CareNotes® are the copyrighted property of A D A M , Inc  or [facility] Gareth Logan   The above information is an  only  It is not intended as medical advice for individual conditions or treatments  Talk to your doctor, nurse or pharmacist before following any medical regimen to see if it is safe and effective for you

## 2021-04-16 ENCOUNTER — ROUTINE PRENATAL (OUTPATIENT)
Dept: OBGYN CLINIC | Facility: CLINIC | Age: 31
End: 2021-04-16

## 2021-04-16 VITALS
SYSTOLIC BLOOD PRESSURE: 120 MMHG | DIASTOLIC BLOOD PRESSURE: 78 MMHG | TEMPERATURE: 97.5 F | WEIGHT: 265.6 LBS | BODY MASS INDEX: 39.22 KG/M2 | HEART RATE: 113 BPM

## 2021-04-16 DIAGNOSIS — G56.03 CARPAL TUNNEL SYNDROME, BILATERAL: ICD-10-CM

## 2021-04-16 DIAGNOSIS — O24.013 PRE-EXISTING TYPE 1 DIABETES MELLITUS DURING PREGNANCY IN THIRD TRIMESTER: ICD-10-CM

## 2021-04-16 DIAGNOSIS — Z46.81 INSULIN PUMP TITRATION: ICD-10-CM

## 2021-04-16 DIAGNOSIS — Z3A.36 36 WEEKS GESTATION OF PREGNANCY: ICD-10-CM

## 2021-04-16 DIAGNOSIS — O99.019 MATERNAL ANEMIA, ANTEPARTUM: ICD-10-CM

## 2021-04-16 DIAGNOSIS — E10.649 HYPOGLYCEMIA DUE TO TYPE 1 DIABETES MELLITUS (HCC): ICD-10-CM

## 2021-04-16 DIAGNOSIS — O09.93 HIGH-RISK PREGNANCY IN THIRD TRIMESTER: Primary | ICD-10-CM

## 2021-04-16 DIAGNOSIS — O99.820 GBS (GROUP B STREPTOCOCCUS CARRIER), +RV CULTURE, CURRENTLY PREGNANT: ICD-10-CM

## 2021-04-16 DIAGNOSIS — O99.213 OBESITY AFFECTING PREGNANCY IN THIRD TRIMESTER: ICD-10-CM

## 2021-04-16 LAB
SL AMB  POCT GLUCOSE, UA: NEGATIVE
SL AMB POCT URINE PROTEIN: NEGATIVE

## 2021-04-16 PROCEDURE — PNV: Performed by: CLINICAL NURSE SPECIALIST

## 2021-04-16 NOTE — ASSESSMENT & PLAN NOTE
Patient has insulin pump and Dexcom CGM  She has started turning CGM off overnight due to low readings  She does not feel these are accurate as blood sugar her fingersticks do not correlate with lows which is what she tends to go by for management  Encouraged to follow closely with MFM_Diab   Unless otherwise indicated will plan for IOL at 39 wks (would be around 5/3)  NST reactive today

## 2021-04-16 NOTE — PROGRESS NOTES
Prenatal Visit  Subjective:   Maru Velarde is a 31 y o  [id number] 36w4d here for Routine Prenatal Visit (NST- GBS positive and due for BW, already had TDAP and decline FLU vaccine  )    Here for routine visit and NST due to T1DM in pregnancy   She denies any regular persistent contractions  Denies LOF or VB  Reports fetus as active  GBS done last visit and Positive  BG not elevated- having some lows-particularly overnight, using CGM and Insulin pump to manage  Objective:  Pregravid Weight/BMI: 95 3 kg (210 lb) (BMI 31 00)  Current Weight: 120 kg (265 lb 9 6 oz)   Total Weight Gain: 25 2 kg (55 lb 9 6 oz)   Pre-Enoc Vitals      Most Recent Value   Prenatal Assessment   Fetal Heart Rate     Movement  Present   Presentation  Vertex   Prenatal Vitals   Blood Pressure  120/78   Weight - Scale  120 kg (265 lb 9 6 oz)   Urine Albumin/Glucose   Dilation/Effacement/Station   Vaginal Drainage   Edema         OBGyn Exam  Physical Exam  General: Not in acute distress and appearing well nourished and well groomed  Genitourinary:          Pelvic exam exam deferred   Cardiovascular:   Rate and Rhythm: Normal rate  Pulmonary:    Effort: normal, not labored  Abdominal:  Abdomen is soft and gravid    Musculoskeletal: Active movement of all extremities, no gross limitations of ROM     Edema: None  Neurological:   Mental Status: She is alert and oriented to person, place, and time  Skin: General: Skin is warm and dry  Psychiatric:    Mood and Affect: Mood normal       Behavior: Behavior normal      Assessment & Plan:    1  High-risk pregnancy in third trimester    2  Pre-existing type 1 diabetes mellitus during pregnancy in third trimester    3  36 weeks gestation of pregnancy  -     POCT urine dip    4  Maternal anemia, antepartum    5  Carpal tunnel syndrome, bilateral    6  Insulin pump titration    7  BMI 39 0-39 9,adult    8   Hypoglycemia due to type 1 diabetes mellitus ([facility] [address] )    9  Obesity affecting pregnancy in third trimester    10   GBS (group B Streptococcus carrier), +RV culture, currently pregnant        Karin MARCO Johnston  4/16/2021

## 2021-04-16 NOTE — ASSESSMENT & PLAN NOTE
Patient is doing well overall  We reviewed the results of her GBS culture from last visit and implications regarding delivery  Will need antibiotics in labor to prevent transmission to baby  no signs and symptoms of labor or preeclampsia      Reviewed signs and symptoms of labor to call for as well as handout in AVS

## 2021-04-16 NOTE — ASSESSMENT & PLAN NOTE
Continues iron as ordered    Will be due for repeat testing in approximately 2 weeks order given at last appointment

## 2021-04-17 ENCOUNTER — LAB (OUTPATIENT)
Dept: LAB | Facility: MEDICAL CENTER | Age: 31
End: 2021-04-17
Payer: COMMERCIAL

## 2021-04-17 DIAGNOSIS — O24.013 PRE-EXISTING TYPE 1 DIABETES MELLITUS DURING PREGNANCY IN THIRD TRIMESTER: ICD-10-CM

## 2021-04-17 DIAGNOSIS — O99.019 MATERNAL ANEMIA, ANTEPARTUM: ICD-10-CM

## 2021-04-17 LAB
ALBUMIN SERPL BCP-MCNC: 2.7 G/DL (ref 3.5–5)
ALP SERPL-CCNC: 165 U/L (ref 46–116)
ALT SERPL W P-5'-P-CCNC: 12 U/L (ref 12–78)
ANION GAP SERPL CALCULATED.3IONS-SCNC: 11 MMOL/L (ref 4–13)
AST SERPL W P-5'-P-CCNC: 13 U/L (ref 5–45)
BILIRUB SERPL-MCNC: 0.31 MG/DL (ref 0.2–1)
BUN SERPL-MCNC: 10 MG/DL (ref 5–25)
CALCIUM ALBUM COR SERPL-MCNC: 9.8 MG/DL (ref 8.3–10.1)
CALCIUM SERPL-MCNC: 8.8 MG/DL (ref 8.3–10.1)
CHLORIDE SERPL-SCNC: 106 MMOL/L (ref 100–108)
CO2 SERPL-SCNC: 21 MMOL/L (ref 21–32)
CREAT SERPL-MCNC: 0.64 MG/DL (ref 0.6–1.3)
ERYTHROCYTE [DISTWIDTH] IN BLOOD BY AUTOMATED COUNT: 13.5 % (ref 11.6–15.1)
EST. AVERAGE GLUCOSE BLD GHB EST-MCNC: 103 MG/DL
GFR SERPL CREATININE-BSD FRML MDRD: 122 ML/MIN/1.73SQ M
GLUCOSE P FAST SERPL-MCNC: 88 MG/DL (ref 65–99)
HBA1C MFR BLD: 5.2 %
HCT VFR BLD AUTO: 32.6 % (ref 34.8–46.1)
HGB BLD-MCNC: 10.1 G/DL (ref 11.5–15.4)
MCH RBC QN AUTO: 25.7 PG (ref 26.8–34.3)
MCHC RBC AUTO-ENTMCNC: 31 G/DL (ref 31.4–37.4)
MCV RBC AUTO: 83 FL (ref 82–98)
PLATELET # BLD AUTO: 261 THOUSANDS/UL (ref 149–390)
PMV BLD AUTO: 11.7 FL (ref 8.9–12.7)
POTASSIUM SERPL-SCNC: 3.8 MMOL/L (ref 3.5–5.3)
PROT SERPL-MCNC: 6.9 G/DL (ref 6.4–8.2)
RBC # BLD AUTO: 3.93 MILLION/UL (ref 3.81–5.12)
SODIUM SERPL-SCNC: 138 MMOL/L (ref 136–145)
WBC # BLD AUTO: 11.13 THOUSAND/UL (ref 4.31–10.16)

## 2021-04-17 PROCEDURE — 36415 COLL VENOUS BLD VENIPUNCTURE: CPT

## 2021-04-17 PROCEDURE — 80053 COMPREHEN METABOLIC PANEL: CPT

## 2021-04-17 PROCEDURE — 83036 HEMOGLOBIN GLYCOSYLATED A1C: CPT

## 2021-04-17 PROCEDURE — 85027 COMPLETE CBC AUTOMATED: CPT

## 2021-04-20 ENCOUNTER — ULTRASOUND (OUTPATIENT)
Dept: PERINATAL CARE | Facility: OTHER | Age: 31
End: 2021-04-20
Payer: COMMERCIAL

## 2021-04-20 VITALS
WEIGHT: 266 LBS | SYSTOLIC BLOOD PRESSURE: 133 MMHG | BODY MASS INDEX: 39.4 KG/M2 | DIASTOLIC BLOOD PRESSURE: 87 MMHG | HEART RATE: 80 BPM | HEIGHT: 69 IN

## 2021-04-20 DIAGNOSIS — O24.013 PRE-EXISTING TYPE 1 DIABETES MELLITUS DURING PREGNANCY IN THIRD TRIMESTER: Primary | ICD-10-CM

## 2021-04-20 PROCEDURE — 76815 OB US LIMITED FETUS(S): CPT | Performed by: OBSTETRICS & GYNECOLOGY

## 2021-04-20 PROCEDURE — 59025 FETAL NON-STRESS TEST: CPT | Performed by: OBSTETRICS & GYNECOLOGY

## 2021-04-22 NOTE — PATIENT INSTRUCTIONS
Thank you for choosing us for your  care today  If you have any questions about your ultrasound or care, please do not hesitate to contact us or your primary obstetrician  Some general instructions for your pregnancy are:     Protect against coronavirus: Continue to practice social distancing, wear a mask, and wash your hands often  Pregnant women are increased risk of severe COVID  Notify your primary care doctor if you have any symptoms including cough, shortness of breath or difficulty breathing, fever, chills, muscle pain, sore throat, or loss of taste or smell  Pregnant women can receive the coronavirus vaccine   Exercise: Aim for 22 minutes per day (150 minutes per week) of regular exercise  Walking is great!  Nutrition: aim for calcium-rich and iron-rich foods as well as healthy sources of protein   Protect against the flu: get yourself and your entire household vaccinated against influenza  This will protect your baby   Learn about Preeclampsia: preeclampsia is a common, serious high blood pressure complication in pregnancy  A blood pressure of 580OXKJ (systolic or top number) or 66GTXV (diastolic or bottom number) is not normal and needs evaluation by your doctor   If you smoke, try to reduce how many cigarettes you smoke or try to quit completely  Do not vape   Other warning signs to watch out for in pregnancy or postpartum: chest pain, obstructed breathing or shortness of breath, seizures, thoughts of hurting yourself or your baby, bleeding, a painful or swollen leg, fever, or headache (see [employer] POST-BIRTH Warning Signs campaign)  If these happen call 911  Itching is also not normal in pregnancy and if you experience this, especially over your hands and feet, potentially worse at night, notify your doctors     Lastly, if you are contacted regarding participation in a survey about your experience in our office, please know that we take any feedback you provide seriously and use it to improve how we deliver care through our center

## 2021-04-22 NOTE — PROGRESS NOTES
[address]: Ms Solomon [facility] was seen today at 37w1d for NST (found under the pregnancy episode) which I reviewed the RN assessment and agree, and VINOD (see ultrasound report under OB procedures tab)  Please don't hesitate to contact our office with any concerns or questions    Chantel Obrien MD

## 2021-04-23 ENCOUNTER — ROUTINE PRENATAL (OUTPATIENT)
Dept: OBGYN CLINIC | Facility: CLINIC | Age: 31
End: 2021-04-23
Payer: COMMERCIAL

## 2021-04-23 VITALS
DIASTOLIC BLOOD PRESSURE: 62 MMHG | BODY MASS INDEX: 39.46 KG/M2 | WEIGHT: 267.2 LBS | HEART RATE: 84 BPM | SYSTOLIC BLOOD PRESSURE: 126 MMHG | TEMPERATURE: 97.5 F

## 2021-04-23 DIAGNOSIS — Z3A.37 37 WEEKS GESTATION OF PREGNANCY: ICD-10-CM

## 2021-04-23 DIAGNOSIS — O99.019 MATERNAL ANEMIA, ANTEPARTUM: ICD-10-CM

## 2021-04-23 DIAGNOSIS — O99.213 OBESITY AFFECTING PREGNANCY IN THIRD TRIMESTER: ICD-10-CM

## 2021-04-23 DIAGNOSIS — O09.93 HIGH-RISK PREGNANCY IN THIRD TRIMESTER: Primary | ICD-10-CM

## 2021-04-23 DIAGNOSIS — O99.820 GBS (GROUP B STREPTOCOCCUS CARRIER), +RV CULTURE, CURRENTLY PREGNANT: ICD-10-CM

## 2021-04-23 DIAGNOSIS — O24.013 PRE-EXISTING TYPE 1 DIABETES MELLITUS DURING PREGNANCY IN THIRD TRIMESTER: ICD-10-CM

## 2021-04-23 LAB
SL AMB  POCT GLUCOSE, UA: NEGATIVE
SL AMB POCT URINE PROTEIN: ABNORMAL

## 2021-04-23 PROCEDURE — 59025 FETAL NON-STRESS TEST: CPT | Performed by: OBSTETRICS & GYNECOLOGY

## 2021-04-23 PROCEDURE — PNV: Performed by: OBSTETRICS & GYNECOLOGY

## 2021-04-23 PROCEDURE — 76815 OB US LIMITED FETUS(S): CPT | Performed by: OBSTETRICS & GYNECOLOGY

## 2021-04-23 NOTE — PROGRESS NOTES
OB/GYN  PN Visit  Flynn Jacobo  [id number]  4/23/2021  12:58 PM  Dr Glenys Mccartney MD    S: 31 y o  [id number] 37w4d here for PN visit  Chief Complaint   Patient presents with    Routine Prenatal Visit     NST, VINOD-  Labs UTD, GBS Positive, Already had TDAP, and Declines FLU vaccine  Patient states no problems  OB complaints:  Contractions: no  Leakage: no  Bleeding: no  Fetal movement: yes      O:  /62 (BP Location: Left arm, Cuff Size: Large)   Pulse 84   Temp 97 5 °F (36 4 °C) (Temporal)   Wt 121 kg (267 lb 3 2 oz)   LMP 08/03/2020 (Exact Date)   BMI 39 46 kg/m²       Review of Systems   Constitutional: Negative  HENT: Negative  Eyes: Negative  Respiratory: Negative  Cardiovascular: Negative  Gastrointestinal: Negative  Endocrine: Negative  Genitourinary:        As noted in HPI   Musculoskeletal: Negative  Skin: Negative  Allergic/Immunologic: Negative  Neurological: Negative  Hematological: Negative  Psychiatric/Behavioral: Negative  Physical Exam  Constitutional:       General: She is not in acute distress  Appearance: She is well-developed  Abdominal:      Palpations: Abdomen is soft  Tenderness: There is no abdominal tenderness  There is no guarding  Neurological:      Mental Status: She is alert and oriented to person, place, and time  Skin:     General: Skin is warm and dry  Psychiatric:         Behavior: Behavior normal                          A/P:    Problem List Items Addressed This Visit        Other    High-risk pregnancy - Primary    Obesity affecting pregnancy in third trimester    Maternal anemia, antepartum    GBS (group B Streptococcus carrier), +RV culture, currently pregnant      Other Visit Diagnoses     37 weeks gestation of pregnancy        Relevant Orders    POCT urine dip (Completed)          IOL 5/6/21 8PM, pt wishes to wait closer to her TRI  Induction of labor process discussed      Discussed indication for induction such as elective (> or equal to 39 weeks), medical/obstetric indications and alternatives which is to await spontaneous labor  Discussed cervical ripening if cervix is unfavorable with prostaglandin (vaginal misoprostol) or mechanical dilation with insertion of balloon catheter  Induction consent signed    GBS positive    Follow-up in 4 days for NST and PNV      Discussed IV iron for anemia, pt declines, will continue po iron twice daily, recent Hgb 10 1              Angelo Cheema MD  4/23/2021  12:58 PM

## 2021-04-23 NOTE — PATIENT INSTRUCTIONS
Induction of Labor   WHAT YOU NEED TO KNOW:   What is induction of labor? Induction of labor is a procedure to induce (start) your labor before it begins on its own  Medicines and other methods are used to start contractions and help your cervix soften, thin (efface), and dilate (open)  You may be given antibiotics to fight a bacterial infection you have or prevent an infection during delivery  Why might I need induction of labor? · A health problem you have, such as high blood pressure or diabetes    · A health problem your baby has, such as a slow heartbeat or poor growth inside the womb     · Problems related to your pregnancy, such as infection of the amniotic fluid, your water breaks before labor begins, or you have too little amniotic fluid    What happens during induction of labor? Your healthcare provider may use one or more of the following to induce labor:  · Cervical ripening  is a process that helps to soften and thin out your cervix  Medicines called prostaglandins may be used to ripen your cervix  These medicines can be inserted into your vagina or taken as a pill  Other methods can also be used to dilate the cervix  This includes a catheter with an inflatable balloon on the end that is inserted into your cervix  Saline injected through the catheter helps the balloon to expand  A substance that absorbs water may also be inserted into your cervix to help dilate it  · Stripping the membranes  is a procedure that causes your body to release prostaglandins naturally  Prostaglandins soften the cervix and may help to cause contractions  Your healthcare provider will sweep a gloved finger over the membranes that connect the amniotic sac to the uterus wall  · Rupturing the amniotic sac  is a procedure that is used to cause your water to break  Your healthcare provider will use a small tool to make a hole in your amniotic sac  This may help contractions to start       · Oxytocin  may be given through an IV to cause contractions to start and stay strong and regular  What are the risks of induction of labor? Medicines used to induce labor may cause too many contractions  This can lower your baby's heartbeat and decrease his or her oxygen supply  Induction of labor also increases the risk of umbilical cord prolapse  This condition causes the umbilical cord to slip back into the vagina before delivery  It can compress the cord and decrease your baby's oxygen supply  Medical induction may cause an infection in you or your baby  Medical induction may also increase your risk for a  section (), especially if it is the first time you give birth  Your uterus may rupture if you have had a  before  CARE AGREEMENT:   You have the right to help plan your care  Learn about your health condition and how it may be treated  Discuss treatment options with your healthcare providers to decide what care you want to receive  You always have the right to refuse treatment  The above information is an  only  It is not intended as medical advice for individual conditions or treatments  Talk to your doctor, nurse or pharmacist before following any medical regimen to see if it is safe and effective for you  © Copyright [address] [place] [address] Information is for End User's use only and may not be sold, redistributed or otherwise used for commercial purposes  All illustrations and images included in CareNotes® are the copyrighted property of [employer]  or [facility] Gareth Logan           Induction of Labor    What is Induction of Labor? Induction of labor is when labor is started (induced) before it begins on its own  Medicines and other methods are used to start contractions and help your cervix soften, thin (efface), and dilate (open)  You may be given antibiotics to fight a bacterial infection you have or prevent an infection during delivery    There are two types:   When labor is started due to a medical problem you or the baby have, this is called an indicated induction  This may happen if the risk of a longer pregnancy is greater then delivering the baby early   If labor is started for convenience it is called an elective induction  When can you have an elective induction? Before a decision can be made for an elective induction of labor, you healthcare provider must assess the following information about your pregnancy:   Due date   At least 44 weeks pregnant   Is your cervix showing signs of being ready for labor?  Confirm that you have not had a previous  section or major surgery on your uterus  How is labor induced? There are several ways to induce labor  Your doctor will choose what's best for you   Stripping membranes   o The doctor uses the tip of a finger to separate the amniotic membrane from the wall of your uterus while checking on your cervix  This causes your body to release hormones called prostaglandins  Prostaglandins help the cervix become soft, thin and ready for labor   Breaking your water  o The doctor uses a plastic hook to make a small hole in the amniotic sac (bag of water)   Murphy Balloon  o This is a small catheter with a balloon on the end which is inserted into the vagina  The balloon is inflated with saline to help the cervix expand  The intention is to mechanically dilate the cervix to a point where it is safe to start Pitocin  You may be out of bed with this method  In fact it is encouraged since gravity will assist in moving the balloon down through your cervix   Medications  o Cytotec  - This is used when your cervix is still closed and thick  It is a small tablet inserted into your vagina by the physician  This tablet may be inserted every three hours as needed until your cervix opens and thins  After the medication is placed you will be on the fetal heart monitor for a period of time  You will need to be in bed for this   After monitoring is over you will be allowed to walk or sit in a chair  The fetal monitoring can be performed intermittently thereafter, unless you go into active labor or there is a change in your or the baby's condition  o Pitocin  - This is an IV medication that is administered slowly through a pump and increased in small increments until a productive pattern of labor is achieved  The goal is to cause your contractions to begin and to stay strong and regular  Your baby will need to be constantly monitored on this medication  You may either be in the bed, a chair or on the birthing ball, as long as the baby's heartrate can be monitored safely  What are the risks of an induction?  The baby may need to go to the  Intensive Care Unit (NICU)  o The baby's lungs may not be mature  o The baby may have trouble with control of body temperature   You may have a longer labor   You have an increased risk of having a  section   Medicines used to induce labor may cause too many contractions  This can lower your baby's heartbeat and decrease his or her oxygen supply  Induction of labor may also increase the risk of umbilical cord prolapse  This condition causes the umbilical cord to slip into the vagina before delivery  It can compress the cord and decrease your baby's oxygen supply  If this were to happen, you will need a    Medical induction may cause an infection in you or your baby  Preparing for your Induction    Overnight Induction    ? When you are scheduled for an overnight induction, it is most likely because your cervix is closed  Your body is not ready for labor and therefore you may need medication overnight to prepare your cervix for the Pitocin in the morning  This type of induction can take a day, or more of labor  This is normal  We are asking  your body do something it is not quite ready to do yet  ?  For an overnight induction please eat a regular dinner before you come in  Once you arrive at the hospital you will be on a clear liquid diet until you deliver the baby  That will consist of juices (not orange juice), soda, broths, popsicles, Jello and water ice  Morning Induction  ? Eat a regular diet up until 2 hours before your induction  Ater that, only clear fluids (see above)  Please be prepared for an induction to take a day or two until you deliver your baby  As mentioned earlier, we are asking your body to do something it is not quite ready to do yet

## 2021-04-26 NOTE — PROGRESS NOTES
OB/GYN  PN Visit  Sarah Beth Pepe  [id number]  4/27/2021  4:37 PM  Dr Tomasa Medrano MD    S: 31 y o  [id number] 38w0d here for PN visit  Chief Complaint   Patient presents with    Routine Prenatal Visit     NST, VINOD  Labs UTD, GBS postive, already received tdap  Patient reports no problems         OB complaints:  Contractions: no  Leakage: no  Bleeding: no  Fetal movement: yes      O:  /82 (BP Location: Left arm, Cuff Size: Large)   Pulse 83   Temp 97 7 °F (36 5 °C) (Temporal)   Wt 123 kg (270 lb 6 4 oz)   LMP 08/03/2020 (Exact Date)   BMI 39 93 kg/m²       Review of Systems   Constitutional: Negative  HENT: Negative  Eyes: Negative  Respiratory: Negative  Cardiovascular: Negative  Gastrointestinal: Negative  Endocrine: Negative  Genitourinary:        As noted in HPI   Musculoskeletal: Negative  Skin: Negative  Allergic/Immunologic: Negative  Neurological: Negative  Hematological: Negative  Psychiatric/Behavioral: Negative  Physical Exam  Constitutional:       General: She is not in acute distress  Appearance: Normal appearance  She is well-developed  Genitourinary:      Pelvic exam was performed with patient in the lithotomy position  Vulva, urethra, bladder, vagina, uterus and rectum normal       No vulval condylomata, lesion, tenderness, ulcerations, Bartholin's cyst or rash noted  No signs of labial injury  No labial fusion  No posterior fourchette tenderness, injury, rash or lesion present  No inguinal adenopathy present in the right or left side  No lesions in the vagina  No vaginal discharge, tenderness, bleeding, atrophy or prolapse  No cervical motion tenderness, friability, lesion or polyp  Uterus is not enlarged or tender  No right or left adnexal mass present  Right adnexa not tender or full  Left adnexa not tender or full        Genitourinary Comments:      Rectum:      No external hemorrhoid  HENT:      Head: Normocephalic  Nose: Nose normal    Eyes:      Conjunctiva/sclera: Conjunctivae normal    Neck:      Musculoskeletal: Neck supple  No muscular tenderness  Thyroid: No thyromegaly  Cardiovascular:      Rate and Rhythm: Normal rate and regular rhythm  Heart sounds: Normal heart sounds  No murmur  Pulmonary:      Effort: Pulmonary effort is normal  No respiratory distress  Breath sounds: Normal breath sounds  No wheezing or rales  Chest:      Breasts:         Right: No mass, nipple discharge, skin change or tenderness  Left: No mass, nipple discharge, skin change or tenderness  Abdominal:      General: There is no distension  Palpations: Abdomen is soft  There is no mass  Tenderness: There is no abdominal tenderness  There is no guarding or rebound  Hernia: There is no hernia in the left inguinal area or right inguinal area  Musculoskeletal:         General: No swelling, tenderness or deformity  Lymphadenopathy:      Cervical: No cervical adenopathy  Lower Body: No right inguinal adenopathy  No left inguinal adenopathy  Neurological:      General: No focal deficit present  Mental Status: She is alert and oriented to person, place, and time  Skin:     General: Skin is warm and dry  Psychiatric:         Mood and Affect: Mood normal          Behavior: Behavior normal    Vitals signs reviewed                Fetal Heart Rate: 120          A/P:    Problem List Items Addressed This Visit        Endocrine    Pre-existing type 1 diabetes mellitus during pregnancy in third trimester    Hypoglycemia due to type 1 diabetes mellitus (HCC)       Nervous and Auditory    Carpal tunnel syndrome, bilateral       Other    High-risk pregnancy - Primary    Obesity affecting pregnancy in third trimester    Insulin pump titration    BMI 39 0-39 9,adult    Maternal anemia, antepartum    GBS (group B Streptococcus carrier), +RV culture, currently pregnant      Other Visit Diagnoses     38 weeks gestation of pregnancy        Relevant Orders    POCT urine dip (Completed)        NST performed today was reactive  VINOD was 12 7 cm  She is returning to  in 2 days for additional testing  She is scheduled for induction on May 6, 2021 for cervical ripening  She will need another appointment in the office early next week for another NST with her without VINOD    Labor precautions  Continue fetal kick counts    Patient has not established care with a new endocrinologist   Will plan to consult Endocrine after delivery in the hospital and arrange for outpatient follow-up  GBS prophylaxis in labor      Anurdaha Vora MD  2021  4:37 PM

## 2021-04-27 ENCOUNTER — ROUTINE PRENATAL (OUTPATIENT)
Dept: OBGYN CLINIC | Facility: CLINIC | Age: 31
End: 2021-04-27
Payer: COMMERCIAL

## 2021-04-27 VITALS
WEIGHT: 270.4 LBS | HEART RATE: 83 BPM | SYSTOLIC BLOOD PRESSURE: 126 MMHG | TEMPERATURE: 97.7 F | BODY MASS INDEX: 39.93 KG/M2 | DIASTOLIC BLOOD PRESSURE: 82 MMHG

## 2021-04-27 DIAGNOSIS — O09.93 HIGH-RISK PREGNANCY IN THIRD TRIMESTER: Primary | ICD-10-CM

## 2021-04-27 DIAGNOSIS — G56.03 CARPAL TUNNEL SYNDROME, BILATERAL: ICD-10-CM

## 2021-04-27 DIAGNOSIS — E10.649 HYPOGLYCEMIA DUE TO TYPE 1 DIABETES MELLITUS (HCC): ICD-10-CM

## 2021-04-27 DIAGNOSIS — O99.019 MATERNAL ANEMIA, ANTEPARTUM: ICD-10-CM

## 2021-04-27 DIAGNOSIS — Z46.81 INSULIN PUMP TITRATION: ICD-10-CM

## 2021-04-27 DIAGNOSIS — O24.013 PRE-EXISTING TYPE 1 DIABETES MELLITUS DURING PREGNANCY IN THIRD TRIMESTER: ICD-10-CM

## 2021-04-27 DIAGNOSIS — O99.820 GBS (GROUP B STREPTOCOCCUS CARRIER), +RV CULTURE, CURRENTLY PREGNANT: ICD-10-CM

## 2021-04-27 DIAGNOSIS — O99.213 OBESITY AFFECTING PREGNANCY IN THIRD TRIMESTER: ICD-10-CM

## 2021-04-27 DIAGNOSIS — Z3A.38 38 WEEKS GESTATION OF PREGNANCY: ICD-10-CM

## 2021-04-27 LAB
SL AMB  POCT GLUCOSE, UA: NEGATIVE
SL AMB POCT URINE PROTEIN: NEGATIVE

## 2021-04-27 PROCEDURE — PNV: Performed by: OBSTETRICS & GYNECOLOGY

## 2021-04-27 PROCEDURE — 59025 FETAL NON-STRESS TEST: CPT | Performed by: OBSTETRICS & GYNECOLOGY

## 2021-04-29 ENCOUNTER — ULTRASOUND (OUTPATIENT)
Dept: PERINATAL CARE | Facility: OTHER | Age: 31
End: 2021-04-29
Payer: COMMERCIAL

## 2021-04-29 VITALS
BODY MASS INDEX: 40.02 KG/M2 | DIASTOLIC BLOOD PRESSURE: 82 MMHG | WEIGHT: 270.2 LBS | HEART RATE: 94 BPM | HEIGHT: 69 IN | SYSTOLIC BLOOD PRESSURE: 131 MMHG

## 2021-04-29 DIAGNOSIS — O24.013 PRE-EXISTING TYPE 1 DIABETES MELLITUS DURING PREGNANCY IN THIRD TRIMESTER: Primary | ICD-10-CM

## 2021-04-29 DIAGNOSIS — Z3A.38 38 WEEKS GESTATION OF PREGNANCY: ICD-10-CM

## 2021-04-29 PROCEDURE — 76815 OB US LIMITED FETUS(S): CPT | Performed by: OBSTETRICS & GYNECOLOGY

## 2021-04-29 PROCEDURE — 59025 FETAL NON-STRESS TEST: CPT | Performed by: OBSTETRICS & GYNECOLOGY

## 2021-04-29 PROCEDURE — 99212 OFFICE O/P EST SF 10 MIN: CPT | Performed by: OBSTETRICS & GYNECOLOGY

## 2021-04-29 NOTE — PROGRESS NOTES
[address]: Ms Zully Michael was seen today at 38w3d for NST (found under the pregnancy episode) which I reviewed the RN assessment and agree, and VINOD (see ultrasound report under OB procedures tab)  Please don't hesitate to contact our office with any concerns or questions    Yahaira Banegas MD

## 2021-04-29 NOTE — LETTER
April 29, 2021     Erika Ellis, [address] [place] [id number]    Patient: Cookie Rene   YOB: 1990   Date of Visit: 4/29/2021       Dear Dr Vlad Patrick: Thank you for referring Cookie Rene to me for evaluation  Below are my notes for this consultation  Can you check her VINOD tomorrow (and also when you see her Monday)>  Thank you so much! If you have questions, please do not hesitate to call me  I look forward to following your patient along with you  Sincerely,        Da Kearney MD        CC: No Recipients  Da Kearney MD  4/29/2021 11:14 AM  Sign when Signing Visit  [address]: Ms Jian Hawkins was seen today at 38w3d for NST (found under the pregnancy episode) which I reviewed the RN assessment and agree, and VINOD (see ultrasound report under OB procedures tab)  Please don't hesitate to contact our office with any concerns or questions    Da Kearney MD

## 2021-04-29 NOTE — PROGRESS NOTES
OB/GYN  PN Visit  Flynn Jacobo  [id number]  4/30/2021  10:32 AM  Dr Glenys Mccartney MD    S: 31 y o  [id number] 38w4d here for PN visit  Chief Complaint   Patient presents with    Routine Prenatal Visit     VINOD         OB complaints:  Contractions: no  Leakage: no  Bleeding: no  Fetal movement: yes      O:  /68 (BP Location: Left arm, Cuff Size: Large)   Pulse 101   Temp 97 7 °F (36 5 °C) (Temporal)   Wt 123 kg (271 lb 9 6 oz)   LMP 08/03/2020 (Exact Date)   BMI 40 11 kg/m²       Review of Systems   Constitutional: Negative  HENT: Negative  Eyes: Negative  Respiratory: Negative  Cardiovascular: Negative  Gastrointestinal: Negative  Endocrine: Negative  Genitourinary:        As noted in HPI   Musculoskeletal: Negative  Skin: Negative  Allergic/Immunologic: Negative  Neurological: Negative  Hematological: Negative  Psychiatric/Behavioral: Negative  Physical Exam  Constitutional:       General: She is not in acute distress  Appearance: She is well-developed  Abdominal:      Palpations: Abdomen is soft  Tenderness: There is no abdominal tenderness  There is no guarding  Neurological:      Mental Status: She is alert and oriented to person, place, and time  Skin:     General: Skin is warm and dry     Psychiatric:         Behavior: Behavior normal                Fetal Heart Rate: 129 Vertex  VINOD 1 1 51  VINOD 2 2 22  VINOD 3 1 42  VINOD 4 0 89  Total 6 03 cm    Cervix 1/30/-3/medium/posterior    A/P:    Problem List Items Addressed This Visit        Endocrine    Pre-existing type 1 diabetes mellitus during pregnancy in third trimester       Other    High-risk pregnancy - Primary    Obesity affecting pregnancy in third trimester      Other Visit Diagnoses     38 weeks gestation of pregnancy        Relevant Orders    POCT urine dip (Completed)        Diabetes well controlled  AF was 5 7 cm, VINOD 6 LVP 2 2  NST reactive    /90, advised pt to proceed to [facility] AND for preeclampsia Labs, blood pressure monitoring  Patient meets criteria for gestational hypertension, she will need induction of labor  Since diabetes is well controlled, induction of labor is recommended after 39 weeks  Discussed plan with MFM (Dr Terrel Burkitt)    If patient  Is discharged, she will need to return daily for NST, VINOD and blood pressure check to Labor and delivery until induction of labor can be set up on Monday  Patient will need cervical ripening  Patient was instructed to collect her belongings at home and then proceed to Labor and delivery      Notified L and D and OBGYN resident        Waldemar Hall MD  4/30/2021  10:32 AM

## 2021-04-29 NOTE — PATIENT INSTRUCTIONS
Thank you for choosing us for your  care today  If you have any questions about your ultrasound or care, please do not hesitate to contact us or your primary obstetrician  Some general instructions for your pregnancy are:     Protect against coronavirus: Continue to practice social distancing, wear a mask, and wash your hands often  Pregnant women are increased risk of severe COVID  Notify your primary care doctor if you have any symptoms including cough, shortness of breath or difficulty breathing, fever, chills, muscle pain, sore throat, or loss of taste or smell  Pregnant women can receive the coronavirus vaccine   Exercise: Aim for 22 minutes per day (150 minutes per week) of regular exercise  Walking is great!  Nutrition: aim for calcium-rich and iron-rich foods as well as healthy sources of protein   Protect against the flu: get yourself and your entire household vaccinated against influenza  This will protect your baby   Learn about Preeclampsia: preeclampsia is a common, serious high blood pressure complication in pregnancy  A blood pressure of 982LJQG (systolic or top number) or 19LLOC (diastolic or bottom number) is not normal and needs evaluation by your doctor   If you smoke, try to reduce how many cigarettes you smoke or try to quit completely  Do not vape   Other warning signs to watch out for in pregnancy or postpartum: chest pain, obstructed breathing or shortness of breath, seizures, thoughts of hurting yourself or your baby, bleeding, a painful or swollen leg, fever, or headache (see [facility] POST-BIRTH Warning Signs campaign)  If these happen call 911  Itching is also not normal in pregnancy and if you experience this, especially over your hands and feet, potentially worse at night, notify your doctors     Lastly, if you are contacted regarding participation in a survey about your experience in our office, please know that we take any feedback you provide seriously and use it to improve how we deliver care through our center

## 2021-04-30 ENCOUNTER — ROUTINE PRENATAL (OUTPATIENT)
Dept: OBGYN CLINIC | Facility: CLINIC | Age: 31
End: 2021-04-30
Payer: COMMERCIAL

## 2021-04-30 ENCOUNTER — HOSPITAL ENCOUNTER (INPATIENT)
Facility: HOSPITAL | Age: 31
LOS: 1 days | Discharge: HOME | End: 2021-05-02
Attending: OBSTETRICS & GYNECOLOGY | Admitting: OBSTETRICS & GYNECOLOGY
Payer: COMMERCIAL

## 2021-04-30 ENCOUNTER — TELEPHONE (OUTPATIENT)
Dept: PERINATAL CARE | Facility: CLINIC | Age: 31
End: 2021-04-30

## 2021-04-30 VITALS
DIASTOLIC BLOOD PRESSURE: 90 MMHG | WEIGHT: 271.6 LBS | BODY MASS INDEX: 40.11 KG/M2 | HEART RATE: 101 BPM | SYSTOLIC BLOOD PRESSURE: 150 MMHG | TEMPERATURE: 97.7 F

## 2021-04-30 DIAGNOSIS — O99.213 OBESITY AFFECTING PREGNANCY IN THIRD TRIMESTER: ICD-10-CM

## 2021-04-30 DIAGNOSIS — O24.013 PRE-EXISTING TYPE 1 DIABETES MELLITUS DURING PREGNANCY IN THIRD TRIMESTER: Primary | ICD-10-CM

## 2021-04-30 DIAGNOSIS — Z46.81 INSULIN PUMP TITRATION: ICD-10-CM

## 2021-04-30 DIAGNOSIS — Z3A.38 38 WEEKS GESTATION OF PREGNANCY: ICD-10-CM

## 2021-04-30 DIAGNOSIS — O24.013 PRE-EXISTING TYPE 1 DIABETES MELLITUS DURING PREGNANCY IN THIRD TRIMESTER: ICD-10-CM

## 2021-04-30 DIAGNOSIS — O09.93 HIGH-RISK PREGNANCY IN THIRD TRIMESTER: Primary | ICD-10-CM

## 2021-04-30 PROBLEM — O13.3 GESTATIONAL HYPERTENSION, THIRD TRIMESTER: Status: ACTIVE | Noted: 2021-04-30

## 2021-04-30 LAB
ABO GROUP BLD: NORMAL
ALBUMIN SERPL BCP-MCNC: 2.7 G/DL (ref 3.5–5)
ALP SERPL-CCNC: 194 U/L (ref 46–116)
ALT SERPL W P-5'-P-CCNC: 22 U/L (ref 12–78)
ANION GAP SERPL CALCULATED.3IONS-SCNC: 9 MMOL/L (ref 4–13)
AST SERPL W P-5'-P-CCNC: 35 U/L (ref 5–45)
BASOPHILS # BLD AUTO: 0.04 THOUSANDS/ΜL (ref 0–0.1)
BASOPHILS NFR BLD AUTO: 0 % (ref 0–1)
BILIRUB SERPL-MCNC: 0.32 MG/DL (ref 0.2–1)
BLD GP AB SCN SERPL QL: NEGATIVE
BUN SERPL-MCNC: 10 MG/DL (ref 5–25)
CALCIUM ALBUM COR SERPL-MCNC: 9.9 MG/DL (ref 8.3–10.1)
CALCIUM SERPL-MCNC: 8.9 MG/DL (ref 8.3–10.1)
CHLORIDE SERPL-SCNC: 105 MMOL/L (ref 100–108)
CO2 SERPL-SCNC: 23 MMOL/L (ref 21–32)
CREAT SERPL-MCNC: 0.62 MG/DL (ref 0.6–1.3)
CREAT UR-MCNC: 69.9 MG/DL
EOSINOPHIL # BLD AUTO: 0.15 THOUSAND/ΜL (ref 0–0.61)
EOSINOPHIL NFR BLD AUTO: 1 % (ref 0–6)
ERYTHROCYTE [DISTWIDTH] IN BLOOD BY AUTOMATED COUNT: 14 % (ref 11.6–15.1)
GFR SERPL CREATININE-BSD FRML MDRD: 123 ML/MIN/1.73SQ M
GLUCOSE SERPL-MCNC: 34 MG/DL (ref 65–140)
GLUCOSE SERPL-MCNC: 45 MG/DL (ref 65–140)
GLUCOSE SERPL-MCNC: 52 MG/DL (ref 65–140)
GLUCOSE SERPL-MCNC: 57 MG/DL (ref 65–140)
GLUCOSE SERPL-MCNC: 76 MG/DL (ref 65–140)
GLUCOSE SERPL-MCNC: 77 MG/DL (ref 65–140)
GLUCOSE SERPL-MCNC: 82 MG/DL (ref 65–140)
GLUCOSE SERPL-MCNC: 87 MG/DL (ref 65–140)
HCT VFR BLD AUTO: 33.1 % (ref 34.8–46.1)
HGB BLD-MCNC: 10.7 G/DL (ref 11.5–15.4)
IMM GRANULOCYTES # BLD AUTO: 0.25 THOUSAND/UL (ref 0–0.2)
IMM GRANULOCYTES NFR BLD AUTO: 2 % (ref 0–2)
LYMPHOCYTES # BLD AUTO: 1.18 THOUSANDS/ΜL (ref 0.6–4.47)
LYMPHOCYTES NFR BLD AUTO: 10 % (ref 14–44)
MCH RBC QN AUTO: 26.2 PG (ref 26.8–34.3)
MCHC RBC AUTO-ENTMCNC: 32.3 G/DL (ref 31.4–37.4)
MCV RBC AUTO: 81 FL (ref 82–98)
MONOCYTES # BLD AUTO: 0.77 THOUSAND/ΜL (ref 0.17–1.22)
MONOCYTES NFR BLD AUTO: 6 % (ref 4–12)
NEUTROPHILS # BLD AUTO: 9.78 THOUSANDS/ΜL (ref 1.85–7.62)
NEUTS SEG NFR BLD AUTO: 81 % (ref 43–75)
NRBC BLD AUTO-RTO: 0 /100 WBCS
PLATELET # BLD AUTO: 284 THOUSANDS/UL (ref 149–390)
PMV BLD AUTO: 11.3 FL (ref 8.9–12.7)
POTASSIUM SERPL-SCNC: 4.4 MMOL/L (ref 3.5–5.3)
PROT SERPL-MCNC: 7.1 G/DL (ref 6.4–8.2)
PROT UR-MCNC: 14 MG/DL
PROT/CREAT UR: 0.2 MG/G{CREAT} (ref 0–0.1)
RBC # BLD AUTO: 4.08 MILLION/UL (ref 3.81–5.12)
RH BLD: POSITIVE
SL AMB  POCT GLUCOSE, UA: NEGATIVE
SL AMB POCT URINE PROTEIN: ABNORMAL
SODIUM SERPL-SCNC: 137 MMOL/L (ref 136–145)
SPECIMEN EXPIRATION DATE: NORMAL
WBC # BLD AUTO: 12.17 THOUSAND/UL (ref 4.31–10.16)

## 2021-04-30 PROCEDURE — 86850 RBC ANTIBODY SCREEN: CPT | Performed by: OBSTETRICS & GYNECOLOGY

## 2021-04-30 PROCEDURE — G0379 DIRECT REFER HOSPITAL OBSERV: HCPCS

## 2021-04-30 PROCEDURE — PNV: Performed by: OBSTETRICS & GYNECOLOGY

## 2021-04-30 PROCEDURE — 82570 ASSAY OF URINE CREATININE: CPT | Performed by: OBSTETRICS & GYNECOLOGY

## 2021-04-30 PROCEDURE — 59025 FETAL NON-STRESS TEST: CPT | Performed by: OBSTETRICS & GYNECOLOGY

## 2021-04-30 PROCEDURE — 86900 BLOOD TYPING SEROLOGIC ABO: CPT | Performed by: OBSTETRICS & GYNECOLOGY

## 2021-04-30 PROCEDURE — 80053 COMPREHEN METABOLIC PANEL: CPT | Performed by: OBSTETRICS & GYNECOLOGY

## 2021-04-30 PROCEDURE — 76815 OB US LIMITED FETUS(S): CPT | Performed by: OBSTETRICS & GYNECOLOGY

## 2021-04-30 PROCEDURE — NC001 PR NO CHARGE: Performed by: OBSTETRICS & GYNECOLOGY

## 2021-04-30 PROCEDURE — 86592 SYPHILIS TEST NON-TREP QUAL: CPT | Performed by: OBSTETRICS & GYNECOLOGY

## 2021-04-30 PROCEDURE — 82948 REAGENT STRIP/BLOOD GLUCOSE: CPT

## 2021-04-30 PROCEDURE — 4A1HXCZ MONITORING OF PRODUCTS OF CONCEPTION, CARDIAC RATE, EXTERNAL APPROACH: ICD-10-PCS | Performed by: OBSTETRICS & GYNECOLOGY

## 2021-04-30 PROCEDURE — G0463 HOSPITAL OUTPT CLINIC VISIT: HCPCS

## 2021-04-30 PROCEDURE — 3E033VJ INTRODUCTION OF OTHER HORMONE INTO PERIPHERAL VEIN, PERCUTANEOUS APPROACH: ICD-10-PCS | Performed by: OBSTETRICS & GYNECOLOGY

## 2021-04-30 PROCEDURE — 99202 OFFICE O/P NEW SF 15 MIN: CPT

## 2021-04-30 PROCEDURE — 86901 BLOOD TYPING SEROLOGIC RH(D): CPT | Performed by: OBSTETRICS & GYNECOLOGY

## 2021-04-30 PROCEDURE — 3E0P7VZ INTRODUCTION OF HORMONE INTO FEMALE REPRODUCTIVE, VIA NATURAL OR ARTIFICIAL OPENING: ICD-10-PCS | Performed by: OBSTETRICS & GYNECOLOGY

## 2021-04-30 PROCEDURE — 84156 ASSAY OF PROTEIN URINE: CPT | Performed by: OBSTETRICS & GYNECOLOGY

## 2021-04-30 PROCEDURE — 85025 COMPLETE CBC W/AUTO DIFF WBC: CPT | Performed by: OBSTETRICS & GYNECOLOGY

## 2021-04-30 RX ORDER — SODIUM CHLORIDE 9 MG/ML
125 INJECTION, SOLUTION INTRAVENOUS CONTINUOUS
Status: DISCONTINUED | OUTPATIENT
Start: 2021-04-30 | End: 2021-05-01

## 2021-04-30 RX ADMIN — SODIUM CHLORIDE 2.5 MILLION UNITS: 9 INJECTION, SOLUTION INTRAVENOUS at 20:27

## 2021-04-30 RX ADMIN — MISOPROSTOL 25 MCG: 100 TABLET ORAL at 19:22

## 2021-04-30 RX ADMIN — SODIUM CHLORIDE 5 MILLION UNITS: 0.9 INJECTION, SOLUTION INTRAVENOUS at 16:19

## 2021-04-30 RX ADMIN — MISOPROSTOL 25 MCG: 100 TABLET ORAL at 16:38

## 2021-04-30 RX ADMIN — SODIUM CHLORIDE 125 ML/HR: 0.9 INJECTION, SOLUTION INTRAVENOUS at 16:21

## 2021-04-30 NOTE — PROGRESS NOTES
L&D Triage Note - OB/GYN  Rashaun Garcia 31 y o  female MRN: [id number]  Unit/Bed#: L&D 321-01 Encounter: [id number]        Patient is seen by Dr Kylah Phelps    ASSESSMENT/PLAN  Rashaun Garcia is a 31 y o  [id number] at 38w4d who is being seen for r/o pre-eclampsia  Plan for 4 hours of observation  1) R/o Pre-Eclampsia     Results from last 7 days   Lab Units 21  1259   WBC Thousand/uL 12 17*   HEMOGLOBIN g/dL 10 7*   MCV fL 81*   PLATELETS Thousands/uL 284     Results from last 7 days   Lab Units 21  1258   POTASSIUM mmol/L 4 4   CHLORIDE mmol/L 105   CO2 mmol/L 23   BUN mg/dL 10   CREATININE mg/dL 0 62   EGFR ml/min/1 73sq m 123     Results from last 7 days   Lab Units 21  1258   AST U/L 35   ALT U/L 22   ALK PHOS U/L 194*   ALBUMIN g/dL 2 7*     Results from last 7 days   Lab Units 21  1259   PROT/CREAT RATIO UR  0 20*         No results found for: Ellie Soliman  Results from last 7 days   Lab Units 21  0923   GLUCOSE UA  Negative             SVE:  Cervical Dilation: 1  Cervical Effacement: 30  Cervical Consistency: Firm  Fetal Station: -3  Presentation: Vertex  Method: Manual  OB Examiner: Nav    FHT:  Baseline Rate: 135 bpm  Variability: Moderate 6-25 bpm  Accelerations: 15 x 15 or greater  Decelerations: None  FHR Category: Category I    TOCO:   Contraction Frequency (minutes): occasional w/ irritability  Contraction Duration (seconds): 40  Contraction Quality: Mild    D/w Dr Luna Arita  ______________    SUBJECTIVE    TRI: Estimated Date of Delivery: 5/10/21    HPI:  31 y o  [id number] 38w4d presents from the office for further evaluation and 4 hours of observation on Labor and delivery  She is a well-controlled type 1 diabetic, on insulin pump  Her VINOD yesterday was 5 7 at the  center  Her VINOD for Dr Kylah Phelps was 6, with an LVP of 2 2 cm  She had a blood pressure of 150/90 in the office and was sent over for a preeclampsia workup      Contractions: no  Leakage of fluid: no  Vaginal Bleeding: no  Fetal movement: present    Her current obstetrical history is significant for pre-existing type 1 diabetes, BMI 40, GBS positive    Her past obstetrical history is significant for SVDx1, pelvis is proven to 6 lb 9 oz      ROS:  Constitutional: Negative  Respiratory: Negative  Cardiovascular: Negative    Gastrointestinal: Negative    Physical Exam  GEN:  Well appearing, in no apparent distress   ABD:  Gravid and soft  SVE: Cervical Dilation: 1  Cervical Effacement: 30  Cervical Consistency: Firm  Fetal Station: -3  Presentation: Vertex  Method: Manual  OB Examiner: Nav     OBJECTIVE:  /69   Pulse 96   Temp 98 2 °F (36 8 °C) (Oral)   Resp 19   Ht 5' 9" (1 753 m)   Wt 122 kg (270 lb)   LMP 08/03/2020 (Exact Date)   BMI 39 87 kg/m²   Body mass index is 39 87 kg/m²    Labs:   Recent Results (from the past 24 hour(s))   POCT urine dip    Collection Time: 04/30/21  9:23 AM   Result Value Ref Range    POCT URINE PROTEIN Trace     GLUCOSE, UA Negative    Comprehensive metabolic panel    Collection Time: 04/30/21 12:58 PM   Result Value Ref Range    Sodium 137 136 - 145 mmol/L    Potassium 4 4 3 5 - 5 3 mmol/L    Chloride 105 100 - 108 mmol/L    CO2 23 21 - 32 mmol/L    ANION GAP 9 4 - 13 mmol/L    BUN 10 5 - 25 mg/dL    Creatinine 0 62 0 60 - 1 30 mg/dL    Glucose 77 65 - 140 mg/dL    Calcium 8 9 8 3 - 10 1 mg/dL    Corrected Calcium 9 9 8 3 - 10 1 mg/dL    AST 35 5 - 45 U/L    ALT 22 12 - 78 U/L    Alkaline Phosphatase 194 (H) 46 - 116 U/L    Total Protein 7 1 6 4 - 8 2 g/dL    Albumin 2 7 (L) 3 5 - 5 0 g/dL    Total Bilirubin 0 32 0 20 - 1 00 mg/dL    eGFR 123 ml/min/1 73sq m   Type and screen    Collection Time: 04/30/21 12:58 PM   Result Value Ref Range    ABO Grouping O     Rh Factor Positive     Antibody Screen Negative     Specimen Expiration Date [id number]    CBC and differential    Collection Time: 04/30/21 12:59 PM   Result Value Ref Range    WBC 12 17 (H) 4 31 - 10 16 Thousand/uL    RBC 4  08 3 81 - 5 12 Million/uL    Hemoglobin 10 7 (L) 11 5 - 15 4 g/dL    Hematocrit 33 1 (L) 34 8 - 46 1 %    MCV 81 (L) 82 - 98 fL    MCH 26 2 (L) 26 8 - 34 3 pg    MCHC 32 3 31 4 - 37 4 g/dL    RDW 14 0 11 6 - 15 1 %    MPV 11 3 8 9 - 12 7 fL    Platelets 192 042 - 645 Thousands/uL    nRBC 0 /100 WBCs    Neutrophils Relative 81 (H) 43 - 75 %    Immat GRANS % 2 0 - 2 %    Lymphocytes Relative 10 (L) 14 - 44 %    Monocytes Relative 6 4 - 12 %    Eosinophils Relative 1 0 - 6 %    Basophils Relative 0 0 - 1 %    Neutrophils Absolute 9 78 (H) 1 85 - 7 62 Thousands/µL    Immature Grans Absolute 0 25 (H) 0 00 - 0 20 Thousand/uL    Lymphocytes Absolute 1 18 0 60 - 4 47 Thousands/µL    Monocytes Absolute 0 77 0 17 - 1 22 Thousand/µL    Eosinophils Absolute 0 15 0 00 - 0 61 Thousand/µL    Basophils Absolute 0 04 0 00 - 0 10 Thousands/µL   Protein / creatinine ratio, urine    Collection Time: 04/30/21 12:59 PM   Result Value Ref Range    Creatinine, Ur 69 9 mg/dL    Protein Urine Random 14 mg/dL    Prot/Creat Ratio, Ur 0 20 (H) 0 00 - 0 10   Fingerstick Glucose (POCT)    Collection Time: 04/30/21  3:07 PM   Result Value Ref Range    POC Glucose 45 (L) 65 - 140 mg/dl   Fingerstick Glucose (POCT)    Collection Time: 04/30/21  4:37 PM   Result Value Ref Range    POC Glucose 87 65 - 140 mg/dl   Fingerstick Glucose (POCT)    Collection Time: 04/30/21  6:57 PM   Result Value Ref Range    POC Glucose 34 (LL) 65 - 140 mg/dl   Fingerstick Glucose (POCT)    Collection Time: 04/30/21  7:18 PM   Result Value Ref Range    POC Glucose 57 (L) 65 - 140 mg/dl   Fingerstick Glucose (POCT)    Collection Time: 04/30/21  8:28 PM   Result Value Ref Range    POC Glucose 52 (L) 65 - 140 mg/dl         Jessica House DO  OB/GYN PGY-1  4/30/2021  9:07 PM

## 2021-04-30 NOTE — OB LABOR/OXYTOCIN SAFETY PROGRESS
Labor Progress Note Silver Pearl 31 y o  female MRN: [id number]    Unit/Bed#: L&D 321-01 Encounter: [id number]       Contraction Frequency (minutes): irritability  Contraction Quality: Not applicable  Tachysystole: No   Cervical Dilation: 1        Cervical Effacement: 30  Fetal Station: -3  Baseline Rate: 120 bpm  Fetal Heart Rate: 140 BPM  FHR Category: Category I               Vital Signs:   Vitals:    04/30/21 1844   BP: 126/69   Pulse: 96   Resp:    Temp:            Notes/comments:   Patient with episode of asymptomatic hypoglycemia 34, after snack her glucose level increased to 57   Cervical check is unchanged, currently patient is not liban, 2nd dose of Cytotec have been placed without complication, fetal tracing is category Neto Schultz MD 4/30/2021 7:30 PM

## 2021-04-30 NOTE — TELEPHONE ENCOUNTER
Spoke with Dr Pinky Mac today regarding Inge Fish who I advised come to office today for VINOD which was normal but BP was elevated  Agreed w sending to L&D for evaluation where she apparently met criteria for gHTN so I agree with delivery given GA of 38+4  Pt requires ripening and is not yet NPO so agree with feeding her and let her stay on pump for now; once NPO would halve basal rate  Copied Dr Michele Rhodes who is on call this weekend for input    Florence Kulkarni MD

## 2021-04-30 NOTE — PROGRESS NOTES
Cytotec inserted vaginally  Induction consent signed  reviewed with patient plan of care and insulin management

## 2021-04-30 NOTE — PROGRESS NOTES
H&P Exam - Obstetrics   Kimberly Bonilla 31 y o  female MRN: [id number]  Unit/Bed#: L&D 321-01 Encounter: [id number]      History of Present Illness     Chief Complaint: Induction of labor    HPI:  Kimberly Bonilla is a 31 y o   female with an TRI of 5/10/2021, by Last Menstrual Period at 38w4d weeks gestation who is being admitted for induction of labor in setting of gestational hypertension  Patient was evaluated initially in the office with evidence of elevated blood pressures, at this time meeting criteria for gestational hypertension  Preeclamptic labs at admission with protein creatinine 0 20, CBC and CMP within normal limits  In addition pregnancy complicated by  GBS positive, Maternal anemia with hemoglobin of 10 7,  type 1 diabetes mellitus on insulin pump, maternal obesity with BMI of 39    Last estimated fetal weight 2021 percentile 76, head circumference percentile 90, abdominal circumference percentile 84, vertex presentation placenta posterior  GBS positive  Rh positive    Contractions: no  Loss of fluid: no  Vaginal bleeding: no  Fetal movement: yes    She is Dr Celestino Briones patient  PREGNANCY COMPLICATIONS:   1) type 1 diabetes  2) GBS positive  3) BMI 39  4) maternal anemia  5) gestational hypertension    OB History    Para Term  AB Living   4 1 1   2 1   SAB TAB Ectopic Multiple Live Births   1 1     1      # Outcome Date GA Lbr Abrahan/2nd Weight Sex Delivery Anes PTL Lv   4 Current            3 Term 19 38w0d 05:45 / 02:51 2985 g (6 lb 9 3 oz) M Vag-Spont EPI  ELROY   2 SAB 18 10w3d          1 TAB 2013               Baby complications/comments: Last estimated fetal weight 2021 percentile 76, head circumference percentile 90, abdominal circumference percentile 84, vertex presentation placenta posterior      Review of Systems   Constitutional: Negative  HENT: Negative  Eyes: Negative  Respiratory: Negative  Cardiovascular: Negative      Gastrointestinal: Negative  Endocrine: Negative  Genitourinary: Negative  Musculoskeletal: Negative  Allergic/Immunologic: Negative  Neurological: Negative  Hematological: Negative  Psychiatric/Behavioral: Negative  Historical Information   Past Medical History:   Diagnosis Date    Diabetes ([facility] [address] ) 1994    type 1    [facility]      Past Surgical History:   Procedure Laterality Date    OR WRIST Gene Soles LIG Right 2/3/2021    Procedure: RELEASE CARPAL TUNNEL ENDOSCOPIC;  Surgeon: Maribel Rowan MD;  Location: [facility];  Service: Orthopedics    OR WRIST Gene Soles LIG Left 2/23/2021    Procedure: RELEASE CARPAL TUNNEL ENDOSCOPIC;  Surgeon: Maribel Rowan MD;  Location[facility];  Service: Orthopedics    WISDOM TOOTH EXTRACTION       Social History   Social History     Substance and Sexual Activity   Alcohol Use Not Currently     Social History     Substance and Sexual Activity   Drug Use Never     Social History     Tobacco Use   Smoking Status Never Smoker   Smokeless Tobacco Never Used     Family History: non-contributory    Meds/Allergies      Medications Prior to Admission   Medication    aspirin 81 mg chewable tablet    Continuous Blood Gluc  (Dexcom G6 ) DENIZ    Continuous Blood Gluc Sensor (Dexcom G6 Sensor) MISC    Continuous Blood Gluc Transmit (Dexcom G6 Transmitter) MISC    docusate sodium (COLACE) 100 mg capsule    ferrous sulfate 324 (65 Fe) mg    glucose blood test strip    insulin lispro (HumaLOG) 100 units/mL injection    insulin lispro (HumaLOG) 100 units/mL injection    Lancets MISC    Prenatal Vit-Fe Fumarate-FA (PRENATAL VITAMIN PO)    acetone, urine, test strip    insulin glargine (LANTUS) 100 units/mL subcutaneous injection      No Known Allergies    OBJECTIVE:    Vitals: Blood pressure 127/68, pulse 86, temperature 98 °F (36 7 °C), temperature source Oral, resp   rate 18, height 5' 9" (1 753 m), weight 122 kg (270 lb), last menstrual period 08/03/2020  Body mass index is 39 87 kg/m²  Physical Exam  Constitutional:       Appearance: She is well-developed  HENT:      Head: Normocephalic and atraumatic  Eyes:      Pupils: Pupils are equal, round, and reactive to light  Neck:      Musculoskeletal: Normal range of motion  Cardiovascular:      Rate and Rhythm: Normal rate  Pulmonary:      Effort: Pulmonary effort is normal    Abdominal:      Palpations: Abdomen is soft  Genitourinary:     Vagina: Normal    Neurological:      Mental Status: She is alert and oriented to person, place, and time           Cervix:  Cervical Dilation: 1  Cervical Effacement: 30  Cervical Consistency: Medium  Fetal Station: -3  Presentation: Vertex  Method: Manual  OB Examiner: Martha Bedoya         Fetal heart rate:   Baseline Rate: 135 bpm  Variability: Moderate 6-25 bpm  Accelerations: 15 x 15 or greater  Decelerations: None    [place]:   Contraction Frequency (minutes): Irregular  Contraction Duration (seconds): 40-90  Contraction Quality: Mild    EFW: 7 5-8 0    GBS: positive    Prenatal Labs:   Blood Type:   Lab Results   Component Value Date/Time    ABO Grouping O 04/30/2021 12:58 PM     , D (Rh type):   Lab Results   Component Value Date/Time    Rh Factor Positive 04/30/2021 12:58 PM     , Antibody Screen: No results found for: ANTIBODYSCR , HCT/HGB:   Lab Results   Component Value Date/Time    Hematocrit 33 1 (L) 04/30/2021 12:59 PM    Hemoglobin 10 7 (L) 04/30/2021 12:59 PM      , MCV:   Lab Results   Component Value Date/Time    MCV 81 (L) 04/30/2021 12:59 PM      , Platelets:   Lab Results   Component Value Date/Time    Platelets 284 17/87/5721 12:59 PM      , 1 hour Glucola: No results found for: [id number], Rubella:   Lab Results   Component Value Date/Time    Rubella IgG Quant 130 3 10/17/2020 11:37 AM        , VDRL/RPR:   Lab Results   Component Value Date/Time    RPR Non-Reactive 02/20/2021 09:00 AM      , Hep B: Lab Results   Component Value Date/Time    Hepatitis B Surface Ag Non-reactive 10/17/2020 11:37 AM     , HIV:   Lab Results   Component Value Date/Time    HIV-1/HIV-2 Ab Non-Reactive 10/17/2020 11:37 AM     , Gonorrhea:   Lab Results   Component Value Date/Time    N gonorrhoeae, DNA Probe Negative 10/14/2020 11:26 AM     , Group B Strep:    Lab Results   Component Value Date/Time    Strep Grp B PCR Positive (A) 2021 10:39 AM          Invasive Devices     Peripheral Intravenous Line            Peripheral IV 21 Dorsal (posterior); Right Forearm less than 1 day                  Assessment/Plan     ASSESSMENT:  33yo  at 38w4d weeks gestation who is being admitted for induction of labor in setting of gestational hypertension  Pregnancy complicated by:  See above  SVE: 1/30/-3  FHT: 120  Clinical EFW: 7 5-8 0 ; Vertex confirmed by U/S  GBS status: positive  Rh: positive    PLAN:    - Admit  - CBC, RPR, Blood Type  - Start with cytotec  - GBS positive status:  PCN for prophylaxis   - Analgesia and/or epidural at patient request  - Anticipate     Discussed with Dr Alberto Mims       This patient will be an INPATIENT  and I certify the anticipated length of stay is >2 Midnights      Geoffery Phoenix MD  2021  3:00 PM

## 2021-05-01 ENCOUNTER — ANESTHESIA (OUTPATIENT)
Dept: ANESTHESIOLOGY | Facility: HOSPITAL | Age: 31
End: 2021-05-01
Payer: COMMERCIAL

## 2021-05-01 ENCOUNTER — ANESTHESIA EVENT (OUTPATIENT)
Dept: ANESTHESIOLOGY | Facility: HOSPITAL | Age: 31
End: 2021-05-01
Payer: COMMERCIAL

## 2021-05-01 LAB
BASE EXCESS BLDCOA CALC-SCNC: -4.1 MMOL/L (ref 3–11)
BASE EXCESS BLDCOV CALC-SCNC: -5.9 MMOL/L (ref 1–9)
GLUCOSE SERPL-MCNC: 108 MG/DL (ref 65–140)
GLUCOSE SERPL-MCNC: 136 MG/DL (ref 65–140)
GLUCOSE SERPL-MCNC: 169 MG/DL (ref 65–140)
GLUCOSE SERPL-MCNC: 190 MG/DL (ref 65–140)
GLUCOSE SERPL-MCNC: 45 MG/DL (ref 65–140)
GLUCOSE SERPL-MCNC: 52 MG/DL (ref 65–140)
GLUCOSE SERPL-MCNC: 73 MG/DL (ref 65–140)
GLUCOSE SERPL-MCNC: 82 MG/DL (ref 65–140)
GLUCOSE SERPL-MCNC: 83 MG/DL (ref 65–140)
HCO3 BLDCOA-SCNC: 22.5 MMOL/L (ref 17.3–27.3)
HCO3 BLDCOV-SCNC: 18.5 MMOL/L (ref 12.2–28.6)
O2 CT VFR BLDCOA CALC: 9 ML/DL
OXYHGB MFR BLDCOA: 43.2 %
OXYHGB MFR BLDCOV: 79.2 %
PCO2 BLDCOA: 46.4 MM[HG] (ref 30–60)
PCO2 BLDCOV: 33.4 MM HG (ref 27–43)
PH BLDCOA: 7.3 [PH] (ref 7.23–7.43)
PH BLDCOV: 7.36 [PH] (ref 7.19–7.49)
PO2 BLDCOA: 20.4 MM HG (ref 5–25)
PO2 BLDCOV: 34.4 MM HG (ref 15–45)
SAO2 % BLDCOV: 16.5 ML/DL

## 2021-05-01 PROCEDURE — 59400 OBSTETRICAL CARE: CPT | Performed by: OBSTETRICS & GYNECOLOGY

## 2021-05-01 PROCEDURE — 82948 REAGENT STRIP/BLOOD GLUCOSE: CPT

## 2021-05-01 PROCEDURE — 0HQ9XZZ REPAIR PERINEUM SKIN, EXTERNAL APPROACH: ICD-10-PCS | Performed by: OBSTETRICS & GYNECOLOGY

## 2021-05-01 PROCEDURE — 82805 BLOOD GASES W/O2 SATURATION: CPT | Performed by: OBSTETRICS & GYNECOLOGY

## 2021-05-01 PROCEDURE — 99024 POSTOP FOLLOW-UP VISIT: CPT | Performed by: OBSTETRICS & GYNECOLOGY

## 2021-05-01 RX ORDER — ROPIVACAINE HYDROCHLORIDE 2 MG/ML
INJECTION, SOLUTION EPIDURAL; INFILTRATION; PERINEURAL
Status: COMPLETED | OUTPATIENT
Start: 2021-05-01 | End: 2021-05-01

## 2021-05-01 RX ORDER — SIMETHICONE 80 MG
80 TABLET,CHEWABLE ORAL EVERY 6 HOURS PRN
Status: DISCONTINUED | OUTPATIENT
Start: 2021-05-01 | End: 2021-05-02 | Stop reason: HOSPADM

## 2021-05-01 RX ORDER — DIPHENHYDRAMINE HCL 25 MG
25 TABLET ORAL EVERY 6 HOURS PRN
Status: DISCONTINUED | OUTPATIENT
Start: 2021-05-01 | End: 2021-05-02 | Stop reason: HOSPADM

## 2021-05-01 RX ORDER — INSULIN GLARGINE 100 [IU]/ML
10 INJECTION, SOLUTION SUBCUTANEOUS
Status: DISCONTINUED | OUTPATIENT
Start: 2021-05-01 | End: 2021-05-01

## 2021-05-01 RX ORDER — ACETAMINOPHEN 325 MG/1
650 TABLET ORAL EVERY 6 HOURS PRN
Status: DISCONTINUED | OUTPATIENT
Start: 2021-05-01 | End: 2021-05-02 | Stop reason: HOSPADM

## 2021-05-01 RX ORDER — DIAPER,BRIEF,INFANT-TODD,DISP
1 EACH MISCELLANEOUS 4 TIMES DAILY PRN
Status: DISCONTINUED | OUTPATIENT
Start: 2021-05-01 | End: 2021-05-02 | Stop reason: HOSPADM

## 2021-05-01 RX ORDER — CALCIUM CARBONATE 200(500)MG
500 TABLET,CHEWABLE ORAL DAILY PRN
Status: DISCONTINUED | OUTPATIENT
Start: 2021-05-01 | End: 2021-05-01

## 2021-05-01 RX ORDER — ROPIVACAINE HYDROCHLORIDE 5 MG/ML
INJECTION, SOLUTION EPIDURAL; INFILTRATION; PERINEURAL AS NEEDED
Status: DISCONTINUED | OUTPATIENT
Start: 2021-05-01 | End: 2021-05-01 | Stop reason: HOSPADM

## 2021-05-01 RX ORDER — ONDANSETRON 2 MG/ML
4 INJECTION INTRAMUSCULAR; INTRAVENOUS EVERY 6 HOURS PRN
Status: DISCONTINUED | OUTPATIENT
Start: 2021-05-01 | End: 2021-05-02 | Stop reason: HOSPADM

## 2021-05-01 RX ORDER — BISACODYL 10 MG
10 SUPPOSITORY, RECTAL RECTAL DAILY PRN
Status: DISCONTINUED | OUTPATIENT
Start: 2021-05-01 | End: 2021-05-02 | Stop reason: HOSPADM

## 2021-05-01 RX ORDER — OXYTOCIN/RINGER'S LACTATE 30/500 ML
1-30 PLASTIC BAG, INJECTION (ML) INTRAVENOUS
Status: DISCONTINUED | OUTPATIENT
Start: 2021-05-01 | End: 2021-05-01

## 2021-05-01 RX ORDER — FENTANYL CITRATE 50 UG/ML
INJECTION, SOLUTION INTRAMUSCULAR; INTRAVENOUS AS NEEDED
Status: DISCONTINUED | OUTPATIENT
Start: 2021-05-01 | End: 2021-05-01 | Stop reason: HOSPADM

## 2021-05-01 RX ORDER — IBUPROFEN 600 MG/1
600 TABLET ORAL EVERY 6 HOURS PRN
Status: DISCONTINUED | OUTPATIENT
Start: 2021-05-01 | End: 2021-05-02 | Stop reason: HOSPADM

## 2021-05-01 RX ORDER — DOCUSATE SODIUM 100 MG/1
100 CAPSULE, LIQUID FILLED ORAL 2 TIMES DAILY
Status: DISCONTINUED | OUTPATIENT
Start: 2021-05-01 | End: 2021-05-02 | Stop reason: HOSPADM

## 2021-05-01 RX ORDER — FENTANYL CITRATE 50 UG/ML
INJECTION, SOLUTION INTRAMUSCULAR; INTRAVENOUS
Status: COMPLETED
Start: 2021-05-01 | End: 2021-05-01

## 2021-05-01 RX ORDER — CALCIUM CARBONATE 200(500)MG
1000 TABLET,CHEWABLE ORAL 3 TIMES DAILY PRN
Status: DISCONTINUED | OUTPATIENT
Start: 2021-05-01 | End: 2021-05-02 | Stop reason: HOSPADM

## 2021-05-01 RX ORDER — ONDANSETRON 2 MG/ML
4 INJECTION INTRAMUSCULAR; INTRAVENOUS EVERY 4 HOURS PRN
Status: DISCONTINUED | OUTPATIENT
Start: 2021-05-01 | End: 2021-05-01

## 2021-05-01 RX ORDER — ROPIVACAINE HYDROCHLORIDE 2 MG/ML
INJECTION, SOLUTION EPIDURAL; INFILTRATION; PERINEURAL
Status: COMPLETED
Start: 2021-05-01 | End: 2021-05-01

## 2021-05-01 RX ADMIN — SODIUM CHLORIDE 2.5 MILLION UNITS: 9 INJECTION, SOLUTION INTRAVENOUS at 03:54

## 2021-05-01 RX ADMIN — WITCH HAZEL 1 PAD: 500 SOLUTION RECTAL; TOPICAL at 10:24

## 2021-05-01 RX ADMIN — BENZOCAINE AND LEVOMENTHOL: 200; 5 SPRAY TOPICAL at 10:24

## 2021-05-01 RX ADMIN — SODIUM CHLORIDE 2.5 MILLION UNITS: 9 INJECTION, SOLUTION INTRAVENOUS at 08:21

## 2021-05-01 RX ADMIN — ROPIVACAINE HYDROCHLORIDE: 2 INJECTION, SOLUTION EPIDURAL; INFILTRATION at 08:23

## 2021-05-01 RX ADMIN — CALCIUM CARBONATE (ANTACID) CHEW TAB 500 MG 500 MG: 500 CHEW TAB at 05:33

## 2021-05-01 RX ADMIN — IBUPROFEN 600 MG: 600 TABLET, FILM COATED ORAL at 15:43

## 2021-05-01 RX ADMIN — MISOPROSTOL 25 MCG: 100 TABLET ORAL at 00:00

## 2021-05-01 RX ADMIN — FENTANYL CITRATE 100 MCG: 50 INJECTION INTRAMUSCULAR; INTRAVENOUS at 08:50

## 2021-05-01 RX ADMIN — ROPIVACAINE HYDROCHLORIDE 5 ML: 5 INJECTION, SOLUTION EPIDURAL; INFILTRATION; PERINEURAL at 08:50

## 2021-05-01 RX ADMIN — SODIUM CHLORIDE 125 ML/HR: 0.9 INJECTION, SOLUTION INTRAVENOUS at 08:21

## 2021-05-01 RX ADMIN — Medication 2 MILLI-UNITS/MIN: at 04:00

## 2021-05-01 RX ADMIN — ONDANSETRON 4 MG: 2 INJECTION INTRAMUSCULAR; INTRAVENOUS at 07:59

## 2021-05-01 RX ADMIN — ROPIVACAINE HYDROCHLORIDE 10 ML: 2 INJECTION, SOLUTION EPIDURAL; INFILTRATION; PERINEURAL at 08:17

## 2021-05-01 RX ADMIN — DOCUSATE SODIUM 100 MG: 100 CAPSULE, LIQUID FILLED ORAL at 17:57

## 2021-05-01 RX ADMIN — IBUPROFEN 600 MG: 600 TABLET, FILM COATED ORAL at 21:39

## 2021-05-01 RX ADMIN — SODIUM CHLORIDE 2.5 MILLION UNITS: 9 INJECTION, SOLUTION INTRAVENOUS at 00:00

## 2021-05-01 RX ADMIN — DOCUSATE SODIUM 100 MG: 100 CAPSULE, LIQUID FILLED ORAL at 10:24

## 2021-05-01 RX ADMIN — HYDROCORTISONE 1 APPLICATION: 1 CREAM TOPICAL at 21:39

## 2021-05-01 NOTE — L&D DELIVERY NOTE
Vaginal Delivery Summary - OB/GYN   Mary Andrade 31 y o  female MRN: [id number]  Unit/Bed#: L&D 321-01 Encounter: [id number]          Predelivery Diagnosis:  1  Pregnancy at 38w5d  2  Type 1 diabetes on insulin pump  3  Gestational hypertension  4  GBS carrier status   5  Anemia  6  Obesity, BMI 39    Postdelivery Diagnosis:  1  Same as above  2  Delivery of term     Procedure: Spontaneous Vaginal Delivery, repair of first degree laceration    Attending: Tai    Assistant: Faisal    Anesthesia: Epidural    Quantified blood loss (mL): 197  Admission Hg:   Lab Results   Component Value Date    HGB 10 7 (L) 2021      Admission platelets:   Lab Results   Component Value Date             Complications: none apparent    Specimens: cord blood, arterial and venous cord blood gasses, placenta to storage    Findings:   1  Viable male at 0932 21, with APGARS of 9 and 9 at 1 and 5 minutes respectively,  2  Spontaneous delivery of intact placenta at 0935  3  1 degree laceration repaired with 3-0 Vicryl Rapide  4  Blood gases:    Umbilical Cord Venous Blood Gas:  Results from last 7 days   Lab Units 21  0933   PH COV  7 361   PCO2 COV mm HG 33 4   HCO3 COV mmol/L 18 5   BASE EXC COV mmol/L -5 9*   O2 CT CD VB mL/dL 16 5   O2 HGB, VENOUS CORD % 06 9     Umbilical Cord Arterial Blood Gas:  Results from last 7 days   Lab Units 21  0933   PH COA  7 303   PCO2 COA  46 4   PO2 COA mm HG 20 4   HCO3 COA mmol/L 22 5   BASE EXC COA mmol/L -4 1*   O2 CONTENT CORD ART ml/dl 9 0   O2 HGB, ARTERIAL CORD % 43 2       Disposition:  Patient tolerated the procedure well and was recovering in labor and delivery room     Brief history and labor course:  Ms Mary Andrade is a 31 y o  [id number] at 38wk5d  She presented to labor and delivery with elevated blood pressures in the office  She met criteria for gestational hypertension, and recommendation was made to proceed with induction   She was started on penicillin for GBS carrier status  She received three doses of cytotec and a galindo balloon for cervical ripening  She was then started on pitocin for induction  She had SROM and had an epidural for analgesia  She progressed well, with good glycemic control, and was complete at 0925 on 21 and started pushing  Description of procedure    Warm compresses were applied during pushing and perineal massage was performed  After pushing for 7 minutes, at 0932 patient delivered a viable male , wt pending, apgars of 9 (1 min) and 9 (5 min)  The fetal vertex delivered spontaneously  Baby was checked for nuchal  Loose loop present, delivered through  The anterior shoulder delivered atraumatically with maternal expulsive forces and the assistance of gentle downward traction  The posterior shoulder delivered with maternal expulsive forces and the assistance of gentle upward traction  The remainder of the fetus delivered spontaneously  Upon delivery, the infant was placed on the mothers abdomen and the cord was clamped and cut  Delayed cord clamping was performed  The infant was noted to cry spontaneously and had all signs of life  There was no evidence for injury  Awaiting nurse resuscitators evaluated the   Arterial and venous cord blood gases and cord blood was collected for analysis  These were promptly sent to the lab  In the immediate post-partum, 30 units of IV pitocin was administered, and the uterus was noted to contract down well with massage and pitocin  The placenta delivered spontaneously at 0935 and was noted to have a centrally inserted 3 vessel cord  The vagina, cervix, perineum, and rectum were inspected and there was noted to be a first degree laceration requiring repair  The apex of the vaginal laceration was identified and a suture of 3-0 Vicryl was placed 1cm above the apex   The vaginal mucosa and underlying rectovaginal fascia were closed using a running suture to re-approximate architecture  Hemostasis was achieved  At the conclusion of the procedure, all needle, sponge, and instrument counts were noted to be correct  David Pastrana showed no retained sponges  Patient tolerated the procedure well and was allowed to recover in labor and delivery room with family and  before being transferred to the post-partum floor  Dr Morgan Vidal was present and participated in all key portions of the case          Red Velasco MD  2021  10:04 AM

## 2021-05-01 NOTE — DISCHARGE SUMMARY
Discharge Summary Karla Salas 31 y o  female MRN: [id number]    Unit/Bed#: L&D 321-01 Encounter: [id number]    Admission Date: 2021     Discharge Date: 2021     Admitting Diagnosis:   Patient Active Problem List   Diagnosis    Dermoid cyst of ovary affecting pregnancy, antepartum    High-risk pregnancy    Pre-existing type 1 diabetes mellitus during pregnancy in third trimester    Hypoglycemia due to type 1 diabetes mellitus ([address] )    Obesity affecting pregnancy in third trimester    Insulin pump titration    BMI 39 0-39 9,adult    Carpal tunnel syndrome, bilateral    Maternal anemia, antepartum    GBS (group B Streptococcus carrier), +RV culture, currently pregnant    38 weeks gestation of pregnancy    Gestational hypertension, third trimester     (spontaneous vaginal delivery)     Discharge Diagnosis:   Same, delivered    Procedures:   spontaneous vaginal delivery    Admitting Attending: Dr Vlad Patrick  Delivery Attending: Dr Avani Mayorga  [facility]:     Cookie Rene is a 31 y o  [id number] who was admitted with new diagnosis of gestational hypertension in the setting of pre-existing well controlled Type 1 DM, for induction of labor  She received penicillin for GBS carrier status  She received cytotec for cervical ripening as well as a galindo balloon, and pitocin for induction  She had SROM, and received an epidural for analgesia  She progressed well  She then underwent an uncomplicated spontaneous vaginal delivery and delivered a viable male  at 80 on 21  APGARS were 9, 9 at 1 and 5 minutes, respectively   weighed 7lb 12oz   was then transferred to  nursery  Patient tolerated the procedure well and was transferred to postpartum in stable condition  The patient's post partum course was unremarkable   She discussed insulin pump settings with Endocrinology postpartum, and will follow up outpatient    On day of discharge, she was ambulating and able to reasonably perform all ADLs  She was voiding and had appropriate bowel function  Pain was well controlled  She was discharged home on postpartum day #1 without complications  Patient was instructed to follow up with her OB as an outpatient and was given appropriate warnings to call provider if she develops signs of infection or uncontrolled pain  Condition at discharge:   good     Disposition:   Home    Planned Readmission:   No    Discharge Medications:   Please see after visit summary for full list of discharge medications  Discharge instructions :   -Do not place anything (no partner, tampons or douche) in your vagina for 6 weeks  -You may walk for exercise for the first 6 weeks then gradually return to your usual activities    -Please do not drive for 1 week if you have no stitches and for 2 weeks if you have stitches or underwent a  delivery     -You may take baths or shower per your preference    -Please look at your bust (breasts) in the mirror daily and call provider for redness or tenderness or increased warmth  - If you have had a  please look at your incision daily as well and call provider for increasing redness or steady drainage from the incision    -Please call your provider if temperature > 100 4*F or 38* C, worsening pain or a foul discharge

## 2021-05-01 NOTE — OB LABOR/OXYTOCIN SAFETY PROGRESS
Oxytocin Safety Progress Check Note - Barbara Hua 31 y o  female MRN: [id number]    Unit/Bed#: L&D 321-01 Encounter: [id number]    Dose (frank-units/min) Oxytocin: 6 frank-units/min  Contraction Frequency (minutes): 2-4(difficulty tracing d/t maternal position)  Contraction Quality: Mild  Tachysystole: No   Cervical Dilation: 4-5        Cervical Effacement: 60  Fetal Station: -2  Baseline Rate: 145 bpm  Fetal Heart Rate: 138 BPM  FHR Category: Category I               Vital Signs:   Vitals:    05/01/21 0530   BP: 126/60   Pulse:    Resp:    Temp:            Notes/comments:    Evaluated patient after reported increase in pressure  Found to be minimally changed on exam, no appreciable membranes, able to palpate fetal hair  Category I FHT  Patient considering epidural  Would be good candidate  Will continue to monitor      Discussed with Dr Reginaldo Cleveland MD 5/1/2021 7:36 AM

## 2021-05-01 NOTE — OB LABOR/OXYTOCIN SAFETY PROGRESS
Labor Progress Note Enrigue Call 31 y o  female MRN: [id number]    Unit/Bed#: L&D 321-01 Encounter: [id number]       Contraction Frequency (minutes): irregular  Contraction Quality: Mild  Tachysystole: No   Cervical Dilation: 1        Cervical Effacement: 30  Fetal Station: -3  Baseline Rate: 130 bpm  Fetal Heart Rate: 136 BPM  FHR Category: Category I               Vital Signs:   Vitals:    04/30/21 2039   BP: 141/71   Pulse: 90   Resp:    Temp:            Notes/comments:   PROCEDURE:  GALINDO BALLOON PLACEMENT     A 24F galindo with a 30cc balloon was selected, SVE was performed and cervix was located, galindo was introduced over sterile gloved hands  Balloon advanced through cervix beyond the internal cervical os  A small amount amount of sterile saline solution was instilled in the balloon to confirm placement  Placement was confirmed to be beyond the internal cervical os  A total of 60cc of sterile saline solution was placed into the balloon  Pt tolerated well  Instructions left with RN to place galindo to gravity with a 1L bag of IV fluid  Notify MD when galindo dislodged      In addition a 3rd dose of Cytotec has been placed  Fetal tracing is category I    Bassem French MD 5/1/2021 12:56 AM

## 2021-05-01 NOTE — PROGRESS NOTES
Spoke with MFM call regarding patients series of low blood sugars  Patients hypoglycemia episodes treated accordingly  She was allowed to have a regular meal late in the afternoon and was also allowed to take/consumer stats  Reviewed patients typical basal insulin rates with MFM on-call and recommendations to decrease rate by 20%  Will continue to monitor blood sugars  Discussed case with Radha at 8:32 PM

## 2021-05-01 NOTE — OB LABOR/OXYTOCIN SAFETY PROGRESS
Oxytocin Safety Progress Check Note - Chick Shlomo 31 y o  female MRN: [id number]    Unit/Bed#: L&D 321-01 Encounter: [id number]       Contraction Frequency (minutes): 3-6  Contraction Quality: Mild  Tachysystole: No   Cervical Dilation: 1        Cervical Effacement: 30  Fetal Station: -3  Baseline Rate: 145 bpm(pt on a ball, difficulty tracing d/t maternal movement)  Fetal Heart Rate: 138 BPM  FHR Category: Category I               Vital Signs:   Vitals:    05/01/21 0300   BP: 143/78   Pulse:    Resp:    Temp:            Notes/comments:   Murphy still in place, liban q 3-5 minutes, fetal tracing is category 1, will consider start Pitocin titration at 4:00 a m  After last dose of Cytotec       Patient discussed with Dr Obi Andrews MD 5/1/2021 3:45 AM

## 2021-05-01 NOTE — PLAN OF CARE
Problem: PAIN - ADULT  Goal: Verbalizes/displays adequate comfort level or baseline comfort level  Description: Interventions:  - Encourage patient to monitor pain and request assistance  - Assess pain using appropriate pain scale  - Administer analgesics based on type and severity of pain and evaluate response  - Implement non-pharmacological measures as appropriate and evaluate response  - Consider cultural and social influences on pain and pain management  - Notify physician/advanced practitioner if interventions unsuccessful or patient reports new pain  Outcome: Progressing     Problem: INFECTION - ADULT  Goal: Absence or prevention of progression during hospitalization  Description: INTERVENTIONS:  - Assess and monitor for signs and symptoms of infection  - Monitor lab/diagnostic results  - Monitor all insertion sites, i e  indwelling lines, tubes, and drains  - Monitor endotracheal if appropriate and nasal secretions for changes in amount and color  - [place] appropriate cooling/warming therapies per order  - Administer medications as ordered  - Instruct and encourage patient and family to use good hand hygiene technique  - Identify and instruct in appropriate isolation precautions for identified infection/condition  Outcome: Progressing  Goal: Absence of fever/infection during neutropenic period  Description: INTERVENTIONS:  - Monitor WBC    Outcome: Progressing     Problem: SAFETY ADULT  Goal: Patient will remain free of falls  Description: INTERVENTIONS:  - Assess patient frequently for physical needs  -  Identify cognitive and physical deficits and behaviors that affect risk of falls    -  [place] fall precautions as indicated by assessment   - Educate patient/family on patient safety including physical limitations  - Instruct patient to call for assistance with activity based on assessment  - Modify environment to reduce risk of injury  - Consider OT/PT consult to assist with strengthening/mobility  Outcome: Progressing  Goal: Maintain or return to baseline ADL function  Description: INTERVENTIONS:  -  Assess patient's ability to carry out ADLs; assess patient's baseline for ADL function and identify physical deficits which impact ability to perform ADLs (bathing, care of mouth/teeth, toileting, grooming, dressing, etc )  - Assess/evaluate cause of self-care deficits   - Assess range of motion  - Assess patient's mobility; develop plan if impaired  - Assess patient's need for assistive devices and provide as appropriate  - Encourage maximum independence but intervene and supervise when necessary  - Involve family in performance of ADLs  - Assess for home care needs following discharge   - Consider OT consult to assist with ADL evaluation and planning for discharge  - Provide patient education as appropriate  Outcome: Progressing  Goal: Maintain or return mobility status to optimal level  Description: INTERVENTIONS:  - Assess patient's baseline mobility status (ambulation, transfers, stairs, etc )    - Identify cognitive and physical deficits and behaviors that affect mobility  - Identify mobility aids required to assist with transfers and/or ambulation (gait belt, sit-to-stand, lift, walker, cane, etc )  - [place] fall precautions as indicated by assessment  - Record patient progress and toleration of activity level on Mobility SBAR; progress patient to next Phase/Stage  - Instruct patient to call for assistance with activity based on assessment  - Consider rehabilitation consult to assist with strengthening/weightbearing, etc   Outcome: Progressing     Problem: DISCHARGE PLANNING  Goal: Discharge to home or other facility with appropriate resources  Description: INTERVENTIONS:  - Identify barriers to discharge w/patient and caregiver  - Arrange for needed discharge resources and transportation as appropriate  - Identify discharge learning needs (meds, wound care, etc )  - Arrange for interpretive services to assist at discharge as needed  - Refer to Case Management Department for coordinating discharge planning if the patient needs post-hospital services based on physician/advanced practitioner order or complex needs related to functional status, cognitive ability, or social support system  Outcome: Progressing     Problem: Knowledge Deficit  Goal: Patient/family/caregiver demonstrates understanding of disease process, treatment plan, medications, and discharge instructions  Description: Complete learning assessment and assess knowledge base    Interventions:  - Provide teaching at level of understanding  - Provide teaching via preferred learning methods  Outcome: Progressing     Problem: BIRTH - VAGINAL/ SECTION  Goal: Fetal and maternal status remain reassuring during the birth process  Description: INTERVENTIONS:  - Monitor vital signs  - Monitor fetal heart rate  - Monitor uterine activity  - Monitor labor progression (vaginal delivery)  - DVT prophylaxis  - Antibiotic prophylaxis  Outcome: Progressing  Goal: Emotionally satisfying birthing experience for mother/fetus  Description: Interventions:  - Assess, plan, implement and evaluate the nursing care given to the patient in labor  - Advocate the philosophy that each childbirth experience is a unique experience and support the family's chosen level of involvement and control during the labor process   - Actively participate in both the patient's and family's teaching of the birth process  - Consider cultural, Tenriism and age-specific factors and plan care for the patient in labor  Outcome: Progressing

## 2021-05-01 NOTE — DISCHARGE INSTRUCTIONS
Self Care After Delivery   AMBULATORY CARE:   The postpartum period  is the period of time from delivery to about 6 weeks  During this time you may experience many physical and emotional changes  It is important to understand what is normal and when you need to call your healthcare provider  It is also important to know how to care for yourself during this time  Call your local emergency number (911 in the [address] [facility]) for any of the following:   · You see or hear things that are not there, or have thoughts of harming yourself or your baby  · You soak through 1 pad in 15 minutes, have blurry vision, clammy or pale skin, and feel faint  · You faint or lose consciousness  · You have trouble breathing  · You cough up blood  · Your  incision comes apart  Seek care immediately if:   · Your heart is beating faster than usual     · You have a bad headache or changes in your vision  · Your episiotomy or  incision is red, swollen, bleeding, or draining pus  · You have severe abdominal pain  Call your doctor or obstetrician if:   · Your leg is painful, red, and larger than usual     · You soak through 1 or more pads in an hour, or pass blood clots larger than a quarter from your vagina  · You have a fever  · You have new or worsening pain in your abdomen or vagina  · You continue to have depression 1 to 2 weeks after you deliver  · You have trouble sleeping  · You have foul-smelling discharge from your vagina  · You have pain or burning when you urinate  · You do not have a bowel movement for 3 days or more  · You have nausea or are vomiting  · You have hard lumps or red streaks over your breasts  · You have cracked nipples or bleed from your nipples  · You have questions or concerns about your condition or care  Physical changes:   The following are normal changes after you give birth:  · Pain in the area between your anus and vagina    · Breast pain    · Constipation or hemorrhoids    · Hot or cold flashes    · Vaginal bleeding or discharge    · Mild to moderate abdominal cramping    · Difficulty controlling bowel movements or urine    Emotional changes:  A drop in hormone levels after you deliver may cause changes in your emotions  You may feel irritable, sad, or anxious  You may cry easily or for no reason  You may also feel depressed  Depression that continues can be a sign of postpartum depression, a condition that can be treated  Treatment may include talk therapy, medicines, or both  Healthcare providers will ask how you are feeling and if you have any depression  These talks can happen during appointments for your medical care and for your baby's care, such as well child visits  Providers can help you find ways to care for yourself and your baby  Talk to your providers about the following:  · When emotional changes or depression started, and if it is getting worse over time    · Problems you are having with daily activities, sleep, or caring for your baby    · If anything makes you feel worse, or makes you feel better    · Feeling that you are not bonding with your baby the way you want    · Any problems your baby has with sleeping or feeding    · Your baby is fussy or cries a lot    · Support you have from friends, family, or others    Breast care for breastfeeding mothers: You may have sore breasts for 3 to 6 days after you give birth  This happens as your milk begins to fill your breasts  You may also have sore breasts if you do not breastfeed frequently  Do the following to care for your breasts:  · Apply a moist, warm, compress to your breast as directed  This may help soothe your breasts  Make sure the washcloth is not too hot before you apply it to your breast     · Nurse your baby or pump your milk frequently  This may prevent clogged milk ducts  Ask your healthcare provider how often to nurse or pump      · Massage your breasts as directed  This may help increase your milk flow  Gently rub your breasts in a circular motion before you breastfeed  You may need to gently squeeze your breast or nipple to help release milk  You can also use a breast pump to help release milk from your breast     · Wash your breasts with warm water only  Do not put soap on your nipples  Soap may cause your nipples to become dry  · Apply lanolin cream to your nipples as directed  Lanolin cream may add moisture to your skin and prevent nipple dryness  Always  wash off lanolin cream with warm water before you breastfeed  · Place pads in your bra  Your nipples may leak milk when you are not breastfeeding  You can place pads inside of your bra to help prevent leaking onto your clothing  Ask your healthcare provider where to purchase bra pads  · Get breastfeeding support if needed  Healthcare providers can answer questions about breastfeeding and provide you with support  Ask your healthcare provider who you can contact if you need breastfeeding support  Breast care for non-breastfeeding mothers:  Milk will fill your breasts even if you bottle feed your baby  Do the following to help stop your milk from filling your breasts and causing pain:  · Wear a bra with support at all times  A sports bra or a tight-fitting bra will help stop your milk from coming in  · Apply ice on each breast for 15 to 20 minutes every hour or as directed  Use an ice pack, or put crushed ice in a plastic bag  Cover it with a towel before you apply it to your breast  Ice helps your milk ducts shrink  · Keep your breasts away from warm water  Warm water will make it easier for milk to fill your breasts  Stand with your breasts away from warm water in the shower  · Limit how much you touch your breasts  This will prevent them from filling with milk  Perineum care: Your perineum is the area between your rectum and vagina   It is normal to have swelling and pain in this area after you give birth  If you had an episiotomy, your healthcare provider may give you special instructions  · Clean your perineum after you use the bathroom  This may prevent infection and help with healing  Use a spray bottle with warm water to clean your perineum  You may also gently spray warm water against your perineum when you urinate  Always wipe front to back  · Take a sitz bath as directed  A sitz bath may help relieve swelling and pain  Fill your bath tub or bucket with water up to your hips and sit in the water  Use cold water for 2 days after you deliver  Then use warm water  Ask your healthcare provider for more information about a sitz bath  · Apply ice packs for the first 24 hours or as directed  Use a plastic glove filled with ice or buy an ice pack  Wrap the ice pack or plastic glove in a small towel or wash cloth  Place the ice pack on your perineum for 20 minutes at a time  · Sit on a donut-shaped pillow  This may relieve pressure on your perineum when you sit  · Use wipes that contain medicine or take pills as directed  Your healthcare provider may tell you to use witch hazel pads  You can place witch hazel pads in the refrigerator before you apply them to your perineum  Your provider may also tell you to take NSAIDs  Ask him or her how often to take pills or use the wipes  · Do not go swimming or take tub baths for 4 to 6 weeks or as directed  This will help prevent an infection in your vagina or uterus  Bowel and bladder care: It may take 3 to 5 days to have a bowel movement after you deliver your baby  You can do the following to prevent or manage constipation, and get control of your bowel or bladder:  · Take stool softeners as directed  A stool softener is medicine that will make your bowel movements softer  This may prevent or relieve constipation  A stool softener may also make bowel movements less painful  · Drink plenty of liquids    Ask how much liquid to drink each day and which liquids are best for you  Liquids may help prevent constipation  · Eat foods high in fiber  Examples include fruits, vegetables, grains, beans, and lentils  Ask your healthcare provider how much fiber you need each day  Fiber may prevent constipation  · Do Kegel exercises as directed  Kegel exercises will help strengthen the muscles that control bowel movements and urination  Ask your healthcare provider for more information on Kegel exercises  · Apply cold compresses or medicine to hemorrhoids as directed  This may relieve swelling and pain  Your healthcare provider may tell you to apply ice or wipes that contain medicine to your hemorrhoids  He or she may also tell you to use a sitz bath  Ask your provider for more information on how to manage hemorrhoids  Nutrition:  Good nutrition is important in the postpartum period  It will help you return to a healthy weight, increase your energy levels, and prevent constipation  It will also help you get enough nutrients and calories if you are going to breastfeed your baby  · Eat a variety of healthy foods  Healthy foods include fruits, vegetables, whole-grain breads, low-fat dairy products, beans, lean meats, and fish  You may need 500 to 700 extra calories each day if you breastfeed your baby  You may also need extra protein  · Limit foods with added sugar and high amounts of fat  These foods are high in calories and low in healthy nutrients  Read food labels so you know how much sugar and fat is in the food you want to eat  · Drink 8 to 10 glasses of water per day  Water will help you make plenty of milk for your baby  It will also help prevent constipation  Drink a glass of water every time you breastfeed your baby  · Take vitamins as directed  Ask your healthcare provider what vitamins you need  · Limit caffeine and alcohol if you are breastfeeding    Caffeine and alcohol can get into your breast milk  Caffeine and alcohol can make your baby fussy  They can also interfere with your baby's sleep  Ask your healthcare provider if you can drink alcohol or caffeine  Rest and sleep: You may feel very tired in the postpartum period  Enough sleep will help you heal and give you energy to care for your baby  The following may help you get sleep and rest:  · Nap when your baby naps  Your baby may nap several times during the day  Get rest during this time  · Limit visitors  Many people may want to see you and your baby  Ask friends or family to visit on different days  This will give you time to rest     · Do not plan too much for one day  Put off household chores so that you have time to rest  Gradually do more each day  · Ask for help from family, friends, or neighbors  Ask them to help you with laundry, cleaning, or errands  Also ask someone to watch the baby while you take a nap or relax  Ask your partner to help with the care of your baby  Pump some of your breast milk so your partner can feed your baby during the night  Exercise after delivery:  Wait until your healthcare provider says it is okay to exercise  Exercise can help you lose weight, increase your energy levels, and manage your mood  It can also prevent constipation and blood clots  Start with gentle exercises such as walking  Do more as you have more energy  You may need to avoid abdominal exercises for 1 to 2 weeks after you deliver  Talk to your healthcare provider about an exercise plan that is right for you  Sexual activity after delivery:   · Do not have sex until your healthcare provider says it is okay  You may need to wait 4 to 6 weeks before you have sex  This may prevent infection and allow time to heal     · Your menstrual cycle may begin as soon as 3 weeks after you deliver  Your period may be delayed if you breastfeed your baby  You can become pregnant before you get your first postpartum period   Talk to your healthcare provider about birth control that is right for you  Some types of birth control are not safe during breastfeeding  For support and more information:  Join a support group for new mothers  Ask for help from family and friends with chores, errands, and care of your baby  · Office of Women's Health,  Department of Health and Human Services  [address], [address] [facility]  [place] [address]  [address], [address] [facility]  [place] [address]  Phone: 3- 366 - [phone]  Web Address: www womenshealth gov  · [facility] Postpartum [address] , [address] [facility] [address]  [address] [facility] , [address] [facility]  Web Address: [employer] be  NPS/pregnancy/postpartum-care  aspx  Follow up with your doctor or obstetrician as directed: You will need to follow up within 2 to 6 weeks of delivery  Write down your questions so you remember to ask them at your visits  © Copyright [address] Information is for End User's use only and may not be sold, redistributed or otherwise used for commercial purposes  All illustrations and images included in CareNotes® are the copyrighted property of [employer]  or [facility] Gareth Logan   The above information is an  only  It is not intended as medical advice for individual conditions or treatments  Talk to your doctor, nurse or pharmacist before following any medical regimen to see if it is safe and effective for you

## 2021-05-01 NOTE — OB LABOR/OXYTOCIN SAFETY PROGRESS
Oxytocin Safety Progress Check Note - Teresia Batters 31 y o  female MRN: [id number]    Unit/Bed#: L&D 321-01 Encounter: [id number]    Dose (frank-units/min) Oxytocin: 6 frank-units/min  Contraction Frequency (minutes): 2-4(difficulty tracing d/t maternal position)  Contraction Quality: Mild  Tachysystole: No   Cervical Dilation: 4        Cervical Effacement: 60  Fetal Station: -3  Baseline Rate: 145 bpm  Fetal Heart Rate: 138 BPM  FHR Category: Category I               Vital Signs:   Vitals:    05/01/21 0530   BP: 126/60   Pulse:    Resp:    Temp:            Notes/comments: galindo balloon expelled at 0400 am, patient is currently 4/60/-2   Category I tracing, liban q 3-6 minutes    D/w Dr Tru Mason MD 5/1/2021 6:02 AM

## 2021-05-01 NOTE — ANESTHESIA PREPROCEDURE EVALUATION
Procedure:  LABOR ANALGESIA    Relevant Problems   ENDO   (+) Pre-existing type 1 diabetes mellitus during pregnancy in third trimester      GYN   (+) 38 weeks gestation of pregnancy   (+) High-risk pregnancy      HEMATOLOGY   (+) Maternal anemia, antepartum      Other   (+) Obesity affecting pregnancy in third trimester        Physical Exam    Airway    Mallampati score: II  TM Distance: >3 FB  Neck ROM: full     Dental   No notable dental hx     Cardiovascular  Cardiovascular exam normal    Pulmonary  Pulmonary exam normal     Other Findings        Anesthesia Plan  ASA Score- 3     Anesthesia Type- epidural with ASA Monitors  Additional Monitors:   Airway Plan:           Plan Factors-    Chart reviewed  Induction-     Postoperative Plan-     Informed Consent- Anesthetic plan and risks discussed with patient

## 2021-05-01 NOTE — OB LABOR/OXYTOCIN SAFETY PROGRESS
Oxytocin Safety Progress Check Note - Kimberly Bonilla 31 y o  female MRN: [id number]    Unit/Bed#: L&D 321-01 Encounter: [id number]    Dose (frank-units/min) Oxytocin: 6 frank-units/min  Contraction Frequency (minutes): 3-3 5  Contraction Quality: Strong  Tachysystole: No   Cervical Dilation: Lip/rim (Comment)        Cervical Effacement: 100  Fetal Station: 0  Baseline Rate: 135 bpm  Fetal Heart Rate: 138 BPM  FHR Category: Category I               Vital Signs:   Vitals:    05/01/21 0915   BP: 130/61   Pulse: 75   Resp:    Temp:            Notes/comments:   Evaluated patient after reported increase in pressure  More comfortable with epidural  Found to be changed to 9 5 with reducible lip  Will consider starting pushing shortly      Discussed with Dr Leonora Saravia MD 5/1/2021 9:20 AM

## 2021-05-01 NOTE — PLAN OF CARE
Problem: PAIN - ADULT  Goal: Verbalizes/displays adequate comfort level or baseline comfort level  Description: Interventions:  - Encourage patient to monitor pain and request assistance  - Assess pain using appropriate pain scale  - Administer analgesics based on type and severity of pain and evaluate response  - Implement non-pharmacological measures as appropriate and evaluate response  - Consider cultural and social influences on pain and pain management  - Notify physician/advanced practitioner if interventions unsuccessful or patient reports new pain  Outcome: Progressing     Problem: INFECTION - ADULT  Goal: Absence or prevention of progression during hospitalization  Description: INTERVENTIONS:  - Assess and monitor for signs and symptoms of infection  - Monitor lab/diagnostic results  - Monitor all insertion sites, i e  indwelling lines, tubes, and drains  - Monitor endotracheal if appropriate and nasal secretions for changes in amount and color  - [place] appropriate cooling/warming therapies per order  - Administer medications as ordered  - Instruct and encourage patient and family to use good hand hygiene technique  - Identify and instruct in appropriate isolation precautions for identified infection/condition  Outcome: Progressing  Goal: Absence of fever/infection during neutropenic period  Description: INTERVENTIONS:  - Monitor WBC    Outcome: Progressing     Problem: SAFETY ADULT  Goal: Patient will remain free of falls  Description: INTERVENTIONS:  - Assess patient frequently for physical needs  -  Identify cognitive and physical deficits and behaviors that affect risk of falls    -  [place] fall precautions as indicated by assessment   - Educate patient/family on patient safety including physical limitations  - Instruct patient to call for assistance with activity based on assessment  - Modify environment to reduce risk of injury  - Consider OT/PT consult to assist with strengthening/mobility  Outcome: Progressing  Goal: Maintain or return to baseline ADL function  Description: INTERVENTIONS:  -  Assess patient's ability to carry out ADLs; assess patient's baseline for ADL function and identify physical deficits which impact ability to perform ADLs (bathing, care of mouth/teeth, toileting, grooming, dressing, etc )  - Assess/evaluate cause of self-care deficits   - Assess range of motion  - Assess patient's mobility; develop plan if impaired  - Assess patient's need for assistive devices and provide as appropriate  - Encourage maximum independence but intervene and supervise when necessary  - Involve family in performance of ADLs  - Assess for home care needs following discharge   - Consider OT consult to assist with ADL evaluation and planning for discharge  - Provide patient education as appropriate  Outcome: Progressing  Goal: Maintain or return mobility status to optimal level  Description: INTERVENTIONS:  - Assess patient's baseline mobility status (ambulation, transfers, stairs, etc )    - Identify cognitive and physical deficits and behaviors that affect mobility  - Identify mobility aids required to assist with transfers and/or ambulation (gait belt, sit-to-stand, lift, walker, cane, etc )  - [place] fall precautions as indicated by assessment  - Record patient progress and toleration of activity level on Mobility SBAR; progress patient to next Phase/Stage  - Instruct patient to call for assistance with activity based on assessment  - Consider rehabilitation consult to assist with strengthening/weightbearing, etc   Outcome: Progressing     Problem: DISCHARGE PLANNING  Goal: Discharge to home or other facility with appropriate resources  Description: INTERVENTIONS:  - Identify barriers to discharge w/patient and caregiver  - Arrange for needed discharge resources and transportation as appropriate  - Identify discharge learning needs (meds, wound care, etc )  - Arrange for interpretive services to assist at discharge as needed  - Refer to Case Management Department for coordinating discharge planning if the patient needs post-hospital services based on physician/advanced practitioner order or complex needs related to functional status, cognitive ability, or social support system  Outcome: Progressing     Problem: Knowledge Deficit  Goal: Patient/family/caregiver demonstrates understanding of disease process, treatment plan, medications, and discharge instructions  Description: Complete learning assessment and assess knowledge base    Interventions:  - Provide teaching at level of understanding  - Provide teaching via preferred learning methods  Outcome: Progressing     Problem: BIRTH - VAGINAL/ SECTION  Goal: Fetal and maternal status remain reassuring during the birth process  Description: INTERVENTIONS:  - Monitor vital signs  - Monitor fetal heart rate  - Monitor uterine activity  - Monitor labor progression (vaginal delivery)  - DVT prophylaxis  - Antibiotic prophylaxis  Outcome: Completed  Goal: Emotionally satisfying birthing experience for mother/fetus  Description: Interventions:  - Assess, plan, implement and evaluate the nursing care given to the patient in labor  - Advocate the philosophy that each childbirth experience is a unique experience and support the family's chosen level of involvement and control during the labor process   - Actively participate in both the patient's and family's teaching of the birth process  - Consider cultural, Latter-day and age-specific factors and plan care for the patient in labor  Outcome: Completed

## 2021-05-01 NOTE — ANESTHESIA PROCEDURE NOTES
Epidural Block    Patient location during procedure: OB  Start time: 5/1/2021 8:15 AM  Reason for block: at surgeon's request and primary anesthetic  Staffing  Anesthesiologist: Luis Landry MD  Performed: anesthesiologist   Preanesthetic Checklist  Completed: patient identified, site marked, surgical consent, pre-op evaluation, timeout performed, IV checked, risks and benefits discussed and monitors and equipment checked  Epidural  Patient position: sitting  Prep: Betadine  Patient monitoring: frequent blood pressure checks and continuous pulse ox  Approach: midline  Location: lumbar (1-5)  Injection technique: GURJIT saline  Needle  Needle type: Tuohy   Needle gauge: 18 G  Catheter type: side hole  Catheter size: 20 G  Test dose: negativeropivacaine (NAROPIN) 0 2% epidural injection, 10 mL  Assessment  Sensory level: [id number] aspiration for CSF, negative aspiration for heme and no paresthesia on injection  patient tolerated the procedure well with no immediate complications

## 2021-05-01 NOTE — UTILIZATION REVIEW
Initial Clinical Review    Admission: Date/Time/Statement:   Admission Orders (From admission, onward)     Ordered        04/30/21 1452  Place in Observation  Once                   Orders Placed This Encounter   Procedures    Place in Observation     Standing Status:   Standing     Number of Occurrences:   1     Order Specific Question:   Level of Care     Answer:   Med Surg [16]          Chief Complaint   Patient presents with    Hypertension     high BP's in OB office, sent over for monitoring       Initial Presentation:  32 yo G 4 OP 1 @ 38 4/7 presentd to L&D  as observation for r/o pre-eclampsia She is a type 1 diabetic on continuous glucose monitoring and subcutaneous insulin  Planm induction of labor     05-01-21 Dose (frank-units/min) Oxytocin: 6 frank-units/min  Contraction Frequency (minutes): 2-4(difficulty tracing d/t maternal position)  Contraction Quality: Mild  Tachysystole: No   Cervical Dilation: 4-5  Cervical Effacement: 60  Fetal Station: -2  Baseline Rate: 145 bpm  Fetal Heart Rate: 138 BPM  FHR Category: Category I       ED Triage Vitals [04/30/21 1219]   Temperature Pulse Respirations Blood Pressure SpO2   98 °F (36 7 °C) (!) 113 18 140/87 --      Temp Source Heart Rate Source Patient Position - Orthostatic VS BP Location FiO2 (%)   Oral Monitor Lying Left arm --      Pain Score       No Pain          Wt Readings from Last 1 Encounters:   04/30/21 122 kg (270 lb)     04/30/21 2000  98 2 °F (36 8 °C)  --  --  --  --   04/30/21 1953  --  87  --  128/76  --   04/30/21 1939  --  77  --  130/81  --   04/30/21 1923  --  82  --  134/80  --   04/30/21 1844  --  96  --  126/69  --   04/30/21 1827  --  96  --  119/69  --   04/30/21 1812  --  91  --  116/60  --   04/30/21 1758  --  88  --  125/75  --   04/30/21 1744  --  86  --  128/60  --   04/30/21 1727  --  102  --  159/75  --   04/30/21 1712  --  88  --  149/76  --   04/30/21 1658  --  88  --  112/61  --   04/30/21 1642  --  88  --  132/66  -- 04/30/21 1634  --  85  --  134/70  --   04/30/21 1551  --  80  --  129/56  --   04/30/21 1519  98 4 °F (36 9 °C)  100  19  148/83  --   04/30/21 1504  --  110Abnormal   --  138/82  --   04/30/21 1449  --  86  --  127/68  --   04/30/21 1435  --  127Abnormal   --  146/73  --   04/30/21 1434  --  127Abnormal   --  146/73  --   04/30/21 1422  --  95  --  139/70  --   04/30/21 1405  --  94  --  132/103Abnormal   --   04/30/21 1349  --  104  --  124/75  --   04/30/21 1334  --  100  --  119/68  --   04/30/21 1319  --  100  --  130/83  --   04/30/21 1304  --  97  --  125/67  --   04/30/21 1251  --  100  --  134/82  --   04/30/21 1235  --  106Abnormal   --  135/81         Additional Vital Signs:   Pertinent Labs/Diagnostic Test Results:       Results from last 7 days   Lab Units 04/30/21  1259   WBC Thousand/uL 12 17*   HEMOGLOBIN g/dL 10 7*   HEMATOCRIT % 33 1*   PLATELETS Thousands/uL 284   NEUTROS ABS Thousands/µL 9 78*         Results from last 7 days   Lab Units 04/30/21  1258   SODIUM mmol/L 137   POTASSIUM mmol/L 4 4   CHLORIDE mmol/L 105   CO2 mmol/L 23   ANION GAP mmol/L 9   BUN mg/dL 10   CREATININE mg/dL 0 62   EGFR ml/min/1 73sq m 123   CALCIUM mg/dL 8 9     Results from last 7 days   Lab Units 04/30/21  1258   AST U/L 35   ALT U/L 22   ALK PHOS U/L 194*   TOTAL PROTEIN g/dL 7 1   ALBUMIN g/dL 2 7*   TOTAL BILIRUBIN mg/dL 0 32     Results from last 7 days   Lab Units 05/01/21  0829 05/01/21  0728 05/01/21  0652 05/01/21  0450 05/01/21  0306 05/01/21  0202 05/01/21  0008 04/30/21  2306 04/30/21  2138 04/30/21  2028 04/30/21  1918 04/30/21  1857   POC GLUCOSE mg/dl 82 73 45* 83 136 169* 108 82 76 52* 57* 34*     Results from last 7 days   Lab Units 04/30/21  1258   GLUCOSE RANDOM mg/dL 77       Results from last 7 days   Lab Units 04/30/21  1259 04/30/21  0923 04/27/21  1430   GLUCOSE UA   --  Negative negative   PROTEIN UA   --  Trace negative   CREATININE UR mg/dL 69 9  --   --    PROTEIN UR mg/dL 14  --   -- PROT/CREAT RATIO UR  0 20*  --   --        Past Medical History:   Diagnosis Date    Diabetes ([facility] [address] ) 1994    type 1    Diabetes mellitus ([facility] [address] )      Present on Admission:   Pre-existing type 1 diabetes mellitus during pregnancy in third trimester   Obesity affecting pregnancy in third trimester      Admitting Diagnosis: Elevated BP without diagnosis of hypertension [R03 0]  Age/Sex: 31 y o  female  Admission Orders:  Scheduled Medications:  misoprostol, 25 mcg, Vaginal, Q4H  penicillin G, 2 5 Million Units, Intravenous, Q4H      Continuous IV Infusions:  oxytocin, 1-30 frank-units/min, Intravenous, Titrated  ropivacaine 0 2%, , Epidural, Continuous  sodium chloride, 125 mL/hr, Intravenous, Continuous      PRN Meds:  calcium carbonate, 500 mg, Oral, Daily PRN  ondansetron, 4 mg, Intravenous, Q4H PRN        None    Network Utilization Review Department  ATTENTION: Please call with any questions or concerns to [phone] and carefully listen to the prompts so that you are directed to the right person  All voicemails are confidential   [facility] all requests for admission clinical reviews, approved or denied determinations and any other requests to dedicated fax number below belonging to the campus where the patient is receiving treatment   List of dedicated fax numbers for the Facilities:  [address] DENIALS (Administrative/Medical Necessity) [phone]   [address] (Maternity/NICU/Pediatrics) [phone] [address] Dr Liliane Kelly 3616 [address] [address]   [place] [address]  [place] [address] [address] [place] [address] [phone]

## 2021-05-02 VITALS
RESPIRATION RATE: 18 BRPM | TEMPERATURE: 97.6 F | OXYGEN SATURATION: 98 % | SYSTOLIC BLOOD PRESSURE: 118 MMHG | DIASTOLIC BLOOD PRESSURE: 76 MMHG | BODY MASS INDEX: 39.99 KG/M2 | HEIGHT: 69 IN | WEIGHT: 270 LBS | HEART RATE: 85 BPM

## 2021-05-02 PROCEDURE — 99024 POSTOP FOLLOW-UP VISIT: CPT | Performed by: OBSTETRICS & GYNECOLOGY

## 2021-05-02 RX ORDER — ACETAMINOPHEN 325 MG/1
650 TABLET ORAL EVERY 6 HOURS PRN
Refills: 0
Start: 2021-05-02 | End: 2022-01-20 | Stop reason: ALTCHOICE

## 2021-05-02 RX ORDER — IBUPROFEN 200 MG
600 TABLET ORAL EVERY 6 HOURS PRN
Start: 2021-05-02 | End: 2021-06-01

## 2021-05-02 RX ADMIN — IBUPROFEN 600 MG: 600 TABLET, FILM COATED ORAL at 09:01

## 2021-05-02 RX ADMIN — DOCUSATE SODIUM 100 MG: 100 CAPSULE, LIQUID FILLED ORAL at 09:02

## 2021-05-02 NOTE — PROGRESS NOTES
Progress Note - OB/GYN   Natalie Levi 31 y o  female MRN: [id number]  Unit/Bed#: L&D 311-01 Encounter: [id number]    Assessment:  Post partum Day #1 s/p , stable, baby in room with patient    Plan:  1  Post partum   - Continue routine post partum care  - Encourage ambulation  - Encourage breastfeeding    2  Type 1 DM on insulin pump  - Glucose  since delivery  - Patient titrating insulin and basal rate on insulin pump  - Dr Poncho Maldonado of Endocrinology had phone discussion of glycemic control with patient after delivery 21  - Follow up outpatient, establish care with [facility] Endocrinology    3  Gestational hypertension  - Systolic (18UZM), CG , Min:108 , PJK:240   - Diastolic (53XWO), SVE:32, Min:53, Max:85  - Asymptomatic, no indication for initiation of antihypertensive at this time    4  Discharge planning  - Anticipate discharge today if mom and baby remain stable        Subjective/Objective   Subjective: This morning, she has no complaints  Feeling well       Pain: yes, minimal cramping, improved with meds  Tolerating PO: yes  Voiding: yes  Flatus: yes  BM: no  Ambulating: yes  Breastfeeding:  yes  Chest pain: no  Shortness of breath: no  Leg pain: no  Lochia: minimal    Objective:     Vitals: /75 (BP Location: Right arm)   Pulse 71   Temp 98 2 °F (36 8 °C) (Oral)   Resp 16   Ht 5' 9" (1 753 m)   Wt 122 kg (270 lb)   LMP 2020 (Exact Date)   SpO2 98%   Breastfeeding Yes   BMI 39 87 kg/m²       Intake/Output Summary (Last 24 hours) at 2021 0257  Last data filed at 2021 1640  Gross per 24 hour   Intake --   Output 897 ml   Net -897 ml       Lab Results   Component Value Date    WBC 12 17 (H) 2021    HGB 10 7 (L) 2021    HCT 33 1 (L) 2021    MCV 81 (L) 2021     2021       Physical Exam:   Physical Exam  NAD  Breathing comfortably on room air  Abdomen soft, nontender, nondistended  Uterine fundus firm, nontender, at 1cm below the umbilicus  WWP, intact distal pulses    Edvin Kaye MD  5/2/2021  2:57 AM

## 2021-05-02 NOTE — PLAN OF CARE
Problem: PAIN - ADULT  Goal: Verbalizes/displays adequate comfort level or baseline comfort level  Description: Interventions:  - Encourage patient to monitor pain and request assistance  - Assess pain using appropriate pain scale  - Administer analgesics based on type and severity of pain and evaluate response  - Implement non-pharmacological measures as appropriate and evaluate response  - Consider cultural and social influences on pain and pain management  - Notify physician/advanced practitioner if interventions unsuccessful or patient reports new pain  Outcome: Progressing     Problem: INFECTION - ADULT  Goal: Absence or prevention of progression during hospitalization  Description: INTERVENTIONS:  - Assess and monitor for signs and symptoms of infection  - Monitor lab/diagnostic results  - Monitor all insertion sites, i e  indwelling lines, tubes, and drains  - Monitor endotracheal if appropriate and nasal secretions for changes in amount and color  - [place] appropriate cooling/warming therapies per order  - Administer medications as ordered  - Instruct and encourage patient and family to use good hand hygiene technique  - Identify and instruct in appropriate isolation precautions for identified infection/condition  Outcome: Progressing  Goal: Absence of fever/infection during neutropenic period  Description: INTERVENTIONS:  - Monitor WBC    Outcome: Progressing     Problem: SAFETY ADULT  Goal: Patient will remain free of falls  Description: INTERVENTIONS:  - Assess patient frequently for physical needs  -  Identify cognitive and physical deficits and behaviors that affect risk of falls    -  [place] fall precautions as indicated by assessment   - Educate patient/family on patient safety including physical limitations  - Instruct patient to call for assistance with activity based on assessment  - Modify environment to reduce risk of injury  - Consider OT/PT consult to assist with strengthening/mobility  Outcome: Progressing  Goal: Maintain or return to baseline ADL function  Description: INTERVENTIONS:  -  Assess patient's ability to carry out ADLs; assess patient's baseline for ADL function and identify physical deficits which impact ability to perform ADLs (bathing, care of mouth/teeth, toileting, grooming, dressing, etc )  - Assess/evaluate cause of self-care deficits   - Assess range of motion  - Assess patient's mobility; develop plan if impaired  - Assess patient's need for assistive devices and provide as appropriate  - Encourage maximum independence but intervene and supervise when necessary  - Involve family in performance of ADLs  - Assess for home care needs following discharge   - Consider OT consult to assist with ADL evaluation and planning for discharge  - Provide patient education as appropriate  Outcome: Progressing  Goal: Maintain or return mobility status to optimal level  Description: INTERVENTIONS:  - Assess patient's baseline mobility status (ambulation, transfers, stairs, etc )    - Identify cognitive and physical deficits and behaviors that affect mobility  - Identify mobility aids required to assist with transfers and/or ambulation (gait belt, sit-to-stand, lift, walker, cane, etc )  - [place] fall precautions as indicated by assessment  - Record patient progress and toleration of activity level on Mobility SBAR; progress patient to next Phase/Stage  - Instruct patient to call for assistance with activity based on assessment  - Consider rehabilitation consult to assist with strengthening/weightbearing, etc   Outcome: Progressing     Problem: DISCHARGE PLANNING  Goal: Discharge to home or other facility with appropriate resources  Description: INTERVENTIONS:  - Identify barriers to discharge w/patient and caregiver  - Arrange for needed discharge resources and transportation as appropriate  - Identify discharge learning needs (meds, wound care, etc )  - Arrange for interpretive services to assist at discharge as needed  - Refer to Case Management Department for coordinating discharge planning if the patient needs post-hospital services based on physician/advanced practitioner order or complex needs related to functional status, cognitive ability, or social support system  Outcome: Progressing     Problem: Knowledge Deficit  Goal: Patient/family/caregiver demonstrates understanding of disease process, treatment plan, medications, and discharge instructions  Description: Complete learning assessment and assess knowledge base    Interventions:  - Provide teaching at level of understanding  - Provide teaching via preferred learning methods  Outcome: Progressing

## 2021-05-03 ENCOUNTER — HOSPITAL ENCOUNTER (INPATIENT)
Facility: HOSPITAL | Age: 31
LOS: 1 days | Discharge: HOME | DRG: 776 | End: 2021-05-04
Attending: EMERGENCY MEDICINE | Admitting: STUDENT IN AN ORGANIZED HEALTH CARE EDUCATION/TRAINING PROGRAM
Payer: COMMERCIAL

## 2021-05-03 ENCOUNTER — TELEPHONE (OUTPATIENT)
Dept: OBGYN CLINIC | Facility: CLINIC | Age: 31
End: 2021-05-03

## 2021-05-03 DIAGNOSIS — K64.9 HEMORRHOIDS, UNSPECIFIED HEMORRHOID TYPE: Primary | ICD-10-CM

## 2021-05-03 DIAGNOSIS — O14.00 ANTEPARTUM MILD PREECLAMPSIA: ICD-10-CM

## 2021-05-03 DIAGNOSIS — K64.4 EXTERNAL HEMORRHOID: Primary | ICD-10-CM

## 2021-05-03 LAB
ALBUMIN SERPL BCP-MCNC: 2.8 G/DL (ref 3.5–5)
ALP SERPL-CCNC: 153 U/L (ref 46–116)
ALT SERPL W P-5'-P-CCNC: 20 U/L (ref 12–78)
ANION GAP SERPL CALCULATED.3IONS-SCNC: 8 MMOL/L (ref 4–13)
AST SERPL W P-5'-P-CCNC: 19 U/L (ref 5–45)
BACTERIA UR QL AUTO: ABNORMAL /HPF
BASOPHILS # BLD AUTO: 0.05 THOUSANDS/ΜL (ref 0–0.1)
BASOPHILS NFR BLD AUTO: 0 % (ref 0–1)
BILIRUB SERPL-MCNC: 0.14 MG/DL (ref 0.2–1)
BILIRUB UR QL STRIP: NEGATIVE
BUN SERPL-MCNC: 13 MG/DL (ref 5–25)
CALCIUM ALBUM COR SERPL-MCNC: 10 MG/DL (ref 8.3–10.1)
CALCIUM SERPL-MCNC: 9 MG/DL (ref 8.3–10.1)
CHLORIDE SERPL-SCNC: 107 MMOL/L (ref 100–108)
CLARITY UR: CLEAR
CO2 SERPL-SCNC: 26 MMOL/L (ref 21–32)
COLOR UR: ABNORMAL
CREAT SERPL-MCNC: 0.81 MG/DL (ref 0.6–1.3)
EOSINOPHIL # BLD AUTO: 0.49 THOUSAND/ΜL (ref 0–0.61)
EOSINOPHIL NFR BLD AUTO: 4 % (ref 0–6)
ERYTHROCYTE [DISTWIDTH] IN BLOOD BY AUTOMATED COUNT: 14.5 % (ref 11.6–15.1)
GFR SERPL CREATININE-BSD FRML MDRD: 99 ML/MIN/1.73SQ M
GLUCOSE SERPL-MCNC: 48 MG/DL (ref 65–140)
GLUCOSE UR STRIP-MCNC: NEGATIVE MG/DL
HCT VFR BLD AUTO: 32.1 % (ref 34.8–46.1)
HGB BLD-MCNC: 10.1 G/DL (ref 11.5–15.4)
HGB UR QL STRIP.AUTO: ABNORMAL
IMM GRANULOCYTES # BLD AUTO: 0.1 THOUSAND/UL (ref 0–0.2)
IMM GRANULOCYTES NFR BLD AUTO: 1 % (ref 0–2)
KETONES UR STRIP-MCNC: NEGATIVE MG/DL
LEUKOCYTE ESTERASE UR QL STRIP: ABNORMAL
LYMPHOCYTES # BLD AUTO: 1.95 THOUSANDS/ΜL (ref 0.6–4.47)
LYMPHOCYTES NFR BLD AUTO: 17 % (ref 14–44)
MCH RBC QN AUTO: 26 PG (ref 26.8–34.3)
MCHC RBC AUTO-ENTMCNC: 31.5 G/DL (ref 31.4–37.4)
MCV RBC AUTO: 83 FL (ref 82–98)
MONOCYTES # BLD AUTO: 0.78 THOUSAND/ΜL (ref 0.17–1.22)
MONOCYTES NFR BLD AUTO: 7 % (ref 4–12)
NEUTROPHILS # BLD AUTO: 8.34 THOUSANDS/ΜL (ref 1.85–7.62)
NEUTS SEG NFR BLD AUTO: 71 % (ref 43–75)
NITRITE UR QL STRIP: NEGATIVE
NON-SQ EPI CELLS URNS QL MICRO: ABNORMAL /HPF
NRBC BLD AUTO-RTO: 0 /100 WBCS
PH UR STRIP.AUTO: 5 [PH] (ref 4.5–8)
PLATELET # BLD AUTO: 291 THOUSANDS/UL (ref 149–390)
PMV BLD AUTO: 10.8 FL (ref 8.9–12.7)
POTASSIUM SERPL-SCNC: 3.5 MMOL/L (ref 3.5–5.3)
PROT SERPL-MCNC: 7 G/DL (ref 6.4–8.2)
PROT UR STRIP-MCNC: NEGATIVE MG/DL
RBC # BLD AUTO: 3.89 MILLION/UL (ref 3.81–5.12)
RBC #/AREA URNS AUTO: ABNORMAL /HPF
RPR SER QL: NORMAL
SODIUM SERPL-SCNC: 141 MMOL/L (ref 136–145)
SP GR UR STRIP.AUTO: 1.01 (ref 1–1.03)
UROBILINOGEN UR QL STRIP.AUTO: 0.2 E.U./DL
WBC # BLD AUTO: 11.71 THOUSAND/UL (ref 4.31–10.16)
WBC #/AREA URNS AUTO: ABNORMAL /HPF

## 2021-05-03 PROCEDURE — 99284 EMERGENCY DEPT VISIT MOD MDM: CPT

## 2021-05-03 PROCEDURE — 85025 COMPLETE CBC W/AUTO DIFF WBC: CPT | Performed by: OBSTETRICS & GYNECOLOGY

## 2021-05-03 PROCEDURE — 81001 URINALYSIS AUTO W/SCOPE: CPT

## 2021-05-03 PROCEDURE — NC001 PR NO CHARGE: Performed by: STUDENT IN AN ORGANIZED HEALTH CARE EDUCATION/TRAINING PROGRAM

## 2021-05-03 PROCEDURE — 99284 EMERGENCY DEPT VISIT MOD MDM: CPT | Performed by: PHYSICIAN ASSISTANT

## 2021-05-03 PROCEDURE — 80053 COMPREHEN METABOLIC PANEL: CPT | Performed by: OBSTETRICS & GYNECOLOGY

## 2021-05-03 PROCEDURE — 36415 COLL VENOUS BLD VENIPUNCTURE: CPT | Performed by: OBSTETRICS & GYNECOLOGY

## 2021-05-03 RX ORDER — SIMETHICONE 80 MG
80 TABLET,CHEWABLE ORAL 4 TIMES DAILY PRN
Status: DISCONTINUED | OUTPATIENT
Start: 2021-05-03 | End: 2021-05-04 | Stop reason: HOSPADM

## 2021-05-03 RX ORDER — MAGNESIUM SULFATE HEPTAHYDRATE 40 MG/ML
2 INJECTION, SOLUTION INTRAVENOUS CONTINUOUS
Status: DISCONTINUED | OUTPATIENT
Start: 2021-05-03 | End: 2021-05-04 | Stop reason: HOSPADM

## 2021-05-03 RX ORDER — MAGNESIUM SULFATE HEPTAHYDRATE 40 MG/ML
4 INJECTION, SOLUTION INTRAVENOUS ONCE
Status: COMPLETED | OUTPATIENT
Start: 2021-05-03 | End: 2021-05-03

## 2021-05-03 RX ORDER — LABETALOL 200 MG/1
200 TABLET, FILM COATED ORAL EVERY 8 HOURS SCHEDULED
Status: DISCONTINUED | OUTPATIENT
Start: 2021-05-03 | End: 2021-05-04 | Stop reason: HOSPADM

## 2021-05-03 RX ORDER — MAGNESIUM SULFATE HEPTAHYDRATE 40 MG/ML
2 INJECTION, SOLUTION INTRAVENOUS ONCE
Status: COMPLETED | OUTPATIENT
Start: 2021-05-03 | End: 2021-05-03

## 2021-05-03 RX ORDER — IBUPROFEN 600 MG/1
600 TABLET ORAL EVERY 6 HOURS PRN
Status: DISCONTINUED | OUTPATIENT
Start: 2021-05-03 | End: 2021-05-04 | Stop reason: HOSPADM

## 2021-05-03 RX ORDER — CALCIUM CARBONATE 200(500)MG
1000 TABLET,CHEWABLE ORAL DAILY PRN
Status: DISCONTINUED | OUTPATIENT
Start: 2021-05-03 | End: 2021-05-04 | Stop reason: HOSPADM

## 2021-05-03 RX ORDER — ONDANSETRON 2 MG/ML
4 INJECTION INTRAMUSCULAR; INTRAVENOUS EVERY 6 HOURS PRN
Status: DISCONTINUED | OUTPATIENT
Start: 2021-05-03 | End: 2021-05-04 | Stop reason: HOSPADM

## 2021-05-03 RX ORDER — ACETAMINOPHEN 325 MG/1
650 TABLET ORAL EVERY 6 HOURS PRN
Status: DISCONTINUED | OUTPATIENT
Start: 2021-05-03 | End: 2021-05-04 | Stop reason: HOSPADM

## 2021-05-03 RX ORDER — DOCUSATE SODIUM 100 MG/1
100 CAPSULE, LIQUID FILLED ORAL 2 TIMES DAILY
Status: DISCONTINUED | OUTPATIENT
Start: 2021-05-03 | End: 2021-05-04 | Stop reason: HOSPADM

## 2021-05-03 RX ORDER — SODIUM CHLORIDE, SODIUM LACTATE, POTASSIUM CHLORIDE, CALCIUM CHLORIDE 600; 310; 30; 20 MG/100ML; MG/100ML; MG/100ML; MG/100ML
25 INJECTION, SOLUTION INTRAVENOUS CONTINUOUS
Status: DISCONTINUED | OUTPATIENT
Start: 2021-05-03 | End: 2021-05-04 | Stop reason: HOSPADM

## 2021-05-03 RX ORDER — POLYETHYLENE GLYCOL 3350 17 G/17G
17 POWDER, FOR SOLUTION ORAL DAILY
Status: DISCONTINUED | OUTPATIENT
Start: 2021-05-04 | End: 2021-05-04 | Stop reason: HOSPADM

## 2021-05-03 RX ORDER — DIAPER,BRIEF,INFANT-TODD,DISP
EACH MISCELLANEOUS 4 TIMES DAILY PRN
Status: DISCONTINUED | OUTPATIENT
Start: 2021-05-03 | End: 2021-05-04 | Stop reason: HOSPADM

## 2021-05-03 RX ADMIN — HYDROCORTISONE: 1 CREAM TOPICAL at 21:38

## 2021-05-03 RX ADMIN — BENZOCAINE AND LEVOMENTHOL: 200; 5 SPRAY TOPICAL at 21:38

## 2021-05-03 RX ADMIN — WITCH HAZEL 1 PAD: 500 SOLUTION RECTAL; TOPICAL at 21:38

## 2021-05-03 RX ADMIN — SODIUM CHLORIDE, SODIUM LACTATE, POTASSIUM CHLORIDE, AND CALCIUM CHLORIDE 25 ML/HR: .6; .31; .03; .02 INJECTION, SOLUTION INTRAVENOUS at 21:30

## 2021-05-03 RX ADMIN — DOCUSATE SODIUM 100 MG: 100 CAPSULE, LIQUID FILLED ORAL at 21:38

## 2021-05-03 RX ADMIN — MAGNESIUM SULFATE HEPTAHYDRATE 4 G: 40 INJECTION, SOLUTION INTRAVENOUS at 21:33

## 2021-05-03 RX ADMIN — LABETALOL HYDROCHLORIDE 200 MG: 200 TABLET, FILM COATED ORAL at 22:35

## 2021-05-03 RX ADMIN — MAGNESIUM SULFATE HEPTAHYDRATE 2 G: 40 INJECTION, SOLUTION INTRAVENOUS at 21:28

## 2021-05-03 RX ADMIN — ACETAMINOPHEN 650 MG: 325 TABLET ORAL at 21:56

## 2021-05-03 RX ADMIN — MAGNESIUM SULFATE HEPTAHYDRATE 2 G/HR: 40 INJECTION, SOLUTION INTRAVENOUS at 21:58

## 2021-05-03 NOTE — TELEPHONE ENCOUNTER
Pt called back and does think that one of them might be purple in color- no appts for today left so pt placed on schedule for tomorrow  Is this ok?

## 2021-05-03 NOTE — Clinical Note
Case was discussed with ob attending and the patient's admission status was agreed to be Admission Status: inpatient status to the service of Dr Sherri Lott

## 2021-05-03 NOTE — ED PROVIDER NOTES
History  Chief Complaint   Patient presents with    Hemorrhoids     Pt reports hemorrhoids worsening over past couple of days  Took rx from ob  and not working at this time  Pt 30 yo f with significant pmh IDDM here today c/o hemorrhoids x 3 days  Pt gave birth over the weekend and since has had increased pain with hemorrhoids  She called ob who rx proctofoam  Pt tried it once and it started burning  She called ob again who recommended she come into the ER to be evaluated  She was also advised to try sitz baths which she has been unable to do so far   I discussed with ob resident who will be down to see the pt due to her high blood pressure  Urine pending      History provided by:  Patient   used: No    Hypertension  Severity:  Moderate  Onset quality:  Sudden  Duration:  1 day  Timing:  Constant  Progression:  Unchanged  Chronicity:  New  Context comment:  Gave birth this weekend  Relieved by:  None tried  Worsened by:  Nothing  Ineffective treatments:  None tried  Associated symptoms: no abdominal pain, no chest pain, no confusion, no dizziness, no ear pain, no fever, no headaches, no nausea, no palpitations, no shortness of breath, not vomiting and no weakness        Prior to Admission Medications   Prescriptions Last Dose Informant Patient Reported? Taking? Continuous Blood Gluc  (Dexcom G6 ) DENIZ  Self No No   Sig: Use as directed  Continuous Blood Gluc Sensor (Dexcom G6 Sensor) MISC  Self No No   Si box=1 month supply or 3 sensors, use 1 sensor every 10 days  Continuous Blood Gluc Transmit (Dexcom G6 Transmitter) MISC  Self No No   Sig: Transmitter change every 90 days     Lancets MISC  Self Yes No   Sig: by Does not apply route   Prenatal Vit-Fe Fumarate-FA (PRENATAL VITAMIN PO)  Self Yes No   Sig: Take 1 tablet by mouth daily   acetaminophen (TYLENOL) 325 mg tablet   No No   Sig: Take 2 tablets (650 mg total) by mouth every 6 (six) hours as needed for mild pain or headaches   acetone, urine, test strip  Self No No   Si strip by Does not apply route as needed for high blood sugar   benzocaine-menthol-lanolin-aloe (DERMOPLAST) 20-0 5 % topical spray   No No   Sig: Apply 1 application topically 4 (four) times a day as needed for irritation   docusate sodium (COLACE) 100 mg capsule  Self No No   Sig: Take 1 capsule (100 mg total) by mouth 2 (two) times a day   glucose blood test strip  Self Yes No   Si each by Other route 6 (six) times a day 8 to 12 times a day  Use as instructed   hydrocortisone-pramoxine (PROCTOFOAM-HC) 1-1 % FOAM rectal foam   No No   Sig: Insert 1 applicator into the rectum 2 (two) times a day   ibuprofen (MOTRIN) 200 mg tablet   No No   Sig: Take 3 tablets (600 mg total) by mouth every 6 (six) hours as needed for moderate pain   insulin glargine (LANTUS) 100 units/mL subcutaneous injection  Self Yes No   Sig: Inject 10 Units under the skin   insulin lispro (HumaLOG) 100 units/mL injection  Self No No   Sig: Use up to 200 units via insulin pump daily     insulin lispro (HumaLOG) 100 units/mL injection  Self Yes No   Sig: INJECT UP  UNITS VIA INSULIN PUMP DAILY e10 [id number]      Facility-Administered Medications: None       Past Medical History:   Diagnosis Date    Diabetes ([facility] [address] )     type 1    Diabetes mellitus ([facility] [address] )        Past Surgical History:   Procedure Laterality Date    [facility] Enter LIG Right 2/3/2021    Procedure: RELEASE CARPAL TUNNEL ENDOSCOPIC;  Surgeon: Darcy Diallo MD;  [facility];  Service: Orthopedics    [facility] W[facility] Enter LIG Left 2021    Procedure: RELEASE CARPAL TUNNEL ENDOSCOPIC;  Surgeon: Darcy Diallo MD;  [facility];  Service: Orthopedics    WISDOM TOOTH EXTRACTION         Family History   Problem Relation Age of Onset    Hypertension Father     Other Father         bladder cancer    No Known Problems Sister     No Known Problems Brother     No Known Problems Son     Diabetes type I Maternal Grandfather     Breast cancer Other     Ovarian cancer Neg Hx      I have reviewed and agree with the history as documented  E-Cigarette/Vaping    E-Cigarette Use Never User      E-Cigarette/Vaping Substances    Nicotine No     THC No     CBD No     Flavoring No     Other No     Unknown No      Social History     Tobacco Use    Smoking status: Never Smoker    Smokeless tobacco: Never Used   Substance Use Topics    Alcohol use: Not Currently    Drug use: Never       Review of Systems   Constitutional: Negative for chills and fever  HENT: Negative for congestion, ear pain and sore throat  Respiratory: Negative for cough, shortness of breath and wheezing  Cardiovascular: Negative for chest pain and palpitations  Gastrointestinal: Negative for abdominal pain, diarrhea, nausea and vomiting  Musculoskeletal: Negative for myalgias  Skin: Negative for color change and rash  Neurological: Negative for dizziness, weakness, light-headedness and headaches  Psychiatric/Behavioral: Negative for behavioral problems and confusion  The patient is not hyperactive  All other systems reviewed and are negative  Physical Exam  Physical Exam  Vitals signs and nursing note reviewed  Constitutional:       General: She is not in acute distress  Appearance: She is well-developed  HENT:      Head: Atraumatic  Neck:      Musculoskeletal: Neck supple  Cardiovascular:      Rate and Rhythm: Normal rate  Pulmonary:      Effort: Pulmonary effort is normal  No respiratory distress  Genitourinary:     Comments: multiple hemorrhoids, none thrombosed  Skin:     General: Skin is warm and dry  Coloration: Skin is not pale  Findings: No erythema or rash  Neurological:      Mental Status: She is alert and oriented to person, place, and time     Psychiatric:         Behavior: Behavior normal          Vital Signs  ED Triage Vitals   Temperature Pulse Respirations Blood Pressure SpO2   05/03/21 1750 05/03/21 1749 05/03/21 1749 05/03/21 1749 05/03/21 1749   98 1 °F (36 7 °C) (!) 109 20 (!) 227/121 98 %      Temp Source Heart Rate Source Patient Position - Orthostatic VS BP Location FiO2 (%)   05/03/21 1750 05/03/21 1749 05/03/21 1749 05/03/21 1749 --   Oral Monitor Standing Right arm       Pain Score       05/03/21 1748       Worst Possible Pain           Vitals:    05/03/21 2045 05/03/21 2129 05/03/21 2130 05/03/21 2202   BP: 144/67 138/69  130/66   Pulse: 80 77 81 84   Patient Position - Orthostatic VS: Sitting            Visual Acuity      ED Medications  Medications   lactated ringers infusion (25 mL/hr Intravenous New Bag 5/3/21 2130)   magnesium sulfate 20 g/500 mL infusion (premix) (2 g/hr Intravenous New Bag 5/3/21 2158)   Pramox-PE-Glycerin-Petrolatum (PREPARATION H MAX) 1-0 25-14 4-15 % rectal cream 1 application (has no administration in time range)   benzocaine-menthol-lanolin-aloe (DERMOPLAST) 20-0 5 % topical spray 1 application (has no administration in time range)   labetalol (NORMODYNE) tablet 200 mg (has no administration in time range)   calcium carbonate (TUMS) chewable tablet 1,000 mg (has no administration in time range)   ondansetron (ZOFRAN) injection 4 mg (has no administration in time range)   simethicone (MYLICON) chewable tablet 80 mg (has no administration in time range)   acetaminophen (TYLENOL) tablet 650 mg (has no administration in time range)   ibuprofen (MOTRIN) tablet 600 mg (has no administration in time range)   docusate sodium (COLACE) capsule 100 mg (100 mg Oral Given 5/3/21 2138)   polyethylene glycol (MIRALAX) packet 17 g (has no administration in time range)   hydrocortisone 1 % cream ( Topical Given 5/3/21 2138)   benzocaine-menthol-lanolin-aloe (DERMOPLAST) 20-0 5 % topical spray ( Topical Given 5/3/21 2138)   witch hazel-glycerin (TUCKS) topical pad 1 pad (1 pad Topical Given 5/3/21 2138) ibuprofen (MOTRIN) tablet 600 mg (has no administration in time range)   acetaminophen (TYLENOL) tablet 650 mg (650 mg Oral Given 5/3/21 2156)   magnesium sulfate 4 g/100 mL IVPB (premix) 4 g (0 g Intravenous Stopped 5/3/21 2157)     Followed by   magnesium sulfate 2 g/50 mL IVPB (premix) 2 g (0 g Intravenous Stopped 5/3/21 2133)       Diagnostic Studies  Results Reviewed     Procedure Component Value Units Date/Time    Comprehensive metabolic panel [[id number]]  (Abnormal) Collected: 05/03/21 1958    Lab Status: Final result Specimen: Blood from Hand, Right Updated: 05/03/21 2036     Sodium 141 mmol/L      Potassium 3 5 mmol/L      Chloride 107 mmol/L      CO2 26 mmol/L      ANION GAP 8 mmol/L      BUN 13 mg/dL      Creatinine 0 81 mg/dL      Glucose 48 mg/dL      Calcium 9 0 mg/dL      Corrected Calcium 10 0 mg/dL      AST 19 U/L      ALT 20 U/L      Alkaline Phosphatase 153 U/L      Total Protein 7 0 g/dL      Albumin 2 8 g/dL      Total Bilirubin 0 14 mg/dL      eGFR 99 ml/min/1 73sq m     Narrative:      [place] guidelines for Chronic Kidney Disease (CKD):     Stage 1 with normal or high GFR (GFR > 90 mL/min/1 73 square meters)    Stage 2 Mild CKD (GFR = 60-89 mL/min/1 73 square meters)    Stage 3A Moderate CKD (GFR = 45-59 mL/min/1 73 square meters)    Stage 3B Moderate CKD (GFR = 30-44 mL/min/1 73 square meters)    Stage 4 Severe CKD (GFR = 15-29 mL/min/1 73 square meters)    Stage 5 End Stage CKD (GFR <15 mL/min/1 73 square meters)  Note: GFR calculation is accurate only with a steady state creatinine    CBC and differential [[id number]]  (Abnormal) Collected: 05/03/21 1958    Lab Status: Final result Specimen: Blood from Hand, Right Updated: 05/03/21 2006     WBC 11 71 Thousand/uL      RBC 3 89 Million/uL      Hemoglobin 10 1 g/dL      Hematocrit 32 1 %      MCV 83 fL      MCH 26 0 pg      MCHC 31 5 g/dL      RDW 14 5 %      MPV 10 8 fL      Platelets 170 Thousands/uL nRBC 0 /100 WBCs      Neutrophils Relative 71 %      Immat GRANS % 1 %      Lymphocytes Relative 17 %      Monocytes Relative 7 %      Eosinophils Relative 4 %      Basophils Relative 0 %      Neutrophils Absolute 8 34 Thousands/µL      Immature Grans Absolute 0 10 Thousand/uL      Lymphocytes Absolute 1 95 Thousands/µL      Monocytes Absolute 0 78 Thousand/µL      Eosinophils Absolute 0 49 Thousand/µL      Basophils Absolute 0 05 Thousands/µL     CBC [[id number]]     Lab Status: No result Specimen: Blood     Comprehensive metabolic panel [[id number]]     Lab Status: No result Specimen: Blood     Urine Microscopic [[id number]]  (Abnormal) Collected: 05/03/21 1832    Lab Status: Final result Specimen: Urine, Clean Catch Updated: 05/03/21 1927     RBC, UA Innumerable /hpf      WBC, UA 4-10 /hpf      Epithelial Cells Occasional /hpf      Bacteria, UA Occasional /hpf     Urine Macroscopic, POC [[id number]]  (Abnormal) Collected: 05/03/21 1832    Lab Status: Final result Specimen: Urine Updated: 05/03/21 1833     Color, UA Bloody     Clarity, UA Clear     pH, UA 5 0     Leukocytes, UA Trace     Nitrite, UA Negative     Protein, UA Negative mg/dl      Glucose, UA Negative mg/dl      Ketones, UA Negative mg/dl      Urobilinogen, UA 0 2 E U /dl      Bilirubin, UA Negative     Blood, UA Large     Specific [place], UA 1 015    Narrative:      [facility] RESULT                 No orders to display              Procedures  Procedures         ED Course                                           MDM    Disposition  Final diagnoses:   Hemorrhoids, unspecified hemorrhoid type   Antepartum mild preeclampsia     Time reflects when diagnosis was documented in both MDM as applicable and the Disposition within this note     Time User Action Codes Description Comment    5/3/2021  6:20 PM Samuel Do [K64 9] Hemorrhoids, unspecified hemorrhoid type     5/3/2021  7:16 PM Samuel Do [O14 00] Antepartum mild preeclampsia       ED Disposition     ED Disposition Condition Date/Time Comment    Send to L&D After Provider Clarissa  Mon May 3, 2021  7:20 PM Case was discussed with ob attending and the patient's admission status was agreed to be Admission Status: inpatient status to the service of Dr Nya San  Follow-up Information     Follow up With Specialties Details Why Contact Info Additional Information    Aditya Lopez MD Maternal and Fetal Medicine, Obstetrics, Obstetrics and Gynecology, Gynecology In 1 week  [address]  [address] [facility]  [phone]       [facility] Gastroenterology In 1 week  [address] [facility]  [place] [address] [address] [phone] [facility] [place], [address] [facility], [place] [address], [facility], [address] [facility], [address] [phone]          Current Discharge Medication List      START taking these medications    Details   nitroglycerin (RECTIV) 0 4 % Insert into the rectum every 12 (twelve) hours  Qty: 30 g, Refills: 0    Associated Diagnoses: Hemorrhoids, unspecified hemorrhoid type         CONTINUE these medications which have NOT CHANGED    Details   acetaminophen (TYLENOL) 325 mg tablet Take 2 tablets (650 mg total) by mouth every 6 (six) hours as needed for mild pain or headaches  Refills: 0    Associated Diagnoses:  (spontaneous vaginal delivery)      acetone, urine, test strip 1 strip by Does not apply route as needed for high blood sugar  Qty: 25 each, Refills: 3    Associated Diagnoses: Pre-existing type 1 diabetes mellitus with hyperglycemia during pregnancy in first trimester ([facility] [address] ); Insulin pump in place      benzocaine-menthol-lanolin-aloe (DERMOPLAST) 20-0 5 % topical spray Apply 1 application topically 4 (four) times a day as needed for irritation  Refills: 0    Associated Diagnoses:  (spontaneous vaginal delivery)      Continuous Blood Gluc  (Dexcom G6 ) DENIZ Use as directed    Qty: 1 Device, Refills: 0    Associated Diagnoses: Hypoglycemia due to type 1 diabetes mellitus ([address] ); Pre-existing type 1 diabetes mellitus in pregnancy in first trimester; 10 weeks gestation of pregnancy; Obesity affecting pregnancy in first trimester      Continuous Blood Gluc Sensor (Dexcom G6 Sensor) MISC 1 box=1 month supply or 3 sensors, use 1 sensor every 10 days  Qty: 1 each, Refills: 12    Associated Diagnoses: Hypoglycemia due to type 1 diabetes mellitus ([address] ); Pre-existing type 1 diabetes mellitus in pregnancy in first trimester; 10 weeks gestation of pregnancy; Obesity affecting pregnancy in first trimester      Continuous Blood Gluc Transmit (Dexcom G6 Transmitter) MISC Transmitter change every 90 days  Qty: 1 each, Refills: 3    Associated Diagnoses: Hypoglycemia due to type 1 diabetes mellitus ([address] ); Pre-existing type 1 diabetes mellitus in pregnancy in first trimester; 10 weeks gestation of pregnancy; Obesity affecting pregnancy in first trimester      docusate sodium (COLACE) 100 mg capsule Take 1 capsule (100 mg total) by mouth 2 (two) times a day  Qty: 60 capsule, Refills: 6    Associated Diagnoses: Maternal anemia in pregnancy, antepartum, third trimester      glucose blood test strip 1 each by Other route 6 (six) times a day 8 to 12 times a day  Use as instructed      hydrocortisone-pramoxine (PROCTOFOAM-HC) 1-1 % FOAM rectal foam Insert 1 applicator into the rectum 2 (two) times a day  Qty: 10 g, Refills: 1    Associated Diagnoses: External hemorrhoid      ibuprofen (MOTRIN) 200 mg tablet Take 3 tablets (600 mg total) by mouth every 6 (six) hours as needed for moderate pain    Associated Diagnoses:  (spontaneous vaginal delivery)      insulin glargine (LANTUS) 100 units/mL subcutaneous injection Inject 10 Units under the skin      !! insulin lispro (HumaLOG) 100 units/mL injection Use up to 200 units via insulin pump daily    Qty: 60 mL, Refills: 0    Associated Diagnoses: Pre-existing type 1 diabetes mellitus with hyperglycemia during pregnancy in first trimester [facility]); Insulin pump in place      !! insulin lispro (HumaLOG) 100 units/mL injection INJECT UP  UNITS VIA INSULIN PUMP DAILY e10 65      Lancets MISC by Does not apply route      Prenatal Vit-Fe Fumarate-FA (PRENATAL VITAMIN PO) Take 1 tablet by mouth daily       ! ! - Potential duplicate medications found  Please discuss with provider  No discharge procedures on file      PDMP Review       Value Time User    PDMP Reviewed  Yes 2/23/2021  7:46 AM Janice Espitia PA-C          ED Provider  Electronically Signed by           Samira Ann PA-C  05/03/21 8205

## 2021-05-03 NOTE — TELEPHONE ENCOUNTER
Patient states she is having pain but is able to sit and breastfeed   Advised if with severe pain to go to ED but can keep appt tomorrow

## 2021-05-03 NOTE — TELEPHONE ENCOUNTER
Pt delivered over the weekend and has c/o of severe hemorrhoids that make it hurt for her to even sit down  She states she has tried OTC medications without relief   If you send in a prescription, please send to [facility] in [place]

## 2021-05-03 NOTE — ED NOTES
Pt waiting for  prior to blood work - mom is currently breastfeeding - when complete will get bloodwork and monitor bp      Patricia Kebede RN  05/03/21 1950

## 2021-05-03 NOTE — TELEPHONE ENCOUNTER
Trina Bull was sent, if she is having SEVERE pain, she may need to be seen here/ED to make sure it is not thrombosed (black/purple)

## 2021-05-04 ENCOUNTER — POSTPARTUM VISIT (OUTPATIENT)
Dept: OBGYN CLINIC | Facility: CLINIC | Age: 31
End: 2021-05-04
Payer: COMMERCIAL

## 2021-05-04 VITALS
BODY MASS INDEX: 38.53 KG/M2 | OXYGEN SATURATION: 99 % | TEMPERATURE: 98.7 F | RESPIRATION RATE: 18 BRPM | SYSTOLIC BLOOD PRESSURE: 130 MMHG | HEART RATE: 83 BPM | HEIGHT: 69 IN | DIASTOLIC BLOOD PRESSURE: 67 MMHG | WEIGHT: 260.14 LBS

## 2021-05-04 VITALS
TEMPERATURE: 97.8 F | DIASTOLIC BLOOD PRESSURE: 62 MMHG | HEART RATE: 93 BPM | WEIGHT: 258 LBS | BODY MASS INDEX: 38.21 KG/M2 | HEIGHT: 69 IN | SYSTOLIC BLOOD PRESSURE: 134 MMHG

## 2021-05-04 DIAGNOSIS — K64.4 HEMORRHOIDS, EXTERNAL: ICD-10-CM

## 2021-05-04 PROCEDURE — G0463 HOSPITAL OUTPT CLINIC VISIT: HCPCS

## 2021-05-04 PROCEDURE — 99214 OFFICE O/P EST MOD 30 MIN: CPT

## 2021-05-04 PROCEDURE — 99213 OFFICE O/P EST LOW 20 MIN: CPT | Performed by: OBSTETRICS & GYNECOLOGY

## 2021-05-04 RX ORDER — LABETALOL 200 MG/1
200 TABLET, FILM COATED ORAL 2 TIMES DAILY
Qty: 60 TABLET | Refills: 1 | Status: SHIPPED | OUTPATIENT
Start: 2021-05-04 | End: 2021-05-27

## 2021-05-04 RX ORDER — LABETALOL 200 MG/1
200 TABLET, FILM COATED ORAL EVERY 8 HOURS SCHEDULED
Qty: 90 TABLET | Refills: 0 | Status: SHIPPED | OUTPATIENT
Start: 2021-05-04 | End: 2021-05-04

## 2021-05-04 NOTE — PATIENT INSTRUCTIONS
CALL IF /110  Headache  Blurry vision  Abdominal pain      Preeclampsia During Pregnancy   WHAT YOU NEED TO KNOW:   Preeclampsia is high blood pressure (BP) that usually develops after week 20 of pregnancy  It can also develop days or weeks after delivery  Your blood pressure may be 140/90 or higher  One or both numbers may be high  You may also have protein in your urine or damage to organs such as your kidneys or liver  Chronic hypertension with superimposed preeclampsia is preeclampsia in a woman with a history of hypertension before pregnancy  It can also be preeclampsia that develops before week 20 of pregnancy  Preeclampsia can lead to life-threatening conditions such as a stroke, eclampsia (seizures), or HELLP syndrome (blood cell destruction)  It is important to get screened for high BP during pregnancy  High BP does not always cause symptoms  Symptoms that do develop may be general, such as headaches and swelling that you may think are not serious  DISCHARGE INSTRUCTIONS:   Call your local emergency number (911 in the [address]) if:   · You have a seizure  · You have chest pain  Return to the emergency department if:   · You have severe abdominal pain with or without nausea and vomiting  · You develop a severe headache that does not go away with medicine  · You have blurred or spotted vision that does not go away  · You are bleeding from your vagina  Call your doctor or obstetrician if:   · You have new or increased swelling in your face or hands  · You are urinating little or not at all  · You do not feel your baby's movements as often as usual     · You have questions or concerns about your condition or care  Medicines: You may need any of the following:  · Blood pressure medicine  helps lower your blood pressure and protects your heart, lungs, brain, and kidneys  Take your blood pressure medicine exactly as directed  · Steroid medicine  helps your baby's lungs develop  These may be given if you have to deliver before 37 weeks of pregnancy  · Low doses of aspirin  may be recommended after 12 weeks of pregnancy if you are at high risk for preeclampsia  Aspirin may help prevent preeclampsia or problems that can happen from preeclampsia  Do not take aspirin unless directed by your healthcare provider  · Take your medicine as directed  Contact your healthcare provider if you think your medicine is not helping or if you have side effects  Tell him of her if you are allergic to any medicine  Keep a list of the medicines, vitamins, and herbs you take  Include the amounts, and when and why you take them  Bring the list or the pill bottles to follow-up visits  Carry your medicine list with you in case of an emergency  Manage preeclampsia:  Your BP will need to be checked by healthcare providers 1 to 2 times each week until your baby is born  The following are ways you can help manage high BP during pregnancy:  · Rest as directed  Your healthcare provider may tell you to rest more often if you have mild symptoms of preeclampsia  You may need to be in the hospital if your condition worsens  · Do not drink alcohol or smoke  Alcohol, nicotine, and other chemicals in cigarettes and cigars, can increase your BP  They can also harm your baby  Ask your healthcare provider for information if you currently drink alcohol or smoke and need help to quit  E-cigarettes or smokeless tobacco still contain nicotine  Talk to your healthcare provider before you use these products  · Do kick counts as directed  You may need to keep track of how often your baby moves or kicks over a certain amount of time  Ask your obstetrician how to do kick counts and how often to do them  · Check your weight each day  Weigh yourself every day before breakfast  Weight gain can be a sign of extra fluid in your body  Call your obstetrician if you have gained 2 or more pounds in a week         Follow up with your obstetrician as directed: You will need tests 1 to 2 times a week to check your condition  Tests include blood pressure checks, urine and blood tests, and fetal monitoring  Write down your questions so you remember to ask them during your visits  © Copyright [address] Information is for End User's use only and may not be sold, redistributed or otherwise used for commercial purposes  All illustrations and images included in CareNotes® are the copyrighted property of A D A M , Inc  or [facility] Gareth Logan   The above information is an  only  It is not intended as medical advice for individual conditions or treatments  Talk to your doctor, nurse or pharmacist before following any medical regimen to see if it is safe and effective for you  Your automatic blood pressure cuff   Your blood pressure is a very important piece of information for your doctors to know  This automatic blood pressure cuff will allow you to measure your blood pressure at home  Please take care of this machine and keep it in good condition  For complete instructions on how to measure your blood pressure, please see the user manual  You must read the user manual and follow all safety instructions  Please save the user manual and box   If you received this blood pressure cuff as a donation, we request that you return it after your pregnancy so it may be used by another patient   If you bought this blood pressure cuff or obtained it from your doctor or insurance company, we kindly suggest that you consider donating it after your pregnancy so it may be used by another patient  Please bring your blood pressure cuff to every appointment  We will inspect it and double check that it is working correctly  Measuring your blood pressure  We recommend measuring your blood pressure on the day of your next virtual visit   If your doctor recommends measuring your blood pressure more often, please follow their advice  You should measure your blood pressure in the seated position, after sitting quietly for about 5 minutes  For written instructions on how to measure your blood pressure, please see the user manual   For pregnant women, normal blood pressure should be less than 276 systolic (top number) AND less than 90 diastolic (bottom number)  If your systolic (top number) blood pressure is 140 or more, call you doctor  If your diastolic (bottom number) blood pressure is 90 or more, call your doctor  You only need one abnormal value to call your doctor  Please remember: there are many other reasons you may need to call your doctor  If you are not feeling well, please do not hesitate to call your doctor even if your blood pressure is normal     Recording your measurements  Please record the date, your blood pressure, and your pulse each time you take a measurement

## 2021-05-04 NOTE — PROGRESS NOTES
Patient  left to go home to other child   at bedside alone with patient  Explained to patient reasons why the  could not be here with her without another adult present, patient verbalized understanding  Patient requesting to leave AMA if  is not allowed to stay at bedside without someone because her  could not return  Hospital supervisor gave permission to allow  to stay overnight till 7 AM because of patient needing to breastfeed infant

## 2021-05-04 NOTE — PROGRESS NOTES
Assessment/Plan:     Diagnoses and all orders for this visit:    Postpartum hypertension  -     labetalol (NORMODYNE) 200 mg tablet; Take 1 tablet (200 mg total) by mouth 2 (two) times a day    Hemorrhoids, external  -     Discontinue: NIFEdipine 0 3%-lidocaine 5% rectal ointment; Apply 1 application topically every 4 (four) hours as needed for discomfort or pain Apply a small amount to hemorrhoids  -     Ambulatory referral to Colorectal Surgery; Future  -     NIFEdipine 0 3%-lidocaine 5% rectal ointment; Apply 1 application topically every 4 (four) hours as needed for discomfort or pain Apply a small amount to hemorrhoids        Advised patient to decrease labetalol dosing to 200 mg p o  b i d  She was instructed to take her Blood pressures at least daily and to report severe range blood pressures greater than 160/110  She was also counseled on preeclampsia warning signs and was advised to call if with any headaches, blurry vision or abdominal pain  With regards to her hemorrhoids, she was advised to continue to use hydrocortisone foam for symptomatic relief  She was advised to continue taking stool softeners  She was advised Sitz baths, perineal donut  Discussed potential referral to Colorectal surgery if with no improvement after several weeks (6-8 weeks)  Or if with signs of thrombosed hemorrhoids  on exam, there is no signs of thrombosis  She was also prescribed topical nifedipine with lidocaine ointment  Follow-up in 2 days      Subjective   Patient ID: Jen Cleveland is a 31 y o  female  Patient is here for a follow-up  Chief Complaint   Patient presents with   [employer]     Patient here for BP check and pelvic exam  C/O Hemorrhoids-  23          Patient is here for blood pressure check  Patient was seen in the hospital yesterday for hemorrhoids and was also noted to have severely elevated blood pressures    Patient did not want to stay overnight and opted to continue to take oral antihypertensives and I close outpatient follow-up  Patient was on labetalol out 20 mg t i d  but has only taken her medications since last night and none this morning  Patient denies any headache, blurry vision  Patient reports  Pain  Due to hemorrhoids but she is able to take bowel movements at least twice  Patient denies any rectal bleeding  Patient states she is taking stool softener  Patient also also using Proctofoam that was prescribed yesterday  Menstrual History:  OB History        4    Para   2    Term   2            AB   2    Living   2       SAB   1    TAB   1    Ectopic        Multiple   0    Live Births   2                  Patient's last menstrual period was 2020 (exact date)  Past Medical History:   Diagnosis Date    Diabetes ([facility] )     type 1    Diabetes mellitus ([facility] )        Social History     Tobacco Use    Smoking status: Never Smoker    Smokeless tobacco: Never Used   Substance Use Topics    Alcohol use: Not Currently    Drug use: Never       No Known Allergies      Current Outpatient Medications:     acetaminophen (TYLENOL) 325 mg tablet, Take 2 tablets (650 mg total) by mouth every 6 (six) hours as needed for mild pain or headaches, Disp: , Rfl: 0    acetone, urine, test strip, 1 strip by Does not apply route as needed for high blood sugar, Disp: 25 each, Rfl: 3    benzocaine-menthol-lanolin-aloe (DERMOPLAST) 20-0 5 % topical spray, Apply 1 application topically 4 (four) times a day as needed for irritation, Disp: , Rfl: 0    Continuous Blood Gluc  (Dexcom G6 ) DENIZ, Use as directed , Disp: 1 Device, Rfl: 0    Continuous Blood Gluc Sensor (Dexcom G6 Sensor) MISC, 1 box=1 month supply or 3 sensors, use 1 sensor every 10 days  , Disp: 1 each, Rfl: 12    Continuous Blood Gluc Transmit (Dexcom G6 Transmitter) MISC, Transmitter change every 90 days  , Disp: 1 each, Rfl: 3    docusate sodium (COLACE) 100 mg capsule, Take 1 capsule (100 mg total) by mouth 2 (two) times a day, Disp: 60 capsule, Rfl: 6    glucose blood test strip, 1 each by Other route 6 (six) times a day 8 to 12 times a day  Use as instructed, Disp: , Rfl:     hydrocortisone-pramoxine (PROCTOFOAM-HC) 1-1 % FOAM rectal foam, Insert 1 applicator into the rectum 2 (two) times a day, Disp: 10 g, Rfl: 1    insulin glargine (LANTUS) 100 units/mL subcutaneous injection, Inject 10 Units under the skin, Disp: , Rfl:     insulin lispro (HumaLOG) 100 units/mL injection, Use up to 200 units via insulin pump daily  , Disp: 60 mL, Rfl: 0    insulin lispro (HumaLOG) 100 units/mL injection, INJECT UP  UNITS VIA INSULIN PUMP DAILY [id number], Disp: , Rfl:     Lancets MISC, by Does not apply route, Disp: , Rfl:     nitroglycerin (RECTIV) 0 4 %, Insert into the rectum every 12 (twelve) hours, Disp: 30 g, Rfl: 0    Prenatal Vit-Fe Fumarate-FA (PRENATAL VITAMIN PO), Take 1 tablet by mouth daily, Disp: , Rfl:     ibuprofen (MOTRIN) 200 mg tablet, Take 3 tablets (600 mg total) by mouth every 6 (six) hours as needed for moderate pain (Patient not taking: Reported on 5/4/2021), Disp: , Rfl:     labetalol (NORMODYNE) 200 mg tablet, Take 1 tablet (200 mg total) by mouth 2 (two) times a day, Disp: 60 tablet, Rfl: 1    NIFEdipine 0 3%-lidocaine 5% rectal ointment, Apply 1 application topically every 4 (four) hours as needed for discomfort or pain Apply a small amount to hemorrhoids, Disp: 2 oz, Rfl: 1  No current facility-administered medications for this visit  Review of Systems   Constitutional: Negative  HENT: Negative  Eyes: Negative  Respiratory: Negative  Cardiovascular: Negative  Gastrointestinal: Negative  Endocrine: Negative  Genitourinary:        As noted in HPI   Musculoskeletal: Negative  Skin: Negative  Allergic/Immunologic: Negative  Neurological: Negative  Hematological: Negative  Psychiatric/Behavioral: Negative            BP 134/62 (BP Location: Right arm, Patient Position: Sitting, Cuff Size: Standard)   Pulse 93   Temp 97 8 °F (36 6 °C) (Temporal)   Ht 5' 9" (1 753 m)   Wt 117 kg (258 lb)   LMP 08/03/2020 (Exact Date)   BMI 38 10 kg/m²       Physical Exam  Constitutional:       General: She is not in acute distress  Appearance: Normal appearance  She is well-developed  Genitourinary:      Pelvic exam was performed with patient in the lithotomy position  No vulval lesion, tenderness, ulcerations, Bartholin's cyst or rash noted  No signs of labial injury  No labial fusion  No posterior fourchette tenderness, injury, rash or lesion present  No inguinal adenopathy present in the right or left side  Rectum:      External hemorrhoid present  HENT:      Head: Normocephalic  Cardiovascular:      Rate and Rhythm: Normal rate  Pulmonary:      Effort: Pulmonary effort is normal    Abdominal:      General: There is no distension  Palpations: Abdomen is soft  Tenderness: There is no abdominal tenderness  There is no guarding  Hernia: There is no hernia in the left inguinal area or right inguinal area  Musculoskeletal:         General: No swelling or deformity  Lymphadenopathy:      Lower Body: No right inguinal adenopathy  No left inguinal adenopathy  Neurological:      General: No focal deficit present  Mental Status: She is alert and oriented to person, place, and time  Skin:     General: Skin is warm and dry  Psychiatric:         Mood and Affect: Mood normal          Behavior: Behavior normal    Vitals signs reviewed         Large external hemorrhoid, non thrombosed

## 2021-05-04 NOTE — H&P
H&P Exam - OB GYN  Latricia Bolivar 31 y o  female MRN: [id number]  Unit/Bed#: L&D 328-01 Encounter: [id number]      Assessment/Plan     A/P: 30 yo  s/p  21, presents with postpartum preeclampsia with severe features, based on blood pressure criteria    1) Preeclampsia with severe features  - Magnesium sulfate for seizure prophylaxis  - Labetalol 200 mg TID for long-acting blood pressure control  - Short-acting treatment with IV labetalol if required  - CBC and CMP q6 hours    2) T1DM  - Patient has insulin pump, will monitor own blood glucose    3) Hemorrhoids  - Prescribed nitroglycerin 0 4% in ER  - Stool softeners daily  - hydrocortisone cream as needed    History of Present Illness     HPI:  Latricia Bolivar is a 31 y o  female who initially presented to the ED with severe pain from hemorrhoids, and was noted to have severely elevated BP on admission  She is s/p an  on 23, and her pregnancy was complicated by gestational hypertension  She has a prior pregnancy complicated by preeclampsia  She denies any headaches, changes in vision, shortness of breath, chest pain, RUQ or epigastric pain  She believes her high blood pressures are a result of her hemorrhoid pain and stressful ER visit  The patient's initial blood pressure on admission was 227/121, with a repeat BP 30 minutes later of 162/99, and 167/73 after that  The patient was initially hesitant to stay for blood pressure treatment and magnesium sulfate  We explained the risks of not treating her blood pressures and using magnesium, including seizures and death  The patient requested that we continue to monitor her blood pressures for another 1-2 hours before making a decision about starting magnesium  Her blood pressures over the next hour ranged 140s / 60-70s, as well as another in the 160s, after which the patient agreed to be admitted for monitoring and magnesium sulfate infusion  Oncology History    No history exists         Review of Systems   Constitutional: Negative for chills and fever  HENT: Negative for ear pain and sore throat  Eyes: Negative for pain and visual disturbance  Respiratory: Negative for cough and shortness of breath  Cardiovascular: Negative for chest pain and palpitations  Gastrointestinal: Positive for rectal pain  Negative for abdominal pain and vomiting  Genitourinary: Negative for dysuria and hematuria  Musculoskeletal: Negative for arthralgias and back pain  Skin: Negative for color change and rash  Neurological: Negative for seizures and syncope  All other systems reviewed and are negative        Historical Information   Past Medical History:   Diagnosis Date    Diabetes ([place] [address] ) 1994    type 1    Diabetes mellitus ([facility] )      Past Surgical History:   Procedure Laterality Date    IA WRIST Volney Saltness LIG Right 2/3/2021    Procedure: RELEASE CARPAL TUNNEL ENDOSCOPIC;  Surgeon: Meli Pride MD;  Location: [facility];  Service: Orthopedics    IA WRIST Volney Saltness LIG Left 2/23/2021    Procedure: RELEASE CARPAL TUNNEL ENDOSCOPIC;  Surgeon: Meli Pride MD;  [facility]: [facility];  Service: Orthopedics    WISDOM TOOTH EXTRACTION       OB/GYN History:   Family History   Problem Relation Age of Onset    Hypertension Father     Other Father         bladder cancer    No Known Problems Sister     No Known Problems Brother     No Known Problems Son     Diabetes type I Maternal Grandfather     Breast cancer Other     Ovarian cancer Neg Hx      Social History   Social History     Substance and Sexual Activity   Alcohol Use Not Currently     Social History     Substance and Sexual Activity   Drug Use Never     Social History     Tobacco Use   Smoking Status Never Smoker   Smokeless Tobacco Never Used       Meds/Allergies   Medications Prior to Admission   Medication    acetaminophen (TYLENOL) 325 mg tablet    acetone, urine, test strip    benzocaine-menthol-lanolin-aloe (DERMOPLAST) 20-0 5 % topical spray    Continuous Blood Gluc  (Dexcom G6 ) DENIZ    Continuous Blood Gluc Sensor (Dexcom G6 Sensor) MISC    Continuous Blood Gluc Transmit (Dexcom G6 Transmitter) MISC    docusate sodium (COLACE) 100 mg capsule    glucose blood test strip    hydrocortisone-pramoxine (PROCTOFOAM-HC) 1-1 % FOAM rectal foam    ibuprofen (MOTRIN) 200 mg tablet    insulin glargine (LANTUS) 100 units/mL subcutaneous injection    insulin lispro (HumaLOG) 100 units/mL injection    insulin lispro (HumaLOG) 100 units/mL injection    Lancets MISC    Prenatal Vit-Fe Fumarate-FA (PRENATAL VITAMIN PO)     No Known Allergies    Objective   /67 (BP Location: Right arm)   Pulse 80   Temp 98 1 °F (36 7 °C) (Oral)   Resp 20   Wt 118 kg (260 lb 2 3 oz)   LMP 08/03/2020 (Exact Date)   SpO2 100%   BMI 38 42 kg/m²     No intake or output data in the 24 hours ending 05/03/21 2111    Physical Exam  Constitutional:       Appearance: She is well-developed  HENT:      Head: Normocephalic and atraumatic  Eyes:      Conjunctiva/sclera: Conjunctivae normal    Neck:      Musculoskeletal: Normal range of motion and neck supple  Cardiovascular:      Rate and Rhythm: Normal rate and regular rhythm  Heart sounds: Normal heart sounds  Pulmonary:      Effort: Pulmonary effort is normal  No respiratory distress  Breath sounds: Normal breath sounds  Abdominal:      Palpations: Abdomen is soft  Tenderness: There is no abdominal tenderness  There is no guarding or rebound  Musculoskeletal: Normal range of motion  General: No tenderness  Skin:     General: Skin is warm and dry  Neurological:      Mental Status: She is alert and oriented to person, place, and time  Psychiatric:         Behavior: Behavior normal          Thought Content:  Thought content normal          Lab Results:   Admission on 05/03/2021   Component Date Value    Color, UA 05/03/2021 Bloody     Clarity, UA 05/03/2021 Clear     pH, UA 05/03/2021 5 0     Leukocytes, UA 05/03/2021 Trace*    Nitrite, UA 05/03/2021 Negative     Protein, UA 05/03/2021 Negative     Glucose, UA 05/03/2021 Negative     Ketones, UA 05/03/2021 Negative     Urobilinogen, UA 05/03/2021 0 2     Bilirubin, UA 05/03/2021 Negative     Blood, UA 05/03/2021 Large*    Specific [place], UA 05/03/2021 1 015     RBC, UA 05/03/2021 Innumerable*    WBC, UA 05/03/2021 4-10*    Epithelial Cells 05/03/2021 Occasional     Bacteria, UA 05/03/2021 Occasional     WBC 05/03/2021 11 71*    RBC 05/03/2021 3 89     Hemoglobin 05/03/2021 10 1*    Hematocrit 05/03/2021 32 1*    MCV 05/03/2021 83     MCH 05/03/2021 26 0*    MCHC 05/03/2021 31 5     RDW 05/03/2021 14 5     MPV 05/03/2021 10 8     Platelets 19/68/0995 291     nRBC 05/03/2021 0     Neutrophils Relative 05/03/2021 71     Immat GRANS % 05/03/2021 1     Lymphocytes Relative 05/03/2021 17     Monocytes Relative 05/03/2021 7     Eosinophils Relative 05/03/2021 4     Basophils Relative 05/03/2021 0     Neutrophils Absolute 05/03/2021 8 34*    Immature Grans Absolute 05/03/2021 0 10     Lymphocytes Absolute 05/03/2021 1 95     Monocytes Absolute 05/03/2021 0 78     Eosinophils Absolute 05/03/2021 0 49     Basophils Absolute 05/03/2021 0 05     Sodium 05/03/2021 141     Potassium 05/03/2021 3 5     Chloride 05/03/2021 107     CO2 05/03/2021 26     ANION GAP 05/03/2021 8     BUN 05/03/2021 13     Creatinine 05/03/2021 0 81     Glucose 05/03/2021 48*    Calcium 05/03/2021 9 0     Corrected Calcium 05/03/2021 10 0     AST 05/03/2021 19     ALT 05/03/2021 20     Alkaline Phosphatase 05/03/2021 153*    Total Protein 05/03/2021 7 0     Albumin 05/03/2021 2 8*    Total Bilirubin 05/03/2021 0 14*    eGFR 05/03/2021 99       Imaging: I have personally reviewed pertinent reports      EKG, Pathology, and Other Studies: I have personally reviewed pertinent reports        Code Status: Prior    Taylor Alexandra MD  5/3/2021  9:11 PM

## 2021-05-04 NOTE — DISCHARGE INSTRUCTIONS
Wash your hands immediately after handling the nitroglycerin  This may cause you to have a headache      Preeclampsia and Eclampsia After Delivery   AMBULATORY CARE:   What you need to know about preeclampsia and eclampsia after delivery:  Preeclampsia is high blood pressure (BP) that usually develops after week 20 of pregnancy  It can also develop days to weeks after delivery, even if you did not have high BP during pregnancy  When it develops after delivery, it may also be called postpartum preeclampsia  Preeclampsia causes your BP to be 140/90 or higher  You may also have protein in your urine or damage to organs such as your kidneys or liver  Preeclampsia can lead to life-threatening conditions such as a stroke or HELLP syndrome (blood cell destruction)  It can also lead to eclampsia, a condition that causes seizures from high BP  Warning signs to watch for:   · BP of 130/80 or higher    · Shortness of breath    · Nausea and vomiting, or severe stomach pain    · Severe headaches    · Vision changes, or seeing spots in front of your eyes    · Arm or leg swelling    Call your local emergency number (911 in the [address] [facility]) if:   · You have a seizure  · You have chest pain  · You have severe nausea and vomiting  Call your doctor or obstetrician if:   · You develop a severe headache that does not go away  · You have new or increased vision changes, such as blurred or spotted vision  · You have new or increased swelling in your face or hands  · You have severe abdominal pain with or without nausea and vomiting  · You have shortness of breath  · Your blood pressure is higher than you were told it should be  · You have questions or concerns about your condition or care  Manage or prevent preeclampsia or eclampsia after delivery:   · Go to follow-up appointments as directed    Your obstetrician will check your blood pressure regularly until it is normal  He or she may also order other tests     · Take medicines as directed  Medicines may be given to lower your blood pressure, protect your organs, or prevent seizures  · Rest during the day  Your healthcare provider may tell you to rest more often if you have mild symptoms of preeclampsia  · Do not drink alcohol or smoke  Alcohol, nicotine, and other chemicals in cigarettes and cigars, can increase your BP  They can also harm your baby  Ask your healthcare provider for information if you currently drink alcohol or smoke and need help to quit  E-cigarettes or smokeless tobacco still contain nicotine  Talk to your healthcare provider before you use these products  Follow up with your doctor or obstetrician as directed:  Write down your questions so you remember to ask them during your visits  © Copyright [address] Information is for End User's use only and may not be sold, redistributed or otherwise used for commercial purposes  All illustrations and images included in CareNotes® are the copyrighted property of [employer]  or [facility] Gareth Logan   The above information is an  only  It is not intended as medical advice for individual conditions or treatments  Talk to your doctor, nurse or pharmacist before following any medical regimen to see if it is safe and effective for you

## 2021-05-05 ENCOUNTER — TELEPHONE (OUTPATIENT)
Dept: OBGYN CLINIC | Facility: CLINIC | Age: 31
End: 2021-05-05

## 2021-05-05 PROBLEM — Z3A.38 38 WEEKS GESTATION OF PREGNANCY: Status: RESOLVED | Noted: 2021-04-30 | Resolved: 2021-05-05

## 2021-05-05 PROBLEM — O99.213 OBESITY AFFECTING PREGNANCY IN THIRD TRIMESTER: Status: RESOLVED | Noted: 2020-10-12 | Resolved: 2021-05-05

## 2021-05-05 PROBLEM — O13.3 GESTATIONAL HYPERTENSION, THIRD TRIMESTER: Status: RESOLVED | Noted: 2021-04-30 | Resolved: 2021-05-05

## 2021-05-05 PROBLEM — O09.90 HIGH-RISK PREGNANCY: Status: RESOLVED | Noted: 2020-10-06 | Resolved: 2021-05-05

## 2021-05-05 PROBLEM — O99.820 GBS (GROUP B STREPTOCOCCUS CARRIER), +RV CULTURE, CURRENTLY PREGNANT: Status: RESOLVED | Noted: 2021-04-12 | Resolved: 2021-05-05

## 2021-05-05 RX ORDER — LIDOCAINE 50 MG/G
OINTMENT TOPICAL AS NEEDED
Qty: 35.44 G | Refills: 0 | Status: SHIPPED | OUTPATIENT
Start: 2021-05-05 | End: 2021-06-01

## 2021-05-05 NOTE — TELEPHONE ENCOUNTER
Lidocaine 5% ointment was sent now    Can you call [facility] to see if they have lidocaine with nifedipine ointment available

## 2021-05-05 NOTE — TELEPHONE ENCOUNTER
Spoke with Samantha at [facility] who states they do not carry that and are also unable to compound it

## 2021-05-06 ENCOUNTER — TELEPHONE (OUTPATIENT)
Dept: OBGYN CLINIC | Facility: CLINIC | Age: 31
End: 2021-05-06

## 2021-05-06 ENCOUNTER — POSTPARTUM VISIT (OUTPATIENT)
Dept: OBGYN CLINIC | Facility: CLINIC | Age: 31
End: 2021-05-06
Payer: COMMERCIAL

## 2021-05-06 VITALS
BODY MASS INDEX: 37.44 KG/M2 | SYSTOLIC BLOOD PRESSURE: 130 MMHG | DIASTOLIC BLOOD PRESSURE: 60 MMHG | HEIGHT: 69 IN | HEART RATE: 82 BPM | TEMPERATURE: 97.7 F | WEIGHT: 252.8 LBS

## 2021-05-06 DIAGNOSIS — D27.9 DERMOID CYST OF OVARY, UNSPECIFIED LATERALITY: ICD-10-CM

## 2021-05-06 PROCEDURE — 99213 OFFICE O/P EST LOW 20 MIN: CPT | Performed by: OBSTETRICS & GYNECOLOGY

## 2021-05-06 NOTE — PROGRESS NOTES
Assessment/Plan:     Diagnoses and all orders for this visit:    Postpartum hypertension    Hemorrhoids, postpartum    Preeclampsia in postpartum period    Dermoid cyst of ovary, unspecified laterality  -     US pelvis complete w transvaginal; Future            Advised continuation of Labetalol 200 mg BID  Discussed preeclampsia warning signs  I advised follow-up in 2 weeks for another BP check  Patient with non thrombosed external hemorrhoids  Advised continuation of management at home with Sitz bath, stool softeners, topical medications  NIFEDEPINE 0 3%/LIDOCAINE 5% rectal ointment called into [facility] to apply every 4 hours for pain and discomfort    She has an appt with colorectal surgery in 2 weeks she was advised to keep  Follow-up in 2 weeks for another clinical exam, BP check        Subjective   Patient ID: Herb Carmichael is a 31 y o  female  Patient is here for a follow-up  Chief Complaint   Patient presents with    Follow-up     Patient here for BP and hemmorhoid check, notices improvement with hemmorhoids      She is 5 days postpartum ,  She reports no headache or blurry vision  She has been on LABETALOL 200 mg BID    She reports painful hemorrhoids, burning  No constipation, bleeding once  Using stool softeners and lidocaine ointment  Menstrual History:  OB History        4    Para   2    Term   2            AB   2    Living   2       SAB   1    TAB   1    Ectopic        Multiple   0    Live Births   2                  No LMP recorded (lmp unknown)           Past Medical History:   Diagnosis Date    Diabetes ([facility] [address] )     type 1    Diabetes mellitus ([facility] )        Social History     Tobacco Use    Smoking status: Never Smoker    Smokeless tobacco: Never Used   Substance Use Topics    Alcohol use: Not Currently    Drug use: Never       No Known Allergies      Current Outpatient Medications:     acetaminophen (TYLENOL) 325 mg tablet, Take 2 tablets (650 mg total) by mouth every 6 (six) hours as needed for mild pain or headaches, Disp: , Rfl: 0    acetone, urine, test strip, 1 strip by Does not apply route as needed for high blood sugar, Disp: 25 each, Rfl: 3    benzocaine-menthol-lanolin-aloe (DERMOPLAST) 20-0 5 % topical spray, Apply 1 application topically 4 (four) times a day as needed for irritation, Disp: , Rfl: 0    Continuous Blood Gluc  (Dexcom G6 ) DENIZ, Use as directed , Disp: 1 Device, Rfl: 0    Continuous Blood Gluc Sensor (Dexcom G6 Sensor) MISC, 1 box=1 month supply or 3 sensors, use 1 sensor every 10 days  , Disp: 1 each, Rfl: 12    Continuous Blood Gluc Transmit (Dexcom G6 Transmitter) MISC, Transmitter change every 90 days  , Disp: 1 each, Rfl: 3    docusate sodium (COLACE) 100 mg capsule, Take 1 capsule (100 mg total) by mouth 2 (two) times a day, Disp: 60 capsule, Rfl: 6    glucose blood test strip, 1 each by Other route 6 (six) times a day 8 to 12 times a day  Use as instructed, Disp: , Rfl:     hydrocortisone-pramoxine (PROCTOFOAM-HC) 1-1 % FOAM rectal foam, Insert 1 applicator into the rectum 2 (two) times a day, Disp: 10 g, Rfl: 1    ibuprofen (MOTRIN) 200 mg tablet, Take 3 tablets (600 mg total) by mouth every 6 (six) hours as needed for moderate pain, Disp: , Rfl:     insulin glargine (LANTUS) 100 units/mL subcutaneous injection, Inject 10 Units under the skin, Disp: , Rfl:     insulin lispro (HumaLOG) 100 units/mL injection, Use up to 200 units via insulin pump daily  , Disp: 60 mL, Rfl: 0    insulin lispro (HumaLOG) 100 units/mL injection, INJECT UP  UNITS VIA INSULIN PUMP DAILY [id number], Disp: , Rfl:     labetalol (NORMODYNE) 200 mg tablet, Take 1 tablet (200 mg total) by mouth 2 (two) times a day, Disp: 60 tablet, Rfl: 1    Lancets MISC, by Does not apply route, Disp: , Rfl:     lidocaine (XYLOCAINE) 5 % ointment, Apply topically as needed for mild pain, Disp: 35 44 g, Rfl: 0    NIFEdipine 0 3%-lidocaine 5% rectal ointment, Apply 1 application topically every 4 (four) hours as needed for discomfort or pain Apply a small amount to hemorrhoids, Disp: 2 oz, Rfl: 1    nitroglycerin (RECTIV) 0 4 %, Insert into the rectum every 12 (twelve) hours, Disp: 30 g, Rfl: 0    Prenatal Vit-Fe Fumarate-FA (PRENATAL VITAMIN PO), Take 1 tablet by mouth daily, Disp: , Rfl:       Review of Systems   Constitutional: Negative  HENT: Negative  Eyes: Negative  Respiratory: Negative  Cardiovascular: Negative  Gastrointestinal: Negative  Endocrine: Negative  Genitourinary:        As noted in HPI   Musculoskeletal: Negative  Skin: Negative  Allergic/Immunologic: Negative  Neurological: Negative  Hematological: Negative  Psychiatric/Behavioral: Negative  /60 (BP Location: Right arm, Patient Position: Sitting, Cuff Size: Standard)   Pulse 82   Temp 97 7 °F (36 5 °C) (Temporal)   Ht 5' 9" (1 753 m)   Wt 115 kg (252 lb 12 8 oz)   LMP  (LMP Unknown)   BMI 37 33 kg/m²       Physical Exam  Constitutional:       General: She is not in acute distress  Appearance: Normal appearance  Genitourinary:      Pelvic exam was performed with patient in the lithotomy position  No vulval lesion, tenderness, ulcerations, Bartholin's cyst or rash noted  No signs of labial injury  No labial fusion  No posterior fourchette tenderness, injury, rash or lesion present  No inguinal adenopathy present in the right or left side  Genitourinary Comments: External hemorrhoids, not bleeding or thrombosed   Rectum:      External hemorrhoid present  HENT:      Head: Normocephalic  Cardiovascular:      Rate and Rhythm: Normal rate  Pulmonary:      Effort: Pulmonary effort is normal    Abdominal:      General: There is no distension  Palpations: Abdomen is soft  Tenderness: There is no abdominal tenderness  There is no guarding        Hernia: There is no hernia in the left inguinal area or right inguinal area  Musculoskeletal:         General: No swelling or deformity  Lymphadenopathy:      Lower Body: No right inguinal adenopathy  No left inguinal adenopathy  Neurological:      General: No focal deficit present  Mental Status: She is alert and oriented to person, place, and time  Skin:     General: Skin is warm and dry  Psychiatric:         Mood and Affect: Mood normal          Behavior: Behavior normal    Vitals signs reviewed

## 2021-05-06 NOTE — TELEPHONE ENCOUNTER
Please call pt and let her know I did order a pelvic US for 6-8 weeks postpartum to check her dermoid cyst of her ovary, she should schedule withy Radiology at some point

## 2021-05-07 NOTE — UTILIZATION REVIEW
Initial Clinical Review    Admission: Date/Time/Statement:   Admission Orders (From admission, onward)     Ordered        21  Inpatient Admission  Once                   Orders Placed This Encounter   Procedures    Inpatient Admission     Standing Status:   Standing     Number of Occurrences:   1     Order Specific Question:   Level of Care     Answer:   Med Surg [16]     Order Specific Question:   Estimated length of stay     Answer:   More than 2 Midnights     Order Specific Question:   Certification     Answer:   I certify that inpatient services are medically necessary for this patient for a duration of greater than two midnights  See H&P and MD Progress Notes for additional information about the patient's course of treatment  ED Arrival Information     Expected Arrival Acuity Means of Arrival Escorted By Service Admission Type    - 5/3/2021 17:43 Emergent Walk-In Self OB/GYN Emergency    Arrival Complaint    medical problem        Chief Complaint   Patient presents with    Hemorrhoids     Pt reports hemorrhoids worsening over past couple of days  Took rx from ob dr and not working at this time  Initial Presentation: ON 5/3 30 yo [id number] hx T1DM, s/p  21, presents with postpartum preeclampsia with severe features, based on blood pressure criteria  Initally presented w severe valeria from hemorrhoids & noted for severely elevated BPs  initial blood pressure on admission was 227/121, with a repeat BP 30 minutes later of 162/99, and 167/73   Admit Inpatient Pre eclampsia w severe features- IV MAG, Labetalol 200 mg TID for long-acting blood pressure control, short-acting treatment with IV labetalol if required  Patient monitoroing own glucoses     Date:    Day 2:    s/p  on 21 with pre-gestational Type 1 DM on insulin pump and gestational hypertension who presents to ED for painful hemorrhoids and found to have blood pressures in the severe range    She denies headache, visual disturbances, dyspnea, chest pain, epigastric pain, right upper quadrant pain, or generalized edema  HELLP labs  wnl  We counseled her on the recommendation to stay for initiation of antihypertensive therapy, blood pressure monitoring, and IV magnesium sulfate  She received IV magnesium sulfate bolus then and about 1 hour of infusion when she became overwhelmed about being away from home and opted to leave  At this point she demonstrates decision making capacity   Her blood pressure is currently 134/57, she is alert and oriented, her lungs are clear  She was initiated on labetalol 200 mg q 8 hours which will be sent to pharmacy   OB provider message OB attending office about getting her in for outpatient blood pressure check later this week    ED Triage Vitals   Temperature Pulse Respirations Blood Pressure SpO2   05/03/21 1750 05/03/21 1749 05/03/21 1749 05/03/21 1749 05/03/21 1749   98 1 °F (36 7 °C) (!) 109 20 (!) 227/121 98 %      Temp Source Heart Rate Source Patient Position - Orthostatic VS BP Location FiO2 (%)   05/03/21 1750 05/03/21 1749 05/03/21 1749 05/03/21 1749 --   Oral Monitor Standing Right arm       Pain Score       05/03/21 1748       Worst Possible Pain          Wt Readings from Last 1 Encounters:   05/06/21 115 kg (252 lb 12 8 oz)     Additional Vital Signs:   Date/Time  Temp  Pulse  Resp  BP  MAP (mmHg)  SpO2  O2 Device  Patient Position - Orthostatic VS   05/04/21 0138  98 7 °F (37 1 °C)  83  18  130/67  --  99 %  None (Room air)  Sitting   05/03/21 2343  --  89  --  134/67  --  98 %  --  --   05/03/21 2330  --  --  --  --  --  98 %  --  --   05/03/21 2202  --  84  --  130/66  --  --  --  --   05/03/21 2135  99 °F (37 2 °C)  --  --  --  --  --  --  --   05/03/21 2130  --  81  --  --  --  98 %  --  --   05/03/21 2129  --  77  --  138/69  --  --  --  --   05/03/21 2045  --  80  20  144/67  96  100 %  None (Room air)  Sitting   05/03/21 2030  --  --  --  142/76  98  --  --  --   05/03/21 2008 --  --  --  140/69  --  --  --  --   05/03/21 1952  --  --  --  141/72  --  --  --  --   05/03/21 1920  --  76  20  128/75  95  100 %  None (Room air)  Sitting   05/03/21 1847  --  --  --  167/73  --  --  --  --   05/03/21 1824  --  --  --  162/99  --  --  --  --   05/03/21 1750  98 1 °F (36 7 °C)  --  --  --  --  --  --  --   05/03/21 1749  --  109Abnormal   20  227/121Abnormal   --  98 %  None (Room air)  Standing      Weights (last 14 days) before discharge    Date/Time  Weight  Weight Method  Height   05/03/21 2135  118 kg (260 lb 2 3 oz)  --  5' 9" (1 753 m)   05/03/21 1748  118 kg (260 lb 2 3 oz)  Standing scale  --       Pertinent Labs/Diagnostic Test Results:       Results from last 7 days   Lab Units 05/03/21 1958 04/30/21  1259   WBC Thousand/uL 11 71* 12 17*   HEMOGLOBIN g/dL 10 1* 10 7*   HEMATOCRIT % 32 1* 33 1*   PLATELETS Thousands/uL 291 284   NEUTROS ABS Thousands/µL 8 34* 9 78*         Results from last 7 days   Lab Units 05/03/21 1958 04/30/21  1258   SODIUM mmol/L 141 137   POTASSIUM mmol/L 3 5 4 4   CHLORIDE mmol/L 107 105   CO2 mmol/L 26 23   ANION GAP mmol/L 8 9   BUN mg/dL 13 10   CREATININE mg/dL 0 81 0 62   EGFR ml/min/1 73sq m 99 123   CALCIUM mg/dL 9 0 8 9     Results from last 7 days   Lab Units 05/03/21 1958 04/30/21  1258   AST U/L 19 35   ALT U/L 20 22   ALK PHOS U/L 153* 194*   TOTAL PROTEIN g/dL 7 0 7 1   ALBUMIN g/dL 2 8* 2 7*   TOTAL BILIRUBIN mg/dL 0 14* 0 32     Results from last 7 days   Lab Units 05/01/21  1910 05/01/21  1002 05/01/21  0829 05/01/21  0728 05/01/21  7859 05/01/21  0450 05/01/21  0306 05/01/21  0202 05/01/21  0008 04/30/21  2306 04/30/21  2138 04/30/21 2028   POC GLUCOSE mg/dl 190* 52* 82 73 45* 83 136 169* 108 82 76 52*     Results from last 7 days   Lab Units 05/03/21  1958 04/30/21  1258   GLUCOSE RANDOM mg/dL 48* 77               Results from last 7 days   Lab Units 05/03/21  1832 04/30/21  1259   CLARITY UA  Clear  --    COLOR UA  Bloody  --    SPEC GRAV UA  1 015  --    PH UA  5 0  --    GLUCOSE UA mg/dl Negative  --    KETONES UA mg/dl Negative  --    BLOOD UA  Large*  --    PROTEIN UA mg/dl Negative  --    NITRITE UA  Negative  --    BILIRUBIN UA  Negative  --    UROBILINOGEN UA E U /dl 0 2  --    LEUKOCYTES UA  Trace*  --    WBC UA /hpf 4-10*  --    RBC UA /hpf Innumerable*  --    BACTERIA UA /hpf Occasional  --    EPITHELIAL CELLS WET PREP /hpf Occasional  --    CREATININE UR mg/dL  --  69 9   PROTEIN UR mg/dL  --  14   PROT/CREAT RATIO UR   --  0 20*         ED Treatment:   Medication Administration from 05/03/2021 1743 to 05/03/2021 2106       Date/Time Order Dose Route Action     05/03/2021 1900 magnesium sulfate 20 g/500 mL infusion (premix) 0 g/hr Intravenous Hold        Past Medical History:   Diagnosis Date    Diabetes ([facility] [address] ) 1994    type 1    Diabetes mellitus ([facility] )      Present on Admission:  **None**      Admitting Diagnosis: Antepartum mild preeclampsia [O14 00]  Hemorrhoids [K64 9]  Hemorrhoids, unspecified hemorrhoid type [K64 9]  Age/Sex: 31 y o  female  Admission Orders:  Monitor BPs, vitals pulse oximetry  IV MAG  Scheduled Medications:  labetalol (NORMODYNE) tablet 200 mg 5/3 x1 @2235  Continuous IV Infusions:  magnesium sulfate 2 g/50 mL IVPB (premix) 2 g IV x1 5/3 2128  magnesium sulfate 4 g/100 mL IVPB (premix) 4 g  IV x1 2133    PRN Meds:    Network Utilization Review Department  ATTENTION: Please call with any questions or concerns to [phone] and carefully listen to the prompts so that you are directed to the right person  All voicemails are confidential   Krystal Mejia all requests for admission clinical reviews, approved or denied determinations and any other requests to dedicated fax number below belonging to the campus where the patient is receiving treatment   List of dedicated fax numbers for the Facilities:  [address] [place] [address] DENIALS (Administrative/Medical Necessity) [phone]   [address] [place] [address] (Maternity/NICU/Pediatrics) [address] [facility] [facility] [address] [facility] [address] [place] [address] [address] [place] [address] [address] [facility] [address] [facility] [phone]

## 2021-05-07 NOTE — UTILIZATION REVIEW
Inpatient Admission Authorization Request   Notification of Maternity/Delivery &  Birth Information for Admission   SERVICING FACILITY:   [address] [place] [address], [facility], [facility]  Tax ID: [id number]  NPI: [id number]  Place of Service: Inpatient [address] [facility]  [address]  Place of Service Code: 24     ATTENDING PROVIDER:  Attending Name and NPI#: Raquel Beck [[id number]]  Address: [address] [place] [address], [place] [address] [facility]  Phone: [phone]     UTILIZATION REVIEW CONTACT:  [facility] Utilization   Network Utilization Review Department  Phone: [phone]  Fax [phone]  Email: [email or web address]     PHYSICIAN ADVISORY SERVICES:  FOR RYLY-LF-WWPE REVIEW - MEDICAL NECESSITY DENIAL  Phone: [phone]  Fax: [phone]  Email: [email or web address]     TYPE OF REQUEST:  Inpatient Status     ADMISSION INFORMATION:  ADMISSION DATE/TIME: 5/3/21 1923  PATIENT DIAGNOSIS CODE/DESCRIPTION:  Antepartum mild preeclampsia [O14 00]  Hemorrhoids [K64 9]  Hemorrhoids, unspecified hemorrhoid type [K64 9] The primary encounter diagnosis was Hemorrhoids, unspecified hemorrhoid type  Diagnoses of Antepartum mild preeclampsia and Preeclampsia in postpartum period were also pertinent to this visit  1  Hemorrhoids, unspecified hemorrhoid type    2  Antepartum mild preeclampsia    3   Preeclampsia in postpartum period      DISCHARGE DATE/TIME: 2021  2:15 AM  DISCHARGE DISPOSITION (IF DISCHARGED): Home/Self Care     MOTHER AND  INFORMATION:  Mother: Raymon Salinas 1992   Delivering clinician: Angelo Cheema   OB History as of 2021        4    Para   2    Term   2            AB   2    Living   2       SAB   1    TAB   1    Ectopic        Multiple   0    Live Births   2               Courtenay Name & MRN:   Information for the patient's :  Sujatha Umaña [[id number]]  Delivery Information:  Sex: male  Delivered 2021 9:32 AM by Vaginal, Spontaneous; Gestational Age: 44w7d    Eads Measurements:  Weight: 7 lb 12 oz (3515 g); Height: 19 5"    APGAR 1 minute 5 minutes 10 minutes   Totals: 9 9       Birth Information: 31 y o  female MRN: [id number] Unit/Bed#: L&D 328-01 Estimated Date of Delivery: 5/10/21  Birthweight: No birth weight on file  Gestational Age: <None> Delivery Type: Vaginal, Spontaneous          APGARS  One minute Five minutes Ten minutes   Totals:               IMPORTANT INFORMATION:  Please contact the [facility] directly with any questions or concerns regarding this request  Department voicemails are confidential     Send requests for admission clinical reviews, concurrent reviews, approvals, and administrative denials due to lack of clinical to fax [phone]

## 2021-05-09 LAB — PLACENTA IN STORAGE: NORMAL

## 2021-05-09 NOTE — H&P
H&P Exam - [address] [place] [address] 31 y o  female MRN: [id number]  Unit/Bed#: L&D 311-01 Encounter: [id number]        Please see note writted on 2021 by Dr Giselle Gomez filed as a Progress Note as patient's H and P    >2 Midnights    INPATIENT     Chief Complaint: Induction of labor for gestational hypertension    HPI:  Raymon Salinas is a 31 y o   female with an TRI of 5/10/2021, by Last Menstrual Period at 38w5d weeks gestation who is being admitted for induction due to gestational HTN  Her current obstetrical history is significant for Type 1 DM, new diagnosis of GHTN, obesity  Contractions: None  Leakage of fluid: None  Bleeding: None  Fetal movement: present  Pregnancy complications: gestational HTN and class   DM B  Pregnancy Problems (from 10/07/20 to 21)     Problem Noted Resolved    Preeclampsia in postpartum period 5/3/2021 by Cornelius Aase, MD No    Maternal anemia, antepartum 3/22/2021 by MARCO Goodwin No    Carpal tunnel syndrome, bilateral 10/26/2020 by Angelo Cheema MD No    Insulin pump titration 10/20/2020 by Felicia Chua No    BMI 39 0-39 9,adult 10/20/2020 by MARCO Chua No    Pre-existing type 1 diabetes mellitus during pregnancy in third trimester 10/12/2020 by MARCO Chua No    Overview Addendum 10/12/2020  9:31 AM by MARCO Chua     Medtronic 530G insulin pump basal settings: MN@ 1 15 units/hour; 3 AM@ 1 20 units/hour; 6 AM @1 10 units/hour; 9 AM @ 2 10 units/hour; 12:30 PM 1 40 units/hour; 5 PM@ 1 75 units/hour; 10 PM@ 1 15 units/hour  Bolus settings: ICR MN @ 9; 9:30 AM@ 9 5; 5 PM@ 9 5; ISF 55; BG target 100 and active insulin 3 hours            Hypoglycemia due to type 1 diabetes mellitus ([address] ) 10/12/2020 by MARCO Chua No    38 weeks gestation of pregnancy 2021 by Joyce Reza DO 2021 by Angelo Cheema MD    Gestational hypertension, third trimester 2021 by Flakita Alfaro MD 2021 by Angelo Cheema MD GBS (group B Streptococcus carrier), +RV culture, currently pregnant 2021 by Griselda Obregon, MARCOSNP 2021 by Magdy Jay MD    Obesity affecting pregnancy in third trimester 10/12/2020 by MARCO Constantino 2021 by Magyd Jay MD    Overview Signed 10/12/2020 10:30 AM by Henrik Villatoro, [address]     -Reports gaining 80 lbs with last pregnancy  -Goal this pregnancy 11 to 20 lbs or less  High-risk pregnancy 10/6/2020 by Magdy Jay MD 2021 by Magdy Jay MD    Overview Signed 11/10/2020 11:32 AM by MARCO Cooper     Declined Flu Vaccine               Review of Systems   Constitutional: Negative  HENT: Negative  Eyes: Negative  Respiratory: Negative  Cardiovascular: Negative  Gastrointestinal: Negative  Endocrine: Negative  Genitourinary:        As noted in HPI   Musculoskeletal: Negative  Skin: Negative  Allergic/Immunologic: Negative  Neurological: Negative  Hematological: Negative  Psychiatric/Behavioral: Negative          Historical Information   OB History    Para Term  AB Living   4 2 2   2 2   SAB TAB Ectopic Multiple Live Births   1 1   0 2      # Outcome Date GA Lbr Abrahan/2nd Weight Sex Delivery Anes PTL Lv   4 Term 21 38w5d / 00:07 3515 g (7 lb 12 oz) M Vag-Spont EPI N ELROY   3 Term 19 38w0d 05:45 / 02:51 2985 g (6 lb 9 3 oz) M Vag-Spont EPI  ELROY   2 SAB 18 10w3d          1 TAB 2013             Baby complications/comments:   Past Medical History:   Diagnosis Date    Diabetes ([facility] )     type 1    Diabetes mellitus ([facility] [address] )      Past Surgical History:   Procedure Laterality Date    NE WRIST Jerene Sancho LIG Right 2/3/2021    Procedure: RELEASE CARPAL TUNNEL ENDOSCOPIC;  Surgeon: Juan Rosenthal MD;  [facility];  Service: Orthopedics    NE WRIST Jerene Sancho LIG Left 2021    Procedure: RELEASE CARPAL TUNNEL ENDOSCOPIC;  Surgeon: Juan Rosenthal MD;  [facility];  Service: Orthopedics    WISDOM TOOTH EXTRACTION       Social History   Social History     Substance and Sexual Activity   Alcohol Use Not Currently     Social History     Substance and Sexual Activity   Drug Use Never     Social History     Tobacco Use   Smoking Status Never Smoker   Smokeless Tobacco Never Used     Family History: non-contributory    Meds/Allergies   {  No medications prior to admission  No Known Allergies    Vitals: Blood pressure 118/76, pulse 85, temperature 97 6 °F (36 4 °C), temperature source Temporal, resp  rate 18, height 5' 9" (1 753 m), weight 122 kg (270 lb), last menstrual period 08/03/2020, SpO2 98 %, currently breastfeeding  Body mass index is 39 87 kg/m²  Invasive Devices     None                 Physical Exam  Constitutional:       General: She is not in acute distress  Appearance: She is well-developed  HENT:      Head: Normocephalic and atraumatic  Pulmonary:      Effort: Pulmonary effort is normal  No respiratory distress  Abdominal:      Palpations: Abdomen is soft  Skin:     General: Skin is warm and dry  Coloration: Skin is not pale  Findings: No rash  Neurological:      Mental Status: She is alert and oriented to person, place, and time  Psychiatric:         Behavior: Behavior normal          Thought Content: Thought content normal        Vaginal Exam  Discharge: none,   Membranes: intact,   Consistency: soft  Position: mid  Presentation: vertex,   Dilation: 1,   Effacement: 30%,   Station: -3,    Fetal heart rate  moderate  Baseline: 135 bpm    Prenatal Labs: I have personally reviewed pertinent reports    , Blood Type:   Lab Results   Component Value Date/Time    ABO Grouping O 04/30/2021 12:58 PM     , D (Rh type):   Lab Results   Component Value Date/Time    Rh Factor Positive 04/30/2021 12:58 PM     , Antibody Screen: No results found for: ANTIBODYSCR , HCT/HGB:   Lab Results   Component Value Date/Time    Hematocrit 32 1 (L) 2021 07:58 PM    Hemoglobin 10 1 (L) 2021 07:58 PM      , MCV:   Lab Results   Component Value Date/Time    MCV 83 2021 07:58 PM      , Platelets:   Lab Results   Component Value Date/Time    Platelets 271  07:58 PM      , Rubella:   Lab Results   Component Value Date/Time    Rubella IgG Quant 130 3 10/17/2020 11:37 AM        , VDRL/RPR:   Lab Results   Component Value Date/Time    RPR Non-Reactive 2021 12:58 PM      , Urine Culture/Screen:   Lab Results   Component Value Date/Time    Urine Culture 30,000-39,000 cfu/ml  10/17/2020 11:37 AM       , Hep B:   Lab Results   Component Value Date/Time    Hepatitis B Surface Ag Non-reactive 10/17/2020 11:37 AM     , HIV:   Lab Results   Component Value Date/Time    HIV-1/HIV-2 Ab Non-Reactive 10/17/2020 11:37 AM     , Chlamydia:   Lab Results   Component Value Date/Time    External Chlamydia Screen negative 10/14/2020     , Gonorrhea:   Lab Results   Component Value Date/Time    N gonorrhoeae, DNA Probe Negative 10/14/2020 11:26 AM     , Group B Strep:    Lab Results   Component Value Date/Time    Strep Grp B PCR Positive (A) 2021 10:39 AM          Imaging, EKG, Pathology, and Other Studies: I have personally reviewed pertinent reports  ASSESSMENT:  33yo  at 38w4d weeks gestation who is being admitted for induction of labor in setting of gestational hypertension    Pregnancy complicated by:  See above  SVE: 3  FHT: 120  Clinical EFW: 7 5-8 0 ; Vertex confirmed by U/S  GBS status: positive  Rh: positive     PLAN:     - Admit  - CBC, RPR, Blood Type  - Start with cytotec for cervical ripening, possible galindo balloon  - GBS positive status:  PCN for prophylaxis   - Analgesia and/or epidural at patient request  - Anticipate   - Per MFM to continue subcutaneous insulin pump

## 2021-05-10 NOTE — UTILIZATION REVIEW
Inpatient Admission Authorization Request   NOTIFICATION OF INPATIENT ADMISSION/INPATIENT AUTHORIZATION REQUEST   SERVICING FACILITY:   [address] [place] [address], [address] [facility]  Tax ID: [id number]  NPI: [id number]  Place of Service: Inpatient [address] [facility]  [address]  Place of Service Code: 24     ATTENDING PROVIDER:  Attending Name and NPI#: Brooklynn Paige, Law Ramos [[id number]]  Address: [address] [place] [address], [address] [facility]  Phone: [phone]     UTILIZATION REVIEW CONTACT:  [facility] Utilization   Network Utilization Review Department  Phone: [phone]  Fax [phone]  Email: [email or web address]     PHYSICIAN ADVISORY SERVICES:  FOR HFFH-EA-FMRW REVIEW - MEDICAL NECESSITY DENIAL  Phone: [phone]  Fax: [phone]  Email: [email or web address]     TYPE OF REQUEST:  Inpatient Status     ADMISSION INFORMATION:  ADMISSION DATE/TIME: 5/3/21 1923  PATIENT DIAGNOSIS CODE/DESCRIPTION:  Antepartum mild preeclampsia [O14 00]  Hemorrhoids [K64 9]  Hemorrhoids, unspecified hemorrhoid type [K64 9]  DISCHARGE DATE/TIME: 5/4/2021  2:15 AM  DISCHARGE DISPOSITION (IF DISCHARGED): Home/Self Care     IMPORTANT INFORMATION:  Please contact the [facility] directly with any questions or concerns regarding this request  Department voicemails are confidential     Send requests for admission clinical reviews, concurrent reviews, approvals, and administrative denials due to lack of clinical to fax [phone]           Initial Clinical Review    Admission: Date/Time/Statement:   Admission Orders (From admission, onward)     Ordered        05/03/21 2103  Inpatient Admission  Once                   Orders Placed This Encounter   Procedures    Inpatient Admission     Standing Status:   Standing     Number of Occurrences:   1     Order Specific Question:   Level of Care     Answer:   Med Surg [16]     Order Specific Question: Estimated length of stay     Answer:   More than 2 Midnights     Order Specific Question:   Certification     Answer:   I certify that inpatient services are medically necessary for this patient for a duration of greater than two midnights  See H&P and MD Progress Notes for additional information about the patient's course of treatment  ED Arrival Information     Expected Arrival Acuity Means of Arrival Escorted By Service Admission Type    - 5/3/2021 17:43 Emergent Walk-In Self OB/GYN Emergency    Arrival Complaint    medical problem        Chief Complaint   Patient presents with    Hemorrhoids     Pt reports hemorrhoids worsening over past couple of days  Took rx from ob dr and not working at this time  Initial Presentation: ON 5/3 32 yo [id number] hx T1DM, s/p  21, presents with postpartum preeclampsia with severe features, based on blood pressure criteria  Initally presented w severe valeria from hemorrhoids & noted for severely elevated BPs  initial blood pressure on admission was 227/121, with a repeat BP 30 minutes later of 162/99, and 167/73   Admit Inpatient Pre eclampsia w severe features- IV MAG, Labetalol 200 mg TID for long-acting blood pressure control, short-acting treatment with IV labetalol if required  Patient monitoroing own glucoses     Date:    Day 2:    s/p  on 21 with pre-gestational Type 1 DM on insulin pump and gestational hypertension who presents to ED for painful hemorrhoids and found to have blood pressures in the severe range  She denies headache, visual disturbances, dyspnea, chest pain, epigastric pain, right upper quadrant pain, or generalized edema  HELLP labs  wnl  We counseled her on the recommendation to stay for initiation of antihypertensive therapy, blood pressure monitoring, and IV magnesium sulfate  She received IV magnesium sulfate bolus then and about 1 hour of infusion when she became overwhelmed about being away from home and opted to leave     At this point she demonstrates decision making capacity   Her blood pressure is currently 134/57, she is alert and oriented, her lungs are clear  She was initiated on labetalol 200 mg q 8 hours which will be sent to pharmacy   OB provider message OB attending office about getting her in for outpatient blood pressure check later this week    ED Triage Vitals   Temperature Pulse Respirations Blood Pressure SpO2   05/03/21 1750 05/03/21 1749 05/03/21 1749 05/03/21 1749 05/03/21 1749   98 1 °F (36 7 °C) (!) 109 20 (!) 227/121 98 %      Temp Source Heart Rate Source Patient Position - Orthostatic VS BP Location FiO2 (%)   05/03/21 1750 05/03/21 1749 05/03/21 1749 05/03/21 1749 --   Oral Monitor Standing Right arm       Pain Score       05/03/21 1748       Worst Possible Pain          Wt Readings from Last 1 Encounters:   05/06/21 115 kg (252 lb 12 8 oz)     Additional Vital Signs:   Date/Time  Temp  Pulse  Resp  BP  MAP (mmHg)  SpO2  O2 Device  Patient Position - Orthostatic VS   05/04/21 0138  98 7 °F (37 1 °C)  83  18  130/67  --  99 %  None (Room air)  Sitting   05/03/21 2343  --  89  --  134/67  --  98 %  --  --   05/03/21 2330  --  --  --  --  --  98 %  --  --   05/03/21 2202  --  84  --  130/66  --  --  --  --   05/03/21 2135  99 °F (37 2 °C)  --  --  --  --  --  --  --   05/03/21 2130  --  81  --  --  --  98 %  --  --   05/03/21 2129  --  77  --  138/69  --  --  --  --   05/03/21 2045  --  80  20  144/67  96  100 %  None (Room air)  Sitting   05/03/21 2030  --  --  --  142/76  98  --  --  --   05/03/21 2008  --  --  --  140/69  --  --  --  --   05/03/21 1952  --  --  --  141/72  --  --  --  --   05/03/21 1920  --  76  20  128/75  95  100 %  None (Room air)  Sitting   05/03/21 1847  --  --  --  167/73  --  --  --  --   05/03/21 1824  --  --  --  162/99  --  --  --  --   05/03/21 1750  98 1 °F (36 7 °C)  --  --  --  --  --  --  --   05/03/21 1749  --  109Abnormal   20  227/121Abnormal   --  98 %  None (Room air) Standing      Weights (last 14 days) before discharge    Date/Time  Weight  Weight Method  Height   05/03/21 2135  118 kg (260 lb 2 3 oz)  --  5' 9" (1 753 m)   05/03/21 1748  118 kg (260 lb 2 3 oz)  Standing scale  --       Pertinent Labs/Diagnostic Test Results:       Results from last 7 days   Lab Units 05/03/21 1958   WBC Thousand/uL 11 71*   HEMOGLOBIN g/dL 10 1*   HEMATOCRIT % 32 1*   PLATELETS Thousands/uL 291   NEUTROS ABS Thousands/µL 8 34*         Results from last 7 days   Lab Units 05/03/21 1958   SODIUM mmol/L 141   POTASSIUM mmol/L 3 5   CHLORIDE mmol/L 107   CO2 mmol/L 26   ANION GAP mmol/L 8   BUN mg/dL 13   CREATININE mg/dL 0 81   EGFR ml/min/1 73sq m 99   CALCIUM mg/dL 9 0     Results from last 7 days   Lab Units 05/03/21 1958   AST U/L 19   ALT U/L 20   ALK PHOS U/L 153*   TOTAL PROTEIN g/dL 7 0   ALBUMIN g/dL 2 8*   TOTAL BILIRUBIN mg/dL 0 14*         Results from last 7 days   Lab Units 05/03/21 1958   GLUCOSE RANDOM mg/dL 48*               Results from last 7 days   Lab Units 05/03/21  1832   CLARITY UA  Clear   COLOR UA  Bloody   SPEC GRAV UA  1 015   PH UA  5 0   GLUCOSE UA mg/dl Negative   KETONES UA mg/dl Negative   BLOOD UA  Large*   PROTEIN UA mg/dl Negative   NITRITE UA  Negative   BILIRUBIN UA  Negative   UROBILINOGEN UA E U /dl 0 2   LEUKOCYTES UA  Trace*   WBC UA /hpf 4-10*   RBC UA /hpf Innumerable*   BACTERIA UA /hpf Occasional   EPITHELIAL CELLS WET PREP /hpf Occasional         ED Treatment:   Medication Administration from 05/03/2021 1743 to 05/03/2021 2106       Date/Time Order Dose Route Action     05/03/2021 1900 magnesium sulfate 20 g/500 mL infusion (premix) 0 g/hr Intravenous Hold        Past Medical History:   Diagnosis Date    Diabetes ([facility] [address] ) 1994    type 1    Diabetes mellitus ([facility] )      Present on Admission:  **None**      Admitting Diagnosis: Antepartum mild preeclampsia [O14 00]  Hemorrhoids [K64 9]  Hemorrhoids, unspecified hemorrhoid type [K64 9]  Age/Sex: 31 y o  female  Admission Orders:  Monitor BPs, vitals pulse oximetry  IV MAG  Scheduled Medications:  labetalol (NORMODYNE) tablet 200 mg 5/3 x1 @2235  Continuous IV Infusions:  magnesium sulfate 2 g/50 mL IVPB (premix) 2 g IV x1 5/3 2128  magnesium sulfate 4 g/100 mL IVPB (premix) 4 g  IV x1 2133    PRN Meds:    Network Utilization Review Department  ATTENTION: Please call with any questions or concerns to [phone] and carefully listen to the prompts so that you are directed to the right person  All voicemails are confidential   [facility] all requests for admission clinical reviews, approved or denied determinations and any other requests to dedicated fax number below belonging to the campus where the patient is receiving treatment   List of dedicated fax numbers for the Facilities:  [address] DENIALS (Administrative/Medical Necessity) [phone]   [address] (Maternity/NICU/Pediatrics) [phone]   [address] [place] [address] [address] [address] [place] [address] [address] [facility] [place] [address] [phone]

## 2021-05-11 NOTE — UTILIZATION REVIEW
Inpatient Admission Authorization Request   Notification of Maternity/Delivery &  Birth Information for Admission   SERVICING FACILITY:   [address] [place] [address], [place] [address] [address] [facility]  Tax ID: [id number]  NPI: [id number]  Place of Service: Inpatient [address] [facility]  [address]  Place of Service Code: 24     ATTENDING PROVIDER:  Attending Name and NPI#: Heather Harry Md [[id number]]  Address: [address] [place] [address], [place] [address] [address] [facility]  Phone: [phone]     UTILIZATION REVIEW CONTACT:  Cynthia Montes Utilization   Network Utilization Review Department  Phone: [phone]  Fax [phone]  Email: [email or web address]     PHYSICIAN ADVISORY SERVICES:  FOR [id number]-[id number] REVIEW - MEDICAL NECESSITY DENIAL  Phone: [phone]  Fax: [phone]  Email: [email or web address]     TYPE OF REQUEST:  Inpatient Status     ADMISSION INFORMATION:  ADMISSION DATE/TIME: 21 0943  PATIENT DIAGNOSIS CODE/DESCRIPTION:  Elevated BP without diagnosis of hypertension [R03 0] The primary encounter diagnosis was Pre-existing type 1 diabetes mellitus during pregnancy in third trimester  Diagnoses of Insulin pump titration and  (spontaneous vaginal delivery) were also pertinent to this visit  1  Pre-existing type 1 diabetes mellitus during pregnancy in third trimester    2  Insulin pump titration    3    (spontaneous vaginal delivery)      DISCHARGE DATE/TIME: 2021 12:40 PM  DISCHARGE DISPOSITION (IF DISCHARGED): Home/Self Care     MOTHER AND  INFORMATION:  Mother: Amelia Oliva 1992   Delivering clinician: Bishnu Casanova   OB History as of 2021        4    Para   2    Term   2            AB   2    Living   2       SAB   1    TAB   1    Ectopic        Multiple   0    Live Births   2                Name & MRN:   Information for the patient's :  Kareen Sorto [[id number]]     [place] Delivery Information:  Sex: male  Delivered 2021 9:32 AM by Vaginal, Spontaneous; Gestational Age: 44w7d     Measurements:  Weight: 7 lb 12 oz (3515 g); Height: 19 5"    APGAR 1 minute 5 minutes 10 minutes   Totals: 9 9       Birth Information: 31 y o  female MRN: [id number] Unit/Bed#: L&D 311-01 Estimated Date of Delivery: 5/10/21  Birthweight: No birth weight on file  Gestational Age: <None> Delivery Type: Vaginal, Spontaneous          APGARS  One minute Five minutes Ten minutes   Totals:               IMPORTANT INFORMATION:  Please contact the Cynthia Montes directly with any questions or concerns regarding this request  Department voicemails are confidential     Send requests for admission clinical reviews, concurrent reviews, approvals, and administrative denials due to lack of clinical to fax [phone]

## 2021-05-11 NOTE — UTILIZATION REVIEW
Inpatient Admission Authorization Request   Notification of Maternity/Delivery &  Birth Information for Admission   SERVICING FACILITY:   [address] [place] [address], [place] [address] [address] [facility]  Tax ID: [id number]  NPI: [id number]  Place of Service: Inpatient [address] [facility]  [address]  Place of Service Code: 24     ATTENDING PROVIDER:  Attending Name and NPI#: Mehreen Odell Md [[id number]]  Address: [address] [place] [address], [place] [address] [address] [facility]  Phone: [phone]     UTILIZATION REVIEW CONTACT:  Barbi Perez Utilization   Network Utilization Review Department  Phone: [phone]  Fax [phone]  Email: [email or web address]     PHYSICIAN ADVISORY SERVICES:  FOR [id number] REVIEW - MEDICAL NECESSITY DENIAL  Phone: [phone]  Fax: [phone]  Email: [email or web address]     TYPE OF REQUEST:  Inpatient Status     ADMISSION INFORMATION:  ADMISSION DATE/TIME: 21 0943  PATIENT DIAGNOSIS CODE/DESCRIPTION:  Elevated BP without diagnosis of hypertension [R03 0] The primary encounter diagnosis was Pre-existing type 1 diabetes mellitus during pregnancy in third trimester  Diagnoses of Insulin pump titration and  (spontaneous vaginal delivery) were also pertinent to this visit  1  Pre-existing type 1 diabetes mellitus during pregnancy in third trimester    2  Insulin pump titration    3    (spontaneous vaginal delivery)      DISCHARGE DATE/TIME: 2021 12:40 PM  DISCHARGE DISPOSITION (IF DISCHARGED): Home/Self Care     MOTHER AND  INFORMATION:  Mother: Neal Porras 1992   Delivering clinician: Mariela Tsai   OB History as of 2021        4    Para   2    Term   2            AB   2    Living   2       SAB   1    TAB   1    Ectopic        Multiple   0    Live Births   2                Name & MRN:   Information for the patient's :  Anil Go [[id number]]     [place]phrey Delivery Information:  Sex: male  Delivered 2021 9:32 AM by Vaginal, Spontaneous; Gestational Age: 44w7d     Measurements:  Weight: 7 lb 12 oz (3515 g); Height: 19 5"    APGAR 1 minute 5 minutes 10 minutes   Totals: 9 9       Birth Information: 31 y o  female MRN: [id number] Unit/Bed#: L&D 311-01 Estimated Date of Delivery: 5/10/21  Birthweight: No birth weight on file  Gestational Age: <None> Delivery Type: Vaginal, Spontaneous          APGARS  One minute Five minutes Ten minutes   Totals:               IMPORTANT INFORMATION:  Please contact the [facility] directly with any questions or concerns regarding this request  Department voicemails are confidential     Send requests for admission clinical reviews, concurrent reviews, approvals, and administrative denials due to lack of clinical to fax [phone]  Initial Clinical Review    Admission: Date/Time/Statement:   Admission Orders (From admission, onward)     Ordered        21 0943  Inpatient Admission  Once         21 1452  Place in Observation  Once                   Orders Placed This Encounter   Procedures    Place in Observation     Standing Status:   Standing     Number of Occurrences:   1     Order Specific Question:   Level of Care     Answer:   Med Surg [16]    Inpatient Admission     Standing Status:   Standing     Number of Occurrences:   1     Order Specific Question:   Level of Care     Answer:   Med Surg [16]     Order Specific Question:   Estimated length of stay     Answer:   More than 2 Midnights     Order Specific Question:   Certification     Answer:   I certify that inpatient services are medically necessary for this patient for a duration of greater than two midnights  See H&P and MD Progress Notes for additional information about the patient's course of treatment            Chief Complaint   Patient presents with    Hypertension     high BP's in OB office, sent over for monitoring       Initial Presentation:  30 yo G 4 OP 1 @ 38 4/7 presentd to L&D  as observation for r/o pre-eclampsia She is a type 1 diabetic on continuous glucose monitoring and subcutaneous insulin  Planm induction of labor     05-01-21 Dose (frank-units/min) Oxytocin: 6 frank-units/min  Contraction Frequency (minutes): 2-4(difficulty tracing d/t maternal position)  Contraction Quality: Mild  Tachysystole: No   Cervical Dilation: 4-5  Cervical Effacement: 60  Fetal Station: -2  Baseline Rate: 145 bpm  Fetal Heart Rate: 138 BPM  FHR Category: Category I       ED Triage Vitals   Temperature Pulse Respirations Blood Pressure SpO2   04/30/21 1219 04/30/21 1219 04/30/21 1219 04/30/21 1219 05/01/21 2010   98 °F (36 7 °C) (!) 113 18 140/87 98 %      Temp Source Heart Rate Source Patient Position - Orthostatic VS BP Location FiO2 (%)   04/30/21 1219 04/30/21 1219 04/30/21 1219 04/30/21 1219 --   Oral Monitor Lying Left arm       Pain Score       04/30/21 1219       No Pain          Wt Readings from Last 1 Encounters:   05/06/21 115 kg (252 lb 12 8 oz)     04/30/21 2000  98 2 °F (36 8 °C)  --  --  --  --   04/30/21 1953  --  87  --  128/76  --   04/30/21 1939  --  77  --  130/81  --   04/30/21 1923  --  82  --  134/80  --   04/30/21 1844  --  96  --  126/69  --   04/30/21 1827  --  96  --  119/69  --   04/30/21 1812  --  91  --  116/60  --   04/30/21 1758  --  88  --  125/75  --   04/30/21 1744  --  86  --  128/60  --   04/30/21 1727  --  102  --  159/75  --   04/30/21 1712  --  88  --  149/76  --   04/30/21 1658  --  88  --  112/61  --   04/30/21 1642  --  88  --  132/66  --   04/30/21 1634  --  85  --  134/70  --   04/30/21 1551  --  80  --  129/56  --   04/30/21 1519  98 4 °F (36 9 °C)  100  19  148/83  --   04/30/21 1504  --  110Abnormal   --  138/82  --   04/30/21 1449  --  86  --  127/68  --   04/30/21 1435  --  127Abnormal   --  146/73  --   04/30/21 1434  --  127Abnormal   --  146/73  --   04/30/21 1422  --  95  --  139/70  --   04/30/21 1405  --  94  --  132/103Abnormal   --   04/30/21 1349  --  104  --  124/75  --   04/30/21 1334  --  100  --  119/68  --   04/30/21 1319  --  100  --  130/83  --   04/30/21 1304  --  97  --  125/67  --   04/30/21 1251  --  100  --  134/82  --   04/30/21 1235  --  106Abnormal   --  135/81         Additional Vital Signs:   Pertinent Labs/Diagnostic Test Results:                                       Past Medical History:   Diagnosis Date    Diabetes ([facility] [address] ) 1994    type 1    Diabetes mellitus ([facility] [address] )      Present on Admission:   Pre-existing type 1 diabetes mellitus during pregnancy in third trimester   (Resolved) Obesity affecting pregnancy in third trimester      Admitting Diagnosis: Elevated BP without diagnosis of hypertension [R03 0]  Age/Sex: 31 y o  female  Admission Orders:  Scheduled Medications:  No current facility-administered medications for this encounter  Continuous IV Infusions:  No current facility-administered medications for this encounter  PRN Meds:  No current facility-administered medications for this encounter  None    Network Utilization Review Department  ATTENTION: Please call with any questions or concerns to [phone] and carefully listen to the prompts so that you are directed to the right person  All voicemails are confidential   Lynette Kussmaul all requests for admission clinical reviews, approved or denied determinations and any other requests to dedicated fax number below belonging to the campus where the patient is receiving treatment   List of dedicated fax numbers for the Facilities:  [address] DENIALS (Administrative/Medical Necessity) [phone]   [address] (Maternity/NICU/Pediatrics) 261 [facility] [address] [place] [address] [place] [address] [facility] [address] [place] [address] [address] [phone]

## 2021-05-17 NOTE — PROGRESS NOTES
Subjective       Chief Complaint   Patient presents with    Postpartum Care     PPD score 23   2021 "Harfred Daughters" is doing well, breast feeding  C/O pp depression and anxiety        Bao Perea is a 31 y o  female who presents for a postpartum visit  She is 2 weeks postpartum following a spontaneous vaginal delivery  I have fully reviewed the prenatal and intrapartum course  The delivery was at 38 5 gestational weeks  Outcome: spontaneous vaginal delivery  Anesthesia: epidural  Postpartum course has been complicated with postpartum HTN and hemorrhoids  Baby's course has been well  Baby is feeding by breast  Bleeding thin lochia, passed a clot yesterday  Bowel function is normal  Bladder function is normal  Patient is not sexually active  Contraception method is none  Postpartum depression screening: positive  The following portions of the patient's history were reviewed and updated as appropriate: allergies, current medications, past family history, past medical history, past social history, past surgical history and problem list       H/o postpartum depression, was on 50 mg ZOLOFT and needed to be increased to 75 mg    Last PAP: 3/12/19  GDM: Type 1 DM      Review of Systems   Constitutional: Negative  HENT: Negative  Eyes: Negative  Respiratory: Negative  Cardiovascular: Negative  Gastrointestinal: Negative  Endocrine: Negative  Genitourinary:        As noted in HPI   Musculoskeletal: Negative  Skin: Negative  Allergic/Immunologic: Negative  Neurological: Negative  Hematological: Negative  Psychiatric/Behavioral: Negative            Objective     /68 (BP Location: Right arm, Patient Position: Sitting, Cuff Size: Standard)   Pulse 69   Temp (!) 97 3 °F (36 3 °C) (Temporal)   Ht 5' 9" (1 753 m)   Wt 108 kg (239 lb)   LMP  (LMP Unknown)   BMI 35 29 kg/m²    General:  alert and oriented, in no acute distress   Abdomen: soft, non-tender; bowel sounds normal; no masses,  no organomegaly    Vulva:  normal   Hemorrhoids External, decreased in size, non thrombosed       Assessment/Plan     2 5 weeks postpartum exam      1  Contraception: none for now, pt concerned about depression with previous birth control pill  2  Labetalol 200 mg BID - decrease to 100 mg mg BID, advised follow-up in 2 weeks for BP check  3  Follow up in: 2 weeks or as needed  4  Follow-up with colorectal  Surgery as scheduled  5  Postpartum depression - restart ZOLOFT 75 mg, advised therapy - referred to [facility] or re-establish care with previous therapist  6    Pt has an appt scheduled with July 7 2021 with her endocrinologist Dr Lamona Angelucci sooner endocrine appt with [facility] [place] if possible    Problem List Items Addressed This Visit     None      Visit Diagnoses     Postpartum care and examination    -  Primary    Postpartum hypertension        Hemorrhoids, postpartum        Type 1 diabetes mellitus without complication ([address] )        Postpartum depression

## 2021-05-19 ENCOUNTER — POSTPARTUM VISIT (OUTPATIENT)
Dept: OBGYN CLINIC | Facility: CLINIC | Age: 31
End: 2021-05-19
Payer: COMMERCIAL

## 2021-05-19 VITALS
TEMPERATURE: 97.3 F | DIASTOLIC BLOOD PRESSURE: 68 MMHG | HEART RATE: 69 BPM | BODY MASS INDEX: 35.4 KG/M2 | SYSTOLIC BLOOD PRESSURE: 118 MMHG | WEIGHT: 239 LBS | HEIGHT: 69 IN

## 2021-05-19 DIAGNOSIS — E10.9 TYPE 1 DIABETES MELLITUS WITHOUT COMPLICATION (HCC): ICD-10-CM

## 2021-05-19 PROCEDURE — 99214 OFFICE O/P EST MOD 30 MIN: CPT | Performed by: OBSTETRICS & GYNECOLOGY

## 2021-05-19 NOTE — PATIENT INSTRUCTIONS
Postpartum Depression   WHAT YOU NEED TO KNOW:   Postpartum depression is a mood disorder that occurs after giving birth  A mood is an emotion or a feeling  Moods affect your behavior and how you feel about yourself and life in general  Depression is a sad mood that you cannot control  Women often feel sad, afraid, or nervous after their baby is born  These feelings are called postpartum blues or baby blues, and they usually go away in 1 to 2 weeks  With postpartum depression, these symptoms get worse and continue for more than 2 weeks  Postpartum depression is a serious condition that affects your daily activities and relationships  DISCHARGE INSTRUCTIONS:   Medicines:   · Antidepressants: These medicines are given to decrease or stop the symptoms of depression  You usually need to take antidepressants for several weeks before you begin to feel better  Do not stop taking antidepressants unless your healthcare provider tells you to  Healthcare providers may try a different antidepressant if one type does not work  · Take your medicine as directed  Contact your healthcare provider if you think your medicine is not helping or if you have side effects  Tell him of her if you are allergic to any medicine  Keep a list of the medicines, vitamins, and herbs you take  Include the amounts, and when and why you take them  Bring the list or the pill bottles to follow-up visits  Carry your medicine list with you in case of an emergency  Follow up with your healthcare provider or psychiatrist as directed:  Write down your questions so you remember to ask them during your visits  Psychotherapy:  During therapy, you will talk with healthcare providers about how to cope with your feelings and moods  This can be done alone or in a group  It may also be done with family members or your partner  Self-care:   · Rest:  Do not try to do everything all at the same time   Do only what is needed and let other things wait until later  Ask your family or friends for help, especially if you have other children  Ask your partner to help with night feedings or other baby care  Try to sleep when the baby naps  · Get emotional support:  Share your feelings with your partner, a friend, or another mother  · Take care of yourself:  Shower and dress each day  Do not skip meals  Try to get out of the house a little each day  Get regular exercise  Eat a healthy diet  Avoid alcohol because it can make your depression worse  Do not isolate yourself  Go for a walk or meet with a friend  It is also important that you have some time by yourself each day  For support and more information:   · [address] [place] [address], Public Information & Communication Branch  [address], [address] [facility], [address]  [place] [address]   Phone: [phone]  Phone: [phone]  Web Address: [facility] tn  Contact your healthcare provider or psychiatrist if:   · You cannot make it to your next visit  · Your depression does not get better with treatment or it gets worse  · You have questions or concerns about your condition or care  Return to the emergency department if:   · You think about hurting or killing yourself, your baby, or someone else  · You feel like other people want to hurt you  · You hear voices telling you to hurt yourself or your baby  © 2017 [address]  Information is for End User's use only and may not be sold, redistributed or otherwise used for commercial purposes  All illustrations and images included in CareNotes® are the copyrighted property of [employer]  or Shaheed Finn  The above information is an  only  It is not intended as medical advice for individual conditions or treatments  Talk to your doctor, nurse or pharmacist before following any medical regimen to see if it is safe and effective for you

## 2021-05-24 PROBLEM — K64.4 EXTERNAL HEMORRHOIDS: Status: ACTIVE | Noted: 2021-05-24

## 2021-05-27 RX ORDER — LABETALOL 200 MG/1
TABLET, FILM COATED ORAL
Qty: 60 TABLET | Refills: 1 | Status: SHIPPED | OUTPATIENT
Start: 2021-05-27 | End: 2021-05-28 | Stop reason: SDUPTHER

## 2021-05-28 RX ORDER — LABETALOL 200 MG/1
TABLET, FILM COATED ORAL
Qty: 180 TABLET | Refills: 1 | Status: SHIPPED | OUTPATIENT
Start: 2021-05-28 | End: 2021-06-01

## 2021-06-01 ENCOUNTER — OFFICE VISIT (OUTPATIENT)
Dept: OBGYN CLINIC | Facility: CLINIC | Age: 31
End: 2021-06-01
Payer: COMMERCIAL

## 2021-06-01 VITALS
WEIGHT: 234.4 LBS | BODY MASS INDEX: 34.72 KG/M2 | HEART RATE: 86 BPM | DIASTOLIC BLOOD PRESSURE: 80 MMHG | HEIGHT: 69 IN | TEMPERATURE: 97.3 F | SYSTOLIC BLOOD PRESSURE: 144 MMHG

## 2021-06-01 PROCEDURE — 99213 OFFICE O/P EST LOW 20 MIN: CPT | Performed by: OBSTETRICS & GYNECOLOGY

## 2021-06-01 RX ORDER — LABETALOL 200 MG/1
100 TABLET, FILM COATED ORAL 2 TIMES DAILY
Qty: 180 TABLET | Refills: 1
Start: 2021-06-01 | End: 2021-12-21

## 2021-06-01 RX ORDER — SERTRALINE HYDROCHLORIDE 100 MG/1
50 TABLET, FILM COATED ORAL
COMMUNITY
Start: 2021-05-26 | End: 2022-01-20 | Stop reason: ALTCHOICE

## 2021-06-01 NOTE — PROGRESS NOTES
Subjective       Chief Complaint   Patient presents with    Follow-up     PPD SCORE 22  2 week follow up (BP check and PP depression)  Patient reports no concerns       José Miguel Pisano is a 31 y o  female who presents for a postpartum visit/follow-up of hypertension and postpartum depression  She is 4 weeks postpartum following a spontaneous vaginal delivery  I have fully reviewed the prenatal and intrapartum course  The delivery was at 38 5 gestational weeks  Outcome: spontaneous vaginal delivery  Anesthesia: epidural  Postpartum course has been well  [de-identified] course has been well, baby has reflux  Baby is feeding by breast  Bleeding no bleeding  Bowel function is normal  Bladder function is normal  Patient is not sexually active  Contraception method is none  Postpartum depression screening: negative  The following portions of the patient's history were reviewed and updated as appropriate: allergies, current medications, past family history, past medical history, past social history, past surgical history and problem list     Was on [address] [place] [address] previously and though she had PP depression from this,  She seen Dr Kan Novak ([facility], has a phone call visit), increased Zoloft from 76 to 100 mg  she has a appt in 3 days for therapist [facility])    Last PAP: 3/8/19  GDM:  Type 1 DM, has endocriniologist    Review of Systems   Constitutional: Negative  HENT: Negative  Eyes: Negative  Respiratory: Negative  Cardiovascular: Negative  Gastrointestinal: Negative  Endocrine: Negative  Genitourinary:        As noted in HPI   Musculoskeletal: Negative  Skin: Negative  Allergic/Immunologic: Negative  Neurological: Negative  Hematological: Negative  Psychiatric/Behavioral: Negative            Objective     /80 (BP Location: Right arm, Patient Position: Sitting, Cuff Size: Large)   Pulse 86   Temp (!) 97 3 °F (36 3 °C) (Temporal)   Ht 5' 9" (1 753 m)   Wt 106 kg (234 lb 6 4 oz) LMP  (LMP Unknown)   BMI 34 61 kg/m²    General:  alert and oriented, in no acute distress     Assessment/Plan     4 weeks postpartum exam  H/o preeclampsia in postpartum periods, BPS stable  Continue LABETALOL 100 mg BID for PP hypertension, advised follow-up with PCP in 2 weeks if still elevated    1  Contraception: none - pt wishes to abstain for now  Discussed with patient options that are nonhormonal such as Intrauterine device copper as well as condoms  Patient is concerned about mood changes from hormonal contraception  2    Patient with continued postpartum depression  She is currently seeing a psychiatrist and also has an appointment with therapist   Continue current dose of Zoloft  Advised to call if with worsening symptoms  3  Follow up in: 2 weeks   For blood pressure check or as needed        Problem List Items Addressed This Visit     None      Visit Diagnoses     Postpartum hypertension    -  Primary    Relevant Medications    labetalol (NORMODYNE) 200 mg tablet    Postpartum care and examination        Postpartum depression        Relevant Medications    sertraline (ZOLOFT) 100 mg tablet

## 2021-06-01 NOTE — PATIENT INSTRUCTIONS
Postpartum Depression   WHAT YOU NEED TO KNOW:   Postpartum depression is a mood disorder that occurs after giving birth  A mood is an emotion or a feeling  Moods affect your behavior and how you feel about yourself and life in general  Depression is a sad mood that you cannot control  Women often feel sad, afraid, or nervous after their baby is born  These feelings are called postpartum blues or baby blues, and they usually go away in 1 to 2 weeks  With postpartum depression, these symptoms get worse and continue for more than 2 weeks  Postpartum depression is a serious condition that affects your daily activities and relationships  DISCHARGE INSTRUCTIONS:   Medicines:   · Antidepressants: These medicines are given to decrease or stop the symptoms of depression  You usually need to take antidepressants for several weeks before you begin to feel better  Do not stop taking antidepressants unless your healthcare provider tells you to  Healthcare providers may try a different antidepressant if one type does not work  · Take your medicine as directed  Contact your healthcare provider if you think your medicine is not helping or if you have side effects  Tell him of her if you are allergic to any medicine  Keep a list of the medicines, vitamins, and herbs you take  Include the amounts, and when and why you take them  Bring the list or the pill bottles to follow-up visits  Carry your medicine list with you in case of an emergency  Follow up with your healthcare provider or psychiatrist as directed:  Write down your questions so you remember to ask them during your visits  Psychotherapy:  During therapy, you will talk with healthcare providers about how to cope with your feelings and moods  This can be done alone or in a group  It may also be done with family members or your partner  Self-care:   · Rest:  Do not try to do everything all at the same time   Do only what is needed and let other things wait until later  Ask your family or friends for help, especially if you have other children  Ask your partner to help with night feedings or other baby care  Try to sleep when the baby naps  · Get emotional support:  Share your feelings with your partner, a friend, or another mother  · Take care of yourself:  Shower and dress each day  Do not skip meals  Try to get out of the house a little each day  Get regular exercise  Eat a healthy diet  Avoid alcohol because it can make your depression worse  Do not isolate yourself  Go for a walk or meet with a friend  It is also important that you have some time by yourself each day  For support and more information:   · [address] [place] [address], Public Information & Communication Branch  [address], [address] [facility], [address]  [place] [address]   Phone: [phone]  Phone: [phone]  Web Address: [facility] tn  Contact your healthcare provider or psychiatrist if:   · You cannot make it to your next visit  · Your depression does not get better with treatment or it gets worse  · You have questions or concerns about your condition or care  Return to the emergency department if:   · You think about hurting or killing yourself, your baby, or someone else  · You feel like other people want to hurt you  · You hear voices telling you to hurt yourself or your baby  © 2017 [facility] [facility] Information is for End User's use only and may not be sold, redistributed or otherwise used for commercial purposes  All illustrations and images included in CareNotes® are the copyrighted property of [employer]  or Shaheed Finn  The above information is an  only  It is not intended as medical advice for individual conditions or treatments  Talk to your doctor, nurse or pharmacist before following any medical regimen to see if it is safe and effective for you

## 2021-06-14 NOTE — PROGRESS NOTES
Assessment/Plan:     Diagnoses and all orders for this visit:    Postpartum hypertension    Postpartum care and examination    Postpartum depression    Essential hypertension  -     Cancel: Ambulatory referral to [facility]; Future  -     Ambulatory referral to [facility]; Future          She was recommended to stay out of work for a total of 8 weeks Due to blood pressure issues as well as postpartum depression  Patient still feels not ready to go back to work  She does continue to score high on her postpartum depression screening  She is seeing psychiatrist and therapist and I would like to defer further recommendations to stay out of work to them since they are the specialist for postpartum depression and anxiety  Patient requests to write down functional limitations for staying out of work  She was advised to bring back paperwork to the office  She was written to stay out of work until 6/27 for postpartum depression  She states she is unable to work due to inability to concentrate, depression, anxiety, inability to focus  Declines contraception due to possible hormonal mood changes with all methods    Offered Copper IUD - declines, will use condoms for now    Her blood pressure is normal but she continues to require labetalol 100 mg p o  b i d  to maintain target blood pressure  She does not have a family doctor and she was referred to [facility] for additional continued care of hypertension and other medical problems including diabetes  She does have an Endocrinology with [facility] that she follows up with  Follow-up in 3 months for annual gyn exam or sooner if needed  Subjective   Patient ID: Wesley Westfall is a 31 y o  female  Patient is here for a follow-up  Chief Complaint   Patient presents with    Follow-up     Patient here for 2wk BP check and PP Depression check  She is 6 weeks postpartum,  She is breastfeeding    She has not been sexually active  She is not bleeding  Labetalol 100 mg BID, no headaches/blurry vision  Zoloft 100 mg BID, pt saw therapist through [facility]  Psychiatrists through [address] 2021 with psychiatrist  She states that she will continue therapist         Menstrual History:  OB History        4    Para   2    Term   2            AB   2    Living   2       SAB   1    TAB   1    Ectopic        Multiple   0    Live Births   2                  No LMP recorded (lmp unknown)  Past Medical History:   Diagnosis Date    Diabetes ([facility] [address] )     type 1    Diabetes mellitus ([facility] [address] )        Social History     Tobacco Use    Smoking status: Never Smoker    Smokeless tobacco: Never Used   Vaping Use    Vaping Use: Never used   Substance Use Topics    Alcohol use: Not Currently    Drug use: Never       No Known Allergies      Current Outpatient Medications:     Continuous Blood Gluc  (Dexcom G6 ) DENIZ, Use as directed , Disp: 1 Device, Rfl: 0    Continuous Blood Gluc Sensor (Dexcom G6 Sensor) MISC, 1 box=1 month supply or 3 sensors, use 1 sensor every 10 days  , Disp: 1 each, Rfl: 12    Continuous Blood Gluc Transmit (Dexcom G6 Transmitter) MISC, Transmitter change every 90 days  , Disp: 1 each, Rfl: 3    glucose blood test strip, 1 each by Other route 6 (six) times a day 8 to 12 times a day  Use as instructed, Disp: , Rfl:     insulin glargine (LANTUS) 100 units/mL subcutaneous injection, Inject 10 Units under the skin, Disp: , Rfl:     insulin lispro (HumaLOG) 100 units/mL injection, Use up to 200 units via insulin pump daily  , Disp: 60 mL, Rfl: 0    insulin lispro (HumaLOG) 100 units/mL injection, INJECT UP  UNITS VIA INSULIN PUMP DAILY e10 65, Disp: , Rfl:     labetalol (NORMODYNE) 200 mg tablet, Take 0 5 tablets (100 mg total) by mouth 2 (two) times a day, Disp: 180 tablet, Rfl: 1    Lancets MISC, by Does not apply route, Disp: , Rfl:     Prenatal Vit-Fe Fumarate-FA (PRENATAL VITAMIN PO), Take 1 tablet by mouth daily, Disp: , Rfl:     sertraline (ZOLOFT) 100 mg tablet, , Disp: , Rfl:     acetaminophen (TYLENOL) 325 mg tablet, Take 2 tablets (650 mg total) by mouth every 6 (six) hours as needed for mild pain or headaches (Patient not taking: Reported on 6/15/2021), Disp: , Rfl: 0    docusate sodium (COLACE) 100 mg capsule, Take 1 capsule (100 mg total) by mouth 2 (two) times a day (Patient not taking: Reported on 6/1/2021), Disp: 60 capsule, Rfl: 6    sertraline (ZOLOFT) 50 mg tablet, Take 1 5 tablets (75 mg total) by mouth daily (Patient not taking: Reported on 6/1/2021), Disp: 45 tablet, Rfl: 2      Review of Systems   Constitutional: Negative  HENT: Negative  Eyes: Negative  Respiratory: Negative  Cardiovascular: Negative  Gastrointestinal: Negative  Endocrine: Negative  Genitourinary:        As noted in HPI   Musculoskeletal: Negative  Skin: Negative  Allergic/Immunologic: Negative  Neurological: Negative  Hematological: Negative  Psychiatric/Behavioral: Negative  /72 (BP Location: Right arm, Patient Position: Sitting, Cuff Size: Standard)   Pulse 63   Temp 97 5 °F (36 4 °C) (Temporal)   Ht 5' 9" (1 753 m)   Wt 104 kg (228 lb 12 8 oz)   LMP  (LMP Unknown)   BMI 33 79 kg/m²       Physical Exam  Constitutional:       General: She is not in acute distress  Appearance: She is well-developed  Abdominal:      Palpations: Abdomen is soft  Tenderness: There is no abdominal tenderness  There is no guarding  Neurological:      Mental Status: She is alert and oriented to person, place, and time  Skin:     General: Skin is warm and dry     Psychiatric:         Behavior: Behavior normal

## 2021-06-15 ENCOUNTER — TELEPHONE (OUTPATIENT)
Dept: OBGYN CLINIC | Facility: CLINIC | Age: 31
End: 2021-06-15

## 2021-06-15 ENCOUNTER — OFFICE VISIT (OUTPATIENT)
Dept: OBGYN CLINIC | Facility: CLINIC | Age: 31
End: 2021-06-15
Payer: COMMERCIAL

## 2021-06-15 VITALS
SYSTOLIC BLOOD PRESSURE: 124 MMHG | HEIGHT: 69 IN | HEART RATE: 63 BPM | WEIGHT: 228.8 LBS | BODY MASS INDEX: 33.89 KG/M2 | DIASTOLIC BLOOD PRESSURE: 72 MMHG | TEMPERATURE: 97.5 F

## 2021-06-15 DIAGNOSIS — I10 ESSENTIAL HYPERTENSION: ICD-10-CM

## 2021-06-15 PROCEDURE — 99213 OFFICE O/P EST LOW 20 MIN: CPT | Performed by: OBSTETRICS & GYNECOLOGY

## 2021-06-15 NOTE — TELEPHONE ENCOUNTER
Pt did not have her card on her today to pay for $15 FMLA form fee  Advised pt fee would be billed to her or she can pay at next visit

## 2021-09-16 PROBLEM — O24.013 PRE-EXISTING TYPE 1 DIABETES MELLITUS DURING PREGNANCY IN THIRD TRIMESTER: Status: RESOLVED | Noted: 2020-10-12 | Resolved: 2021-09-16

## 2021-09-16 PROBLEM — Z46.81 INSULIN PUMP TITRATION: Status: RESOLVED | Noted: 2020-10-20 | Resolved: 2021-09-16

## 2021-09-16 PROBLEM — E10.649 HYPOGLYCEMIA DUE TO TYPE 1 DIABETES MELLITUS (HCC): Status: RESOLVED | Noted: 2020-10-12 | Resolved: 2021-09-16

## 2021-09-16 PROBLEM — E10.9 CONTROLLED DIABETES MELLITUS TYPE 1 WITHOUT COMPLICATIONS (HCC): Status: ACTIVE | Noted: 2019-03-12

## 2021-09-16 PROBLEM — D27.9 DERMOID CYST OF OVARY AFFECTING PREGNANCY, ANTEPARTUM: Status: RESOLVED | Noted: 2019-03-12 | Resolved: 2021-09-16

## 2021-09-16 PROBLEM — O34.80 DERMOID CYST OF OVARY AFFECTING PREGNANCY, ANTEPARTUM: Status: RESOLVED | Noted: 2019-03-12 | Resolved: 2021-09-16

## 2021-09-16 PROBLEM — O99.019 MATERNAL ANEMIA, ANTEPARTUM: Status: RESOLVED | Noted: 2021-03-22 | Resolved: 2021-09-16

## 2021-12-21 ENCOUNTER — OFFICE VISIT (OUTPATIENT)
Dept: OBGYN CLINIC | Facility: CLINIC | Age: 31
End: 2021-12-21
Payer: COMMERCIAL

## 2021-12-21 VITALS
WEIGHT: 206 LBS | BODY MASS INDEX: 30.42 KG/M2 | SYSTOLIC BLOOD PRESSURE: 136 MMHG | TEMPERATURE: 97.7 F | DIASTOLIC BLOOD PRESSURE: 72 MMHG

## 2021-12-21 DIAGNOSIS — I15.2 HYPERTENSION DUE TO ENDOCRINE DISORDER: ICD-10-CM

## 2021-12-21 DIAGNOSIS — Z36.89 ESTABLISH GESTATIONAL AGE, ULTRASOUND: ICD-10-CM

## 2021-12-21 DIAGNOSIS — N91.2 AMENORRHEA: Primary | ICD-10-CM

## 2021-12-21 DIAGNOSIS — Z87.59 HISTORY OF GESTATIONAL HYPERTENSION: ICD-10-CM

## 2021-12-21 DIAGNOSIS — Z3A.09 9 WEEKS GESTATION OF PREGNANCY: ICD-10-CM

## 2021-12-21 PROBLEM — O24.019 PRE-EXISTING TYPE 1 DIABETES MELLITUS DURING PREGNANCY: Status: ACTIVE | Noted: 2021-12-21

## 2021-12-21 LAB — SL AMB POCT URINE HCG: POSITIVE

## 2021-12-21 PROCEDURE — 76801 OB US < 14 WKS SINGLE FETUS: CPT | Performed by: CLINICAL NURSE SPECIALIST

## 2021-12-21 PROCEDURE — 99213 OFFICE O/P EST LOW 20 MIN: CPT | Performed by: CLINICAL NURSE SPECIALIST

## 2021-12-21 PROCEDURE — 81025 URINE PREGNANCY TEST: CPT | Performed by: CLINICAL NURSE SPECIALIST

## 2021-12-21 RX ORDER — MEDICAL SUPPLY, MISCELLANEOUS
EACH MISCELLANEOUS 2 TIMES DAILY
Qty: 1 EACH | Refills: 0 | Status: SHIPPED | OUTPATIENT
Start: 2021-12-21

## 2022-01-07 ENCOUNTER — TELEPHONE (OUTPATIENT)
Dept: OBGYN CLINIC | Facility: CLINIC | Age: 32
End: 2022-01-07

## 2022-01-07 DIAGNOSIS — O24.011 PRE-EXISTING TYPE 1 DIABETES MELLITUS DURING PREGNANCY IN FIRST TRIMESTER: ICD-10-CM

## 2022-01-07 DIAGNOSIS — Z87.59 HISTORY OF GESTATIONAL HYPERTENSION: Primary | ICD-10-CM

## 2022-01-07 DIAGNOSIS — O09.91 SUPERVISION OF HIGH RISK PREGNANCY IN FIRST TRIMESTER: ICD-10-CM

## 2022-01-07 DIAGNOSIS — O99.210 MATERNAL OBESITY, ANTEPARTUM: ICD-10-CM

## 2022-01-07 DIAGNOSIS — O99.210 MATERNAL OBESITY, ANTEPARTUM: Primary | ICD-10-CM

## 2022-01-07 PROBLEM — O09.90 SUPERVISION OF HIGH-RISK PREGNANCY: Status: ACTIVE | Noted: 2022-01-07

## 2022-01-07 PROBLEM — Z34.80 SUPERVISION OF OTHER NORMAL PREGNANCY, ANTEPARTUM: Status: ACTIVE | Noted: 2022-01-07

## 2022-01-07 PROBLEM — F32.A DEPRESSION AFFECTING PREGNANCY: Status: ACTIVE | Noted: 2022-01-07

## 2022-01-07 PROBLEM — G56.03 CARPAL TUNNEL SYNDROME, BILATERAL: Status: RESOLVED | Noted: 2020-10-26 | Resolved: 2022-01-07

## 2022-01-07 PROBLEM — O99.340 DEPRESSION AFFECTING PREGNANCY: Status: ACTIVE | Noted: 2022-01-07

## 2022-01-07 NOTE — TELEPHONE ENCOUNTER
Patient canceled appointment today for OB intake and new OB exam   Call placed to patient to advised her to complete her prenatal blood work this weekend early next week  Orders placed in chart    If she needs prescriptions faxed to the lab other than [facility] she should call us let us know so we can fax it there  msg through my chart as well

## 2022-01-17 ENCOUNTER — TELEPHONE (OUTPATIENT)
Dept: PERINATAL CARE | Facility: CLINIC | Age: 32
End: 2022-01-17

## 2022-01-17 ENCOUNTER — TELEMEDICINE (OUTPATIENT)
Dept: OBGYN CLINIC | Facility: CLINIC | Age: 32
End: 2022-01-17
Payer: COMMERCIAL

## 2022-01-17 ENCOUNTER — TELEPHONE (OUTPATIENT)
Dept: OBGYN CLINIC | Facility: CLINIC | Age: 32
End: 2022-01-17

## 2022-01-17 DIAGNOSIS — O24.011 PRE-EXISTING TYPE 1 DIABETES MELLITUS DURING PREGNANCY IN FIRST TRIMESTER: Primary | ICD-10-CM

## 2022-01-17 DIAGNOSIS — Z87.59 HISTORY OF GESTATIONAL HYPERTENSION: ICD-10-CM

## 2022-01-17 DIAGNOSIS — O09.91 SUPERVISION OF HIGH RISK PREGNANCY IN FIRST TRIMESTER: ICD-10-CM

## 2022-01-17 PROCEDURE — 99212 OFFICE O/P EST SF 10 MIN: CPT | Performed by: OBSTETRICS & GYNECOLOGY

## 2022-01-17 NOTE — TELEPHONE ENCOUNTER
Patient calling in regards to calling [facility]  Patient was recently positive for covid and had to r/s appts  Patient is now past the point to getting her u/s for [facility] guidelines  Patient wanted to know if she would still need the u/s or if she can just wait until her next scan is due for her 20 week growth scan  Patient is type 1 diabetic and patient states she is completely okay with waiting until the 20 weeks since she has two kids already as stated by the patient  Patient just wanted to check with you first  Please advise

## 2022-01-17 NOTE — PROGRESS NOTES
COVID-19 Outpatient Progress Note    Assessment/Plan:    Problem List Items Addressed This Visit        Endocrine    Pre-existing type 1 diabetes mellitus during pregnancy - Primary    Relevant Orders    Ambulatory Referral to Diabetic Education       Other    History of gestational hypertension    Supervision of high-risk pregnancy         Disposition:     I recommended self-quarantine for 10 days and to watch for symptoms until 14 days after exposure  If patient were to develop symptoms, they should self isolate and call our office for further guidance  Patient is not fully vaccinated and I recommended self quarantine for 5 days followed by strict mask use for an additional 5 days  If patient were to develop symptoms, they should immediately self isolate and call our office for further guidance  Discussed symptom directed medication options with patient  Discussed vitamin D, vitamin C, and/or zinc supplementation with patient  I have spent 10 minutes directly with the patient  Greater than 50% of this time was spent in counseling/coordination of care regarding: diagnostic results, risks and benefits of treatment options, instructions for management, patient and family education, importance of treatment compliance, risk factor reductions and impressions  Advised patient to call Maternal-Fetal Medicine to set up 1st ultrasound either 1st trimester or 2nd trimester  She is at high risk due to her type 1 diabetes  I also asked patient to make an appointment with diabetes Education regarding diabetes control during pregnancy  [address]  regarding this as well        Encounter provider Tyron Paredes MD    Provider located at [address] [place] [address]  [address]ia [address] [address] [phone]    Recent Visits  No visits were found meeting these conditions    Showing recent visits within past 7 days and meeting all other requirements  Today's Visits  Date Type Provider Dept   01/17/22 Telemedicine Magdy Jay MD [facility]   01/17/22 Telephone Mckinley Berg LPN [facility]   Showing today's visits and meeting all other requirements  Future Appointments  No visits were found meeting these conditions  Showing future appointments within next 150 days and meeting all other requirements     This virtual check-in was done via telephone and she agrees to proceed  Patient agrees to participate in a virtual check in via telephone or video visit instead of presenting to the office to address urgent/immediate medical needs  Patient is aware this is a billable service  After connecting through Telephone, the patient was identified by name and date of birth  Celia Ganser Haugh was informed that this was a telemedicine visit and that the exam was being conducted confidentially over secure lines  Celia Ganser Haugh acknowledged consent and understanding of privacy and security of the telemedicine visit  I informed the patient that I have reviewed her record in Epic and presented the opportunity for her to ask any questions regarding the visit today  The patient agreed to participate  It was my intent to perform this visit via video technology but the patient was not able to do a video connection so the visit was completed via audio telephone only  Verification of patient location:  Patient is located in the following state in which I hold an active license: [place]    Subjective:   Wesley Westfall is a 36 y o  female who is concerned about COVID-19  Patient's symptoms include fever, chills, nasal congestion, cough and myalgias  Patient denies fatigue, malaise, rhinorrhea, sore throat, anosmia, loss of taste, shortness of breath, chest tightness, abdominal pain, nausea, vomiting, diarrhea and headaches       - Date of symptom onset: 1/9/2021      COVID-19 vaccination status: Not vaccinated    Tested positive home test on 1/10/22    Last fever 1/11/22    Pt asmptomatic currently    Lab Results   Component Value Date    [facility] Not Detected 07/23/2020     Past Medical History:   Diagnosis Date    Diabetes ([facility] [address] [facility] 1994    type 1    Diabetes mellitus ([facility] [address] )      Past Surgical History:   Procedure Laterality Date    DC WRIST Rosiland Leonard LIG Right 2/3/2021    Procedure: RELEASE CARPAL TUNNEL ENDOSCOPIC;  Surgeon: Bernie Campbell MD;  [facility]: [facility];  Service: Orthopedics    DC WRIST Rosiland Leonard LIG Left 2/23/2021    Procedure: RELEASE CARPAL TUNNEL ENDOSCOPIC;  Surgeon: Bernie Campbell MD;  [facility];  Service: Orthopedics    WISDOM TOOTH EXTRACTION       Current Outpatient Medications   Medication Sig Dispense Refill    acetaminophen (TYLENOL) 325 mg tablet Take 2 tablets (650 mg total) by mouth every 6 (six) hours as needed for mild pain or headaches (Patient not taking: Reported on 6/15/2021)  0    Blood Pressure Monitoring (B-D ASSURE BPM/AUTO ARM CUFF) MISC Use 2 (two) times a day Call if BP > 140/90 1 each 0    Continuous Blood Gluc  (Dexcom G6 ) DENIZ Use as directed  1 Device 0    Continuous Blood Gluc Sensor (Dexcom G6 Sensor) MISC 1 box=1 month supply or 3 sensors, use 1 sensor every 10 days  1 each 12    Continuous Blood Gluc Transmit (Dexcom G6 Transmitter) MISC Transmitter change every 90 days  1 each 3    docusate sodium (COLACE) 100 mg capsule Take 1 capsule (100 mg total) by mouth 2 (two) times a day (Patient not taking: Reported on 6/1/2021) 60 capsule 6    glucose blood test strip 1 each by Other route 6 (six) times a day 8 to 12 times a day  Use as instructed      insulin glargine (LANTUS) 100 units/mL subcutaneous injection Inject 10 Units under the skin      insulin lispro (HumaLOG) 100 units/mL injection Use up to 200 units via insulin pump daily   60 mL 0    insulin lispro (HumaLOG) 100 units/mL injection INJECT UP  UNITS VIA INSULIN PUMP DAILY e10 65      Lancets MISC by Does not apply route      Prenatal Vit-Fe Fumarate-FA (PRENATAL VITAMIN PO) Take 1 tablet by mouth daily      sertraline (ZOLOFT) 100 mg tablet 50 mg         No current facility-administered medications for this visit  No Known Allergies    Review of Systems   Constitutional: Positive for chills and fever  Negative for fatigue  HENT: Positive for congestion  Negative for rhinorrhea and sore throat  Respiratory: Positive for cough  Negative for chest tightness and shortness of breath  Gastrointestinal: Negative for abdominal pain, diarrhea, nausea and vomiting  Musculoskeletal: Positive for myalgias  Neurological: Negative for headaches  Objective: There were no vitals filed for this visit  Physical Exam    Isolation end 1/19/22      VIRTUAL VISIT DISCLAIMER    Jo Ann Hernandez verbally agrees to participate in [employer]  Pt is aware that [employer] could be limited without vital signs or the ability to perform a full hands-on physical Anika Hernandez understands she or the provider may request at any time to terminate the video visit and request the patient to seek care or treatment in person

## 2022-01-17 NOTE — TELEPHONE ENCOUNTER
Pt called to make an appt for her first trimester screening she is 14wks now her edc 7/22, she did mention to me that she had  Tested positive for covid on 1/10 and that she is not vaccinated  She did mention to me that she would like to come in for her level 2 due to having other children so I did tell her to contact her OB and get back to me

## 2022-01-17 NOTE — TELEPHONE ENCOUNTER
Left voicemail for patient to call Desire Jaramillo at [phone] to schedule her appointment with [address] [place] [address] due to type 1 DM on an insulin pump at 13 weeks 3 days  Patient tested positive for Covid on 01/10/2022 and unvaccinated  Per Dr Ennis Link telemedicine appointment earlier today:   "I recommended self-quarantine for 10 days and to watch for symptoms until 14 days after exposure  If patient were to develop symptoms, they should self isolate and call our office for further guidance  Patient is not fully vaccinated and I recommended self quarantine for 5 days followed by strict mask use for an additional 5 days   If patient were to develop symptoms, they should immediately self isolate and call our office for further guidance "

## 2022-01-17 NOTE — TELEPHONE ENCOUNTER
----- Message from Bill Taylor MD sent at 1/17/2022  2:45 PM EST -----  I placed a referral for the patient  I was hoping she could get set up as soon as possible for Diabetes Education  Patient is a type 1 diabetic on an insulin pump  I do not think she has made the appointment yet  She did test positive for COVID on January 10 but is asymptomatic now, un vaccinated

## 2022-01-20 ENCOUNTER — TELEMEDICINE (OUTPATIENT)
Dept: PERINATAL CARE | Facility: CLINIC | Age: 32
End: 2022-01-20
Payer: COMMERCIAL

## 2022-01-20 VITALS — WEIGHT: 206 LBS | BODY MASS INDEX: 30.51 KG/M2 | HEIGHT: 69 IN

## 2022-01-20 DIAGNOSIS — Z3A.13 13 WEEKS GESTATION OF PREGNANCY: ICD-10-CM

## 2022-01-20 DIAGNOSIS — O24.011 PRE-EXISTING TYPE 1 DIABETES MELLITUS DURING PREGNANCY IN FIRST TRIMESTER: Primary | ICD-10-CM

## 2022-01-20 DIAGNOSIS — O99.210 MATERNAL OBESITY, ANTEPARTUM: ICD-10-CM

## 2022-01-20 PROCEDURE — 99215 OFFICE O/P EST HI 40 MIN: CPT | Performed by: NURSE PRACTITIONER

## 2022-01-20 RX ORDER — BLOOD SUGAR DIAGNOSTIC
STRIP MISCELLANEOUS
Qty: 800 STRIP | Refills: 1 | Status: SHIPPED | OUTPATIENT
Start: 2022-01-20 | End: 2022-03-23 | Stop reason: CLARIF

## 2022-01-20 NOTE — PATIENT INSTRUCTIONS
-Check A1c, goal is <6% with minimal hypoglycemia   -Pending baseline CMP, TSH and urine protein/creatinine ratio  -Due to overnight hypoglycemia, use decrease temp basal by 20%  -Pending insulin pump download review for further recommendations    -Continue with 3 meals and 3 snacks a day with combination of carbohydrates and protein   -No more than 8 to 10 hours of fasting overnight   -Glucose goals fasting 60-90; before meals/overnight ; 1 hour post meal 140 or less and 2 hours post meal 120 or less  -SMBG before meals and 1 or 2 hours post meal and with hypoglycemia  -Download insulin pump every Tuesday for Wednesday review    -Always have glucose available to treat hypoglycemia and use 15 by 15 rule  -Continue follow up with OB and MFM  -Continue baby aspirin until 36 weeks gestation    -After Level II US fetal growth every 4 to 6 weeks as recommended   -Fetal echo 22-24 weeks gestation   -Starting at 32 week gestation NST twice a week and VINOD weekly    -Refuses dietitian appointment   -Stay in close contact with diabetes team

## 2022-01-20 NOTE — PROGRESS NOTES
Virtual Regular Visit    Verification of patient location:[place]    Patient is located in the following state in which I hold an active license [place]      Assessment/Plan:    Problem List Items Addressed This Visit        Endocrine    Pre-existing type 1 diabetes mellitus during pregnancy in first trimester - Primary     -Humalog via Medtronic 530G insulin pump and Dexcom G6  -Basal settings:  Currently uses decreased temp basal by 15% from MN to 9 AM   MN@ 2 10 units/hour; (1 785 units)  3 AM@ 1 80 units/hour; (1 53)  6 AM@ 1 90 units/hour; (1 615)  9 AM@ 2 65 units/hour;  12:30 PM @ 1 85 units/hour;  3:30 PM@ 1 85 units/hour;  10 PM@ 1 30 units/hour  Bolus settings:  Carb ratio MN@ 9; 9:30 AM@ 9; 5 PM@ 11  Correction factor 60; Active insulin time 3 hours  BG target 100    180 units every 3 days    -Check A1c, goal is <6% with minimal hypoglycemia   -Pending baseline CMP, TSH and urine protein/creatinine ratio  -Due to overnight hypoglycemia, use decrease temp basal by 20%  -Pending insulin pump download review for further recommendations    -Continue with 3 meals and 3 snacks a day with combination of carbohydrates and protein   -No more than 8 to 10 hours of fasting overnight   -Glucose goals fasting 60-90; before meals/overnight ; 1 hour post meal 140 or less and 2 hours post meal 120 or less  -SMBG before meals and 1 or 2 hours post meal and with hypoglycemia  -Download insulin pump every Tuesday for Wednesday review    -Always have glucose available to treat hypoglycemia and use 15 by 15 rule  -Continue follow up with OB and MFM  -Continue baby aspirin until 36 weeks gestation    -After Level II US fetal growth every 4 to 6 weeks as recommended   -Fetal echo 22-24 weeks gestation   -Starting at 32 week gestation NST twice a week and VINOD weekly    -Refuses dietitian appointment   -Stay in close contact with diabetes team            Lab Results   Component Value Date    HGBA1C 5 2 04/17/2021 Relevant Medications    Contour Next Test test strip    Other Relevant Orders    Hemoglobin A1C       Other    BMI 30 0-30 9,adult     -Current weight 206 lbs  -Recommended weight gain during pregnancy 11 to 20 lbs  -Start GDM diet and follow up with dietitian  Maternal obesity, antepartum     -Starting weight 197 lbs  -Current weight 206 lbs    -Start GDM diet  Other Visit Diagnoses     13 weeks gestation of pregnancy        Relevant Medications    Contour Next Test test strip               Reason for visit is   Chief Complaint   Patient presents with    Virtual Regular Visit    Diabetes Type 1    Patient Education        Encounter provider Frank Redmond, [address]    Provider located at [address] [place] [id number] [phone]      Recent Visits  Date Type Provider Dept   01/20/22 Telephone Frank Redmond, [address] [facility]   01/20/22 [id number] [facility], [address] [facility]   01/17/22 Telephone Monty Okeefe, [address] [facility]   01/17/22 Telephone [address]   Showing recent visits within past 7 days and meeting all other requirements  Future Appointments  No visits were found meeting these conditions  Showing future appointments within next 150 days and meeting all other requirements       The patient was identified by name and date of birth  Brian Hernandez was informed that this is a telemedicine visit and that the visit is being conducted through [facility] and patient was informed this is a secure, HIPAA-complaint platform  She agrees to proceed     My office door was closed  No one else was in the room  She acknowledged consent and understanding of privacy and security of the video platform  The patient has agreed to participate and understands they can discontinue the visit at any time  Patient is aware this is a billable service       Subjective  Brian Hernandez is a 36 y o  female  13 6/7 weeks gestation T1DM on Humalog via Medtronic 530G and Dexcom G6   HPI     Past Medical History:   Diagnosis Date    Diabetes ([facility] [address] )     type 1    Diabetes mellitus ([facility] [address] )        Past Surgical History:   Procedure Laterality Date    ID WRIST Hina Bud LIG Right 2/3/2021    Procedure: RELEASE CARPAL TUNNEL ENDOSCOPIC;  Surgeon: Jamar Barriga MD;  [facility];  Service: Orthopedics    ID WRIST Hina Bud LIG Left 2021    Procedure: RELEASE CARPAL TUNNEL ENDOSCOPIC;  Surgeon: Jamar Barriga MD;  [facility];  Service: Orthopedics    WISDOM TOOTH EXTRACTION         Current Outpatient Medications   Medication Sig Dispense Refill    Blood Pressure Monitoring (B-D ASSURE BPM/AUTO ARM CUFF) MISC Use 2 (two) times a day Call if BP > 140/90 1 each 0    Continuous Blood Gluc  (Dexcom G6 ) DENIZ Use as directed  1 Device 0    Continuous Blood Gluc Sensor (Dexcom G6 Sensor) MISC 1 box=1 month supply or 3 sensors, use 1 sensor every 10 days  1 each 12    Continuous Blood Gluc Transmit (Dexcom G6 Transmitter) MISC Transmitter change every 90 days  1 each 3    Contour Next Test test strip Tests up to 8 to 12 times a day  T1DM and pregnancy  800 strip 1    insulin glargine (LANTUS) 100 units/mL subcutaneous injection Inject 10 Units under the skin      insulin lispro (HumaLOG) 100 units/mL injection Use up to 200 units via insulin pump daily  60 mL 0    insulin lispro (HumaLOG) 100 units/mL injection INJECT UP  UNITS VIA INSULIN PUMP DAILY e10 65      Lancets MISC by Does not apply route      Prenatal Vit-Fe Fumarate-FA (PRENATAL VITAMIN PO) Take 1 tablet by mouth daily       No current facility-administered medications for this visit  No Known Allergies    Review of Systems   Constitutional: Positive for fatigue  Negative for fever     HENT: Negative for congestion, sore throat and trouble swallowing  Eyes: Negative for visual disturbance  Last eye exam a couple years ago; needs to schedule  Respiratory: Negative for cough and shortness of breath  Cardiovascular: Negative for chest pain, palpitations and leg swelling  Gastrointestinal: Positive for constipation  Negative for nausea and vomiting  Endocrine: Positive for polyphagia  Negative for polydipsia and polyuria  Genitourinary: Negative for difficulty urinating and vaginal bleeding  Musculoskeletal: Negative for back pain  Neurological: Negative for headaches  Psychiatric/Behavioral: Negative for sleep disturbance  Video Exam  Refer to attached file Dexcom  Vitals:    01/20/22 1152   Weight: 93 4 kg (206 lb)   Height: 5' 9" (1 753 m)       Physical Exam  HENT:      Head: Normocephalic  Nose: Nose normal    Eyes:      Conjunctiva/sclera: Conjunctivae normal    Pulmonary:      Effort: Pulmonary effort is normal    Musculoskeletal:      Cervical back: Normal range of motion  Neurological:      Mental Status: She is alert and oriented to person, place, and time  Psychiatric:         Mood and Affect: Mood normal          Behavior: Behavior normal          Thought Content: Thought content normal          Judgment: Judgment normal           I spent 65 minutes with patient today in which greater than 50% of the time was spent in counseling/coordination of care regarding reviewing chart; obtaining insulin pump settings; plan of care; etc      VIRTUAL VISIT DISCLAIMER      Meghan Hernanedz verbally agrees to participate in [employer]  Pt is aware that [employer] could be limited without vital signs or the ability to perform a full hands-on physical Marisabel Hernandez understands she or the provider may request at any time to terminate the video visit and request the patient to seek care or treatment in person

## 2022-01-20 NOTE — ASSESSMENT & PLAN NOTE
-Humalog via Medtronic 530G insulin pump and Dexcom G6  -Basal settings:  Currently uses decreased temp basal by 15% from MN to 9 AM   MN@ 2 10 units/hour; (1 785 units)  3 AM@ 1 80 units/hour; (1 53)  6 AM@ 1 90 units/hour; (1 615)  9 AM@ 2 65 units/hour;  12:30 PM @ 1 85 units/hour;  3:30 PM@ 1 85 units/hour;  10 PM@ 1 30 units/hour  Bolus settings:  Carb ratio MN@ 9; 9:30 AM@ 9; 5 PM@ 11  Correction factor 60; Active insulin time 3 hours  BG target 100    180 units every 3 days    -Check A1c, goal is <6% with minimal hypoglycemia   -Pending baseline CMP, TSH and urine protein/creatinine ratio  -Due to overnight hypoglycemia, use decrease temp basal by 20%  -Pending insulin pump download review for further recommendations    -Continue with 3 meals and 3 snacks a day with combination of carbohydrates and protein   -No more than 8 to 10 hours of fasting overnight   -Glucose goals fasting 60-90; before meals/overnight ; 1 hour post meal 140 or less and 2 hours post meal 120 or less  -SMBG before meals and 1 or 2 hours post meal and with hypoglycemia  -Download insulin pump every Tuesday for Wednesday review    -Always have glucose available to treat hypoglycemia and use 15 by 15 rule  -Continue follow up with OB and MFM  -Continue baby aspirin until 36 weeks gestation    -After Level II US fetal growth every 4 to 6 weeks as recommended   -Fetal echo 22-24 weeks gestation   -Starting at 32 week gestation NST twice a week and VINOD weekly    -Refuses dietitian appointment   -Stay in close contact with diabetes team            Lab Results   Component Value Date    HGBA1C 5 2 04/17/2021

## 2022-01-21 ENCOUNTER — TELEMEDICINE (OUTPATIENT)
Dept: OBGYN CLINIC | Facility: CLINIC | Age: 32
End: 2022-01-21

## 2022-01-21 DIAGNOSIS — Z87.59 HISTORY OF GESTATIONAL HYPERTENSION: ICD-10-CM

## 2022-01-21 DIAGNOSIS — O99.210 MATERNAL OBESITY, ANTEPARTUM: ICD-10-CM

## 2022-01-21 DIAGNOSIS — F32.A DEPRESSION AFFECTING PREGNANCY: ICD-10-CM

## 2022-01-21 DIAGNOSIS — U07.1 COVID-19 AFFECTING PREGNANCY, ANTEPARTUM: ICD-10-CM

## 2022-01-21 DIAGNOSIS — O09.91 SUPERVISION OF HIGH RISK PREGNANCY IN FIRST TRIMESTER: Primary | ICD-10-CM

## 2022-01-21 DIAGNOSIS — O99.340 DEPRESSION AFFECTING PREGNANCY: ICD-10-CM

## 2022-01-21 DIAGNOSIS — O24.011 PRE-EXISTING TYPE 1 DIABETES MELLITUS DURING PREGNANCY IN FIRST TRIMESTER: ICD-10-CM

## 2022-01-21 DIAGNOSIS — O98.519 COVID-19 AFFECTING PREGNANCY, ANTEPARTUM: ICD-10-CM

## 2022-01-21 PROCEDURE — OBC: Performed by: CLINICAL NURSE SPECIALIST

## 2022-01-21 NOTE — PROGRESS NOTES
1  This service was provided in [facility] via [address]  2  Provider located at [facility]   3  [facility] provider: Dannis Siemens Schreck, CRNP  4  Identify all parties in room with patient during [facility] visit:    5  After connecting through [employer], patient was identified by name and date of birth  Patient was then informed that this was a Telemedicine visit and that the exam was being conducted confidentially over secure lines  My office door was closed  No one else was in the room  Patient acknowledged consent and understanding of privacy and security of the Telemedicine visit  I informed the patient that I have reviewed their record in Epic and presented the opportunity for them to ask any questions regarding the visit today  The patient agreed to participate     ------------------------  Subjective  Patient ID: Monique Tang is a 36 y o  female here for OB intake  Pregnancy was unplanned/surpised  Reports the follow common c/o in early pregnancy: breast tenderness, fatigue  Was having some N/V but none for a few weeks now  Her Patient's last menstrual period was 10/15/2021 (exact date)  giving her an TRI of 22     She reports her menstrual cycle is not regular    She has had a pregnancy confirmation appointment and previous US on  Demonstrated a viable IUP c/w GA by LMP    Obstetric history reviewed/updated:  OB History    Para Term  AB Living   5 2 2 0 2 2   SAB IAB Ectopic Multiple Live Births   1 1 0 0 2      # Outcome Date GA Lbr Abrahan/2nd Weight Sex Delivery Anes PTL Lv   5 Current            4 Term 21 38w5d / 00:07 3515 g (7 lb 12 oz) M Vag-Spont EPI N ELROY      Complications: History of gestational hypertension      [facility]  Apgar5: 9   3 Term 19 38w0d 05:45 / 02:51 2985 g (6 lb 9 3 oz) M Vag-Spont EPI  ELROY      Name: Ale Jacob: 8  Apgar5: 9   2 SAB 18 10w3d          1 IAB  Previous Pregnancy Complications/Hx: PP HTN    The following portions of the patient's history were reviewed and updated as appropriate: allergies, current medications, past family history, past medical history, past social history, past surgical history, and problem list     Family genetic history completed: and unremarkable     Pre-Pregnancy weight: 93 4 kg (206 lb)  Pre-gravid BMI: 30 41  Infection/Exposure Risk assessment  Employed: yes  Occupation: [employer]    Infection Screening    Prior hx of MRSA?  no  Recent COVID Infection or exposure:   sxs 1/9  Test 1/10    Recent Travel Screening  Traveled outside the [address]  in the last month?: No  Planned Travel Screening  Planned travel outside the [address]  in the next 12 months?: No    Immunizations:   Vaccinated against Hep B: yes   Previous VZV vaccine or chicken pox disease  yes   influenza vaccine given this flu season: no    Covid-19/SARS vaccine: no   Discussed Tdap vaccine administration at 27-28 weeks    Substance and Domestic Abuse Screening  None   SBIRT  Have you ever used drugs or Alcohol during Pregnancy? : No  Have you had a problem with drugs or alcohol in the Past? : No  Does your partner have a problem with drugs or alcohol? : No  Do you consider one of your parents to be an addict or alcoholic? : No    Abuse and Domestic Violence Screen   Are you safe at home? : Yes  Is anyone hurting you? : No  ___________________             LMP 10/15/2021 (Exact Date)   PE N/A-see other visit same day          Assessment/Plan:         OB intake completed  She is a [id number] with Patient's last menstrual period was 10/15/2021 (exact date)  I reviewed risk factors for pregnancy based on her medical history     Diabetes risk Factors: The following hx was assessed r/t risk for diabetes in pregnancy: Pregestational DM, hx of GDM, BMI >35, 1st degree relative w/ T2 DM, personal hx of PCOS, Metformin use, Prior baby LGA/macrosomia   Pertinent positive: Pre-gest DM- type I     Hypertension  The following hx was assessed r/t risk for HTN disorder in pregnancy:   (Risk level High) prior hx of CHTN, gHTN, Pre-eclampsia (or eclampsia or HELLP syndrome), FH  pre-eclampsia, Multifetal gestation, Type 1 or Type 2 DM, renal disease, Autoimmune disease, Nulliparity;   (Risk level Mod) BMI > 30, > age 28, > 10 yr pregnancy interval, Previous IUGR/SGA baby, race  Pertinent positive: hx of gest HTN      Additional risks/problems Identified:     Aneuploidy/Congenital defects risk factors: lower  Discussed genetic screening/testing options- pt interested no   For the low risk patient- counseled on cell free DNA vs sequential screening   SMA  no   Cystic Fibrosis Testing  no   Hemoglobin electrophoresis was not indicated    Prenatal lab work orders previously mailed to pt, but not yet completed  Encouraged to do asap  Will try to go tomorrow  Reviewed routine US to be expected in pregnancy and [facility] MF NT/Level 2 Us/consult were ordered  Pt [place] not have [place] and thus Domitila Aguila Was Not initiated  Pregnancy Education  [facility] Pregnancy Essentials Book reviewed and discussed  Call group and instructions to call the office/answering service in case of an emergency  Discussed appropriate weight gain in pregnancy based on pre-gravid BMI  Discussed healthy lifestyle choices for pregnancy     OB packet/Handouts given addressing   Nutrition, Immunizations, and Medications during pregnancy   Warning Signs During Pregnancy   Baby and Me phone norma guide and support center  [address] and Ultrasounds in Pregnancy    CoronaVirus and Zika precautions discussed and encouraged patient to visit CDC website for up-to-date information  Warning signs reviewed as well as reasons to call          Problem List Items Addressed This Visit        Pregnancy G5    COVID-19 affecting pregnancy, antepartum     Recent hx of covid infection  Sxs started 1/9  Pos results 1/10  sxs resolved  Feels well overall    - growth US in 3rd trimester         Depression affecting pregnancy     Hx of PPD  No meds currently  Feels well overall  Denies moodiness, sadness, or hopelessness           History of gestational hypertension     Hx of HTN with deliver and PP  Reviewed increased risk for recurrence and with hx of DM is at increased risk for pre-eclampsia  on low dose aspirin for prophylaxis  (now -36 wks)         Maternal obesity, antepartum     Patient meeting criteria for obesity   Pre gestational There is no height or weight on file to calculate BMI  Reviewed increased risk related to obesity in pregnancy of gestational diabetes, preeclampsia, operative complications, Caesarean section, increased risk of VTE, and increased risk for postpartum infection  Its not advised to lose weight in pregnancy however weight gain should be limited to 11-15 lb  Reviewed healthy lifestyle and dietary/activity modifications to minimize weight gain  Early Glucola, and TSH ordered a baseline  Pre-existing type 1 diabetes mellitus during pregnancy in first trimester     Hgb a1c at goal  Has CGM and insulin pump  Reports FBS typically < 90 and PP 1h ~180 and 2H pp ~140  Has had MFM consult and has f/u scheduled with early anatomy US  Will continue to follow  Fetal Echo at 22-24 wks  Pt aware will need 3rd trimester US for growth as well as A/N testing at 32 wks  Plan for deliver ~ 39 wks  Lab Results   Component Value Date    HGBA1C 5 2 04/17/2021            Supervision of high-risk pregnancy - Primary          No orders of the defined types were placed in this encounter

## 2022-01-22 ENCOUNTER — APPOINTMENT (OUTPATIENT)
Dept: LAB | Facility: MEDICAL CENTER | Age: 32
End: 2022-01-22
Payer: COMMERCIAL

## 2022-01-22 DIAGNOSIS — O99.210 MATERNAL OBESITY, ANTEPARTUM: ICD-10-CM

## 2022-01-22 DIAGNOSIS — O24.011 PRE-EXISTING TYPE 1 DIABETES MELLITUS DURING PREGNANCY IN FIRST TRIMESTER: ICD-10-CM

## 2022-01-22 DIAGNOSIS — O09.91 SUPERVISION OF HIGH RISK PREGNANCY IN FIRST TRIMESTER: ICD-10-CM

## 2022-01-22 LAB
ABO GROUP BLD: NORMAL
ALBUMIN SERPL BCP-MCNC: 3.5 G/DL (ref 3.5–5)
ALP SERPL-CCNC: 56 U/L (ref 46–116)
ALT SERPL W P-5'-P-CCNC: 16 U/L (ref 12–78)
ANION GAP SERPL CALCULATED.3IONS-SCNC: 2 MMOL/L (ref 4–13)
AST SERPL W P-5'-P-CCNC: 15 U/L (ref 5–45)
BASOPHILS # BLD AUTO: 0.03 THOUSANDS/ΜL (ref 0–0.1)
BASOPHILS NFR BLD AUTO: 0 % (ref 0–1)
BILIRUB SERPL-MCNC: 0.59 MG/DL (ref 0.2–1)
BILIRUB UR QL STRIP: NEGATIVE
BLD GP AB SCN SERPL QL: NEGATIVE
BUN SERPL-MCNC: 11 MG/DL (ref 5–25)
CALCIUM SERPL-MCNC: 10 MG/DL (ref 8.3–10.1)
CHLORIDE SERPL-SCNC: 107 MMOL/L (ref 100–108)
CLARITY UR: CLEAR
CO2 SERPL-SCNC: 27 MMOL/L (ref 21–32)
COLOR UR: YELLOW
CREAT SERPL-MCNC: 0.64 MG/DL (ref 0.6–1.3)
CREAT UR-MCNC: 78.4 MG/DL
EOSINOPHIL # BLD AUTO: 0.12 THOUSAND/ΜL (ref 0–0.61)
EOSINOPHIL NFR BLD AUTO: 1 % (ref 0–6)
ERYTHROCYTE [DISTWIDTH] IN BLOOD BY AUTOMATED COUNT: 12.3 % (ref 11.6–15.1)
EST. AVERAGE GLUCOSE BLD GHB EST-MCNC: 105 MG/DL
GFR SERPL CREATININE-BSD FRML MDRD: 120 ML/MIN/1.73SQ M
GLUCOSE P FAST SERPL-MCNC: 52 MG/DL (ref 65–99)
GLUCOSE UR STRIP-MCNC: NEGATIVE MG/DL
HBA1C MFR BLD: 5.3 %
HBV SURFACE AG SER QL: NORMAL
HCT VFR BLD AUTO: 39 % (ref 34.8–46.1)
HGB BLD-MCNC: 13 G/DL (ref 11.5–15.4)
HGB UR QL STRIP.AUTO: NEGATIVE
IMM GRANULOCYTES # BLD AUTO: 0.03 THOUSAND/UL (ref 0–0.2)
IMM GRANULOCYTES NFR BLD AUTO: 0 % (ref 0–2)
KETONES UR STRIP-MCNC: NEGATIVE MG/DL
LEUKOCYTE ESTERASE UR QL STRIP: NEGATIVE
LYMPHOCYTES # BLD AUTO: 1.46 THOUSANDS/ΜL (ref 0.6–4.47)
LYMPHOCYTES NFR BLD AUTO: 16 % (ref 14–44)
MCH RBC QN AUTO: 29 PG (ref 26.8–34.3)
MCHC RBC AUTO-ENTMCNC: 33.3 G/DL (ref 31.4–37.4)
MCV RBC AUTO: 87 FL (ref 82–98)
MONOCYTES # BLD AUTO: 0.61 THOUSAND/ΜL (ref 0.17–1.22)
MONOCYTES NFR BLD AUTO: 7 % (ref 4–12)
NEUTROPHILS # BLD AUTO: 6.81 THOUSANDS/ΜL (ref 1.85–7.62)
NEUTS SEG NFR BLD AUTO: 76 % (ref 43–75)
NITRITE UR QL STRIP: NEGATIVE
NRBC BLD AUTO-RTO: 0 /100 WBCS
PH UR STRIP.AUTO: 7.5 [PH]
PLATELET # BLD AUTO: 323 THOUSANDS/UL (ref 149–390)
PMV BLD AUTO: 10.6 FL (ref 8.9–12.7)
POTASSIUM SERPL-SCNC: 4.3 MMOL/L (ref 3.5–5.3)
PROT SERPL-MCNC: 7.5 G/DL (ref 6.4–8.2)
PROT UR STRIP-MCNC: NEGATIVE MG/DL
PROT UR-MCNC: 13 MG/DL
PROT/CREAT UR: 0.17 MG/G{CREAT} (ref 0–0.1)
RBC # BLD AUTO: 4.49 MILLION/UL (ref 3.81–5.12)
RH BLD: POSITIVE
RUBV IGG SERPL IA-ACNC: 155.4 IU/ML
SODIUM SERPL-SCNC: 136 MMOL/L (ref 136–145)
SP GR UR STRIP.AUTO: 1.01 (ref 1–1.03)
SPECIMEN EXPIRATION DATE: NORMAL
TSH SERPL DL<=0.05 MIU/L-ACNC: 1.15 UIU/ML (ref 0.36–3.74)
URATE SERPL-MCNC: 3.2 MG/DL (ref 2–6.8)
UROBILINOGEN UR QL STRIP.AUTO: 0.2 E.U./DL
WBC # BLD AUTO: 9.06 THOUSAND/UL (ref 4.31–10.16)

## 2022-01-22 PROCEDURE — 36415 COLL VENOUS BLD VENIPUNCTURE: CPT | Performed by: NURSE PRACTITIONER

## 2022-01-22 PROCEDURE — 84156 ASSAY OF PROTEIN URINE: CPT | Performed by: CLINICAL NURSE SPECIALIST

## 2022-01-22 PROCEDURE — 83036 HEMOGLOBIN GLYCOSYLATED A1C: CPT | Performed by: NURSE PRACTITIONER

## 2022-01-22 PROCEDURE — 84550 ASSAY OF BLOOD/URIC ACID: CPT

## 2022-01-22 PROCEDURE — 82570 ASSAY OF URINE CREATININE: CPT | Performed by: CLINICAL NURSE SPECIALIST

## 2022-01-22 PROCEDURE — 84443 ASSAY THYROID STIM HORMONE: CPT

## 2022-01-22 PROCEDURE — 87086 URINE CULTURE/COLONY COUNT: CPT

## 2022-01-22 PROCEDURE — 80081 OBSTETRIC PANEL INC HIV TSTG: CPT

## 2022-01-22 PROCEDURE — 80053 COMPREHEN METABOLIC PANEL: CPT

## 2022-01-22 PROCEDURE — 81003 URINALYSIS AUTO W/O SCOPE: CPT

## 2022-01-23 LAB
BACTERIA UR CULT: NORMAL
RPR SER QL: NORMAL

## 2022-01-24 LAB — HIV 1+2 AB+HIV1 P24 AG SERPL QL IA: NORMAL

## 2022-01-24 NOTE — ASSESSMENT & PLAN NOTE
-Current weight 206 lbs  -Recommended weight gain during pregnancy 11 to 20 lbs  -Start GDM diet and follow up with dietitian

## 2022-01-25 ENCOUNTER — OB ABSTRACT (OUTPATIENT)
Dept: OBGYN CLINIC | Facility: CLINIC | Age: 32
End: 2022-01-25

## 2022-02-06 PROBLEM — O99.212 MATERNAL OBESITY, ANTEPARTUM, SECOND TRIMESTER: Status: ACTIVE | Noted: 2022-02-06

## 2022-02-06 PROBLEM — O09.892 SHORT INTERVAL BETWEEN PREGNANCIES COMPLICATING PREGNANCY, ANTEPARTUM, SECOND TRIMESTER: Status: ACTIVE | Noted: 2022-02-06

## 2022-02-06 PROBLEM — O24.012: Status: ACTIVE | Noted: 2022-02-06

## 2022-02-07 ENCOUNTER — OFFICE VISIT (OUTPATIENT)
Dept: PERINATAL CARE | Facility: OTHER | Age: 32
End: 2022-02-07
Payer: COMMERCIAL

## 2022-02-07 VITALS
WEIGHT: 215.8 LBS | BODY MASS INDEX: 31.96 KG/M2 | HEIGHT: 69 IN | HEART RATE: 90 BPM | DIASTOLIC BLOOD PRESSURE: 80 MMHG | SYSTOLIC BLOOD PRESSURE: 124 MMHG

## 2022-02-07 DIAGNOSIS — O24.011 PRE-EXISTING TYPE 1 DIABETES MELLITUS DURING PREGNANCY IN FIRST TRIMESTER: ICD-10-CM

## 2022-02-07 DIAGNOSIS — O99.212 MATERNAL OBESITY, ANTEPARTUM, SECOND TRIMESTER: ICD-10-CM

## 2022-02-07 DIAGNOSIS — Z87.59 HISTORY OF GESTATIONAL HYPERTENSION: ICD-10-CM

## 2022-02-07 DIAGNOSIS — O99.210 MATERNAL OBESITY, ANTEPARTUM: ICD-10-CM

## 2022-02-07 DIAGNOSIS — O09.91 SUPERVISION OF HIGH RISK PREGNANCY IN FIRST TRIMESTER: ICD-10-CM

## 2022-02-07 DIAGNOSIS — O09.892 SHORT INTERVAL BETWEEN PREGNANCIES COMPLICATING PREGNANCY, ANTEPARTUM, SECOND TRIMESTER: ICD-10-CM

## 2022-02-07 DIAGNOSIS — Z3A.16 16 WEEKS GESTATION OF PREGNANCY: ICD-10-CM

## 2022-02-07 DIAGNOSIS — O24.012 PREGNANCY COMPLICATED BY PRE-EXISTING TYPE 1 DIABETES, SECOND TRIMESTER: Primary | ICD-10-CM

## 2022-02-07 PROCEDURE — 76811 OB US DETAILED SNGL FETUS: CPT | Performed by: OBSTETRICS & GYNECOLOGY

## 2022-02-07 PROCEDURE — 99242 OFF/OP CONSLTJ NEW/EST SF 20: CPT | Performed by: OBSTETRICS & GYNECOLOGY

## 2022-02-07 NOTE — LETTER
February 7, 2022     MD Emilio Copeland [id number] [facility]    Patient: Jose Juan Bro   YOB: 1990   Date of Visit: 2/7/2022       Dear Dr Rubin Begin: Thank you for referring Go Zimmerman to me for evaluation  Below are my notes for this consultation  If you have questions, please do not hesitate to call me  I look forward to following your patient along with you  Sincerely,        Herberth Shoemaker MD        CC: No Recipients  Herberth Shoemaker MD  2/6/2022  2:57 PM  Sign when Signing Visit  Please refer to the [facility] ultrasound report in Ob Procedures for additional information regarding today's visit

## 2022-02-08 ENCOUNTER — TELEPHONE (OUTPATIENT)
Dept: PERINATAL CARE | Facility: OTHER | Age: 32
End: 2022-02-08

## 2022-02-08 NOTE — TELEPHONE ENCOUNTER
Left message for pt regarding her appt on 3/21 that was suppose to be her fetal echo and tvus I had to cancel this appt I did let her know that I will be calling her back to aldair her appt for March

## 2022-02-17 ENCOUNTER — TELEMEDICINE (OUTPATIENT)
Dept: PERINATAL CARE | Facility: CLINIC | Age: 32
End: 2022-02-17
Payer: COMMERCIAL

## 2022-02-17 VITALS — WEIGHT: 215 LBS | HEIGHT: 69 IN | BODY MASS INDEX: 31.84 KG/M2

## 2022-02-17 DIAGNOSIS — Z3A.17 17 WEEKS GESTATION OF PREGNANCY: ICD-10-CM

## 2022-02-17 DIAGNOSIS — O24.012 PRE-EXISTING TYPE 1 DIABETES MELLITUS WITH HYPERGLYCEMIA DURING PREGNANCY IN SECOND TRIMESTER (HCC): Primary | ICD-10-CM

## 2022-02-17 DIAGNOSIS — O99.212 MATERNAL OBESITY, ANTEPARTUM, SECOND TRIMESTER: ICD-10-CM

## 2022-02-17 DIAGNOSIS — Z96.41 INSULIN PUMP IN PLACE: ICD-10-CM

## 2022-02-17 DIAGNOSIS — E10.65 PRE-EXISTING TYPE 1 DIABETES MELLITUS WITH HYPERGLYCEMIA DURING PREGNANCY IN SECOND TRIMESTER (HCC): Primary | ICD-10-CM

## 2022-02-17 DIAGNOSIS — E10.65 PRE-EXISTING TYPE 1 DIABETES MELLITUS WITH HYPERGLYCEMIA DURING PREGNANCY IN FIRST TRIMESTER (HCC): ICD-10-CM

## 2022-02-17 DIAGNOSIS — E10.649 HYPOGLYCEMIA DUE TO TYPE 1 DIABETES MELLITUS (HCC): ICD-10-CM

## 2022-02-17 DIAGNOSIS — O24.011 PRE-EXISTING TYPE 1 DIABETES MELLITUS WITH HYPERGLYCEMIA DURING PREGNANCY IN FIRST TRIMESTER (HCC): ICD-10-CM

## 2022-02-17 DIAGNOSIS — Z46.81 INSULIN PUMP TITRATION: ICD-10-CM

## 2022-02-17 PROCEDURE — 99214 OFFICE O/P EST MOD 30 MIN: CPT | Performed by: NURSE PRACTITIONER

## 2022-02-17 NOTE — ASSESSMENT & PLAN NOTE
-Starting weight 197 lbs  -First visit weight 206 lbs  -Current weight 215 lbs  -Recommended weight gain during pregnancy 11 to 20 lbs

## 2022-02-17 NOTE — ASSESSMENT & PLAN NOTE
-A1c at goal    -Humalog via Medtronic 770G insulin pump upgraded on 2/9/22 and Dexcom G6   -Issues with overnight glucose readings less than 60 and treating causing hyperglycemia    -Adjust basal settings as follows:  MN-5 AM  from 1 45 to 1 20 units/hour;  5 AM-9 AM from 1 25 to 1 35 units/hour;  9 AM-10 PM from 1 65 to 1 50 units/hour;  10 PM-MN@ 1 00 units/hour   -Work basal settings:  MN@ 1 20 units/hour;  5 AM@ 1 35 units/hour;  9 AM@ 1 50 units/hour;  1 PM@ 1 30 units/hour;  10 PM@ 0 90 units/hour  Under bolus settings:  Decrease BG target to 90;  Decrease insulin sensitivity to 30  Decrease carb ratio as follows:   MN to 8;   5 PM to 8;   add 10 PM@ 10   -Avoid post meal correction insulin and try not to react to CGM with high alerts   -Download insulin pump every Monday night for Tuesday review    -Eat 3 meals and 3 snacks a day  -No more than 8 to 10 hours of fasting overnight   -Continue SMBG  -Fetal growth ultrasound every 4 to 6 weeks as recommended   -Fetal echo as recommended   -Starting at 32 weeks gestation; further fetal surveillance   -Continue follow up with OB and MFM  -Stay in close contact with diabetes team   -Stop baby aspirin at 36 weeks gestation             Lab Results   Component Value Date    HGBA1C 5 3 01/22/2022

## 2022-02-17 NOTE — PATIENT INSTRUCTIONS
-A1c at goal    -Humalog via Medtronic 770G insulin pump upgraded on 2/9/22 and Dexcom G6   -Issues with overnight glucose readings less than 60 and treating causing hyperglycemia    -Adjust basal settings as follows:  MN-5 AM  from 1 45 to 1 20 units/hour;  5 AM-9 AM from 1 25 to 1 35 units/hour;  9 AM-10 PM from 1 65 to 1 50 units/hour;  10 PM-MN@ 1 00 units/hour   -Work basal settings:  MN@ 1 20 units/hour;  5 AM@ 1 35 units/hour;  9 AM@ 1 50 units/hour;  1 PM@ 1 30 units/hour;  10 PM@ 0 90 units/hour  Under bolus settings:  Decrease BG target to 90;  Decrease insulin sensitivity to 30  Decrease carb ratio as follows:   MN to 8;   5 PM to 8;   add 10 PM@ 10   -Avoid post meal correction insulin and try not to react to CGM with high alerts   -Download insulin pump every Monday night for Tuesday review    -Eat 3 meals and 3 snacks a day  -No more than 8 to 10 hours of fasting overnight   -Continue SMBG  -Fetal growth ultrasound every 4 to 6 weeks as recommended   -Fetal echo as recommended   -Starting at 32 weeks gestation; further fetal surveillance   -Continue follow up with OB and MFM  -Stay in close contact with diabetes team   -Stop baby aspirin at 36 weeks gestation

## 2022-02-17 NOTE — PROGRESS NOTES
Virtual Regular Visit    Verification of patient location:[place]    Patient is located in the following state in which I hold an active license [place]      Assessment/Plan:    Problem List Items Addressed This Visit        Endocrine    Pre-existing type 1 diabetes mellitus with hyperglycemia during pregnancy in second trimester ([facility] ) - Primary     -A1c at goal    -Humalog via Medtronic 770G insulin pump upgraded on 2/9/22 and Dexcom G6   -Issues with overnight glucose readings less than 60 and treating causing hyperglycemia    -Adjust basal settings as follows:  MN-5 AM  from 1 45 to 1 20 units/hour;  5 AM-9 AM from 1 25 to 1 35 units/hour;  9 AM-10 PM from 1 65 to 1 50 units/hour;  10 PM-MN@ 1 00 units/hour   -Work basal settings:  MN@ 1 20 units/hour;  5 AM@ 1 35 units/hour;  9 AM@ 1 50 units/hour;  1 PM@ 1 30 units/hour;  10 PM@ 0 90 units/hour  Under bolus settings:  Decrease BG target to 90;  Decrease insulin sensitivity to 30  Decrease carb ratio as follows:   MN to 8;   5 PM to 8;   add 10 PM@ 10   -Avoid post meal correction insulin and try not to react to CGM with high alerts   -Download insulin pump every Monday night for Tuesday review    -Eat 3 meals and 3 snacks a day  -No more than 8 to 10 hours of fasting overnight   -Continue SMBG  -Fetal growth ultrasound every 4 to 6 weeks as recommended   -Fetal echo as recommended   -Starting at 32 weeks gestation; further fetal surveillance   -Continue follow up with OB and MFM  -Stay in close contact with diabetes team   -Stop baby aspirin at 36 weeks gestation  Lab Results   Component Value Date    HGBA1C 5 3 01/22/2022            Relevant Medications    insulin lispro (HumaLOG) 100 units/mL injection    Hypoglycemia due to type 1 diabetes mellitus ([place] [address] )     -Reviewed 15 by 15 rule, always have glucose available  -Dexcom G6 in place   -Insulin pump basal settings decreased      Lab Results   Component Value Date    HGBA1C 5 3 01/22/2022 Relevant Medications    insulin lispro (HumaLOG) 100 units/mL injection       Other    Maternal obesity, antepartum, second trimester     -Starting weight 197 lbs  -First visit weight 206 lbs  -Current weight 215 lbs  -Recommended weight gain during pregnancy 11 to 20 lbs  17 weeks gestation of pregnancy    Insulin pump titration      Other Visit Diagnoses     Pre-existing type 1 diabetes mellitus with hyperglycemia during pregnancy in first trimester ([facility] )        Relevant Medications    insulin lispro (HumaLOG) 100 units/mL injection    Insulin pump in place        Relevant Medications    insulin lispro (HumaLOG) 100 units/mL injection               Reason for visit is   Chief Complaint   Patient presents with    Virtual Regular Visit    Diabetes Type 1        Encounter provider Jake Gutierrez, [facility]    Provider located at [address] [place] [id number] [phone]      Recent Visits  No visits were found meeting these conditions  Showing recent visits within past 7 days and meeting all other requirements  Today's Visits  Date Type Provider Dept   02/17/22 Telemedicine Jake Gutierrez, [address] [place] [address] today's visits and meeting all other requirements  Future Appointments  No visits were found meeting these conditions  Showing future appointments within next 150 days and meeting all other requirements       The patient was identified by name and date of birth  Reymundo Hernandez was informed that this is a telemedicine visit and that the visit is being conducted through Telephone  My office door was closed  No one else was in the room  She acknowledged consent and understanding of privacy and security of the video platform  The patient has agreed to participate and understands they can discontinue the visit at any time  Patient requested telephone vs virtual visit  Patient is aware this is a billable service  Subjective  Ti Carter is a 36 y o  female  17 6/7 weeks gestation T1DM on Humalog via Medtronic 770G and Dexcom G6  Insulin pump download and Dexcom report reviewed  New meter will be Accu-chek  Currently not inputting glucose readings into PDM  Works 3 to 11 PM at [employer] but will stop working in 2 weeks and be at home full-time with children  Eating 3 meals and 3 snacks a day  SMBG  Reports issue with hypoglycemia overnight; over treating and causing hyperglycemia  During work hours issues with glucose readings less than 60; physically active job  Family had episode of Covid and now recent episode of flu  HPI     Past Medical History:   Diagnosis Date    Diabetes ([facility] )     type 1    Diabetes mellitus ([facility] )        Past Surgical History:   Procedure Laterality Date    WI WRIST Cecilio Carolina LIG Right 2/3/2021    Procedure: RELEASE CARPAL TUNNEL ENDOSCOPIC;  Surgeon: Noah Boyle MD;  Location: [facility];  Service: Orthopedics    WI WRIST Cecilio Carolina LIG Left 2021    Procedure: RELEASE CARPAL TUNNEL ENDOSCOPIC;  Surgeon: Noah Boyle MD;  [facility]: [facility];  Service: Orthopedics    WISDOM TOOTH EXTRACTION         Current Outpatient Medications   Medication Sig Dispense Refill    BABY ASPIRIN PO Take 162 mg by mouth in the morning      Blood Pressure Monitoring (B-D ASSURE BPM/AUTO ARM CUFF) MISC Use 2 (two) times a day Call if BP > 140/90 (Patient not taking: Reported on 2022 ) 1 each 0    Continuous Blood Gluc  (Dexcom G6 ) DENIZ Use as directed  1 Device 0    Continuous Blood Gluc Sensor (Dexcom G6 Sensor) MISC 1 box=1 month supply or 3 sensors, use 1 sensor every 10 days  1 each 12    Continuous Blood Gluc Transmit (Dexcom G6 Transmitter) MISC Transmitter change every 90 days  1 each 3    Contour Next Test test strip Tests up to 8 to 12 times a day  T1DM and pregnancy   800 strip 1    insulin glargine (LANTUS) 100 units/mL subcutaneous injection Inject 10 Units under the skin (Patient not taking: Reported on 2/7/2022 )      insulin lispro (HumaLOG) 100 units/mL injection INJECT UP  UNITS VIA INSULIN PUMP DAILY e10 [id number]      insulin lispro (HumaLOG) 100 units/mL injection Use up to 200 units via insulin pump daily  T2DM and pregnancy  60 mL 1    Lancets MISC by Does not apply route      Prenatal Vit-Fe Fumarate-FA (PRENATAL VITAMIN PO) Take 1 tablet by mouth daily       No current facility-administered medications for this visit  No Known Allergies    Review of Systems   Constitutional: Negative for fatigue and fever  Eyes: Negative for visual disturbance  Needs eye exam     Endocrine: Negative for polydipsia, polyphagia and polyuria  Neurological: Negative for headaches  Video Exam  Refer to attached files for insulin pump download and CGM report  Vitals:    02/17/22 1357   Weight: 97 5 kg (215 lb)   Height: 5' 9" (1 753 m)       Physical Exam   It was my intent to perform this visit via video technology but the patient was not able to do a video connection so the visit was completed via audio telephone only  I spent 40 minutes directly with the patient during this visit    VIRTUAL VISIT DISCLAIMER      Patricia Hernandez verbally agrees to participate in [employer]  Pt is aware that [employer] could be limited without vital signs or the ability to perform a full hands-on physical Yane Hernandez understands she or the provider may request at any time to terminate the video visit and request the patient to seek care or treatment in person

## 2022-02-17 NOTE — ASSESSMENT & PLAN NOTE
-Reviewed 15 by 15 rule, always have glucose available  -Dexcom G6 in place   -Insulin pump basal settings decreased      Lab Results   Component Value Date    HGBA1C 5 3 01/22/2022

## 2022-03-02 ENCOUNTER — TELEPHONE (OUTPATIENT)
Dept: PERINATAL CARE | Facility: CLINIC | Age: 32
End: 2022-03-02

## 2022-03-02 NOTE — TELEPHONE ENCOUNTER
Attempted to call patient, no answer and voicemail box is full  [employer] messages with adjustments per CRNP state not read by patient

## 2022-03-08 NOTE — TELEPHONE ENCOUNTER
Was able to leave voicemail  Encouraged patient to download her insulin pump so we can review  As of yet patient has not read her mychart message from [facility], [address] that was sent 02/24/2022  Encouraged patient to check her mychart frequently  Explained on voicemail it is very important to stay in close contact with our team  Provided our office number for any questions or concerns

## 2022-03-15 NOTE — TELEPHONE ENCOUNTER
Left voicemail for patient to download Medtronic insulin pump  Last download from 02/24/2022  Emphasized the importance of monitoring and managing her diabetes during pregnancy  Provide dour office number for any assistance she may need   Repeated how important it is to stay in contact with our team

## 2022-03-22 ENCOUNTER — ROUTINE PRENATAL (OUTPATIENT)
Dept: OBGYN CLINIC | Facility: CLINIC | Age: 32
End: 2022-03-22

## 2022-03-22 VITALS
HEART RATE: 77 BPM | OXYGEN SATURATION: 98 % | DIASTOLIC BLOOD PRESSURE: 68 MMHG | SYSTOLIC BLOOD PRESSURE: 122 MMHG | TEMPERATURE: 97.2 F | BODY MASS INDEX: 33.41 KG/M2 | WEIGHT: 225.6 LBS | HEIGHT: 69 IN

## 2022-03-22 DIAGNOSIS — Z86.59 HISTORY OF POSTPARTUM DEPRESSION: ICD-10-CM

## 2022-03-22 DIAGNOSIS — Z87.59 HISTORY OF GESTATIONAL HYPERTENSION: ICD-10-CM

## 2022-03-22 DIAGNOSIS — O09.92 SUPERVISION OF HIGH RISK PREGNANCY IN SECOND TRIMESTER: ICD-10-CM

## 2022-03-22 DIAGNOSIS — O99.212 MATERNAL OBESITY, ANTEPARTUM, SECOND TRIMESTER: ICD-10-CM

## 2022-03-22 DIAGNOSIS — Z87.59 HISTORY OF POSTPARTUM DEPRESSION: ICD-10-CM

## 2022-03-22 DIAGNOSIS — U07.1 COVID-19 AFFECTING PREGNANCY, ANTEPARTUM: ICD-10-CM

## 2022-03-22 DIAGNOSIS — E10.65 PRE-EXISTING TYPE 1 DIABETES MELLITUS WITH HYPERGLYCEMIA DURING PREGNANCY IN SECOND TRIMESTER (HCC): Primary | ICD-10-CM

## 2022-03-22 DIAGNOSIS — O24.011 PRE-EXISTING TYPE 1 DIABETES MELLITUS DURING PREGNANCY IN FIRST TRIMESTER: ICD-10-CM

## 2022-03-22 DIAGNOSIS — O24.012 PRE-EXISTING TYPE 1 DIABETES MELLITUS WITH HYPERGLYCEMIA DURING PREGNANCY IN SECOND TRIMESTER (HCC): Primary | ICD-10-CM

## 2022-03-22 DIAGNOSIS — Z46.81 INSULIN PUMP TITRATION: ICD-10-CM

## 2022-03-22 DIAGNOSIS — E10.649 HYPOGLYCEMIA DUE TO TYPE 1 DIABETES MELLITUS (HCC): ICD-10-CM

## 2022-03-22 DIAGNOSIS — O99.210 MATERNAL OBESITY, ANTEPARTUM: ICD-10-CM

## 2022-03-22 DIAGNOSIS — Z3A.22 22 WEEKS GESTATION OF PREGNANCY: ICD-10-CM

## 2022-03-22 DIAGNOSIS — O98.519 COVID-19 AFFECTING PREGNANCY, ANTEPARTUM: ICD-10-CM

## 2022-03-22 PROBLEM — E10.9 CONTROLLED DIABETES MELLITUS TYPE 1 WITHOUT COMPLICATIONS (HCC): Status: RESOLVED | Noted: 2019-03-12 | Resolved: 2022-03-22

## 2022-03-22 PROBLEM — K64.4 EXTERNAL HEMORRHOIDS: Status: RESOLVED | Noted: 2021-05-24 | Resolved: 2022-03-22

## 2022-03-22 LAB
SL AMB  POCT GLUCOSE, UA: NEGATIVE
SL AMB POCT URINE PROTEIN: NEGATIVE

## 2022-03-22 PROCEDURE — PNV: Performed by: OBSTETRICS & GYNECOLOGY

## 2022-03-22 NOTE — ASSESSMENT & PLAN NOTE
Needs EKG still -  ordered  Recommend optho if not done recently - pt aware   Early anatomy WNL  Fetal echo, CL scheduled this week   appt with diabetic educator tomorrow   Says she has not been using dexcom, has been doing finger sticks   Reviewed future surveillance

## 2022-03-22 NOTE — ASSESSMENT & PLAN NOTE
Declines aneuploidy screening, msAFP   Declines COVID, flu vaccines  Midtrimester labs and A1C ordered for 2 weeks from now   OB, COVID precautions reviewed

## 2022-03-22 NOTE — PROGRESS NOTES
S: 36 y o  [id number] 22w4d here for routine prenatal visit  Chief Complaint   Patient presents with    Routine Prenatal Visit     No concerns         OB complaints:  Contractions: no  Leakage: no  Bleeding: no  Fetal movement: yes      O:  /68 (BP Location: Right arm, Patient Position: Sitting, Cuff Size: Large)   Pulse 77   Temp (!) 97 2 °F (36 2 °C) (Tympanic)   Ht 5' 9" (1 753 m)   Wt 102 kg (225 lb 9 6 oz)   LMP 10/15/2021 (Exact Date)   SpO2 98%   BMI 33 32 kg/m²       Review of Systems   Constitutional: Negative for chills and fever  Eyes: Negative for visual disturbance  Respiratory: Negative for chest tightness and shortness of breath  Cardiovascular: Negative for chest pain  Gastrointestinal: Negative for abdominal pain, diarrhea, nausea and vomiting  Genitourinary: Negative for pelvic pain and vaginal bleeding  As noted in HPI   Skin: Negative for rash  Neurological: Negative for headaches  All other systems reviewed and are negative  Physical Exam  Constitutional:       General: She is not in acute distress  Appearance: Normal appearance  HENT:      Head: Normocephalic and atraumatic  Cardiovascular:      Rate and Rhythm: Normal rate  Pulmonary:      Effort: Pulmonary effort is normal  No respiratory distress  Abdominal:      General: There is no distension  Palpations: Abdomen is soft  Tenderness: There is no abdominal tenderness  There is no guarding or rebound  Comments: gravid   Neurological:      General: No focal deficit present  Mental Status: She is alert  Psychiatric:         Mood and Affect: Mood normal          Behavior: Behavior normal    Vitals and nursing note reviewed             Fundal Height (cm): 22 cm  Fetal Heart Rate: 140      Pre- Vitals      Most Recent Value   Prenatal Assessment    Fetal Heart Rate 140   Fundal Height (cm) 22 cm   Movement Present   Prenatal Vitals    Blood Pressure 122/68 Weight - Scale 102 kg (225 lb 9 6 oz)   Urine Albumin/Glucose    Dilation/Effacement/Station    Vaginal Drainage    Edema             Results for orders placed or performed in visit on 03/22/22   POCT urine dip   Result Value Ref Range    POCT URINE PROTEIN negative     GLUCOSE, UA negative          Problem List        Endocrine    Pre-existing type 1 diabetes mellitus with hyperglycemia during pregnancy in second trimester ([facility] )    Overview     -T1DM  -On Humalog via Medtronic 530G insulin pump and Dexcom G6  Pending Medtronic upgrade  MN@ 2 10 units/hour; (1 785 units)  3 AM@ 1 80 units/hour; (1 53)  6 AM@ 1 90 units/hour; (1 615)  9 AM@ 2 65 units/hour;  12:30 PM @ 1 85 units/hour;  3:30 PM@ 1 85 units/hour;  10 PM@ 1 30 units/hour  Bolus settings:  Carb ratio MN@ 9; 9:30 AM@ 9; 5 PM@ 11  Correction factor 60; Active insulin time 3 hours  BG target 100  baby aspirin   Baseline labs WNL  - EKG still needed  Early anatomy US WNL  - fetal echo   - twice weekly surveillance at 32 weeks  - delivery by [facility]          Current Assessment & Plan     Needs EKG still -  ordered  Recommend optho if not done recently - pt aware   Early anatomy WNL  Fetal echo, CL scheduled this week   appt with diabetic educator tomorrow   Says she has not been using dexcom, has been doing finger sticks   Reviewed future surveillance             Other    History of gestational hypertension    Supervision of high-risk pregnancy    Overview     Declines aneuploidy screening, msAFP  Declines flu, COVID vaccines          Current Assessment & Plan     Declines aneuploidy screening, msAFP   Declines COVID, flu vaccines  Midtrimester labs and A1C ordered for 2 weeks from now   OB, COVID precautions reviewed            Maternal obesity, antepartum    Overview     prepreg BMI 30  Surveillance per DM1         History of postpartum depression    COVID-19 affecting pregnancy, antepartum    Overview     + 1/10/22           Short interval between pregnancies complicating pregnancy, antepartum, second trimester    22 weeks gestation of pregnancy    Insulin pump titration      Other Visit Diagnoses     Pre-existing type 1 diabetes mellitus during pregnancy in first trimester                                Sanjuana Pineda MD  3/22/2022  9:21 AM

## 2022-03-22 NOTE — PATIENT INSTRUCTIONS
EKG can be done walk in at urgent care  Labs to be done in approximately 2 weeks  [facility]: for up to date hours, call [phone]  [facility]/lab             Pregnancy at 23 to 26 Weeks   AMBULATORY CARE:   What changes are happening to your body: You are now close to or at the beginning of the third trimester  The third trimester starts at 24 weeks and ends with delivery  As your baby gets larger, you may develop certain symptoms  These may include pain in your back or down the sides of your abdomen  You may also have stretch marks on your abdomen, breasts, thighs, or buttocks  You may also have constipation  Seek care immediately if:   · You develop a severe headache that does not go away  · You have new or increased vision changes, such as blurred or spotted vision  · You have new or increased swelling in your face or hands  · You have vaginal spotting or bleeding  · Your water broke or you feel warm water gushing or trickling from your vagina  Call your doctor or obstetrician if:   · You have abdominal cramps, pressure, or tightening  · You have a change in vaginal discharge  · You have light bleeding  · You have chills or a fever  · You have vaginal itching, burning, or pain  · You have yellow, green, white, or foul-smelling vaginal discharge  · You have pain or burning when you urinate, less urine than usual, or pink or bloody urine  · You have questions or concerns about your condition or care  How to care for yourself at this stage of your pregnancy:       · Eat a variety of healthy foods  Healthy foods include fruits, vegetables, whole-grain breads, low-fat dairy foods, beans, lean meats, and fish  Drink liquids as directed  Ask how much liquid to drink each day and which liquids are best for you  Limit caffeine to less than 200 milligrams each day  Limit your intake of fish to 2 servings each week   Choose fish low in mercury such as canned light tuna, shrimp, salmon, cod, or tilapia  Do not  eat fish high in mercury such as swordfish, tilefish, oscar mackerel, and shark  · Manage back pain  Do not stand for long periods of time or lift heavy items  Use good posture while you stand, squat, or bend  Wear low-heeled shoes with good support  Rest may also help to relieve back pain  Ask your healthcare provider about exercises you can do to strengthen your back muscles  · Take prenatal vitamins as directed  Your need for certain vitamins and minerals, such as folic acid, increases during pregnancy  Prenatal vitamins provide some of the extra vitamins and minerals you need  Prenatal vitamins may also help to decrease the risk of certain birth defects  · Talk to your healthcare provider about exercise  Moderate exercise can help you stay fit  Your healthcare provider will help you plan an exercise program that is safe for you during pregnancy  · Do not smoke  Smoking increases your risk of a miscarriage and other health problems during your pregnancy  Smoking can cause your baby to be born too early or weigh less at birth  Ask your healthcare provider for information if you need help quitting  · Do not drink alcohol  Alcohol passes from your body to your baby through the placenta  It can affect your baby's brain development and cause fetal alcohol syndrome (FAS)  FAS is a group of conditions that causes mental, behavior, and growth problems  · Talk to your healthcare provider before you take any medicines  Many medicines may harm your baby if you take them when you are pregnant  Do not take any medicines, vitamins, herbs, or supplements without first talking to your healthcare provider  Never use illegal or street drugs (such as marijuana or cocaine) while you are pregnant  Safety tips during pregnancy:   · Avoid hot tubs and saunas    Do not use a hot tub or sauna while you are pregnant, especially during your first trimester  Hot tubs and saunas may raise your baby's temperature and increase the risk of birth defects  · Avoid toxoplasmosis  This is an infection caused by eating raw meat or being around infected cat feces  It can cause birth defects, miscarriages, and other problems  Wash your hands after you touch raw meat  Make sure any meat is well-cooked before you eat it  Avoid raw eggs and unpasteurized milk  Use gloves or ask someone else to clean your cat's litter box while you are pregnant  Changes that are happening with your baby:  By 26 weeks, your baby will weigh about 2 pounds  Your baby will be about 10 inches long from the top of the head to the rump (baby's bottom)  Your baby's movements are much stronger now  Your baby's eyes are almost completely formed and can partially open  Your baby also sleeps and wakes up  What you need to know about prenatal care: Your healthcare provider will check your blood pressure and weight  You may also need the following:  · A urine test  may also be done to check for sugar and protein  These can be signs of gestational diabetes or infection  Protein in your urine may also be a sign of preeclampsia  Preeclampsia is a condition that can develop during week 20 or later of your pregnancy  It causes high blood pressure, and it can cause problems with your kidneys and other organs  · A gestational diabetes screen  may be done  Your healthcare provider may order either a 1-step or 2-step oral glucose tolerance test (OGTT)  ? 1-step OGTT:  Your blood sugar level will be tested after you have not eaten for 8 hours (fasting)  You will then be given a glucose drink  Your level will be tested again 1 hour and 2 hours after you finish the drink  ? 2-step OGTT:  You do not have to fast for the first part of the test  You will have the glucose drink at any time of day  Your blood sugar level will be checked 1 hour later   If your blood sugar is higher than a certain level, another test will be ordered  You will fast and your blood sugar level will be tested  You will have the glucose drink  Your blood will be tested again 1 hour, 2 hours, and 3 hours after you finish the glucose drink  · Fundal height  is a measurement of your uterus to check your baby's growth  This number is usually the same as the number of weeks that you have been pregnant  · Your baby's heart rate  will be checked  Follow up with your doctor or obstetrician as directed:  Write down your questions so you remember to ask them during your visits  © Copyright [employer] 2022 Information is for End User's use only and may not be sold, redistributed or otherwise used for commercial purposes  All illustrations and images included in CareNotes® are the copyrighted property of [employer]  or [facility] Gareth Logan   The above information is an  only  It is not intended as medical advice for individual conditions or treatments  Talk to your doctor, nurse or pharmacist before following any medical regimen to see if it is safe and effective for you

## 2022-03-23 ENCOUNTER — TELEMEDICINE (OUTPATIENT)
Dept: PERINATAL CARE | Facility: CLINIC | Age: 32
End: 2022-03-23
Payer: COMMERCIAL

## 2022-03-23 VITALS — WEIGHT: 225 LBS | BODY MASS INDEX: 33.33 KG/M2 | HEIGHT: 69 IN

## 2022-03-23 DIAGNOSIS — O99.212 MATERNAL MORBID OBESITY IN SECOND TRIMESTER, ANTEPARTUM (HCC): ICD-10-CM

## 2022-03-23 DIAGNOSIS — Z46.81 INSULIN PUMP TITRATION: ICD-10-CM

## 2022-03-23 DIAGNOSIS — E10.65 PRE-EXISTING TYPE 1 DIABETES MELLITUS WITH HYPERGLYCEMIA DURING PREGNANCY IN SECOND TRIMESTER (HCC): Primary | ICD-10-CM

## 2022-03-23 DIAGNOSIS — E66.01 MATERNAL MORBID OBESITY IN SECOND TRIMESTER, ANTEPARTUM (HCC): ICD-10-CM

## 2022-03-23 DIAGNOSIS — Z3A.22 22 WEEKS GESTATION OF PREGNANCY: ICD-10-CM

## 2022-03-23 DIAGNOSIS — E10.649 HYPOGLYCEMIA DUE TO TYPE 1 DIABETES MELLITUS (HCC): ICD-10-CM

## 2022-03-23 DIAGNOSIS — O99.211 OBESITY AFFECTING PREGNANCY IN FIRST TRIMESTER: ICD-10-CM

## 2022-03-23 DIAGNOSIS — Z3A.10 10 WEEKS GESTATION OF PREGNANCY: ICD-10-CM

## 2022-03-23 DIAGNOSIS — O24.011 PRE-EXISTING TYPE 1 DIABETES MELLITUS IN PREGNANCY IN FIRST TRIMESTER: ICD-10-CM

## 2022-03-23 DIAGNOSIS — O24.012 PRE-EXISTING TYPE 1 DIABETES MELLITUS WITH HYPERGLYCEMIA DURING PREGNANCY IN SECOND TRIMESTER (HCC): Primary | ICD-10-CM

## 2022-03-23 PROCEDURE — 99215 OFFICE O/P EST HI 40 MIN: CPT | Performed by: NURSE PRACTITIONER

## 2022-03-23 RX ORDER — BLOOD-GLUCOSE SENSOR
EACH MISCELLANEOUS
Qty: 1 EACH | Refills: 12 | Status: SHIPPED | OUTPATIENT
Start: 2022-03-23

## 2022-03-23 RX ORDER — BLOOD-GLUCOSE TRANSMITTER
EACH MISCELLANEOUS
Qty: 1 EACH | Refills: 3 | Status: SHIPPED | OUTPATIENT
Start: 2022-03-23

## 2022-03-23 NOTE — ASSESSMENT & PLAN NOTE
-Repeat A1c    -On Humalog via Medtronic 770G insulin pump   -Basal settings:  MN@ 2 15 units/hour;  3 [email or web address] units/hour;  6 [email or web address] units/hour;  9 AM@ 3 70 units/hour;  12:30 [email or web address] units/hour;  10 PM@ 1 45 units/hour  Bolus settings:  Decrease correction factor to 30  Decrease carb MN@ 7; 8 PM@ 11  BG target 90     -Download insulin pump every Tuesday for Wednesday review   -Try to follow GDM diet 3 meals and 3 snacks a day  -SMBG fasting; before meals; 2 hours post meal and with hypoglycemia   -Always have glucose available and use 15 by 15 rule    -Keep ultrasound as scheduled then every 4 to 6 weeks as recommended   -Fetal echo needed   -Continue follow up with OB and MFM as recommended    -Continue close contact with diabetes team    Lab Results   Component Value Date    HGBA1C 5 3 01/22/2022

## 2022-03-23 NOTE — ASSESSMENT & PLAN NOTE
-First visit weight 206 lbs  -Current weight 225 lbs  -TWG 19 lbs  -Recommended weight gain 11 to 20 lbs  Per OBGYN 11 to 15 lbs  -Follow GDM diet

## 2022-03-23 NOTE — PROGRESS NOTES
Virtual Regular Visit    Verification of patient location:[place]    Patient is located in the following state in which I hold an active license [place]      Assessment/Plan:    Problem List Items Addressed This Visit        Endocrine    Pre-existing type 1 diabetes mellitus with hyperglycemia during pregnancy in second trimester ([place] [address] ) - Primary     -Repeat A1c    -On Humalog via Medtronic 770G insulin pump   -Basal settings:  MN@ 2 15 units/hour;  3 [email or web address] units/hour;  6 [email or web address] units/hour;  9 AM@ 3 70 units/hour;  12:30 [email or web address] units/hour;  10 PM@ 1 45 units/hour  Bolus settings:  Decrease correction factor to 30  Decrease carb MN@ 7; 8 PM@ 11  BG target 90     -Download insulin pump every Tuesday for Wednesday review   -Try to follow GDM diet 3 meals and 3 snacks a day  -SMBG fasting; before meals; 2 hours post meal and with hypoglycemia   -Always have glucose available and use 15 by 15 rule  -Keep ultrasound as scheduled then every 4 to 6 weeks as recommended   -Fetal echo needed   -Continue follow up with OB and MFM as recommended    -Continue close contact with diabetes team    Lab Results   Component Value Date    HGBA1C 5 3 01/22/2022               Other    BMI 33 0-33 9,adult    Maternal morbid obesity in second trimester, antepartum ([place] [address] )     -First visit weight 206 lbs  -Current weight 225 lbs  -TWG 19 lbs  -Recommended weight gain 11 to 20 lbs  Per OBGYN 11 to 15 lbs  -Follow GDM diet            22 weeks gestation of pregnancy    Insulin pump titration      Other Visit Diagnoses     Hypoglycemia due to type 1 diabetes mellitus ([place] [address] )        Relevant Medications    Continuous Blood Gluc Sensor (Dexcom G6 Sensor) MISC    Continuous Blood Gluc Transmit (Dexcom G6 Transmitter) MISC    Pre-existing type 1 diabetes mellitus in pregnancy in first trimester        Relevant Medications    Continuous Blood Gluc Sensor (Dexcom G6 Sensor) MISC    Continuous Blood Gluc Transmit (Dexcom G6 Transmitter) MISC    10 weeks gestation of pregnancy        Relevant Medications    Continuous Blood Gluc Sensor (Dexcom G6 Sensor) MISC    Continuous Blood Gluc Transmit (Dexcom G6 Transmitter) MISC    Obesity affecting pregnancy in first trimester        Relevant Medications    Continuous Blood Gluc Sensor (Dexcom G6 Sensor) MISC    Continuous Blood Gluc Transmit (Dexcom G6 Transmitter) MISC        Script sent for Dexcom to pharmacy to assess if covered under pharmacy vs DME  If not covered, patient to find out if [facility] is covered as an alternative  Reason for visit is   Chief Complaint   Patient presents with    Virtual Regular Visit    Diabetes Type 1        Encounter provider Monty Read     Provider located at [address] [place] [id number] [phone]      Recent Visits  No visits were found meeting these conditions  Showing recent visits within past 7 days and meeting all other requirements  Today's Visits  Date Type Provider Dept   22 [id number] [facility], [address] today's visits and meeting all other requirements  Future Appointments  No visits were found meeting these conditions  Showing future appointments within next 150 days and meeting all other requirements       The patient was identified by name and date of birth  Jose Hernandez was informed that this is a telemedicine visit and that the visit is being conducted through [facility] and patient was informed this is a secure, HIPAA-complaint platform  She agrees to proceed     My office door was closed  No one else was in the room  She acknowledged consent and understanding of privacy and security of the video platform  The patient has agreed to participate and understands they can discontinue the visit at any time  Patient is aware this is a billable service       Subjective  Bo Day is a 36 y o  female  22 5/7 weeks gestation T1DM on Humalog via Medtronic 770G insulin pump  Due to insurance Dexcom CGM discontinued March 1st  Will be using Accu-chek guide me to SMBG  Has self adjusted insulin pump settings  Today only bolus settings were adjusted due to reports that 2 hours post meal readings are 140; reports fasting glucose 100 but given self adjustment of basal settings will not make any changes at this time  CGM needs to be restarted  Has fetal growth ultrasound scheduled for tomorrow  Insulin pump downloaded from home but report basal settings are incorrect and Sharon Mike provided current settings  HPI     Past Medical History:   Diagnosis Date    Diabetes ([facility] [address] ) 1994    type 1    Diabetes mellitus ([facility] [address] )        Past Surgical History:   Procedure Laterality Date    GA WRIST Eppie Ku LIG Right 2/3/2021    Procedure: RELEASE CARPAL TUNNEL ENDOSCOPIC;  Surgeon: Momo Bella MD;  Location: [facility];  Service: Orthopedics    GA WRIST Eppie Ku LIG Left 2/23/2021    Procedure: RELEASE CARPAL TUNNEL ENDOSCOPIC;  Surgeon: Momo Bella MD;  [facility];  Service: Orthopedics    WISDOM TOOTH EXTRACTION         Current Outpatient Medications   Medication Sig Dispense Refill    glucose blood test strip Use 1 each daily as needed Use as instructed      BABY ASPIRIN PO Take 162 mg by mouth in the morning      Blood Pressure Monitoring (B-D ASSURE BPM/AUTO ARM CUFF) MISC Use 2 (two) times a day Call if BP > 140/90 (Patient not taking: Reported on 2/7/2022 ) 1 each 0    Continuous Blood Gluc  (Dexcom G6 ) DENIZ Use as directed  1 Device 0    Continuous Blood Gluc Sensor (Dexcom G6 Sensor) MISC 1 box=1 month supply or 3 sensors, use 1 sensor every 10 days  1 each 12    Continuous Blood Gluc Transmit (Dexcom G6 Transmitter) MISC Transmitter change every 90 days   1 each 3    insulin glargine (LANTUS) 100 units/mL subcutaneous injection Inject 10 Units under the skin (Patient not taking: Reported on 2/7/2022 )      insulin lispro (HumaLOG) 100 units/mL injection INJECT UP  UNITS VIA INSULIN PUMP DAILY [id number]      insulin lispro (HumaLOG) 100 units/mL injection Use up to 200 units via insulin pump daily  T2DM and pregnancy  60 mL 1    Lancets MISC by Does not apply route      Prenatal Vit-Fe Fumarate-FA (PRENATAL VITAMIN PO) Take 1 tablet by mouth daily       No current facility-administered medications for this visit  No Known Allergies    Review of Systems   Constitutional: Negative for fatigue and fever  Eyes: Negative for visual disturbance  Respiratory: Negative for cough and shortness of breath  Cardiovascular: Negative for chest pain and palpitations  Gastrointestinal: Negative for constipation, nausea and vomiting  Endocrine: Negative for polydipsia, polyphagia and polyuria  Genitourinary: Negative for vaginal bleeding  Neurological: Negative for headaches  Psychiatric/Behavioral: Negative for sleep disturbance  Video Exam  Unable to attach insulin pump download but only one glucose reading on report  Pending next download for further recommendations  Vitals:    03/23/22 1840   Weight: 102 kg (225 lb)   Height: 5' 9" (1 753 m)       Physical Exam  Constitutional:       Appearance: She is obese  HENT:      Nose: Nose normal    Eyes:      Conjunctiva/sclera: Conjunctivae normal    Pulmonary:      Effort: Pulmonary effort is normal    Musculoskeletal:      Cervical back: Normal range of motion  Neurological:      Mental Status: She is alert and oriented to person, place, and time  Psychiatric:         Mood and Affect: Mood normal          Behavior: Behavior normal          Thought Content: Thought content normal          Judgment: Judgment normal           I spent 40 minutes directly with the patient during this visit  VIRTUAL VISIT DISCLAIMER      Jennie Hernandez verbally agrees to participate in [employer]   Pt is aware that [employer] could be limited without vital signs or the ability to perform a full hands-on physical Woody Hernandez understands she or the provider may request at any time to terminate the video visit and request the patient to seek care or treatment in person

## 2022-03-23 NOTE — PROGRESS NOTES
Please refer to the [facility] ultrasound report in Ob Procedures for additional information regarding today's visit

## 2022-03-23 NOTE — PATIENT INSTRUCTIONS
-If Dexcom not covered via pharmacy benefits; find out if [facility] is covered and contact office    -Repeat A1c    -On Humalog via Medtronic 770G insulin pump   -Basal settings:  MN@ 2 15 units/hour;  3 [email or web address] units/hour;  6 [email or web address] units/hour;  9 AM@ 3 70 units/hour;  12:30 [email or web address] units/hour;  10 PM@ 1 45 units/hour  Bolus settings:  Decrease correction factor to 30  Decrease carb MN@ 7; 8 PM@ 11  BG target 90     -Download insulin pump every Tuesday for Wednesday review   -Try to follow GDM diet 3 meals and 3 snacks a day  -SMBG fasting; before meals; 2 hours post meal and with hypoglycemia   -Always have glucose available and use 15 by 15 rule    -Keep ultrasound as scheduled then every 4 to 6 weeks as recommended   -Fetal echo needed   -Continue follow up with OB and MFM as recommended    -Continue close contact with diabetes team

## 2022-03-24 ENCOUNTER — ROUTINE PRENATAL (OUTPATIENT)
Dept: PERINATAL CARE | Facility: OTHER | Age: 32
End: 2022-03-24
Payer: COMMERCIAL

## 2022-03-24 VITALS
DIASTOLIC BLOOD PRESSURE: 75 MMHG | SYSTOLIC BLOOD PRESSURE: 126 MMHG | BODY MASS INDEX: 33.44 KG/M2 | HEIGHT: 69 IN | HEART RATE: 84 BPM | WEIGHT: 225.8 LBS

## 2022-03-24 DIAGNOSIS — Z3A.22 22 WEEKS GESTATION OF PREGNANCY: Primary | ICD-10-CM

## 2022-03-24 DIAGNOSIS — O24.012 PRE-EXISTING TYPE 1 DIABETES MELLITUS WITH HYPERGLYCEMIA DURING PREGNANCY IN SECOND TRIMESTER (HCC): ICD-10-CM

## 2022-03-24 DIAGNOSIS — Z36.86 ENCOUNTER FOR ANTENATAL SCREENING FOR CERVICAL LENGTH: ICD-10-CM

## 2022-03-24 DIAGNOSIS — E10.65 PRE-EXISTING TYPE 1 DIABETES MELLITUS WITH HYPERGLYCEMIA DURING PREGNANCY IN SECOND TRIMESTER (HCC): ICD-10-CM

## 2022-03-24 PROCEDURE — 76828 ECHO EXAM OF FETAL HEART: CPT | Performed by: OBSTETRICS & GYNECOLOGY

## 2022-03-24 PROCEDURE — 93325 DOPPLER ECHO COLOR FLOW MAPG: CPT | Performed by: OBSTETRICS & GYNECOLOGY

## 2022-03-24 PROCEDURE — 76817 TRANSVAGINAL US OBSTETRIC: CPT | Performed by: OBSTETRICS & GYNECOLOGY

## 2022-03-24 PROCEDURE — 76825 ECHO EXAM OF FETAL HEART: CPT | Performed by: OBSTETRICS & GYNECOLOGY

## 2022-03-24 NOTE — LETTER
March 24, 2022     [address] [facility],[address]  [place] [id number] [address]    Patient: Kayleigh RICHARDSON [facility]   YOB: 1990   Date of Visit: 3/24/2022       Dear Dr Ravin Kang: Thank you for referring Marita Burns to me for evaluation  Below are my notes for this consultation  If you have questions, please do not hesitate to call me  I look forward to following your patient along with you  Sincerely,        Merced Brown MD        CC: No Recipients  Merced Brown MD  3/23/2022  5:20 PM  Sign when Signing Visit  Please refer to the [facility] ultrasound report in Ob Procedures for additional information regarding today's visit

## 2022-03-31 ENCOUNTER — APPOINTMENT (OUTPATIENT)
Dept: LAB | Facility: CLINIC | Age: 32
End: 2022-03-31
Payer: COMMERCIAL

## 2022-03-31 DIAGNOSIS — O24.011 PRE-EXISTING TYPE 1 DIABETES MELLITUS DURING PREGNANCY IN FIRST TRIMESTER: ICD-10-CM

## 2022-03-31 DIAGNOSIS — O09.92 SUPERVISION OF HIGH RISK PREGNANCY IN SECOND TRIMESTER: ICD-10-CM

## 2022-03-31 LAB
ERYTHROCYTE [DISTWIDTH] IN BLOOD BY AUTOMATED COUNT: 13.6 % (ref 11.6–15.1)
HCT VFR BLD AUTO: 35.5 % (ref 34.8–46.1)
HGB BLD-MCNC: 11.8 G/DL (ref 11.5–15.4)
MCH RBC QN AUTO: 30.3 PG (ref 26.8–34.3)
MCHC RBC AUTO-ENTMCNC: 33.2 G/DL (ref 31.4–37.4)
MCV RBC AUTO: 91 FL (ref 82–98)
PLATELET # BLD AUTO: 254 THOUSANDS/UL (ref 149–390)
PMV BLD AUTO: 11.2 FL (ref 8.9–12.7)
RBC # BLD AUTO: 3.89 MILLION/UL (ref 3.81–5.12)
WBC # BLD AUTO: 10.67 THOUSAND/UL (ref 4.31–10.16)

## 2022-03-31 PROCEDURE — 85027 COMPLETE CBC AUTOMATED: CPT

## 2022-03-31 PROCEDURE — 36415 COLL VENOUS BLD VENIPUNCTURE: CPT

## 2022-03-31 PROCEDURE — 83036 HEMOGLOBIN GLYCOSYLATED A1C: CPT

## 2022-03-31 PROCEDURE — 86592 SYPHILIS TEST NON-TREP QUAL: CPT

## 2022-04-01 LAB
EST. AVERAGE GLUCOSE BLD GHB EST-MCNC: 100 MG/DL
HBA1C MFR BLD: 5.1 %
RPR SER QL: NORMAL

## 2022-04-08 NOTE — ASSESSMENT & PLAN NOTE
Hgb a1c at goal  Has CGM and insulin pump  Reports FBS typically < 90 and PP 1h ~180 and 2H pp ~140  Has had MFM consult and has f/u scheduled with early anatomy US  Will continue to follow  Fetal Echo at 22-24 wks  Pt aware will need 3rd trimester US for growth as well as A/N testing at 32 wks  Plan for deliver ~ 39 wks     Lab Results   Component Value Date    HGBA1C 5 2 04/17/2021
Hx of HTN with deliver and PP  Reviewed increased risk for recurrence and with hx of DM is at increased risk for pre-eclampsia  on low dose aspirin for prophylaxis   (now -36 wks)
Hx of PPD  No meds currently  Feels well overall    Denies moodiness, sadness, or hopelessness
Patient meeting criteria for obesity   Pre gestational There is no height or weight on file to calculate BMI  Reviewed increased risk related to obesity in pregnancy of gestational diabetes, preeclampsia, operative complications, Caesarean section, increased risk of VTE, and increased risk for postpartum infection  Its not advised to lose weight in pregnancy however weight gain should be limited to 11-15 lb  Reviewed healthy lifestyle and dietary/activity modifications to minimize weight gain  Early Glucola, and TSH ordered a baseline 
Recent hx of covid infection  Sxs started 1/9  Pos results 1/10  sxs resolved   Feels well overall    - growth US in 3rd trimester
Yes - the patient is able to be screened

## 2022-04-19 ENCOUNTER — TELEPHONE (OUTPATIENT)
Dept: PERINATAL CARE | Facility: OTHER | Age: 32
End: 2022-04-19

## 2022-04-19 ENCOUNTER — ULTRASOUND (OUTPATIENT)
Dept: PERINATAL CARE | Facility: OTHER | Age: 32
End: 2022-04-19
Payer: COMMERCIAL

## 2022-04-19 ENCOUNTER — ROUTINE PRENATAL (OUTPATIENT)
Dept: OBGYN CLINIC | Facility: CLINIC | Age: 32
End: 2022-04-19

## 2022-04-19 VITALS
HEIGHT: 69 IN | BODY MASS INDEX: 35.58 KG/M2 | HEART RATE: 92 BPM | SYSTOLIC BLOOD PRESSURE: 120 MMHG | DIASTOLIC BLOOD PRESSURE: 79 MMHG | WEIGHT: 240.2 LBS

## 2022-04-19 VITALS
DIASTOLIC BLOOD PRESSURE: 60 MMHG | WEIGHT: 240.4 LBS | TEMPERATURE: 97.3 F | SYSTOLIC BLOOD PRESSURE: 114 MMHG | BODY MASS INDEX: 35.6 KG/M2 | HEART RATE: 90 BPM | HEIGHT: 69 IN | OXYGEN SATURATION: 97 %

## 2022-04-19 DIAGNOSIS — Z87.59 HISTORY OF POSTPARTUM DEPRESSION: ICD-10-CM

## 2022-04-19 DIAGNOSIS — E10.65 PRE-EXISTING TYPE 1 DIABETES MELLITUS WITH HYPERGLYCEMIA DURING PREGNANCY IN SECOND TRIMESTER (HCC): ICD-10-CM

## 2022-04-19 DIAGNOSIS — U07.1 COVID-19 AFFECTING PREGNANCY, ANTEPARTUM: ICD-10-CM

## 2022-04-19 DIAGNOSIS — O99.212 MATERNAL MORBID OBESITY IN SECOND TRIMESTER, ANTEPARTUM (HCC): ICD-10-CM

## 2022-04-19 DIAGNOSIS — O99.210 OBESITY IN PREGNANCY, ANTEPARTUM: ICD-10-CM

## 2022-04-19 DIAGNOSIS — Z3A.22 22 WEEKS GESTATION OF PREGNANCY: ICD-10-CM

## 2022-04-19 DIAGNOSIS — O09.92 SUPERVISION OF HIGH RISK PREGNANCY IN SECOND TRIMESTER: Primary | ICD-10-CM

## 2022-04-19 DIAGNOSIS — O24.012 PRE-EXISTING TYPE 1 DIABETES MELLITUS WITH HYPERGLYCEMIA DURING PREGNANCY IN SECOND TRIMESTER (HCC): Primary | ICD-10-CM

## 2022-04-19 DIAGNOSIS — O24.012 PRE-EXISTING TYPE 1 DIABETES MELLITUS WITH HYPERGLYCEMIA DURING PREGNANCY IN SECOND TRIMESTER (HCC): ICD-10-CM

## 2022-04-19 DIAGNOSIS — Z87.59 HISTORY OF GESTATIONAL HYPERTENSION: ICD-10-CM

## 2022-04-19 DIAGNOSIS — Z3A.26 26 WEEKS GESTATION OF PREGNANCY: ICD-10-CM

## 2022-04-19 DIAGNOSIS — E66.01 MATERNAL MORBID OBESITY IN SECOND TRIMESTER, ANTEPARTUM (HCC): ICD-10-CM

## 2022-04-19 DIAGNOSIS — Z36.89 ENCOUNTER FOR ULTRASOUND TO ASSESS FETAL GROWTH: ICD-10-CM

## 2022-04-19 DIAGNOSIS — E10.65 PRE-EXISTING TYPE 1 DIABETES MELLITUS WITH HYPERGLYCEMIA DURING PREGNANCY IN SECOND TRIMESTER (HCC): Primary | ICD-10-CM

## 2022-04-19 DIAGNOSIS — O09.892 SHORT INTERVAL BETWEEN PREGNANCIES COMPLICATING PREGNANCY, ANTEPARTUM, SECOND TRIMESTER: ICD-10-CM

## 2022-04-19 DIAGNOSIS — O98.519 COVID-19 AFFECTING PREGNANCY, ANTEPARTUM: ICD-10-CM

## 2022-04-19 DIAGNOSIS — Z86.59 HISTORY OF POSTPARTUM DEPRESSION: ICD-10-CM

## 2022-04-19 LAB
SL AMB  POCT GLUCOSE, UA: NEGATIVE
SL AMB POCT URINE PROTEIN: NEGATIVE

## 2022-04-19 PROCEDURE — 99213 OFFICE O/P EST LOW 20 MIN: CPT | Performed by: OBSTETRICS & GYNECOLOGY

## 2022-04-19 PROCEDURE — 76816 OB US FOLLOW-UP PER FETUS: CPT | Performed by: OBSTETRICS & GYNECOLOGY

## 2022-04-19 PROCEDURE — PNV: Performed by: CLINICAL NURSE SPECIALIST

## 2022-04-19 NOTE — PROGRESS NOTES
Prenatal Visit  Subjective:   Yaw Muñoz is a 36 y o  [id number] 26w4d here for Routine Prenatal Visit (No concerns)      Denies unusual vaginal discharge, LOF, VB, or ctx  Reports Fetus is active  Pre-gest DM-type 1- dexcom G6 with mostly nml BG- some overnight hypoglycemia humalog via insulin pump  A1c 5 1 on 3/31   Fetal echo and level2 were WNL  No anemia on 3rd trimester labs    Hx of GHTN- no c/o HA, vision changes or RUQ pain  BP normal    Objective:  Vitals:    04/19/22 0804   BP: 114/60   Pulse: 90   Temp: (!) 97 3 °F (36 3 °C)   SpO2: 97%     Pregravid Weight/BMI: 93 4 kg (206 lb) (BMI 30 41)  Current Weight: 109 kg (240 lb 6 4 oz)   Total Weight Gain: 15 6 kg (34 lb 6 4 oz)     Fetal Heart Rate: 145  Fundal Height (cm): 26 cm    OBGyn Exam  Physical Exam  Fetal Heart Rate: 145 , Fundal Height (cm): 26 cm  General: Not in acute distress and appearing well nourished and well groomed  Genitourinary:          Pelvic exam exam deferred   Cardiovascular:   Rate and Rhythm: Normal rate  Pulmonary:    Effort: normal, not labored  Abdominal:  Abdomen is soft and gravid    Musculoskeletal: Active movement of all extremities, no gross limitations of ROM     Edema: None  Neurological:   Mental Status: She is alert and oriented to person, place, and time  Skin: General: Skin is warm and dry  Psychiatric:    Mood and Affect: Mood normal       Behavior: Behavior normal      Assessment & Plan:    1  Supervision of high risk pregnancy in second trimester  Assessment & Plan:    26w4d  Doing well w/o complaints today  Reviewed the following today:   S/S PTL including to call if having >  4-6 ctx in an hour   Completed 3rd trimester BW and WNL   Tdap vaccine- to be given to all pregnant women in the 3rd trimester (27-36 weeks) of each pregnancy in order to protect against pertussis    It is also recommended that anyone who is going to have close contact with the baby also get the vaccine at their health care provider  2  Pre-existing type 1 diabetes mellitus with hyperglycemia during pregnancy in second trimester [facility])  Assessment & Plan:  Doing well regarding BG  Following closely with MFM  Level 2 US and fetal echo WNL  Us for growth today  3  22 weeks gestation of pregnancy  -     POCT urine dip    4  Maternal morbid obesity in second trimester, antepartum ([address] )  Assessment & Plan:  34 lb wt gain already  Is eating well due to diabetic diet  Discussed trying in increase activity slightly  5  History of postpartum depression  Assessment & Plan:  Denies depressed/anxious mood presently      6  History of gestational hypertension  Assessment & Plan:   no c/o HA, vision changes or RUQ pain  BP normal  S/S pre-e reviewed to call for  7  COVID-19 affecting pregnancy, antepartum  Assessment & Plan:  Hx of COVID in January   Growth surveillance per DM        MARCO Pathak  4/19/2022

## 2022-04-19 NOTE — ASSESSMENT & PLAN NOTE
26w4d  Doing well w/o complaints today  Reviewed the following today:   S/S PTL including to call if having >  4-6 ctx in an hour   Completed 3rd trimester BW and WNL   Tdap vaccine- to be given to all pregnant women in the 3rd trimester (27-36 weeks) of each pregnancy in order to protect against pertussis  It is also recommended that anyone who is going to have close contact with the baby also get the vaccine at their health care provider

## 2022-04-19 NOTE — PROGRESS NOTES
[address]: Ms Isreal Martin was seen today for fetal growth and followup missed anatomy ultrasound  See ultrasound report under "OB Procedures" tab  The time spent on this established patient on the encounter date included 5 minutes previsit service time reviewing records and precharting, 5 minutes face-to-face service time counseling regarding results and coordinating care, and  4 minutes charting, totalling 14 minutes  Please don't hesitate to contact our office with any concerns or questions    Ahsan Joyce MD

## 2022-04-19 NOTE — ASSESSMENT & PLAN NOTE
34 lb wt gain already  Is eating well due to diabetic diet  Discussed trying in increase activity slightly

## 2022-04-19 NOTE — TELEPHONE ENCOUNTER
Scheduled patient for her 4wk growth, and I offered our availabilities at multiple offices, and she declined  She wanted to start the week after  I stated that I will make a notation that I offered multiple locations, patient confirmed

## 2022-04-19 NOTE — PATIENT INSTRUCTIONS
Thank you for choosing us for your  care today  If you have any questions about your ultrasound or care, please do not hesitate to contact us or your primary obstetrician  Some general instructions for your pregnancy are:     Protect against coronavirus: get vaccinated - pregnant women are increased risk of severe COVID  Notify your primary care doctor if you have any symptoms   Exercise: Aim for 22 minutes per day (150 minutes per week) of regular exercise  Walking is great!  Nutrition: aim for calcium-rich and iron-rich foods as well as healthy sources of protein   Learn about Preeclampsia: preeclampsia is a common, serious high blood pressure complication in pregnancy  A blood pressure of 756LZVD (systolic or top number) or 11RGVN (diastolic or bottom number) is not normal and needs evaluation by your doctor  Aspirin is sometimes prescribed in early pregnancy to prevent preeclampsia in women with risk factors - ask your obstetrician if you should be on this medication   If you smoke, try to reduce how many cigarettes you smoke or try to quit completely  Do not vape   Other warning signs to watch out for in pregnancy or postpartum: chest pain, obstructed breathing or shortness of breath, seizures, thoughts of hurting yourself or your baby, bleeding, a painful or swollen leg, fever, or headache (see [employer] POST-BIRTH Warning Signs campaign)  If these happen call 911  Itching is also not normal in pregnancy and if you experience this, especially over your hands and feet, potentially worse at night, notify your doctors

## 2022-04-19 NOTE — ASSESSMENT & PLAN NOTE
Doing well regarding BG  Following closely with MFM  Level 2 US and fetal echo WNL  Us for growth today

## 2022-04-28 ENCOUNTER — TELEPHONE (OUTPATIENT)
Dept: PERINATAL CARE | Facility: CLINIC | Age: 32
End: 2022-04-28

## 2022-05-10 ENCOUNTER — ROUTINE PRENATAL (OUTPATIENT)
Dept: OBGYN CLINIC | Facility: CLINIC | Age: 32
End: 2022-05-10
Payer: COMMERCIAL

## 2022-05-10 VITALS
WEIGHT: 251 LBS | SYSTOLIC BLOOD PRESSURE: 104 MMHG | DIASTOLIC BLOOD PRESSURE: 62 MMHG | HEIGHT: 69 IN | OXYGEN SATURATION: 98 % | TEMPERATURE: 97.5 F | BODY MASS INDEX: 37.18 KG/M2 | HEART RATE: 88 BPM

## 2022-05-10 DIAGNOSIS — Z86.59 HISTORY OF POSTPARTUM DEPRESSION: ICD-10-CM

## 2022-05-10 DIAGNOSIS — E66.01 MATERNAL MORBID OBESITY IN SECOND TRIMESTER, ANTEPARTUM (HCC): ICD-10-CM

## 2022-05-10 DIAGNOSIS — Z87.59 HISTORY OF GESTATIONAL HYPERTENSION: ICD-10-CM

## 2022-05-10 DIAGNOSIS — Z46.81 INSULIN PUMP TITRATION: ICD-10-CM

## 2022-05-10 DIAGNOSIS — O98.519 COVID-19 AFFECTING PREGNANCY, ANTEPARTUM: ICD-10-CM

## 2022-05-10 DIAGNOSIS — Z87.59 HISTORY OF POSTPARTUM DEPRESSION: ICD-10-CM

## 2022-05-10 DIAGNOSIS — O24.012 PRE-EXISTING TYPE 1 DIABETES MELLITUS WITH HYPERGLYCEMIA DURING PREGNANCY IN SECOND TRIMESTER (HCC): ICD-10-CM

## 2022-05-10 DIAGNOSIS — Z3A.29 29 WEEKS GESTATION OF PREGNANCY: Primary | ICD-10-CM

## 2022-05-10 DIAGNOSIS — O09.93 SUPERVISION OF HIGH RISK PREGNANCY IN THIRD TRIMESTER: ICD-10-CM

## 2022-05-10 DIAGNOSIS — U07.1 COVID-19 AFFECTING PREGNANCY, ANTEPARTUM: ICD-10-CM

## 2022-05-10 DIAGNOSIS — O99.212 MATERNAL MORBID OBESITY IN SECOND TRIMESTER, ANTEPARTUM (HCC): ICD-10-CM

## 2022-05-10 DIAGNOSIS — O09.893 SHORT INTERVAL BETWEEN PREGNANCIES COMPLICATING PREGNANCY IN THIRD TRIMESTER, ANTEPARTUM: ICD-10-CM

## 2022-05-10 DIAGNOSIS — E10.65 PRE-EXISTING TYPE 1 DIABETES MELLITUS WITH HYPERGLYCEMIA DURING PREGNANCY IN SECOND TRIMESTER (HCC): ICD-10-CM

## 2022-05-10 DIAGNOSIS — Z23 NEED FOR TDAP VACCINATION: ICD-10-CM

## 2022-05-10 PROBLEM — O24.313 PRE-EXISTING DIABETES MELLITUS AFFECTING PREGNANCY IN THIRD TRIMESTER, ANTEPARTUM: Status: ACTIVE | Noted: 2021-12-21

## 2022-05-10 LAB
SL AMB  POCT GLUCOSE, UA: NEGATIVE
SL AMB POCT URINE PROTEIN: NEGATIVE

## 2022-05-10 PROCEDURE — 90471 IMMUNIZATION ADMIN: CPT

## 2022-05-10 PROCEDURE — PNV: Performed by: OBSTETRICS & GYNECOLOGY

## 2022-05-10 PROCEDURE — 90715 TDAP VACCINE 7 YRS/> IM: CPT

## 2022-05-10 NOTE — PATIENT INSTRUCTIONS
Pregnancy at 32 to 30 Weeks   AMBULATORY CARE:   What changes are happening to your body: You may notice new symptoms such as shortness of breath, heartburn, or swelling of your ankles and feet  You may also have trouble sleeping or contractions  Seek care immediately if:   · You develop a severe headache that does not go away  · You have new or increased vision changes, such as blurred or spotted vision  · You have new or increased swelling in your face or hands  · You have vaginal spotting or bleeding  · Your water broke or you feel warm water gushing or trickling from your vagina  Call your doctor or obstetrician if:   · You have more than 5 contractions in 1 hour  · You notice any changes in your baby's movements  · You have abdominal cramps, pressure, or tightening  · You have a change in vaginal discharge  · You have chills or a fever  · You have vaginal itching, burning, or pain  · You have yellow, green, white, or foul-smelling vaginal discharge  · You have pain or burning when you urinate, less urine than usual, or pink or bloody urine  · You have questions or concerns about your condition or care  How to care for yourself at this stage of your pregnancy:       · Eat a variety of healthy foods  Healthy foods include fruits, vegetables, whole-grain breads, low-fat dairy foods, beans, lean meats, and fish  Drink liquids as directed  Ask how much liquid to drink each day and which liquids are best for you  Limit caffeine to less than 200 milligrams each day  Limit your intake of fish to 2 servings each week  Choose fish low in mercury such as canned light tuna, shrimp, salmon, cod, or tilapia  Do not  eat fish high in mercury such as swordfish, tilefish, oscar mackerel, and shark  · Manage heartburn  by eating 4 or 5 small meals each day instead of large meals  Avoid spicy food  · Manage swelling  by lying down and putting your feet up           · Take prenatal vitamins as directed  Your need for certain vitamins and minerals, such as folic acid, increases during pregnancy  Prenatal vitamins provide some of the extra vitamins and minerals you need  Prenatal vitamins may also help to decrease the risk of certain birth defects  · Talk to your healthcare provider about exercise  Moderate exercise can help you stay fit  Your healthcare provider will help you plan an exercise program that is safe for you during pregnancy  · Do not smoke  Smoking increases your risk of a miscarriage and other health problems during your pregnancy  Smoking can cause your baby to be born too early or weigh less at birth  Ask your healthcare provider for information if you need help quitting  · Do not drink alcohol  Alcohol passes from your body to your baby through the placenta  It can affect your baby's brain development and cause fetal alcohol syndrome (FAS)  FAS is a group of conditions that causes mental, behavior, and growth problems  · Talk to your healthcare provider before you take any medicines  Many medicines may harm your baby if you take them when you are pregnant  Do not take any medicines, vitamins, herbs, or supplements without first talking to your healthcare provider  Never use illegal or street drugs (such as marijuana or cocaine) while you are pregnant  Safety tips during pregnancy:   · Avoid hot tubs and saunas  Do not use a hot tub or sauna while you are pregnant, especially during your first trimester  Hot tubs and saunas may raise your baby's temperature and increase the risk of birth defects  · Avoid toxoplasmosis  This is an infection caused by eating raw meat or being around infected cat feces  It can cause birth defects, miscarriages, and other problems  Wash your hands after you touch raw meat  Make sure any meat is well-cooked before you eat it  Avoid raw eggs and unpasteurized milk   Use gloves or ask someone else to clean your cat's litter box while you are pregnant  Changes that are happening with your baby:  By 30 weeks, your baby may weigh more than 3 pounds  Your baby may be about 11 inches long from the top of the head to the rump (baby's bottom)  Your baby's eyes open and close now  Your baby's kicks and movements are more forceful at this time  What you need to know about prenatal care: Your healthcare provider will check your blood pressure and weight  You may also need the following:  · Blood tests  may be done to check for anemia or blood type  · A urine test  may also be done to check for sugar and protein  These can be signs of gestational diabetes or infection  Protein in your urine may also be a sign of preeclampsia  Preeclampsia is a condition that can develop during week 20 or later of your pregnancy  It causes high blood pressure, and it can cause problems with your kidneys and other organs  · A Tdap vaccine and flu vaccine  may be recommended by your healthcare provider  · A gestational diabetes screen  may be done  Your healthcare provider may order either a 1-step or 2-step oral glucose tolerance test (OGTT)  ? 1-step OGTT:  Your blood sugar level will be tested after you have not eaten for 8 hours (fasting)  You will then be given a glucose drink  Your level will be tested again 1 hour and 2 hours after you finish the drink  ? 2-step OGTT:  You do not have to fast for the first part of the test  You will have the glucose drink at any time of day  Your blood sugar level will be checked 1 hour later  If your blood sugar is higher than a certain level, another test will be ordered  You will fast and your blood sugar level will be tested  You will have the glucose drink  Your blood will be tested again 1 hour, 2 hours, and 3 hours after you finish the glucose drink  · Fundal height  is a measurement of your uterus to check your baby's growth   This number is usually the same as the number of weeks that you have been pregnant  Your healthcare provider may also check your baby's position  · Your baby's heart rate  will be checked  Follow up with your doctor or obstetrician as directed:  Write down your questions so you remember to ask them during your visits  © Copyright [employer] 2022 Information is for End User's use only and may not be sold, redistributed or otherwise used for commercial purposes  All illustrations and images included in CareNotes® are the copyrighted property of [employer]  or [facility] Gareth Logan   The above information is an  only  It is not intended as medical advice for individual conditions or treatments  Talk to your doctor, nurse or pharmacist before following any medical regimen to see if it is safe and effective for you

## 2022-05-10 NOTE — PROGRESS NOTES
S: 36 y o  [id number] 29w4d here for routine prenatal visit  Chief Complaint   Patient presents with    Routine Prenatal Visit     TDAP due; no concerns         OB complaints:  Contractions: no  Leakage: no  Bleeding: no  Fetal movement: yes      O:  /62 (BP Location: Right arm, Patient Position: Sitting, Cuff Size: Large)   Pulse 88   Temp 97 5 °F (36 4 °C) (Tympanic)   Ht 5' 9" (1 753 m)   Wt 114 kg (251 lb)   LMP 10/15/2021 (Exact Date)   SpO2 98%   BMI 37 07 kg/m²       Review of Systems   Constitutional: Negative for chills and fever  Eyes: Negative for visual disturbance  Respiratory: Negative for chest tightness and shortness of breath  Cardiovascular: Negative for chest pain  Gastrointestinal: Negative for abdominal pain, diarrhea, nausea and vomiting  Genitourinary: Negative for pelvic pain and vaginal bleeding  As noted in HPI   Skin: Negative for rash  Neurological: Negative for headaches  All other systems reviewed and are negative  Physical Exam  Constitutional:       General: She is not in acute distress  Appearance: Normal appearance  HENT:      Head: Normocephalic and atraumatic  Cardiovascular:      Rate and Rhythm: Normal rate  Pulmonary:      Effort: Pulmonary effort is normal  No respiratory distress  Abdominal:      General: There is no distension  Palpations: Abdomen is soft  Tenderness: There is no abdominal tenderness  There is no guarding or rebound  Comments: gravid   Neurological:      General: No focal deficit present  Mental Status: She is alert  Psychiatric:         Mood and Affect: Mood normal          Behavior: Behavior normal    Vitals and nursing note reviewed             Fundal Height (cm): 29 cm  Fetal Heart Rate: 140      Pre-Enoc Vitals      Most Recent Value   Prenatal Assessment    Fetal Heart Rate 140   Fundal Height (cm) 29 cm   Movement Present   Prenatal Vitals    Blood Pressure 104/62 Weight - Scale 114 kg (251 lb)   Urine Albumin/Glucose    Dilation/Effacement/Station    Vaginal Drainage    Edema             Results for orders placed or performed in visit on 05/10/22   POCT urine dip   Result Value Ref Range    POCT URINE PROTEIN Negative     GLUCOSE, UA Negative          Problem List        Endocrine    Pre-existing diabetes mellitus affecting pregnancy in third trimester, antepartum    Overview     -T1DM  -On Humalog via Medtronic 530G insulin pump and Dexcom G6  Pending Medtronic upgrade      baby aspirin   Baseline labs WNL  - EKG still needed  Early and routine anatomy US WNL  fetal echo WNL  - serial US   - twice weekly surveillance at 32 weeks  - delivery by [facility]          Current Assessment & Plan     Taking aspirin  Recent growth US WNL   Aware she still needs to complete EKG  Start twice weekly surveillance at 32 weeks - has appts at  scheduled            Other    BMI 33 0-33 9,adult    History of gestational hypertension    Supervision of high-risk pregnancy    Overview     Declines aneuploidy screening, msAFP  Declines flu, COVID vaccines   S/p tdap          Current Assessment & Plan     Accepts tdap today  Consents given to review, sign next visit  OB, COVID precautions reviewed         Maternal morbid obesity in second trimester, antepartum ([address] )    Overview     prepreg BMI 30  Surveillance per DM1         History of postpartum depression    COVID-19 affecting pregnancy, antepartum    Overview     + 1/10/22           Short interval between pregnancies complicating pregnancy in third trimester, antepartum    Current Assessment & Plan     Undecided on [facility], plans on more children in the future, reviewed risks of short interval pregnancy          29 weeks gestation of pregnancy    Insulin pump titration      Other Visit Diagnoses     Need for Tdap vaccination                                Carloz Alfaro MD  5/10/2022  8:57 AM

## 2022-05-10 NOTE — ASSESSMENT & PLAN NOTE
Undecided on [facility], plans on more children in the future, reviewed risks of short interval pregnancy

## 2022-05-10 NOTE — ASSESSMENT & PLAN NOTE
Taking aspirin  Recent growth US WNL   Aware she still needs to complete EKG  Start twice weekly surveillance at 32 weeks - has appts at  scheduled

## 2022-05-23 ENCOUNTER — ULTRASOUND (OUTPATIENT)
Dept: PERINATAL CARE | Facility: OTHER | Age: 32
End: 2022-05-23
Payer: COMMERCIAL

## 2022-05-23 VITALS
SYSTOLIC BLOOD PRESSURE: 130 MMHG | DIASTOLIC BLOOD PRESSURE: 76 MMHG | WEIGHT: 254.8 LBS | HEIGHT: 69 IN | BODY MASS INDEX: 37.74 KG/M2 | HEART RATE: 86 BPM

## 2022-05-23 DIAGNOSIS — Z36.89 ENCOUNTER FOR ULTRASOUND TO ASSESS FETAL GROWTH: Primary | ICD-10-CM

## 2022-05-23 DIAGNOSIS — Z3A.31 31 WEEKS GESTATION OF PREGNANCY: ICD-10-CM

## 2022-05-23 DIAGNOSIS — O24.313 PRE-EXISTING DIABETES MELLITUS AFFECTING PREGNANCY IN THIRD TRIMESTER, ANTEPARTUM: ICD-10-CM

## 2022-05-23 PROCEDURE — 99214 OFFICE O/P EST MOD 30 MIN: CPT | Performed by: OBSTETRICS & GYNECOLOGY

## 2022-05-23 PROCEDURE — 76816 OB US FOLLOW-UP PER FETUS: CPT | Performed by: OBSTETRICS & GYNECOLOGY

## 2022-05-23 NOTE — PATIENT INSTRUCTIONS
Please refer to " Ultrasound" under test results in MyChart for today's ultrasound findings  Congratulations, and best wishes for a healthy remainder of the pregnancy! Thank you for choosing [facility] for your visit today  We appreciate your trust and the opportunity to assist your obstetrician with your care  We value your feedback regarding the care we are providing  Following today's appointment, you may receive a patient satisfaction survey by mail or e-mail requesting feedback on your visit  We ask that you complete the survey to  help us understand how we are doing  Thank you for in advance for your feedback

## 2022-05-23 NOTE — PROGRESS NOTES
[address]: Ms Jolie Kocher was seen today at 31w3d for fetal growth assessment ultrasound  See ultrasound report under "OB Procedures" tab    Please don't hesitate to contact our office with any concerns or questions   -Ru Ching MD

## 2022-05-23 NOTE — LETTER
May 23, 2022     MD Emilio [id number]  [address] [facility]    Patient: Malena Molina   YOB: 1990   Date of Visit: 5/23/2022       Dear Dr Kerline Cross: Thank you for referring Suzanna Delaney to me for evaluation  Below are my notes for this consultation  If you have questions, please do not hesitate to call me  I look forward to following your patient along with you  Sincerely,        Salvador Mueller MD        CC: No Recipients  Salvador Mueller MD  5/23/2022  8:38 AM  Sign when Signing Visit  [address]: Ms Dedra Barros was seen today at 31w3d for fetal growth assessment ultrasound  See ultrasound report under "OB Procedures" tab    Please don't hesitate to contact our office with any concerns or questions   -Salvador Mueller MD

## 2022-05-25 ENCOUNTER — TELEMEDICINE (OUTPATIENT)
Dept: PERINATAL CARE | Facility: CLINIC | Age: 32
End: 2022-05-25
Payer: COMMERCIAL

## 2022-05-25 ENCOUNTER — TELEPHONE (OUTPATIENT)
Dept: PERINATAL CARE | Facility: OTHER | Age: 32
End: 2022-05-25

## 2022-05-25 VITALS — HEIGHT: 69 IN | WEIGHT: 254 LBS | BODY MASS INDEX: 37.62 KG/M2

## 2022-05-25 DIAGNOSIS — Z46.81 INSULIN PUMP TITRATION: ICD-10-CM

## 2022-05-25 DIAGNOSIS — Z3A.31 31 WEEKS GESTATION OF PREGNANCY: ICD-10-CM

## 2022-05-25 DIAGNOSIS — O24.313 PRE-EXISTING DIABETES MELLITUS AFFECTING PREGNANCY IN THIRD TRIMESTER, ANTEPARTUM: Primary | ICD-10-CM

## 2022-05-25 DIAGNOSIS — E66.01 MATERNAL MORBID OBESITY, ANTEPARTUM, THIRD TRIMESTER (HCC): ICD-10-CM

## 2022-05-25 DIAGNOSIS — O99.213 MATERNAL MORBID OBESITY, ANTEPARTUM, THIRD TRIMESTER (HCC): ICD-10-CM

## 2022-05-25 PROCEDURE — 99215 OFFICE O/P EST HI 40 MIN: CPT | Performed by: NURSE PRACTITIONER

## 2022-05-25 NOTE — TELEPHONE ENCOUNTER
Left patient a message that her MFM appointment had to be rescheduled  The new time, date and location were provided  The patient has been instructed to please call us back at [phone] with any questions or concerns

## 2022-05-25 NOTE — ASSESSMENT & PLAN NOTE
-Pre-pregnancy weight 206 lbs  -Current weight 254 lbs  -TWGG 48 lbs  -Recommended weight gain 48 lbs    -Continue GDM diet

## 2022-05-25 NOTE — PROGRESS NOTES
Virtual Regular Visit    Verification of patient location:[place]    Patient is located in the following state in which I hold an active license [place]      Assessment/Plan:    Problem List Items Addressed This Visit        Endocrine    Pre-existing diabetes mellitus affecting pregnancy in third trimester, antepartum - Primary     -Check A1c, CMP and spot urine protein creatinine ratio  -Humalog via Medtronic 770G and Dexcom G6   -Basal pump settings:  MN@ 3 80 units/hour; decrease to 3 60   3 AM@ 3 10 units/hour; decrease to 2 95   6 AM@ 3 45 units/hour; decrease to 3 30  8 AM@ 5 30 units/hour; decrease to 5 05   11:30 AM@ 3 55 units/hour; decrease to 3 35   Add 3 PM@ 3 75 units/hour  8 PM@ 4 05 units/hour; decrease to 3 85   10 PM@ 4 05 units/hour decrease to 3 85   Total basal 92 925  new total 89 8  Reports average 72 5 units of bolus insulin  Bolus settings:  Carb ratio  MN-8 PM@ 5 decrease 3  8 PM-MN@ 6 5   Correction factor   30 decrease to 15  BG target 90  Active Insulin Time 3 hours  Download insulin pump on June 6, 2022    -Continue GDM diet and SMBG  -Always treat hypoglycemia; using 15 by 15 rule  -Fetal growth ultrasound every 4 to 6 weeks as recommended  -NST twice a week and VINOD weekly   -Continue follow up with OB and MFM as recommended   -Stop baby aspirin at 36 weeks gestation   -Schedule endocrinology follow up with August    Lab Results   Component Value Date    HGBA1C 5 1 03/31/2022              Relevant Orders    Hemoglobin A1C    Comprehensive metabolic panel    Protein / creatinine ratio, urine       Other    BMI 37 0-37 9, adult    Relevant Orders    Hemoglobin A1C    Comprehensive metabolic panel    Protein / creatinine ratio, urine    Maternal morbid obesity, antepartum, third trimester ([facility] [address] )     -Pre-pregnancy weight 206 lbs  -Current weight 254 lbs  -TWGG 48 lbs  -Recommended weight gain 48 lbs    -Continue GDM diet              Relevant Orders    Hemoglobin A1C    Comprehensive metabolic panel    Protein / creatinine ratio, urine    31 weeks gestation of pregnancy    Relevant Orders    Hemoglobin A1C    Comprehensive metabolic panel    Protein / creatinine ratio, urine    Insulin pump titration               Reason for visit is   Chief Complaint   Patient presents with    Virtual Regular Visit    Diabetes Type 1        [facility]    Provider located at [address] [place] [address]  [address] [place] [address] [phone]      Recent Visits  No visits were found meeting these conditions  Showing recent visits within past 7 days and meeting all other requirements  Today's Visits  Date Type Provider Dept   22 Telemedicine Katt Soin, [facility] today's visits and meeting all other requirements  Future Appointments  No visits were found meeting these conditions  Showing future appointments within next 150 days and meeting all other requirements       The patient was identified by name and date of birth  Evelina Hernandez was informed that this is a telemedicine visit and that the visit is being conducted through  [address] and patient was informed this is a secure, HIPAA-complaint platform  She agrees to proceed     My office door was closed  No one else was in the room  She acknowledged consent and understanding of privacy and security of the video platform  The patient has agreed to participate and understands they can discontinue the visit at any time  Patient is aware this is a billable service  Subjective  Walker Rather is a 36 y o  female  31 5/7 weeks gestation T1DM on Humalog via Medtronic 770G insulin pump and Dexcom G6  Unable to see insulin pump download despite auto upload  Dexcom AGP reviewed, less than 60 glucose readings noted overnight  Reports lowest 55 via finger stick  Treats with juice   Some lows due to stacking insulin per Ang Gasca if post meals are higher than will give bolus insulin  Self adjusted insulin pump settings  Decrease in basal settings and decrease in carb and correction factor needed and completed during visit  Positive fetal movement  HPI     Past Medical History:   Diagnosis Date    Diabetes ([facility] [address] ) 1994    type 1    Diabetes mellitus ([facility] [address] [facility] )        Past Surgical History:   Procedure Laterality Date    MN WRIST Gerald Palafox LIG Right 2/3/2021    Procedure: RELEASE CARPAL TUNNEL ENDOSCOPIC;  Surgeon: Laura Mendez MD;  [facility]: [facility];  Service: Orthopedics    MN WRIST Gerald Strangele LIG Left 2/23/2021    Procedure: RELEASE CARPAL TUNNEL ENDOSCOPIC;  Surgeon: Laura Mendez MD;  [facility];  Service: Orthopedics    WISDOM TOOTH EXTRACTION         Current Outpatient Medications   Medication Sig Dispense Refill    BABY ASPIRIN PO Take 162 mg by mouth in the morning      Blood Pressure Monitoring (B-D ASSURE BPM/AUTO ARM CUFF) MISC Use 2 (two) times a day Call if BP > 140/90 (Patient not taking: Reported on 2/7/2022 ) 1 each 0    Continuous Blood Gluc  (Dexcom G6 ) DENIZ Use as directed  1 Device 0    Continuous Blood Gluc Sensor (Dexcom G6 Sensor) MISC 1 box=1 month supply or 3 sensors, use 1 sensor every 10 days  1 each 12    Continuous Blood Gluc Transmit (Dexcom G6 Transmitter) MISC Transmitter change every 90 days  1 each 3    glucose blood test strip Use 1 each daily as needed Use as instructed      insulin glargine (LANTUS) 100 units/mL subcutaneous injection Inject 10 Units under the skin (Patient not taking: Reported on 2/7/2022 )      insulin lispro (HumaLOG) 100 units/mL injection Use up to 200 units via insulin pump daily  T2DM and pregnancy   60 mL 1    insulin lispro (insulin lispro) 100 units/mL injection INJECT UP  UNITS VIA INSULIN PUMP DAILY [id number]      Lancets MISC by Does not apply route      Prenatal Vit-Fe Fumarate-FA (PRENATAL VITAMIN PO) Take 1 tablet by mouth daily       No current facility-administered medications for this visit  No Known Allergies    Review of Systems   Constitutional: Negative for fatigue and fever  Eyes: Negative for visual disturbance  Respiratory: Negative for cough and shortness of breath  Cardiovascular: Negative for chest pain and palpitations  Gastrointestinal: Negative for constipation, nausea and vomiting  Endocrine: Negative for polydipsia, polyphagia and polyuria  Genitourinary: Negative for difficulty urinating and vaginal bleeding  Neurological: Negative for headaches  Psychiatric/Behavioral: Negative for sleep disturbance  Video Exam  Refer to attached Dexcom AGP report 81 3% of time glucose readings between ; <60 9 7%; >140 9%  Vitals:    05/25/22 1538   Weight: 115 kg (254 lb)   Height: 5' 9" (1 753 m)       Physical Exam  HENT:      Head: Normocephalic  Eyes:      Conjunctiva/sclera: Conjunctivae normal    Pulmonary:      Effort: Pulmonary effort is normal    Musculoskeletal:      Cervical back: Normal range of motion  Neurological:      Mental Status: She is alert and oriented to person, place, and time  Psychiatric:         Mood and Affect: Mood normal          Behavior: Behavior normal          Thought Content: Thought content normal          Judgment: Judgment normal           I spent 50 minutes directly with the patient during this visit  VIRTUAL VISIT DISCLAIMER      Korina Hernandez verbally agrees to participate in [employer]  Pt is aware that [employer] could be limited without vital signs or the ability to perform a full hands-on physical Katie Hernandez understands she or the provider may request at any time to terminate the video visit and request the patient to seek care or treatment in person

## 2022-05-25 NOTE — ASSESSMENT & PLAN NOTE
-Check A1c, CMP and spot urine protein creatinine ratio  -Humalog via Medtronic 770G and Dexcom G6   -Basal pump settings:  MN@ 3 80 units/hour; decrease to 3 60   3 AM@ 3 10 units/hour; decrease to 2 95   6 AM@ 3 45 units/hour; decrease to 3 30  8 AM@ 5 30 units/hour; decrease to 5 05   11:30 AM@ 3 55 units/hour; decrease to 3 35   Add 3 PM@ 3 75 units/hour  8 PM@ 4 05 units/hour; decrease to 3 85   10 PM@ 4 05 units/hour decrease to 3 85   Total basal 92 925  new total 89 8  Reports average 72 5 units of bolus insulin  Bolus settings:  Carb ratio  MN-8 PM@ 5 decrease 3  8 PM-MN@ 6 5   Correction factor   30 decrease to 15  BG target 90  Active Insulin Time 3 hours  Download insulin pump on June 6, 2022    -Continue GDM diet and SMBG  -Always treat hypoglycemia; using 15 by 15 rule  -Fetal growth ultrasound every 4 to 6 weeks as recommended    -NST twice a week and VINOD weekly   -Continue follow up with OB and MFM as recommended   -Stop baby aspirin at 36 weeks gestation   -Schedule endocrinology follow up with August    Lab Results   Component Value Date    HGBA1C 5 1 03/31/2022

## 2022-05-25 NOTE — PATIENT INSTRUCTIONS
-Check A1c, CMP and spot urine protein creatinine ratio  -Humalog via Medtronic 770G and Dexcom G6   -Basal pump settings:  MN@ 3 80 units/hour; decrease to 3 60   3 AM@ 3 10 units/hour; decrease to 2 95   6 AM@ 3 45 units/hour; decrease to 3 30  8 AM@ 5 30 units/hour; decrease to 5 05   11:30 AM@ 3 55 units/hour; decrease to 3 35   Add 3 PM@ 3 75 units/hour  8 PM@ 4 05 units/hour; decrease to 3 85   10 PM@ 4 05 units/hour decrease to 3 85   Total basal 92 925  new total 89 8  Reports average 72 5 units of bolus insulin  Bolus settings:  Carb ratio  MN-8 PM@ 5 decrease 3  8 PM-MN@ 6 5   Correction factor   30 decrease to 15  BG target 90  Active Insulin Time 3 hours  Download insulin pump on June 6, 2022    -Continue GDM diet and SMBG  -Always treat hypoglycemia; using 15 by 15 rule  -Fetal growth ultrasound every 4 to 6 weeks as recommended    -NST twice a week and VINOD weekly   -Continue follow up with OB and MFM as recommended   -Stop baby aspirin at 36 weeks gestation   -Schedule endocrinology follow up with August

## 2022-05-31 ENCOUNTER — ULTRASOUND (OUTPATIENT)
Dept: PERINATAL CARE | Facility: OTHER | Age: 32
End: 2022-05-31
Payer: COMMERCIAL

## 2022-05-31 VITALS
BODY MASS INDEX: 38.63 KG/M2 | HEART RATE: 87 BPM | WEIGHT: 260.8 LBS | DIASTOLIC BLOOD PRESSURE: 82 MMHG | HEIGHT: 69 IN | SYSTOLIC BLOOD PRESSURE: 136 MMHG

## 2022-05-31 DIAGNOSIS — Z3A.32 32 WEEKS GESTATION OF PREGNANCY: Primary | ICD-10-CM

## 2022-05-31 DIAGNOSIS — O24.313 PRE-EXISTING DIABETES MELLITUS AFFECTING PREGNANCY IN THIRD TRIMESTER, ANTEPARTUM: ICD-10-CM

## 2022-05-31 PROCEDURE — 76815 OB US LIMITED FETUS(S): CPT | Performed by: OBSTETRICS & GYNECOLOGY

## 2022-05-31 PROCEDURE — 59025 FETAL NON-STRESS TEST: CPT | Performed by: OBSTETRICS & GYNECOLOGY

## 2022-05-31 NOTE — PROGRESS NOTES
Non-Stress Testing:    Non-Stress test, equipment, procedure, and expected outcomes reviewed  Reviewed fetal kick counts and when to call OB  Joey Russell Verified patient understanding of fetal kick counts with teach back method  Patient reports feeling daily fetal movements  Patient has no questions or concerns

## 2022-05-31 NOTE — LETTER
NST sleeve cover sheet    Patient name: Malena Molina  : 1990  MRN: [id number]    TRI: Estimated Date of Delivery: 22    Obstetrician: __________Tuazon______________    Reason(s) for testing:  ________________Pre-Gest DM ______________________      Testing frequency:    _x__ 2x/wk  ___ 1x/wk  ___ Dopplers  ___ BPP?       Last growth scan: __________________________________________

## 2022-06-01 ENCOUNTER — PATIENT MESSAGE (OUTPATIENT)
Dept: PERINATAL CARE | Facility: CLINIC | Age: 32
End: 2022-06-01

## 2022-06-01 DIAGNOSIS — E10.65 PRE-EXISTING TYPE 1 DIABETES MELLITUS WITH HYPERGLYCEMIA DURING PREGNANCY IN FIRST TRIMESTER (HCC): ICD-10-CM

## 2022-06-01 DIAGNOSIS — Z96.41 INSULIN PUMP IN PLACE: ICD-10-CM

## 2022-06-01 DIAGNOSIS — O24.011 PRE-EXISTING TYPE 1 DIABETES MELLITUS WITH HYPERGLYCEMIA DURING PREGNANCY IN FIRST TRIMESTER (HCC): ICD-10-CM

## 2022-06-01 PROBLEM — O98.519 COVID-19 AFFECTING PREGNANCY, ANTEPARTUM: Status: RESOLVED | Noted: 2022-01-21 | Resolved: 2022-06-01

## 2022-06-01 PROBLEM — U07.1 COVID-19 AFFECTING PREGNANCY, ANTEPARTUM: Status: RESOLVED | Noted: 2022-01-21 | Resolved: 2022-06-01

## 2022-06-01 PROBLEM — Z3A.33 33 WEEKS GESTATION OF PREGNANCY: Status: ACTIVE | Noted: 2022-02-17

## 2022-06-02 ENCOUNTER — ROUTINE PRENATAL (OUTPATIENT)
Dept: OBGYN CLINIC | Facility: CLINIC | Age: 32
End: 2022-06-02
Payer: COMMERCIAL

## 2022-06-02 ENCOUNTER — APPOINTMENT (OUTPATIENT)
Dept: LAB | Facility: MEDICAL CENTER | Age: 32
End: 2022-06-02
Payer: COMMERCIAL

## 2022-06-02 ENCOUNTER — CLINICAL SUPPORT (OUTPATIENT)
Dept: URGENT CARE | Facility: MEDICAL CENTER | Age: 32
End: 2022-06-02
Payer: COMMERCIAL

## 2022-06-02 VITALS
DIASTOLIC BLOOD PRESSURE: 70 MMHG | WEIGHT: 261.4 LBS | HEART RATE: 86 BPM | BODY MASS INDEX: 38.72 KG/M2 | OXYGEN SATURATION: 98 % | TEMPERATURE: 97.9 F | SYSTOLIC BLOOD PRESSURE: 130 MMHG | HEIGHT: 69 IN

## 2022-06-02 DIAGNOSIS — E66.01 MATERNAL MORBID OBESITY, ANTEPARTUM, THIRD TRIMESTER (HCC): ICD-10-CM

## 2022-06-02 DIAGNOSIS — Z46.81 INSULIN PUMP TITRATION: ICD-10-CM

## 2022-06-02 DIAGNOSIS — O09.93 SUPERVISION OF HIGH RISK PREGNANCY IN THIRD TRIMESTER: ICD-10-CM

## 2022-06-02 DIAGNOSIS — Z3A.33 33 WEEKS GESTATION OF PREGNANCY: ICD-10-CM

## 2022-06-02 DIAGNOSIS — O24.313 PRE-EXISTING DIABETES MELLITUS AFFECTING PREGNANCY IN THIRD TRIMESTER, ANTEPARTUM: ICD-10-CM

## 2022-06-02 DIAGNOSIS — Z87.59 HISTORY OF POSTPARTUM DEPRESSION: ICD-10-CM

## 2022-06-02 DIAGNOSIS — Z87.59 HISTORY OF GESTATIONAL HYPERTENSION: ICD-10-CM

## 2022-06-02 DIAGNOSIS — O24.011 PRE-EXISTING TYPE 1 DIABETES MELLITUS DURING PREGNANCY IN FIRST TRIMESTER: ICD-10-CM

## 2022-06-02 DIAGNOSIS — O99.213 MATERNAL MORBID OBESITY, ANTEPARTUM, THIRD TRIMESTER (HCC): ICD-10-CM

## 2022-06-02 DIAGNOSIS — Z86.59 HISTORY OF POSTPARTUM DEPRESSION: ICD-10-CM

## 2022-06-02 DIAGNOSIS — O09.893 SHORT INTERVAL BETWEEN PREGNANCIES COMPLICATING PREGNANCY IN THIRD TRIMESTER, ANTEPARTUM: ICD-10-CM

## 2022-06-02 DIAGNOSIS — Z3A.31 31 WEEKS GESTATION OF PREGNANCY: ICD-10-CM

## 2022-06-02 DIAGNOSIS — O24.313 PRE-EXISTING DIABETES MELLITUS AFFECTING PREGNANCY IN THIRD TRIMESTER, ANTEPARTUM: Primary | ICD-10-CM

## 2022-06-02 LAB
ALBUMIN SERPL BCP-MCNC: 2.8 G/DL (ref 3.5–5)
ALP SERPL-CCNC: 130 U/L (ref 46–116)
ALT SERPL W P-5'-P-CCNC: 17 U/L (ref 12–78)
ANION GAP SERPL CALCULATED.3IONS-SCNC: 6 MMOL/L (ref 4–13)
AST SERPL W P-5'-P-CCNC: 20 U/L (ref 5–45)
ATRIAL RATE: 95 BPM
BILIRUB SERPL-MCNC: 0.22 MG/DL (ref 0.2–1)
BUN SERPL-MCNC: 9 MG/DL (ref 5–25)
CALCIUM ALBUM COR SERPL-MCNC: 9.8 MG/DL (ref 8.3–10.1)
CALCIUM SERPL-MCNC: 8.8 MG/DL (ref 8.3–10.1)
CHLORIDE SERPL-SCNC: 107 MMOL/L (ref 100–108)
CO2 SERPL-SCNC: 24 MMOL/L (ref 21–32)
CREAT SERPL-MCNC: 0.67 MG/DL (ref 0.6–1.3)
CREAT UR-MCNC: 67.3 MG/DL
GFR SERPL CREATININE-BSD FRML MDRD: 119 ML/MIN/1.73SQ M
GLUCOSE P FAST SERPL-MCNC: 173 MG/DL (ref 65–99)
P AXIS: 32 DEGREES
POTASSIUM SERPL-SCNC: 3.9 MMOL/L (ref 3.5–5.3)
PR INTERVAL: 130 MS
PROT SERPL-MCNC: 6.8 G/DL (ref 6.4–8.2)
PROT UR-MCNC: 21 MG/DL
PROT/CREAT UR: 0.31 MG/G{CREAT} (ref 0–0.1)
QRS AXIS: 62 DEGREES
QRSD INTERVAL: 74 MS
QT INTERVAL: 352 MS
QTC INTERVAL: 442 MS
SL AMB  POCT GLUCOSE, UA: NEGATIVE
SL AMB POCT URINE PROTEIN: NEGATIVE
SODIUM SERPL-SCNC: 137 MMOL/L (ref 136–145)
T WAVE AXIS: 25 DEGREES
VENTRICULAR RATE: 95 BPM

## 2022-06-02 PROCEDURE — 84156 ASSAY OF PROTEIN URINE: CPT | Performed by: NURSE PRACTITIONER

## 2022-06-02 PROCEDURE — PNV: Performed by: CLINICAL NURSE SPECIALIST

## 2022-06-02 PROCEDURE — 80053 COMPREHEN METABOLIC PANEL: CPT

## 2022-06-02 PROCEDURE — 93005 ELECTROCARDIOGRAM TRACING: CPT

## 2022-06-02 PROCEDURE — 82570 ASSAY OF URINE CREATININE: CPT | Performed by: NURSE PRACTITIONER

## 2022-06-02 PROCEDURE — 93010 ELECTROCARDIOGRAM REPORT: CPT | Performed by: INTERNAL MEDICINE

## 2022-06-02 PROCEDURE — 83036 HEMOGLOBIN GLYCOSYLATED A1C: CPT | Performed by: NURSE PRACTITIONER

## 2022-06-02 PROCEDURE — 59025 FETAL NON-STRESS TEST: CPT | Performed by: CLINICAL NURSE SPECIALIST

## 2022-06-02 PROCEDURE — 81002 URINALYSIS NONAUTO W/O SCOPE: CPT | Performed by: CLINICAL NURSE SPECIALIST

## 2022-06-02 PROCEDURE — 36415 COLL VENOUS BLD VENIPUNCTURE: CPT

## 2022-06-02 NOTE — ASSESSMENT & PLAN NOTE
[id number], 32w6d  Doing well overall  No c/o today  Good FM  No s/s PTL    We again reviewed the S/S PTL and importance of consistent/regular FM   Reviewed process for [facility] and encouraged pt to call with any decreases in fetal movement

## 2022-06-02 NOTE — PROGRESS NOTES
Vernadine Ormond is a 36 y o  Jamal Murtaza here for Routine Prenatal Visit (No concerns)    Prenatal Visit  Subjective:   Denies unusual vaginal discharge, LOF, VB, or ctx  Reports Fetus is active  T1DM- starting a/n testing this week  Had VINOD/NST with MFM on Monday and NST due to day      Objective:  Vitals:    06/02/22 1413   BP: 130/70   Pulse: 86   Temp: 97 9 °F (36 6 °C)   SpO2: 98%     Pregravid Weight/BMI: 93 4 kg (206 lb) (BMI 30 41)  Current Weight: 119 kg (261 lb 6 4 oz)   Total Weight Gain: 25 1 kg (55 lb 6 4 oz)     OBGyn Exam  Physical Exam  Fetal Heart Rate: 130 , Fundal Height (cm): 33 cm    General: Not in acute distress and appearing well nourished and well groomed  Genitourinary:          Pelvic exam exam deferred   Cardiovascular:   Rate and Rhythm: Normal rate  Pulmonary:    Effort: normal, not labored  Abdominal:  Abdomen is soft and gravid    Musculoskeletal: Active movement of all extremities, no gross limitations of ROM     Edema: Trace  Neurological:   Mental Status: She is alert and oriented to person, place, and time  Skin: General: Skin is warm and dry  Psychiatric:    Mood and Affect: Mood normal       Behavior: Behavior normal  NST Completed today:  Variability: Moderate  Accelerations: Yes  Decelerations: None  Contractions: Not present  Nonstress Test Interpretation: Reactive        Assessment & Plan:    1  Pre-existing diabetes mellitus affecting pregnancy in third trimester, antepartum  Assessment & Plan:  Recent adjustments in insulin dosing   Following closely with MFM  NST today is reactive  F/U in 1 wk for NST (going to m alternating for NST/VINOD)      2  Supervision of high risk pregnancy in third trimester  Assessment & Plan:  [id number], 32w6d  Doing well overall  No c/o today  Good FM  No s/s PTL    We again reviewed the S/S PTL and importance of consistent/regular FM   Reviewed process for [facility] and encouraged pt to call with any decreases in fetal movement    Orders:  - POCT urine dip    3  Maternal morbid obesity, antepartum, third trimester ([address] )    4  33 weeks gestation of pregnancy    5  Insulin pump titration    6  History of postpartum depression    7  History of gestational hypertension  Assessment & Plan:  BP stable  No C/O HA vision changes or RUQ pain      8  BMI 37 0-37 9, adult    9   Short interval between pregnancies complicating pregnancy in third trimester, antepartum      MARCO Gómez  6/2/2022

## 2022-06-02 NOTE — ASSESSMENT & PLAN NOTE
Recent adjustments in insulin dosing   Following closely with MFM  NST today is reactive    F/U in 1 wk for NST (going to MFm alternating for NST/VINOD)

## 2022-06-03 LAB
EST. AVERAGE GLUCOSE BLD GHB EST-MCNC: 105 MG/DL
HBA1C MFR BLD: 5.3 %

## 2022-06-07 ENCOUNTER — ULTRASOUND (OUTPATIENT)
Dept: PERINATAL CARE | Facility: OTHER | Age: 32
End: 2022-06-07
Payer: COMMERCIAL

## 2022-06-07 VITALS
WEIGHT: 261 LBS | SYSTOLIC BLOOD PRESSURE: 122 MMHG | DIASTOLIC BLOOD PRESSURE: 77 MMHG | HEIGHT: 69 IN | BODY MASS INDEX: 38.66 KG/M2 | HEART RATE: 99 BPM

## 2022-06-07 DIAGNOSIS — O24.313 PRE-EXISTING DIABETES MELLITUS AFFECTING PREGNANCY IN THIRD TRIMESTER, ANTEPARTUM: Primary | ICD-10-CM

## 2022-06-07 DIAGNOSIS — Z3A.33 33 WEEKS GESTATION OF PREGNANCY: ICD-10-CM

## 2022-06-07 PROCEDURE — 76815 OB US LIMITED FETUS(S): CPT | Performed by: OBSTETRICS & GYNECOLOGY

## 2022-06-07 PROCEDURE — 59025 FETAL NON-STRESS TEST: CPT | Performed by: OBSTETRICS & GYNECOLOGY

## 2022-06-07 RX ORDER — INSULIN LISPRO 100 [IU]/ML
100 INJECTION, SOLUTION INTRAVENOUS; SUBCUTANEOUS AS NEEDED
COMMUNITY
Start: 2022-06-02

## 2022-06-09 ENCOUNTER — ROUTINE PRENATAL (OUTPATIENT)
Dept: OBGYN CLINIC | Facility: CLINIC | Age: 32
End: 2022-06-09
Payer: COMMERCIAL

## 2022-06-09 VITALS
BODY MASS INDEX: 39.01 KG/M2 | SYSTOLIC BLOOD PRESSURE: 124 MMHG | WEIGHT: 263.4 LBS | HEIGHT: 69 IN | OXYGEN SATURATION: 97 % | TEMPERATURE: 97.4 F | DIASTOLIC BLOOD PRESSURE: 74 MMHG | HEART RATE: 92 BPM

## 2022-06-09 DIAGNOSIS — O24.313 PRE-EXISTING DIABETES MELLITUS AFFECTING PREGNANCY IN THIRD TRIMESTER, ANTEPARTUM: ICD-10-CM

## 2022-06-09 DIAGNOSIS — Z87.59 HISTORY OF GESTATIONAL HYPERTENSION: ICD-10-CM

## 2022-06-09 DIAGNOSIS — Z3A.33 33 WEEKS GESTATION OF PREGNANCY: Primary | ICD-10-CM

## 2022-06-09 DIAGNOSIS — O09.893 SHORT INTERVAL BETWEEN PREGNANCIES COMPLICATING PREGNANCY IN THIRD TRIMESTER, ANTEPARTUM: ICD-10-CM

## 2022-06-09 DIAGNOSIS — Z46.81 INSULIN PUMP TITRATION: ICD-10-CM

## 2022-06-09 DIAGNOSIS — O09.93 SUPERVISION OF HIGH RISK PREGNANCY IN THIRD TRIMESTER: ICD-10-CM

## 2022-06-09 DIAGNOSIS — Z87.59 HISTORY OF POSTPARTUM DEPRESSION: ICD-10-CM

## 2022-06-09 DIAGNOSIS — Z86.59 HISTORY OF POSTPARTUM DEPRESSION: ICD-10-CM

## 2022-06-09 DIAGNOSIS — E66.01 MATERNAL MORBID OBESITY, ANTEPARTUM, THIRD TRIMESTER (HCC): ICD-10-CM

## 2022-06-09 DIAGNOSIS — O99.213 MATERNAL MORBID OBESITY, ANTEPARTUM, THIRD TRIMESTER (HCC): ICD-10-CM

## 2022-06-09 LAB
SL AMB  POCT GLUCOSE, UA: NEGATIVE
SL AMB POCT URINE PROTEIN: ABNORMAL

## 2022-06-09 PROCEDURE — 59025 FETAL NON-STRESS TEST: CPT | Performed by: OBSTETRICS & GYNECOLOGY

## 2022-06-09 PROCEDURE — PNV: Performed by: OBSTETRICS & GYNECOLOGY

## 2022-06-09 NOTE — ASSESSMENT & PLAN NOTE
Reviewed normal EKG  Taking aspirin   NST today reactive, had VINOD with MFM earlier this week   Continue twice weekly surveillance

## 2022-06-09 NOTE — PROGRESS NOTES
S: 36 y o  [id number] 33w6d here for routine prenatal visit  Chief Complaint   Patient presents with    Routine Prenatal Visit     And NST; No concerns         OB complaints:  Contractions: no  Leakage: no  Bleeding: no  Fetal movement: yes      O:  /74 (BP Location: Left arm, Patient Position: Sitting, Cuff Size: Large)   Pulse 92   Temp (!) 97 4 °F (36 3 °C) (Tympanic)   Ht 5' 9" (1 753 m)   Wt 119 kg (263 lb 6 4 oz)   LMP 10/15/2021 (Exact Date)   SpO2 97%   BMI 38 90 kg/m²       Review of Systems   Constitutional: Negative for chills and fever  Eyes: Negative for visual disturbance  Respiratory: Negative for chest tightness and shortness of breath  Cardiovascular: Negative for chest pain  Gastrointestinal: Negative for abdominal pain, diarrhea, nausea and vomiting  Genitourinary: Negative for pelvic pain and vaginal bleeding  As noted in HPI   Skin: Negative for rash  Neurological: Negative for headaches  All other systems reviewed and are negative  Physical Exam  Constitutional:       General: She is not in acute distress  Appearance: Normal appearance  HENT:      Head: Normocephalic and atraumatic  Cardiovascular:      Rate and Rhythm: Normal rate  Pulmonary:      Effort: Pulmonary effort is normal  No respiratory distress  Abdominal:      General: There is no distension  Palpations: Abdomen is soft  Tenderness: There is no abdominal tenderness  There is no guarding or rebound  Comments: gravid   Neurological:      General: No focal deficit present  Mental Status: She is alert  Psychiatric:         Mood and Affect: Mood normal          Behavior: Behavior normal    Vitals and nursing note reviewed                Fetal Heart Rate: NST    Pregravid Weight/BMI: 93 4 kg (206 lb) (BMI 30 41)  Current Weight: 119 kg (263 lb 6 4 oz)   Total Weight Gain: 26 kg (57 lb 6 4 oz)     Pre- Vitals    Flowsheet Row Most Recent Value Prenatal Assessment    Fetal Heart Rate NST   Movement Present   Prenatal Vitals    Blood Pressure 124/74   Weight - Scale 119 kg (263 lb 6 4 oz)   Urine Albumin/Glucose    Dilation/Effacement/Station    Vaginal Drainage    Edema             Results for orders placed or performed in visit on 06/09/22   POCT urine dip   Result Value Ref Range    POCT URINE PROTEIN Trace     GLUCOSE, UA Negative          NST Completed today:  Baseline: 130 BPM  Variability: Moderate  Accelerations: Yes  Decelerations: None  Contractions: Not present  Nonstress Test Interpretation: Reactive      Problem List        Endocrine    Pre-existing diabetes mellitus affecting pregnancy in third trimester, antepartum    Overview     -T1DM  -On Humalog via Medtronic 530G insulin pump and Dexcom G6  Pending Medtronic upgrade  baby aspirin   Baseline labs WNL  EKG - completed 3rd trimester was NSR   Early and routine anatomy US WNL  fetal echo WNL  - serial US   - twice weekly surveillance at 32 weeks  - delivery by [facility]            Current Assessment & Plan     Reviewed normal EKG  Taking aspirin   NST today reactive, had VINOD with MFM earlier this week   Continue twice weekly surveillance              Other    BMI 37 0-37 9, adult    History of gestational hypertension    Supervision of high-risk pregnancy    Overview     Declines aneuploidy screening, msAFP  Declines flu, COVID vaccines   S/p tdap            Current Assessment & Plan     Consents signed  OB, COVID precautions reviewed            Maternal morbid obesity, antepartum, third trimester ([address] )    Overview     prepreg BMI 30  Surveillance per DM1           History of postpartum depression    Short interval between pregnancies complicating pregnancy in third trimester, antepartum    33 weeks gestation of pregnancy    Insulin pump titration                            Krista Lantigua MD  6/9/2022  9:29 AM

## 2022-06-12 NOTE — PATIENT INSTRUCTIONS
Thank you for choosing us for your  care today  If you have any questions about your ultrasound or care, please do not hesitate to contact us or your primary obstetrician  Some general instructions for your pregnancy are:    Protect against coronavirus: get vaccinated - pregnant women are increased risk of severe COVID  Notify your primary care doctor if you have any symptoms  Exercise: Aim for 22 minutes per day (150 minutes per week) of regular exercise  Walking is great! Nutrition: aim for calcium-rich and iron-rich foods as well as healthy sources of protein  Learn about Preeclampsia: preeclampsia is a common, serious high blood pressure complication in pregnancy  A blood pressure of 043LZFP (systolic or top number) or 21BTGD (diastolic or bottom number) is not normal and needs evaluation by your doctor  Aspirin is sometimes prescribed in early pregnancy to prevent preeclampsia in women with risk factors - ask your obstetrician if you should be on this medication  If you smoke, try to reduce how many cigarettes you smoke or try to quit completely  Do not vape  Other warning signs to watch out for in pregnancy or postpartum: chest pain, obstructed breathing or shortness of breath, seizures, thoughts of hurting yourself or your baby, bleeding, a painful or swollen leg, fever, or headache (see AWHONN POST-BIRTH Warning Signs campaign)  If these happen call 911  Itching is also not normal in pregnancy and if you experience this, especially over your hands and feet, potentially worse at night, notify your doctors  Adequate: hears normal conversation without difficulty

## 2022-06-14 ENCOUNTER — ULTRASOUND (OUTPATIENT)
Dept: PERINATAL CARE | Facility: OTHER | Age: 32
End: 2022-06-14
Payer: COMMERCIAL

## 2022-06-14 VITALS
SYSTOLIC BLOOD PRESSURE: 124 MMHG | WEIGHT: 265.4 LBS | HEART RATE: 90 BPM | BODY MASS INDEX: 39.31 KG/M2 | HEIGHT: 69 IN | DIASTOLIC BLOOD PRESSURE: 83 MMHG

## 2022-06-14 DIAGNOSIS — O24.313 PRE-EXISTING DIABETES MELLITUS AFFECTING PREGNANCY IN THIRD TRIMESTER, ANTEPARTUM: Primary | ICD-10-CM

## 2022-06-14 DIAGNOSIS — Z3A.34 34 WEEKS GESTATION OF PREGNANCY: ICD-10-CM

## 2022-06-14 PROCEDURE — 59025 FETAL NON-STRESS TEST: CPT | Performed by: OBSTETRICS & GYNECOLOGY

## 2022-06-14 PROCEDURE — 76815 OB US LIMITED FETUS(S): CPT | Performed by: OBSTETRICS & GYNECOLOGY

## 2022-06-15 ENCOUNTER — NURSE TRIAGE (OUTPATIENT)
Dept: OTHER | Facility: OTHER | Age: 32
End: 2022-06-15

## 2022-06-15 ENCOUNTER — HOSPITAL ENCOUNTER (OUTPATIENT)
Facility: HOSPITAL | Age: 32
Discharge: HOME | End: 2022-06-15
Attending: OBSTETRICS & GYNECOLOGY | Admitting: OBSTETRICS & GYNECOLOGY
Payer: COMMERCIAL

## 2022-06-15 VITALS
HEART RATE: 94 BPM | RESPIRATION RATE: 16 BRPM | SYSTOLIC BLOOD PRESSURE: 127 MMHG | TEMPERATURE: 97.6 F | DIASTOLIC BLOOD PRESSURE: 77 MMHG

## 2022-06-15 PROBLEM — Z3A.34 34 WEEKS GESTATION OF PREGNANCY: Status: ACTIVE | Noted: 2022-02-17

## 2022-06-15 LAB
BILIRUB UR QL STRIP: NEGATIVE
CLARITY UR: CLEAR
COLOR UR: COLORLESS
GLUCOSE UR STRIP-MCNC: NEGATIVE MG/DL
HGB UR QL STRIP.AUTO: NEGATIVE
KETONES UR STRIP-MCNC: ABNORMAL MG/DL
LEUKOCYTE ESTERASE UR QL STRIP: NEGATIVE
NITRITE UR QL STRIP: NEGATIVE
PH UR STRIP.AUTO: 6 [PH]
PROT UR STRIP-MCNC: NEGATIVE MG/DL
SP GR UR STRIP.AUTO: <=1.005 (ref 1–1.03)
UROBILINOGEN UR QL STRIP.AUTO: 0.2 E.U./DL

## 2022-06-15 PROCEDURE — 99203 OFFICE O/P NEW LOW 30 MIN: CPT

## 2022-06-15 PROCEDURE — 99213 OFFICE O/P EST LOW 20 MIN: CPT | Performed by: OBSTETRICS & GYNECOLOGY

## 2022-06-15 PROCEDURE — G0463 HOSPITAL OUTPT CLINIC VISIT: HCPCS

## 2022-06-15 PROCEDURE — 81003 URINALYSIS AUTO W/O SCOPE: CPT | Performed by: OBSTETRICS & GYNECOLOGY

## 2022-06-15 NOTE — PROGRESS NOTES
L&D Triage Note - OB/GYN  Malena Molina 36 y o  female MRN: [id number]  Unit/Bed#: L&D 325-01 Encounter: [id number]      ASSESSMENT/PLAN  Malena Molina is a 36 y o  [id number] at 34w5d who presented with pelvic pressure      1) Pelvic Pressure  - SVE cl/thick/high  - Ketones noted in UA, but otherwise negative for UTI  - Tocometer quiet  - Discussed low risk for  labor at this time, but to call back if anything changes or she starts liban    2)  Discharge instructions  - Patient instructed to call if experiencing worsening contractions, vaginal bleeding, loss of fluid or decreased fetal movement  - Will follow up with OBGYN in office      She is a patient of Dr Kerline Cross  D/w Dr Kerline Cross, on call OBGYN Attending Physician  ______________    SUBJECTIVE    TRI: Estimated Date of Delivery: 22    HPI:  36 y o  [id number] 34w5d presents with complaint of pelvic pressure and burning in the lower half of her abdomen  Denies flank pain  Good fetal movement, no loss of fluid or vaginal bleeding  Her obstetrical history is significant for SVDx2, pre-existing diabetes, hx PPD, short interval pregnancy    ROS:  Constitutional: Negative  Respiratory: Negative  Cardiovascular: Negative    Gastrointestinal: Negative    Physical Exam  General: Well appearing, no distress  Respiratory: Unlabored breathing  Cardiovascular: Regular rate  Abdomen: Soft, gravid, nontender   Fundal Height: Appropriate for gestational age  Extremities: Warm and well perfused  Non tender  OBJECTIVE:  /77   Pulse 94   Temp 97 6 °F (36 4 °C)   Resp 16   LMP 10/15/2021 (Exact Date)   There is no height or weight on file to calculate BMI    Labs:   Recent Results (from the past 24 hour(s))   UA w Reflex to Microscopic w Reflex to Culture    Collection Time: 06/15/22  7:42 PM    Specimen: Urine, Clean Catch   Result Value Ref Range    Color, UA Colorless     Clarity, UA Clear     Specific Gravity, UA <=1 005 1 003 - 1 030 pH, UA 6 0 4 5, 5 0, 5 5, 6 0, 6 5, 7 0, 7 5, 8 0    Leukocytes, UA Negative Negative    Nitrite, UA Negative Negative    Protein, UA Negative Negative mg/dl    Glucose, UA Negative Negative mg/dl    Ketones, UA 15 (1+) (A) Negative mg/dl    Urobilinogen, UA 0 2 0 2, 1 0 E U /dl E U /dl    Bilirubin, UA Negative Negative    Blood, UA Negative Negative         SVE:  Cervical Dilation: Closed  Cervical Effacement: 0  Fetal Station: -3  Method: Manual  OB Examiner: Laynet    FHT:  Baseline Rate: 125 bpm  Variability: Moderate 6-25 bpm  Accelerations: 15 x 15 or greater  Decelerations: None    TOCO:   Contraction Frequency (minutes): 0  Contraction Quality: Not applicable        Hillary Phelps DO  OB/GYN PGY-2  6/15/2022  9:31 PM      Portions of the record may have been created with voice recognition software  Occasional wrong word or "sound a like" substitutions may have occurred due to the inherent limitations of voice recognition software    Read the chart carefully and recognize, using context, where substitutions have occurred

## 2022-06-15 NOTE — TELEPHONE ENCOUNTER
Reason for Disposition   Increased pressure in pelvic area    Answer Assessment - Initial Assessment Questions  1  ONSET: "When did the symptoms begin?"         Last night    2  CONTRACTIONS: "Describe the contractions that you are having " (e g , duration, frequency, regularity, severity)      Nothing consistent    3  TRI: "What date are you expecting to deliver?"     22    4  PARITY: "Have you had a baby before?" If Yes, ask: "How long did the labor last?"      Yes, third baby    5  FETAL MOVEMENT: "Has the baby's movement decreased or changed significantly from normal?"      Denies    6   OTHER SYMPTOMS: "Do you have any other symptoms?" (e g , leaking fluid from vagina, fever, hand/facial swelling)     Left hip pain, pelvic pressure, constant feeling of having to poop, burning down belly button to vagina    Protocols used: PREGNANCY - LABOR - -ADULT-

## 2022-06-16 ENCOUNTER — ROUTINE PRENATAL (OUTPATIENT)
Dept: OBGYN CLINIC | Facility: CLINIC | Age: 32
End: 2022-06-16
Payer: COMMERCIAL

## 2022-06-16 VITALS
WEIGHT: 266.4 LBS | SYSTOLIC BLOOD PRESSURE: 126 MMHG | HEART RATE: 85 BPM | BODY MASS INDEX: 39.46 KG/M2 | DIASTOLIC BLOOD PRESSURE: 72 MMHG | HEIGHT: 69 IN

## 2022-06-16 DIAGNOSIS — Z87.59 HISTORY OF GESTATIONAL HYPERTENSION: ICD-10-CM

## 2022-06-16 DIAGNOSIS — Z3A.34 34 WEEKS GESTATION OF PREGNANCY: Primary | ICD-10-CM

## 2022-06-16 DIAGNOSIS — O99.213 MATERNAL MORBID OBESITY, ANTEPARTUM, THIRD TRIMESTER (HCC): ICD-10-CM

## 2022-06-16 DIAGNOSIS — E66.01 MATERNAL MORBID OBESITY, ANTEPARTUM, THIRD TRIMESTER (HCC): ICD-10-CM

## 2022-06-16 DIAGNOSIS — O09.93 SUPERVISION OF HIGH RISK PREGNANCY IN THIRD TRIMESTER: ICD-10-CM

## 2022-06-16 DIAGNOSIS — O24.313 PRE-EXISTING DIABETES MELLITUS AFFECTING PREGNANCY IN THIRD TRIMESTER, ANTEPARTUM: ICD-10-CM

## 2022-06-16 LAB
SL AMB  POCT GLUCOSE, UA: NEGATIVE
SL AMB POCT URINE PROTEIN: NEGATIVE

## 2022-06-16 PROCEDURE — 59025 FETAL NON-STRESS TEST: CPT | Performed by: CLINICAL NURSE SPECIALIST

## 2022-06-16 PROCEDURE — 76815 OB US LIMITED FETUS(S): CPT | Performed by: CLINICAL NURSE SPECIALIST

## 2022-06-16 PROCEDURE — PNV: Performed by: CLINICAL NURSE SPECIALIST

## 2022-06-16 NOTE — PROGRESS NOTES
Hussain Young is a 36 y o  [id number] 34w6d here for Routine Prenatal Visit (34 weeks, no problems or concerns )    Prenatal Visit  Subjective:   Denies unusual vaginal discharge, LOF, VB, or ctx  Reports Fetus is active  Seen in triage last night due to c/o unusual pressure feeling  Not in PTL and d/c'd   Feels better today  Objective:  Vitals:    06/16/22 0800   BP: 126/72   Pulse: 85     Pregravid Weight/BMI: 93 4 kg (206 lb) (BMI 30 41)  Current Weight: 121 kg (266 lb 6 4 oz)   Total Weight Gain: 27 4 kg (60 lb 6 4 oz)     OBGyn Exam  Physical Exam    ,      General: Not in acute distress and appearing well nourished and well groomed  Genitourinary:          Pelvic exam exam deferred   Cardiovascular:   Rate and Rhythm: Normal rate  Pulmonary:    Effort: normal, not labored  Abdominal:  Abdomen is soft and gravid    Musculoskeletal: Active movement of all extremities, no gross limitations of ROM     Edema: None  Neurological:   Mental Status: She is alert and oriented to person, place, and time  Skin: General: Skin is warm and dry  Psychiatric:    Mood and Affect: Mood normal       Behavior: Behavior normal    2nd/3rd TRIMESTER OBSTETRIC ULTRASOUND   [id number] at 34w6d  INDICATION: DM     TECHNIQUE:  Limited Transabdominal imaging was performed  The study includes volumetric sweeps and traditional still imaging technique  FINDINGS:  A single intrauterine gestation is identified  Cardiac activity detected   Fetal presentation:  cephalic    Amniotic Fluid Index: Total 7 29cm  Q1: 0 68cm  Q2: 1 03cm  Q3: 3 98cm  Q4: 1 80cm    IMPRESSION:     Single intrauterine pregnancy at 34w6d  Normal VINOD 7 29cm              Assessment & Plan:      1  34 weeks gestation of pregnancy  -     POCT urine dip    2  Maternal morbid obesity, antepartum, third trimester ([facility] [address] )    3  History of gestational hypertension  Assessment & Plan:  BP normotensive   No c/o HA, vision changes or RUQpain      4   Pre-existing diabetes mellitus affecting pregnancy in third trimester, antepartum  Assessment & Plan:  Denies significant highs or lows  Taking LDA for pre-e ppx  Continuing A/N testing twice weekly  VINOD repeated as VINOD earlier in the week  -normal but lower (7 9)  VINOD is stable at 7 29      5  Supervision of high risk pregnancy in third trimester  Assessment & Plan:  [id number], 34w6d  Overall pt doing well  Fetus active  Reviewed common mood changes towards end of pregnancy and s/s Postpartum depression to be aware of  Reviewed benefits of perineal massage - to be done 1-4 times per week x 5-10 minutes- education in AVS given  Educated on GBS culture which will be completed at the 36 wk visit  We again reviewed the S/S PTL and importance of consistent/regular FM   Reviewed process for [facility] and encouraged pt to call with any decreases in fetal movement          MARCO Moran  6/16/2022

## 2022-06-16 NOTE — ASSESSMENT & PLAN NOTE
Denies significant highs or lows  Taking LDA for pre-e ppx  Continuing A/N testing twice weekly  VINOD repeated as VINOD earlier in the week  -normal but lower (7 9)    VINOD is stable at 7 29

## 2022-06-16 NOTE — ASSESSMENT & PLAN NOTE
[id number], 34w6d  Overall pt doing well  Fetus active  Reviewed common mood changes towards end of pregnancy and s/s Postpartum depression to be aware of  Reviewed benefits of perineal massage - to be done 1-4 times per week x 5-10 minutes- education in AVS given  Educated on GBS culture which will be completed at the 36 wk visit  We again reviewed the S/S PTL and importance of consistent/regular FM   Reviewed process for [facility] and encouraged pt to call with any decreases in fetal movement

## 2022-06-21 ENCOUNTER — ULTRASOUND (OUTPATIENT)
Dept: PERINATAL CARE | Facility: OTHER | Age: 32
End: 2022-06-21
Payer: COMMERCIAL

## 2022-06-21 ENCOUNTER — TELEPHONE (OUTPATIENT)
Dept: PERINATAL CARE | Facility: CLINIC | Age: 32
End: 2022-06-21

## 2022-06-21 ENCOUNTER — ROUTINE PRENATAL (OUTPATIENT)
Dept: PERINATAL CARE | Facility: OTHER | Age: 32
End: 2022-06-21
Payer: COMMERCIAL

## 2022-06-21 VITALS
HEART RATE: 92 BPM | SYSTOLIC BLOOD PRESSURE: 125 MMHG | BODY MASS INDEX: 39.6 KG/M2 | DIASTOLIC BLOOD PRESSURE: 82 MMHG | HEIGHT: 69 IN | WEIGHT: 267.4 LBS

## 2022-06-21 DIAGNOSIS — O24.313 PRE-EXISTING DIABETES MELLITUS AFFECTING PREGNANCY IN THIRD TRIMESTER, ANTEPARTUM: ICD-10-CM

## 2022-06-21 DIAGNOSIS — Z3A.35 35 WEEKS GESTATION OF PREGNANCY: Primary | ICD-10-CM

## 2022-06-21 DIAGNOSIS — O24.313 PRE-EXISTING DIABETES MELLITUS AFFECTING PREGNANCY IN THIRD TRIMESTER, ANTEPARTUM: Primary | ICD-10-CM

## 2022-06-21 PROCEDURE — 59025 FETAL NON-STRESS TEST: CPT | Performed by: OBSTETRICS & GYNECOLOGY

## 2022-06-21 PROCEDURE — NC001 PR NO CHARGE: Performed by: OBSTETRICS & GYNECOLOGY

## 2022-06-21 PROCEDURE — 99213 OFFICE O/P EST LOW 20 MIN: CPT | Performed by: OBSTETRICS & GYNECOLOGY

## 2022-06-21 PROCEDURE — 76816 OB US FOLLOW-UP PER FETUS: CPT | Performed by: OBSTETRICS & GYNECOLOGY

## 2022-06-21 NOTE — TELEPHONE ENCOUNTER
LMOM to return call to discuss her reporting her blood sugars and to schedule an appt      Thank you

## 2022-06-23 ENCOUNTER — ROUTINE PRENATAL (OUTPATIENT)
Dept: OBGYN CLINIC | Facility: CLINIC | Age: 32
End: 2022-06-23
Payer: COMMERCIAL

## 2022-06-23 VITALS
HEART RATE: 86 BPM | TEMPERATURE: 97.2 F | BODY MASS INDEX: 39.72 KG/M2 | DIASTOLIC BLOOD PRESSURE: 60 MMHG | SYSTOLIC BLOOD PRESSURE: 110 MMHG | OXYGEN SATURATION: 98 % | HEIGHT: 69 IN | WEIGHT: 268.2 LBS

## 2022-06-23 DIAGNOSIS — Z87.59 HISTORY OF POSTPARTUM DEPRESSION: ICD-10-CM

## 2022-06-23 DIAGNOSIS — Z46.81 INSULIN PUMP TITRATION: ICD-10-CM

## 2022-06-23 DIAGNOSIS — O99.213 OBESITY AFFECTING PREGNANCY IN THIRD TRIMESTER: ICD-10-CM

## 2022-06-23 DIAGNOSIS — Z3A.35 35 WEEKS GESTATION OF PREGNANCY: Primary | ICD-10-CM

## 2022-06-23 DIAGNOSIS — Z87.59 HISTORY OF GESTATIONAL HYPERTENSION: ICD-10-CM

## 2022-06-23 DIAGNOSIS — Z86.59 HISTORY OF POSTPARTUM DEPRESSION: ICD-10-CM

## 2022-06-23 DIAGNOSIS — O09.93 SUPERVISION OF HIGH RISK PREGNANCY IN THIRD TRIMESTER: ICD-10-CM

## 2022-06-23 DIAGNOSIS — O24.313 PRE-EXISTING DIABETES MELLITUS AFFECTING PREGNANCY IN THIRD TRIMESTER, ANTEPARTUM: ICD-10-CM

## 2022-06-23 DIAGNOSIS — O09.893 SHORT INTERVAL BETWEEN PREGNANCIES COMPLICATING PREGNANCY IN THIRD TRIMESTER, ANTEPARTUM: ICD-10-CM

## 2022-06-23 LAB
SL AMB  POCT GLUCOSE, UA: NEGATIVE
SL AMB POCT URINE PROTEIN: NEGATIVE

## 2022-06-23 PROCEDURE — 59025 FETAL NON-STRESS TEST: CPT | Performed by: OBSTETRICS & GYNECOLOGY

## 2022-06-23 PROCEDURE — PNV: Performed by: OBSTETRICS & GYNECOLOGY

## 2022-06-23 NOTE — PROGRESS NOTES
S: 36 y o  [id number] 35w6d here for routine prenatal visit  Chief Complaint   Patient presents with    Routine Prenatal Visit     And NST; No concerns         OB complaints:  Contractions: no  Leakage: no  Bleeding: no  Fetal movement: yes      O:  /60 (BP Location: Left arm, Patient Position: Sitting, Cuff Size: Large)   Pulse 86   Temp (!) 97 2 °F (36 2 °C) (Tympanic)   Ht 5' 9" (1 753 m)   Wt 122 kg (268 lb 3 2 oz)   LMP 10/15/2021 (Exact Date)   SpO2 98%   BMI 39 61 kg/m²       Review of Systems   Constitutional: Negative for chills and fever  Eyes: Negative for visual disturbance  Respiratory: Negative for chest tightness and shortness of breath  Cardiovascular: Negative for chest pain  Gastrointestinal: Negative for abdominal pain, diarrhea, nausea and vomiting  Genitourinary: Negative for pelvic pain and vaginal bleeding  As noted in HPI   Skin: Negative for rash  Neurological: Negative for headaches  All other systems reviewed and are negative  Physical Exam  Constitutional:       General: She is not in acute distress  Appearance: Normal appearance  HENT:      Head: Normocephalic and atraumatic  Cardiovascular:      Rate and Rhythm: Normal rate  Pulmonary:      Effort: Pulmonary effort is normal  No respiratory distress  Abdominal:      General: There is no distension  Palpations: Abdomen is soft  Tenderness: There is no abdominal tenderness  There is no guarding or rebound  Comments: gravid   Neurological:      General: No focal deficit present  Mental Status: She is alert  Psychiatric:         Mood and Affect: Mood normal          Behavior: Behavior normal    Vitals and nursing note reviewed                Fetal Heart Rate: NST     NST Completed today:  Baseline: 130 BPM  Variability: Moderate  Accelerations: Yes  Decelerations: None  Contractions: Not present  Nonstress Test Interpretation: Reactive      Pregravid Weight/BMI: 93 4 kg (206 lb) (BMI 30 41)  Current Weight: 122 kg (268 lb 3 2 oz)   Total Weight Gain: 28 2 kg (62 lb 3 2 oz)     Pre-Enoc Vitals    Flowsheet Row Most Recent Value   Prenatal Assessment    Fetal Heart Rate NST   Movement Present   Prenatal Vitals    Blood Pressure 110/60   Weight - Scale 122 kg (268 lb 3 2 oz)   Urine Albumin/Glucose    Dilation/Effacement/Station    Vaginal Drainage    Edema             Results for orders placed or performed in visit on 22   POCT urine dip   Result Value Ref Range    POCT URINE PROTEIN Negative     GLUCOSE, UA Negative          Problem List        Endocrine    Pre-existing diabetes mellitus affecting pregnancy in third trimester, antepartum    Overview     -T1DM  -On Humalog via Medtronic 530G insulin pump and Dexcom G6  Pending Medtronic upgrade      baby aspirin   Baseline labs WNL  EKG - completed 3rd trimester was NSR   Early and routine anatomy US WNL  fetal echo WNL  - serial US: 35 week growth US 83rd%  - twice weekly surveillance at 32 weeks  - delivery by [facility]            Current Assessment & Plan     Can stop ASA  Had NST/VINOD at [facility] earlier this week with growth US - reviewed  NST today reactive/reassuring              Other    History of gestational hypertension    Supervision of high-risk pregnancy    Overview     Declines aneuploidy screening, msAFP  Declines flu, COVID vaccines   S/p tdap   Consents signed           Current Assessment & Plan     GBS next visit  OB, COVID precautions reviewed           Obesity affecting pregnancy in third trimester    Overview     prepreg BMI 30  Surveillance per DM1           History of postpartum depression    Short interval between pregnancies complicating pregnancy in third trimester, antepartum    35 weeks gestation of pregnancy    Insulin pump titration                            Madison Hoyt MD  2022  9:06 AM

## 2022-06-23 NOTE — ASSESSMENT & PLAN NOTE
Can stop ASA  Had NST/VINOD at [facility] earlier this week with growth US - reviewed  NST today reactive/reassuring

## 2022-06-28 ENCOUNTER — ULTRASOUND (OUTPATIENT)
Dept: PERINATAL CARE | Facility: OTHER | Age: 32
End: 2022-06-28
Payer: COMMERCIAL

## 2022-06-28 VITALS
HEART RATE: 82 BPM | HEIGHT: 69 IN | DIASTOLIC BLOOD PRESSURE: 75 MMHG | BODY MASS INDEX: 40.11 KG/M2 | WEIGHT: 270.8 LBS | SYSTOLIC BLOOD PRESSURE: 122 MMHG

## 2022-06-28 DIAGNOSIS — O99.213 OBESITY AFFECTING PREGNANCY IN THIRD TRIMESTER: ICD-10-CM

## 2022-06-28 DIAGNOSIS — O24.313 PRE-EXISTING DIABETES MELLITUS AFFECTING PREGNANCY IN THIRD TRIMESTER, ANTEPARTUM: Primary | ICD-10-CM

## 2022-06-28 DIAGNOSIS — Z3A.36 36 WEEKS GESTATION OF PREGNANCY: ICD-10-CM

## 2022-06-28 PROCEDURE — 76815 OB US LIMITED FETUS(S): CPT | Performed by: OBSTETRICS & GYNECOLOGY

## 2022-06-28 PROCEDURE — 59025 FETAL NON-STRESS TEST: CPT | Performed by: NURSE PRACTITIONER

## 2022-06-28 PROCEDURE — 99213 OFFICE O/P EST LOW 20 MIN: CPT | Performed by: OBSTETRICS & GYNECOLOGY

## 2022-06-28 PROCEDURE — 59025 FETAL NON-STRESS TEST: CPT | Performed by: OBSTETRICS & GYNECOLOGY

## 2022-06-28 NOTE — PROGRESS NOTES
[address]: Ms Grabiel Simon was seen today for NST (found under the pregnancy episode) which I reviewed the RN assessment and agree, and VINOD (see ultrasound report under OB procedures tab)  See ultrasound report under "OB Procedures" tab  Patient reminded of need to upload her sugars to diabetic pathways  She states they are normal and she thought she sent them out last week but nothing in chart  Reinforced need to send out   Please don't hesitate to contact our office with any concerns or questions   -MARCO Newberry

## 2022-06-28 NOTE — PATIENT INSTRUCTIONS
Thank you for choosing us for your  care today  If you have any questions about your ultrasound or care, please do not hesitate to contact us or your primary obstetrician  Some general instructions for your pregnancy are:    Protect against coronavirus: get vaccinated - pregnant women are increased risk of severe COVID  Notify your primary care doctor if you have any symptoms  Exercise: Aim for 22 minutes per day (150 minutes per week) of regular exercise  Walking is great! Nutrition: aim for calcium-rich and iron-rich foods as well as healthy sources of protein  Learn about Preeclampsia: preeclampsia is a common, serious high blood pressure complication in pregnancy  A blood pressure of 026HVCS (systolic or top number) or 48WHHK (diastolic or bottom number) is not normal and needs evaluation by your doctor  Aspirin is sometimes prescribed in early pregnancy to prevent preeclampsia in women with risk factors - ask your obstetrician if you should be on this medication  If you smoke, try to reduce how many cigarettes you smoke or try to quit completely  Do not vape  Other warning signs to watch out for in pregnancy or postpartum: chest pain, obstructed breathing or shortness of breath, seizures, thoughts of hurting yourself or your baby, bleeding, a painful or swollen leg, fever, or headache (see AWHONN POST-BIRTH Warning Signs campaign)  If these happen call 911  Itching is also not normal in pregnancy and if you experience this, especially over your hands and feet, potentially worse at night, notify your doctors

## 2022-06-30 ENCOUNTER — ROUTINE PRENATAL (OUTPATIENT)
Dept: OBGYN CLINIC | Facility: CLINIC | Age: 32
End: 2022-06-30
Payer: COMMERCIAL

## 2022-06-30 VITALS
DIASTOLIC BLOOD PRESSURE: 68 MMHG | HEART RATE: 78 BPM | OXYGEN SATURATION: 98 % | WEIGHT: 273.6 LBS | BODY MASS INDEX: 40.52 KG/M2 | HEIGHT: 69 IN | SYSTOLIC BLOOD PRESSURE: 128 MMHG | TEMPERATURE: 97.2 F

## 2022-06-30 DIAGNOSIS — Z46.81 INSULIN PUMP TITRATION: ICD-10-CM

## 2022-06-30 DIAGNOSIS — O09.93 SUPERVISION OF HIGH RISK PREGNANCY IN THIRD TRIMESTER: ICD-10-CM

## 2022-06-30 DIAGNOSIS — Z86.59 HISTORY OF POSTPARTUM DEPRESSION: ICD-10-CM

## 2022-06-30 DIAGNOSIS — O24.313 PRE-EXISTING DIABETES MELLITUS AFFECTING PREGNANCY IN THIRD TRIMESTER, ANTEPARTUM: ICD-10-CM

## 2022-06-30 DIAGNOSIS — Z3A.36 36 WEEKS GESTATION OF PREGNANCY: ICD-10-CM

## 2022-06-30 DIAGNOSIS — Z3A.35 35 WEEKS GESTATION OF PREGNANCY: ICD-10-CM

## 2022-06-30 DIAGNOSIS — O99.213 OBESITY AFFECTING PREGNANCY IN THIRD TRIMESTER: Primary | ICD-10-CM

## 2022-06-30 DIAGNOSIS — Z87.59 HISTORY OF POSTPARTUM DEPRESSION: ICD-10-CM

## 2022-06-30 DIAGNOSIS — O09.893 SHORT INTERVAL BETWEEN PREGNANCIES COMPLICATING PREGNANCY IN THIRD TRIMESTER, ANTEPARTUM: ICD-10-CM

## 2022-06-30 DIAGNOSIS — Z87.59 HISTORY OF GESTATIONAL HYPERTENSION: ICD-10-CM

## 2022-06-30 LAB
SL AMB  POCT GLUCOSE, UA: NEGATIVE
SL AMB POCT URINE PROTEIN: ABNORMAL

## 2022-06-30 PROCEDURE — 59025 FETAL NON-STRESS TEST: CPT | Performed by: OBSTETRICS & GYNECOLOGY

## 2022-06-30 PROCEDURE — PNV: Performed by: OBSTETRICS & GYNECOLOGY

## 2022-06-30 PROCEDURE — 87150 DNA/RNA AMPLIFIED PROBE: CPT | Performed by: OBSTETRICS & GYNECOLOGY

## 2022-06-30 NOTE — ASSESSMENT & PLAN NOTE
Has stopped ASA as instructed  NST today reactive/reassuring   Had NST/VINOD at [facility] earlier this week, continue twice weekly

## 2022-06-30 NOTE — PROGRESS NOTES
S: 36 y o  [id number] 36w6d here for routine prenatal visit  Chief Complaint   Patient presents with    Routine Prenatal Visit     No concerns - GBS due, NST         OB complaints:  Contractions: no  Leakage: no  Bleeding: no  Fetal movement: yes      O:  /68 (BP Location: Left arm, Patient Position: Sitting, Cuff Size: Large)   Pulse 78   Temp (!) 97 2 °F (36 2 °C) (Tympanic)   Ht 5' 9" (1 753 m)   Wt 124 kg (273 lb 9 6 oz)   LMP 10/15/2021 (Exact Date)   SpO2 98%   BMI 40 40 kg/m²       Review of Systems   Constitutional: Negative for chills and fever  Eyes: Negative for visual disturbance  Respiratory: Negative for chest tightness and shortness of breath  Cardiovascular: Negative for chest pain  Gastrointestinal: Negative for abdominal pain, diarrhea, nausea and vomiting  Genitourinary: Negative for pelvic pain and vaginal bleeding  As noted in HPI   Skin: Negative for rash  Neurological: Negative for headaches  All other systems reviewed and are negative  Physical Exam  Constitutional:       General: She is not in acute distress  Appearance: Normal appearance  Genitourinary:      Right Labia: No rash, tenderness, lesions or skin changes  Left Labia: No tenderness, lesions, skin changes or rash  HENT:      Head: Normocephalic and atraumatic  Cardiovascular:      Rate and Rhythm: Normal rate  Pulmonary:      Effort: Pulmonary effort is normal  No respiratory distress  Abdominal:      General: There is no distension  Palpations: Abdomen is soft  Tenderness: There is no abdominal tenderness  There is no guarding or rebound  Comments: Gravid    Neurological:      General: No focal deficit present  Mental Status: She is alert  Psychiatric:         Mood and Affect: Mood normal          Behavior: Behavior normal    Vitals and nursing note reviewed  Exam conducted with a chaperone present                Fetal Heart Rate: NST    Pregravid Weight/BMI: 93 4 kg (206 lb) (BMI 30 41)  Current Weight: 124 kg (273 lb 9 6 oz)   Total Weight Gain: 30 7 kg (67 lb 9 6 oz)     Pre- Vitals    Flowsheet Row Most Recent Value   Prenatal Assessment    Fetal Heart Rate NST   Movement Present   Prenatal Vitals    Blood Pressure 128/68   Weight - Scale 124 kg (273 lb 9 6 oz)   Urine Albumin/Glucose    Dilation/Effacement/Station    Vaginal Drainage    Edema             Results for orders placed or performed in visit on 22   POCT urine dip   Result Value Ref Range    POCT URINE PROTEIN Trace     GLUCOSE, UA Negative        NST Completed today:  Baseline: 130 BPM  Variability: Moderate  Accelerations: Yes  Decelerations: None  Contractions: Not present  Nonstress Test Interpretation: Reactive      Problem List        Endocrine    Pre-existing diabetes mellitus affecting pregnancy in third trimester, antepartum    Overview     -T1DM  -On Humalog via Medtronic 530G insulin pump and Dexcom G6  Pending Medtronic upgrade  baby aspirin   Baseline labs WNL  EKG - completed 3rd trimester was NSR   Early and routine anatomy US WNL  fetal echo WNL  - serial US: 35 week growth US 83rd%  - twice weekly surveillance at 32 weeks  - delivery by [facility]            Current Assessment & Plan     Has stopped ASA as instructed  NST today reactive/reassuring  Had NST/VINOD at MFM earlier this week, continue twice weekly              Other    History of gestational hypertension    Supervision of high-risk pregnancy    Overview     Declines aneuploidy screening, msAFP  Declines flu, COVID vaccines   S/p tdap   Consents signed           Current Assessment & Plan     GBS collected today   SVE deferred given lack of complaints   OB, COVID precautions reviewed           Obesity affecting pregnancy in third trimester    Overview     prepreg BMI 30  Surveillance per DM1           History of postpartum depression    Short interval between pregnancies complicating pregnancy in third trimester, antepartum    36 weeks gestation of pregnancy    Insulin pump titration                            Cornelia Blanco MD  6/30/2022  9:44 AM

## 2022-07-02 LAB — GP B STREP DNA SPEC QL NAA+PROBE: POSITIVE

## 2022-07-03 PROBLEM — O99.820 GROUP B STREPTOCOCCAL CARRIAGE COMPLICATING PREGNANCY: Status: ACTIVE | Noted: 2022-07-03

## 2022-07-05 ENCOUNTER — ULTRASOUND (OUTPATIENT)
Dept: PERINATAL CARE | Facility: OTHER | Age: 32
End: 2022-07-05
Payer: COMMERCIAL

## 2022-07-05 VITALS
BODY MASS INDEX: 40.91 KG/M2 | DIASTOLIC BLOOD PRESSURE: 82 MMHG | WEIGHT: 276.2 LBS | HEART RATE: 92 BPM | SYSTOLIC BLOOD PRESSURE: 129 MMHG | HEIGHT: 69 IN

## 2022-07-05 DIAGNOSIS — Z3A.37 37 WEEKS GESTATION OF PREGNANCY: ICD-10-CM

## 2022-07-05 DIAGNOSIS — O99.213 OBESITY AFFECTING PREGNANCY IN THIRD TRIMESTER: ICD-10-CM

## 2022-07-05 DIAGNOSIS — O24.313 PRE-EXISTING DIABETES MELLITUS AFFECTING PREGNANCY IN THIRD TRIMESTER, ANTEPARTUM: Primary | ICD-10-CM

## 2022-07-05 PROCEDURE — 76815 OB US LIMITED FETUS(S): CPT | Performed by: NURSE PRACTITIONER

## 2022-07-05 PROCEDURE — 59025 FETAL NON-STRESS TEST: CPT | Performed by: NURSE PRACTITIONER

## 2022-07-05 NOTE — PROGRESS NOTES
[address]: Ms Gela Garcia was seen today for NST (found under the pregnancy episode) which I reviewed the RN assessment and agree, and VINOD (see ultrasound report under OB procedures tab)  See ultrasound report under "OB Procedures" tab  She sent sugars to [facility] 6/28 and plans 39 week IOL  Will schedule @ next OB visit    Please don't hesitate to contact our office with any concerns or questions   -MARCO Parker

## 2022-07-05 NOTE — PATIENT INSTRUCTIONS
Thank you for choosing us for your  care today  If you have any questions about your ultrasound or care, please do not hesitate to contact us or your primary obstetrician  Some general instructions for your pregnancy are:    Protect against coronavirus: get vaccinated - pregnant women are increased risk of severe COVID  Notify your primary care doctor if you have any symptoms  Exercise: Aim for 22 minutes per day (150 minutes per week) of regular exercise  Walking is great! Nutrition: aim for calcium-rich and iron-rich foods as well as healthy sources of protein  Learn about Preeclampsia: preeclampsia is a common, serious high blood pressure complication in pregnancy  A blood pressure of 835OEYJ (systolic or top number) or 16KJOL (diastolic or bottom number) is not normal and needs evaluation by your doctor  Aspirin is sometimes prescribed in early pregnancy to prevent preeclampsia in women with risk factors - ask your obstetrician if you should be on this medication  If you smoke, try to reduce how many cigarettes you smoke or try to quit completely  Do not vape  Other warning signs to watch out for in pregnancy or postpartum: chest pain, obstructed breathing or shortness of breath, seizures, thoughts of hurting yourself or your baby, bleeding, a painful or swollen leg, fever, or headache (see [employer] POST-BIRTH Warning Signs campaign)  If these happen call 911  Itching is also not normal in pregnancy and if you experience this, especially over your hands and feet, potentially worse at night, notify your doctors

## 2022-07-05 NOTE — LETTER
July 5, 2022     Maximilian Xiao, [address] [place], [place] [address] [address] [facility]    Patient: Jorge Choudhary   YOB: 1990   Date of Visit: 7/5/2022       Dear Dr Anabelle Valdovinos: Thank you for referring Saji Billings to me for evaluation  Below are my notes for this consultation  If you have questions, please do not hesitate to call me  I look forward to following your patient along with you  Sincerely,        Carlos LARA        CC: No Recipients  MARCO Umanzor  7/5/2022  8:57 AM  [facility]: Ms Izaiah Dixon was seen today for NST (found under the pregnancy episode) which I reviewed the RN assessment and agree, and VINOD (see ultrasound report under OB procedures tab)  See ultrasound report under "OB Procedures" tab  She sent sugars to [facility] 6/28 and plans 39 week IOL  Will schedule @ next OB visit    Please don't hesitate to contact our office with any concerns or questions   -MARCO Umanzor

## 2022-07-07 PROBLEM — Z3A.38 38 WEEKS GESTATION OF PREGNANCY: Status: ACTIVE | Noted: 2022-02-17

## 2022-07-07 NOTE — PROGRESS NOTES
OB/GYN  PN Visit  Waldemar Li  [id number]  7/8/2022  8:39 AM  Dr Juanito Mckeon MD    S: 29 y o  [id number] 38 weeks here for PN visit  Chief Complaint   Patient presents with    Routine Prenatal Visit     38 weeks, no problems or concerns          OB complaints:  Contractions: no  Leakage: no  Bleeding: no  Fetal movement: yes      O:  /82 (BP Location: Right arm, Patient Position: Sitting, Cuff Size: Standard)   Pulse 104   Ht 5' 9" (1 753 m)   Wt 125 kg (275 lb 6 4 oz)   LMP 10/15/2021 (Exact Date)   BMI 40 67 kg/m²       Review of Systems   Constitutional: Negative  HENT: Negative  Eyes: Negative  Respiratory: Negative  Cardiovascular: Negative  Gastrointestinal: Negative  Endocrine: Negative  Genitourinary:        As noted in HPI   Musculoskeletal: Negative  Skin: Negative  Allergic/Immunologic: Negative  Neurological: Negative  Hematological: Negative  Psychiatric/Behavioral: Negative  Physical Exam  Constitutional:       General: She is not in acute distress  Appearance: Normal appearance  She is well-developed  Genitourinary:      No inguinal adenopathy present in the right or left side  Pelvic exam was performed with patient in the lithotomy position  HENT:      Head: Normocephalic  Cardiovascular:      Rate and Rhythm: Normal rate  Pulmonary:      Effort: Pulmonary effort is normal    Abdominal:      General: There is no distension  Palpations: Abdomen is soft  Tenderness: There is no abdominal tenderness  There is no guarding  Hernia: There is no hernia in the left inguinal area or right inguinal area  Musculoskeletal:         General: No swelling or deformity  Lymphadenopathy:      Lower Body: No right inguinal adenopathy  No left inguinal adenopathy  Neurological:      General: No focal deficit present  Mental Status: She is alert and oriented to person, place, and time     Skin:     General: Skin is warm and dry  Psychiatric:         Mood and Affect: Mood normal          Behavior: Behavior normal    Vitals reviewed  Pregravid Weight/BMI: 93 4 kg (206 lb) (BMI 30 41)  Current Weight: 125 kg (275 lb 6 4 oz)   Total Weight Gain: 31 5 kg (69 lb 6 4 oz)   Pre-Enoc Vitals    Flowsheet Row Most Recent Value   Prenatal Assessment    Fetal Heart Rate 130   Movement Present   Presentation Vertex   Prenatal Vitals    Blood Pressure 130/82   Weight - Scale 125 kg (275 lb 6 4 oz)   Urine Albumin/Glucose    Dilation/Effacement/Station    Cervical Dilation   5   Cervical Effacement 50   Fetal Station -3   Vaginal Drainage    Edema            Problem List        Endocrine    Pre-existing diabetes mellitus affecting pregnancy in third trimester, antepartum    Overview     -T1DM  -On Humalog via Medtronic 530G insulin pump and Dexcom G6  Pending Medtronic upgrade      baby aspirin   Baseline labs WNL  EKG - completed 3rd trimester was NSR   Early and routine anatomy US WNL  fetal echo WNL  - serial US: 35 week growth US 83rd%  - twice weekly surveillance at 32 weeks  - delivery by [facility]               Other    History of gestational hypertension    Supervision of high-risk pregnancy    Overview     Declines aneuploidy screening, msAFP  Declines flu, COVID vaccines   S/p tdap   Consents signed           Obesity affecting pregnancy in third trimester    Overview     prepreg BMI 30  Surveillance per DM1           History of postpartum depression    Short interval between pregnancies complicating pregnancy in third trimester, antepartum    38 weeks gestation of pregnancy    Insulin pump titration    Group B streptococcal carriage complicating pregnancy      Other Visit Diagnoses     Third trimester pregnancy    -  Primary         She is c/o external hemorrhoids    35 week US  EFW [place] 4   3079 grams - 6 lbs 13 oz                 (83%)    Pt prefers to wait for IOL if no contraindications for expectant management, discussed delivery at 39-39 79 weeks    NST performed  Has NST and VINOD next Wed at [facility] 120    Follow-up in 1 week for OB visit and NST    States she is unable to have IOL next weekend due to no     Juanito Mckeon MD  7/8/2022  8:39 AM

## 2022-07-08 ENCOUNTER — ROUTINE PRENATAL (OUTPATIENT)
Dept: OBGYN CLINIC | Facility: CLINIC | Age: 32
End: 2022-07-08
Payer: COMMERCIAL

## 2022-07-08 VITALS
HEIGHT: 69 IN | BODY MASS INDEX: 40.79 KG/M2 | WEIGHT: 275.4 LBS | HEART RATE: 104 BPM | SYSTOLIC BLOOD PRESSURE: 130 MMHG | DIASTOLIC BLOOD PRESSURE: 82 MMHG

## 2022-07-08 DIAGNOSIS — Z86.59 HISTORY OF POSTPARTUM DEPRESSION: ICD-10-CM

## 2022-07-08 DIAGNOSIS — Z87.59 HISTORY OF GESTATIONAL HYPERTENSION: ICD-10-CM

## 2022-07-08 DIAGNOSIS — Z34.93 THIRD TRIMESTER PREGNANCY: Primary | ICD-10-CM

## 2022-07-08 DIAGNOSIS — O99.213 OBESITY AFFECTING PREGNANCY IN THIRD TRIMESTER: ICD-10-CM

## 2022-07-08 DIAGNOSIS — Z87.59 HISTORY OF POSTPARTUM DEPRESSION: ICD-10-CM

## 2022-07-08 DIAGNOSIS — Z46.81 INSULIN PUMP TITRATION: ICD-10-CM

## 2022-07-08 DIAGNOSIS — O09.93 SUPERVISION OF HIGH RISK PREGNANCY IN THIRD TRIMESTER: ICD-10-CM

## 2022-07-08 DIAGNOSIS — Z3A.38 38 WEEKS GESTATION OF PREGNANCY: ICD-10-CM

## 2022-07-08 DIAGNOSIS — O99.820 GROUP B STREPTOCOCCAL CARRIAGE COMPLICATING PREGNANCY: ICD-10-CM

## 2022-07-08 DIAGNOSIS — O24.313 PRE-EXISTING DIABETES MELLITUS AFFECTING PREGNANCY IN THIRD TRIMESTER, ANTEPARTUM: ICD-10-CM

## 2022-07-08 DIAGNOSIS — O09.893 SHORT INTERVAL BETWEEN PREGNANCIES COMPLICATING PREGNANCY IN THIRD TRIMESTER, ANTEPARTUM: ICD-10-CM

## 2022-07-08 PROCEDURE — 59025 FETAL NON-STRESS TEST: CPT | Performed by: OBSTETRICS & GYNECOLOGY

## 2022-07-08 PROCEDURE — PNV: Performed by: OBSTETRICS & GYNECOLOGY

## 2022-07-08 NOTE — PATIENT INSTRUCTIONS
Pregnancy at 28 to [address]:   You are considered full term at the beginning of 37 weeks  Your breathing may be easier if your baby has moved down into a head-down position  You may need to urinate more often because the baby may be pressing on your bladder  You may also feel more discomfort and get tired easily  DISCHARGE INSTRUCTIONS:   Return to the emergency department if:   You develop a severe headache that does not go away  You have new or increased vision changes, such as blurred or spotted vision  You have new or increased swelling in your face or hands  You have vaginal spotting or bleeding  Your water broke or you feel warm water gushing or trickling from your vagina  Call your obstetrician if:   You have more than 5 contractions in 1 hour  You notice any changes in your baby's movements  You have abdominal cramps, pressure, or tightening  You have a change in vaginal discharge  You have chills or a fever  You have vaginal itching, burning, or pain  You have yellow, green, white, or foul-smelling vaginal discharge  You have pain or burning when you urinate, less urine than usual, or pink or bloody urine  You have questions or concerns about your condition or care  How to care for yourself at this stage of your pregnancy:       Eat a variety of healthy foods  Healthy foods include fruits, vegetables, whole-grain breads, low-fat dairy foods, beans, lean meats, and fish  Drink liquids as directed  Ask how much liquid to drink each day and which liquids are best for you  Limit caffeine to less than 200 milligrams each day  Limit your intake of fish to 2 servings each week  Choose fish low in mercury such as canned light tuna, shrimp, salmon, cod, or tilapia  Do not  eat fish high in mercury such as swordfish, tilefish, oscar mackerel, and shark  Take prenatal vitamins as directed    Your need for certain vitamins and minerals, such as folic acid, increases during pregnancy  Prenatal vitamins provide some of the extra vitamins and minerals you need  Prenatal vitamins may also help to decrease the risk of certain birth defects  Rest as needed  Put your feet up if you have swelling in your ankles and feet  Talk to your healthcare provider about exercise  Moderate exercise can help you stay fit  Your healthcare provider will help you plan an exercise program that is safe for you during pregnancy  Do not smoke  Smoking increases your risk of a miscarriage and other health problems during your pregnancy  Smoking can cause your baby to be born early or weigh less at birth  Ask your healthcare provider for information if you need help quitting  Do not drink alcohol  Alcohol passes from your body to your baby through the placenta  It can affect your baby's brain development and cause fetal alcohol syndrome (FAS)  FAS is a group of conditions that causes mental, behavior, and growth problems  Talk to your healthcare provider before you take any medicines  Many medicines may harm your baby if you take them when you are pregnant  Do not take any medicines, vitamins, herbs, or supplements without first talking to your healthcare provider  Never use illegal or street drugs (such as marijuana or cocaine) while you are pregnant  Safety tips:   Avoid hot tubs and saunas  Do not use a hot tub or sauna while you are pregnant, especially during your first trimester  Hot tubs and saunas may raise your baby's temperature and increase the risk of birth defects  Avoid toxoplasmosis  This is an infection caused by eating raw meat or being around infected cat feces  It can cause birth defects, miscarriages, and other problems  Wash your hands after you touch raw meat  Make sure any meat is well-cooked before you eat it  Avoid raw eggs and unpasteurized milk   Use gloves or ask someone else to clean your cat's litter box while you are pregnant  Ask your healthcare provider about travel  The most comfortable time to travel is during the second trimester  Ask your provider if you can travel after 36 weeks  You may not be able to travel in an airplane after 36 weeks  He or she may also recommend you avoid long road trips  Changes happening with your baby:  By 38 weeks, your baby may weigh between 6 and 9 pounds  Your baby may be about 14 inches long from the top of the head to the rump (baby's bottom)  Your baby hears well enough to know your voice  As your baby gets larger, you may feel fewer kicks and more stretching and rolling  Your baby may move into a head-down position  Your baby will also rest lower in your abdomen  What you need to know about prenatal care: Your healthcare provider will check your blood pressure and weight  You may also need the following:  A urine test  may also be done to check for sugar and protein  These can be signs of gestational diabetes or infection  Protein in your urine may also be a sign of preeclampsia  Preeclampsia is a condition that can develop during week 20 or later of your pregnancy  It causes high blood pressure, and it can cause problems with your kidneys and other organs  A gestational diabetes screen  may be done  Your healthcare provider may order either a 1-step or 2-step oral glucose tolerance test (OGTT)  1-step OGTT:  Your blood sugar level will be tested after you have not eaten for 8 hours (fasting)  You will then be given a glucose drink  Your level will be tested again 1 hour and 2 hours after you finish the drink  2-step OGTT:  You do not have to fast for the first part of the test  You will have the glucose drink at any time of day  Your blood sugar level will be checked 1 hour later  If your blood sugar is higher than a certain level, another test will be ordered  You will fast and your blood sugar level will be tested  You will have the glucose drink   Your blood will be tested again 1 hour, 2 hours, and 3 hours after you finish the glucose drink  A blood test  may be done to check for anemia (low iron level)  A Tdap vaccine  may be recommended by your healthcare provider  A group B strep test  is a test that is done to check for group B strep infection  Group B strep is a type of bacteria that may be found in the vagina or rectum  It can be passed to your baby during delivery if you have it  Your healthcare provider will take swab your vagina or rectum and send the sample to the lab for tests  Fundal height  is a measurement of your uterus to check your baby's growth  This number is usually the same as the number of weeks that you have been pregnant  Your healthcare provider may also check your baby's position  Your baby's heart rate  will be checked  Follow up with your obstetrician as directed:  Write down your questions so you remember to ask them during your visits  © Copyright [employer] 2022 Information is for End User's use only and may not be sold, redistributed or otherwise used for commercial purposes  All illustrations and images included in CareNotes® are the copyrighted property of [employer]  or [facility] Gareth Logan   The above information is an  only  It is not intended as medical advice for individual conditions or treatments  Talk to your doctor, nurse or pharmacist before following any medical regimen to see if it is safe and effective for you

## 2022-07-11 ENCOUNTER — TELEPHONE (OUTPATIENT)
Dept: PERINATAL CARE | Facility: CLINIC | Age: 32
End: 2022-07-11

## 2022-07-11 ENCOUNTER — TELEMEDICINE (OUTPATIENT)
Dept: PERINATAL CARE | Facility: CLINIC | Age: 32
End: 2022-07-11
Payer: COMMERCIAL

## 2022-07-11 VITALS — BODY MASS INDEX: 40.73 KG/M2 | HEIGHT: 69 IN | WEIGHT: 275 LBS

## 2022-07-11 DIAGNOSIS — Z46.81 INSULIN PUMP TITRATION: ICD-10-CM

## 2022-07-11 DIAGNOSIS — O24.313 PRE-EXISTING DIABETES MELLITUS AFFECTING PREGNANCY IN THIRD TRIMESTER, ANTEPARTUM: Primary | ICD-10-CM

## 2022-07-11 DIAGNOSIS — O99.213 OBESITY AFFECTING PREGNANCY IN THIRD TRIMESTER: ICD-10-CM

## 2022-07-11 DIAGNOSIS — Z3A.38 38 WEEKS GESTATION OF PREGNANCY: ICD-10-CM

## 2022-07-11 PROCEDURE — 99214 OFFICE O/P EST MOD 30 MIN: CPT | Performed by: NURSE PRACTITIONER

## 2022-07-11 NOTE — ASSESSMENT & PLAN NOTE
-A1c 5 3% normal   -Continue Humalog via Medtronic 770G and Dexcom G6    -Due to 2 hours post meal start of meal 120 and self bolus insulin multiple times; decrease carb ratio to 1 6; decrease correction factor to 5    -Continue GDM diet  -Try to SMBG fasting and 2 hours post start of each meal    -Glucose goals fasting 60-90; 1 hour post meal 140 or less; 2 hours post meal 120 or less; before meals/overnight   -Always have glucose for hypoglycemia; use 15 by 15 rule  -Continue follow up with OB and MFM as recommended  -NST twice a week and VINOD weekly   -Continue close contact with diabetes team   -Follow up with endocrinologist post delivery     Lab Results   Component Value Date    HGBA1C 5 3 06/02/2022

## 2022-07-11 NOTE — PATIENT INSTRUCTIONS
-A1c 5 3% normal   -Continue Humalog via Medtronic 770G and Dexcom G6    -Due to 2 hours post meal start of meal 120 and self bolus insulin multiple times; decrease carb ratio to 1 6; decrease correction factor to 5    -Continue GDM diet  -Try to SMBG fasting and 2 hours post start of each meal    -Glucose goals fasting 60-90; 1 hour post meal 140 or less; 2 hours post meal 120 or less; before meals/overnight   -Always have glucose for hypoglycemia; use 15 by 15 rule  -Continue follow up with OB and MFM as recommended  -NST twice a week and VINOD weekly   -Continue close contact with diabetes team   -Follow up with endocrinologist post delivery

## 2022-07-11 NOTE — PROGRESS NOTES
Virtual Regular Visit    Verification of patient location:[place]    Patient is located in the following state in which I hold an active license [place]      Assessment/Plan:    Problem List Items Addressed This Visit        Endocrine    Pre-existing diabetes mellitus affecting pregnancy in third trimester, antepartum - Primary     -A1c 5 3% normal   -Continue Humalog via Medtronic 770G and Dexcom G6    -Due to 2 hours post meal start of meal 120 and self bolus insulin multiple times; decrease carb ratio to 1 6; decrease correction factor to 5    -Continue GDM diet  -Try to SMBG fasting and 2 hours post start of each meal    -Glucose goals fasting 60-90; 1 hour post meal 140 or less; 2 hours post meal 120 or less; before meals/overnight   -Always have glucose for hypoglycemia; use 15 by 15 rule  -Continue follow up with OB and MFM as recommended  -NST twice a week and VINOD weekly   -Continue close contact with diabetes team   -Follow up with endocrinologist post delivery  Lab Results   Component Value Date    HGBA1C 5 3 06/02/2022                 Other    Obesity affecting pregnancy in third trimester     -Current weight 275 lbs  -TWG 69 lbs  -Recommended weight gain 11 to 20 lbs  -Continue GDM diet  38 weeks gestation of pregnancy    Insulin pump titration               Reason for visit is   Chief Complaint   Patient presents with    Virtual Regular Visit    Diabetes Type 1        Encounter provider Henrik Villatoro, [facility]    Provider located at [address] [place] [id number] [phone]      Recent Visits  No visits were found meeting these conditions    Showing recent visits within past 7 days and meeting all other requirements  Today's Visits  Date Type Provider Dept   07/11/22 Telephone Onarnoldo Villatoro, [address] [place] [address],[address]   07/11/22 [id number] [facility], [address] [place] [address],[address]   Showing today's visits and meeting all other requirements  Future Appointments  No visits were found meeting these conditions  Showing future appointments within next 150 days and meeting all other requirements       The patient was identified by name and date of birth  Jo Ann Hernandez was informed that this is a telemedicine visit and that the visit is being conducted through [facility] and patient was informed this is a secure, HIPAA-complaint platform  She agrees to proceed     My office door was closed  The patient was notified the following individuals were present in the room Aida Covington, medical student  She acknowledged consent and understanding of privacy and security of the video platform  The patient has agreed to participate and understands they can discontinue the visit at any time  Patient is aware this is a billable service  Subjective  Yamile Garcia is a 29 y o  female  45 3/7 weeks gestation T1DM on Humalog via Medtronic 770G and Dexcom G6  Overall glucose readings well controlled due current TDD up to 279; carb ratio and correction factor re-calculated to assist with hyperglycemia requiring multiple bolus insulin doses  Positive fetal movement  Will know this week date of induction  Scheduled follow up appointment with her endocrinologist at [facility]  Reported pre-pregnancy weight about 200 lbs and insulin pump settings calculated with using 91 kg times 0 50 units/kg  Made aware endocrinologist may be consulted inpatient post delivery         HPI     Past Medical History:   Diagnosis Date    Diabetes ([address] )     type 1    Diabetes mellitus ([address] )        Past Surgical History:   Procedure Laterality Date    FL WRIST Justo Johanny LIG Right 2/3/2021    Procedure: RELEASE CARPAL TUNNEL ENDOSCOPIC;  Surgeon: Madhav Victor MD;  [facility]: [facility];  Service: Orthopedics    FL WRIST Justo Johanny LIG Left 2021    Procedure: RELEASE CARPAL TUNNEL ENDOSCOPIC;  Surgeon: Madhav Victor MD; Location: [facility];  Service: Orthopedics    WISDOM TOOTH EXTRACTION         Current Outpatient Medications   Medication Sig Dispense Refill    Blood Pressure Monitoring (B-D ASSURE BPM/AUTO ARM CUFF) MISC Use 2 (two) times a day Call if BP > 140/90 (Patient not taking: No sig reported) 1 each 0    Continuous Blood Gluc  (Dexcom G6 ) DENIZ Use as directed  1 Device 0    Continuous Blood Gluc Sensor (Dexcom G6 Sensor) MISC 1 box=1 month supply or 3 sensors, use 1 sensor every 10 days  1 each 12    Continuous Blood Gluc Transmit (Dexcom G6 Transmitter) MISC Transmitter change every 90 days  1 each 3    glucose blood test strip Use 1 each daily as needed Use as instructed      HumaLOG 100 UNIT/ML injection Inject 100 Units under the skin if needed Titrate as needed      insulin glargine (LANTUS) 100 units/mL subcutaneous injection Inject 10 Units under the skin (Patient not taking: No sig reported)      insulin lispro (HumaLOG) 100 units/mL injection Use up to 200 units via insulin pump daily  T2DM and pregnancy  180 mL 0    insulin lispro (insulin lispro) 100 units/mL injection INJECT UP  UNITS VIA INSULIN PUMP DAILY e10 65 (Patient not taking: No sig reported)      Lancets MISC by Does not apply route      Prenatal Vit-Fe Fumarate-FA (PRENATAL VITAMIN PO) Take 1 tablet by mouth daily       No current facility-administered medications for this visit  No Known Allergies    Review of Systems   Constitutional: Negative for fatigue and fever  Eyes: Negative for visual disturbance  Respiratory: Negative for cough and shortness of breath  Cardiovascular: Negative for chest pain and palpitations  Gastrointestinal: Negative for nausea and vomiting  Endocrine: Negative for polydipsia and polyphagia  Genitourinary: Negative for vaginal bleeding  Neurological: Negative for headaches  Video Exam  Refer to attached files insulin pump and Dexcom AGP    Vitals: 07/11/22 1602   Weight: 125 kg (275 lb)   Height: 5' 9" (1 753 m)       Physical Exam  HENT:      Head: Normocephalic  Nose: Nose normal    Eyes:      Conjunctiva/sclera: Conjunctivae normal    Pulmonary:      Effort: Pulmonary effort is normal    Musculoskeletal:      Cervical back: Normal range of motion  Neurological:      Mental Status: She is alert and oriented to person, place, and time  Psychiatric:         Mood and Affect: Mood normal          Behavior: Behavior normal          Thought Content: Thought content normal          Judgment: Judgment normal           I spent 23 minutes directly with the patient during this visit  VIRTUAL VISIT DISCLAIMER      Meghan Hernandez verbally agrees to participate in [employer]  Pt is aware that [employer] could be limited without vital signs or the ability to perform a full hands-on physical Marisabel Jayne Hernandez understands she or the provider may request at any time to terminate the video visit and request the patient to seek care or treatment in person

## 2022-07-12 PROBLEM — Z3A.39 39 WEEKS GESTATION OF PREGNANCY: Status: ACTIVE | Noted: 2022-02-17

## 2022-07-13 ENCOUNTER — ULTRASOUND (OUTPATIENT)
Dept: PERINATAL CARE | Facility: OTHER | Age: 32
End: 2022-07-13
Payer: COMMERCIAL

## 2022-07-13 VITALS
HEIGHT: 69 IN | SYSTOLIC BLOOD PRESSURE: 123 MMHG | DIASTOLIC BLOOD PRESSURE: 85 MMHG | HEART RATE: 96 BPM | BODY MASS INDEX: 40.91 KG/M2 | WEIGHT: 276.2 LBS

## 2022-07-13 DIAGNOSIS — Z3A.38 38 WEEKS GESTATION OF PREGNANCY: ICD-10-CM

## 2022-07-13 DIAGNOSIS — O24.313 PRE-EXISTING DIABETES MELLITUS AFFECTING PREGNANCY IN THIRD TRIMESTER, ANTEPARTUM: Primary | ICD-10-CM

## 2022-07-13 PROBLEM — O26.03 EXCESSIVE WEIGHT GAIN DURING PREGNANCY IN THIRD TRIMESTER: Status: ACTIVE | Noted: 2022-07-13

## 2022-07-13 PROCEDURE — 59025 FETAL NON-STRESS TEST: CPT | Performed by: OBSTETRICS & GYNECOLOGY

## 2022-07-13 PROCEDURE — 76815 OB US LIMITED FETUS(S): CPT | Performed by: OBSTETRICS & GYNECOLOGY

## 2022-07-15 ENCOUNTER — HOSPITAL ENCOUNTER (INPATIENT)
Facility: HOSPITAL | Age: 32
LOS: 2 days | Discharge: HOME | End: 2022-07-17
Attending: OBSTETRICS & GYNECOLOGY | Admitting: OBSTETRICS & GYNECOLOGY
Payer: COMMERCIAL

## 2022-07-15 ENCOUNTER — APPOINTMENT (OUTPATIENT)
Dept: LAB | Facility: CLINIC | Age: 32
End: 2022-07-15
Payer: COMMERCIAL

## 2022-07-15 ENCOUNTER — ROUTINE PRENATAL (OUTPATIENT)
Dept: OBGYN CLINIC | Facility: CLINIC | Age: 32
End: 2022-07-15
Payer: COMMERCIAL

## 2022-07-15 VITALS
TEMPERATURE: 97.5 F | WEIGHT: 277 LBS | BODY MASS INDEX: 40.91 KG/M2 | HEART RATE: 91 BPM | SYSTOLIC BLOOD PRESSURE: 150 MMHG | DIASTOLIC BLOOD PRESSURE: 80 MMHG

## 2022-07-15 DIAGNOSIS — Z87.59 HISTORY OF GESTATIONAL HYPERTENSION: ICD-10-CM

## 2022-07-15 DIAGNOSIS — Z34.93 THIRD TRIMESTER PREGNANCY: ICD-10-CM

## 2022-07-15 DIAGNOSIS — Z34.93 THIRD TRIMESTER PREGNANCY: Primary | ICD-10-CM

## 2022-07-15 DIAGNOSIS — O24.313 PRE-EXISTING DIABETES MELLITUS AFFECTING PREGNANCY IN THIRD TRIMESTER, ANTEPARTUM: ICD-10-CM

## 2022-07-15 DIAGNOSIS — Z86.59 HISTORY OF POSTPARTUM DEPRESSION: ICD-10-CM

## 2022-07-15 DIAGNOSIS — Z3A.39 39 WEEKS GESTATION OF PREGNANCY: ICD-10-CM

## 2022-07-15 DIAGNOSIS — O09.893 SHORT INTERVAL BETWEEN PREGNANCIES COMPLICATING PREGNANCY IN THIRD TRIMESTER, ANTEPARTUM: ICD-10-CM

## 2022-07-15 DIAGNOSIS — O99.820 GROUP B STREPTOCOCCAL CARRIAGE COMPLICATING PREGNANCY: ICD-10-CM

## 2022-07-15 DIAGNOSIS — R03.0 ELEVATED BP WITHOUT DIAGNOSIS OF HYPERTENSION: ICD-10-CM

## 2022-07-15 DIAGNOSIS — O99.213 OBESITY AFFECTING PREGNANCY IN THIRD TRIMESTER: ICD-10-CM

## 2022-07-15 DIAGNOSIS — Z46.81 INSULIN PUMP TITRATION: ICD-10-CM

## 2022-07-15 DIAGNOSIS — O26.03 EXCESSIVE WEIGHT GAIN DURING PREGNANCY IN THIRD TRIMESTER: ICD-10-CM

## 2022-07-15 DIAGNOSIS — Z87.59 HISTORY OF POSTPARTUM DEPRESSION: ICD-10-CM

## 2022-07-15 PROBLEM — O13.3 GESTATIONAL HYPERTENSION, THIRD TRIMESTER: Status: ACTIVE | Noted: 2022-07-15

## 2022-07-15 LAB
ABO GROUP BLD: NORMAL
ABO GROUP BLD: NORMAL
ALBUMIN SERPL BCP-MCNC: 2.7 G/DL (ref 3.5–5)
ALP SERPL-CCNC: 205 U/L (ref 46–116)
ALT SERPL W P-5'-P-CCNC: 19 U/L (ref 12–78)
ANION GAP SERPL CALCULATED.3IONS-SCNC: 6 MMOL/L (ref 4–13)
AST SERPL W P-5'-P-CCNC: 19 U/L (ref 5–45)
BACTERIA UR QL AUTO: ABNORMAL /HPF
BASOPHILS # BLD AUTO: 0.03 THOUSANDS/ΜL (ref 0–0.1)
BASOPHILS NFR BLD AUTO: 0 % (ref 0–1)
BILIRUB SERPL-MCNC: 0.3 MG/DL (ref 0.2–1)
BILIRUB UR QL STRIP: NEGATIVE
BLD GP AB SCN SERPL QL: NEGATIVE
BLD GP AB SCN SERPL QL: NEGATIVE
BUN SERPL-MCNC: 13 MG/DL (ref 5–25)
CALCIUM ALBUM COR SERPL-MCNC: 10 MG/DL (ref 8.3–10.1)
CALCIUM SERPL-MCNC: 9 MG/DL (ref 8.3–10.1)
CHLORIDE SERPL-SCNC: 106 MMOL/L (ref 100–108)
CLARITY UR: CLEAR
CO2 SERPL-SCNC: 24 MMOL/L (ref 21–32)
COLOR UR: YELLOW
CREAT SERPL-MCNC: 0.74 MG/DL (ref 0.6–1.3)
CREAT UR-MCNC: 150 MG/DL
CREAT UR-MCNC: 68.1 MG/DL
EOSINOPHIL # BLD AUTO: 0.11 THOUSAND/ΜL (ref 0–0.61)
EOSINOPHIL NFR BLD AUTO: 1 % (ref 0–6)
ERYTHROCYTE [DISTWIDTH] IN BLOOD BY AUTOMATED COUNT: 12.7 % (ref 11.6–15.1)
ERYTHROCYTE [DISTWIDTH] IN BLOOD BY AUTOMATED COUNT: 12.9 % (ref 11.6–15.1)
EST. AVERAGE GLUCOSE BLD GHB EST-MCNC: 123 MG/DL
GFR SERPL CREATININE-BSD FRML MDRD: 109 ML/MIN/1.73SQ M
GLUCOSE SERPL-MCNC: 136 MG/DL (ref 65–140)
GLUCOSE SERPL-MCNC: 60 MG/DL (ref 65–140)
GLUCOSE SERPL-MCNC: 87 MG/DL (ref 65–140)
GLUCOSE UR STRIP-MCNC: NEGATIVE MG/DL
HBA1C MFR BLD: 5.9 %
HCT VFR BLD AUTO: 33 % (ref 34.8–46.1)
HCT VFR BLD AUTO: 33.5 % (ref 34.8–46.1)
HGB BLD-MCNC: 10.6 G/DL (ref 11.5–15.4)
HGB BLD-MCNC: 10.8 G/DL (ref 11.5–15.4)
HGB UR QL STRIP.AUTO: NEGATIVE
IMM GRANULOCYTES # BLD AUTO: 0.14 THOUSAND/UL (ref 0–0.2)
IMM GRANULOCYTES NFR BLD AUTO: 1 % (ref 0–2)
KETONES UR STRIP-MCNC: NEGATIVE MG/DL
LEUKOCYTE ESTERASE UR QL STRIP: ABNORMAL
LYMPHOCYTES # BLD AUTO: 1.24 THOUSANDS/ΜL (ref 0.6–4.47)
LYMPHOCYTES NFR BLD AUTO: 12 % (ref 14–44)
MCH RBC QN AUTO: 26.8 PG (ref 26.8–34.3)
MCH RBC QN AUTO: 27.1 PG (ref 26.8–34.3)
MCHC RBC AUTO-ENTMCNC: 32.1 G/DL (ref 31.4–37.4)
MCHC RBC AUTO-ENTMCNC: 32.2 G/DL (ref 31.4–37.4)
MCV RBC AUTO: 84 FL (ref 82–98)
MCV RBC AUTO: 84 FL (ref 82–98)
MONOCYTES # BLD AUTO: 0.75 THOUSAND/ΜL (ref 0.17–1.22)
MONOCYTES NFR BLD AUTO: 7 % (ref 4–12)
NEUTROPHILS # BLD AUTO: 8.19 THOUSANDS/ΜL (ref 1.85–7.62)
NEUTS SEG NFR BLD AUTO: 79 % (ref 43–75)
NITRITE UR QL STRIP: NEGATIVE
NON-SQ EPI CELLS URNS QL MICRO: ABNORMAL /HPF
NRBC BLD AUTO-RTO: 0 /100 WBCS
PH UR STRIP.AUTO: 6 [PH]
PLATELET # BLD AUTO: 245 THOUSANDS/UL (ref 149–390)
PLATELET # BLD AUTO: 254 THOUSANDS/UL (ref 149–390)
PMV BLD AUTO: 11.4 FL (ref 8.9–12.7)
PMV BLD AUTO: 11.6 FL (ref 8.9–12.7)
POTASSIUM SERPL-SCNC: 4.1 MMOL/L (ref 3.5–5.3)
PROT SERPL-MCNC: 6.8 G/DL (ref 6.4–8.2)
PROT UR STRIP-MCNC: NEGATIVE MG/DL
PROT UR-MCNC: 11 MG/DL
PROT UR-MCNC: 23 MG/DL
PROT/CREAT UR: 0.15 MG/G{CREAT} (ref 0–0.1)
PROT/CREAT UR: 0.16 MG/G{CREAT} (ref 0–0.1)
RBC # BLD AUTO: 3.95 MILLION/UL (ref 3.81–5.12)
RBC # BLD AUTO: 3.99 MILLION/UL (ref 3.81–5.12)
RBC #/AREA URNS AUTO: ABNORMAL /HPF
RH BLD: POSITIVE
RH BLD: POSITIVE
SODIUM SERPL-SCNC: 136 MMOL/L (ref 136–145)
SP GR UR STRIP.AUTO: 1.01 (ref 1–1.03)
SPECIMEN EXPIRATION DATE: NORMAL
SPECIMEN EXPIRATION DATE: NORMAL
UROBILINOGEN UR QL STRIP.AUTO: 0.2 E.U./DL
WBC # BLD AUTO: 10.46 THOUSAND/UL (ref 4.31–10.16)
WBC # BLD AUTO: 9.49 THOUSAND/UL (ref 4.31–10.16)
WBC #/AREA URNS AUTO: ABNORMAL /HPF

## 2022-07-15 PROCEDURE — 86901 BLOOD TYPING SEROLOGIC RH(D): CPT

## 2022-07-15 PROCEDURE — 36415 COLL VENOUS BLD VENIPUNCTURE: CPT

## 2022-07-15 PROCEDURE — NC001 PR NO CHARGE: Performed by: OBSTETRICS & GYNECOLOGY

## 2022-07-15 PROCEDURE — 84156 ASSAY OF PROTEIN URINE: CPT

## 2022-07-15 PROCEDURE — 82570 ASSAY OF URINE CREATININE: CPT

## 2022-07-15 PROCEDURE — 83036 HEMOGLOBIN GLYCOSYLATED A1C: CPT

## 2022-07-15 PROCEDURE — 4A1HXCZ MONITORING OF PRODUCTS OF CONCEPTION, CARDIAC RATE, EXTERNAL APPROACH: ICD-10-PCS | Performed by: OBSTETRICS & GYNECOLOGY

## 2022-07-15 PROCEDURE — 3E033VJ INTRODUCTION OF OTHER HORMONE INTO PERIPHERAL VEIN, PERCUTANEOUS APPROACH: ICD-10-PCS | Performed by: OBSTETRICS & GYNECOLOGY

## 2022-07-15 PROCEDURE — 82948 REAGENT STRIP/BLOOD GLUCOSE: CPT

## 2022-07-15 PROCEDURE — 10907ZC DRAINAGE OF AMNIOTIC FLUID, THERAPEUTIC FROM PRODUCTS OF CONCEPTION, VIA NATURAL OR ARTIFICIAL OPENING: ICD-10-PCS | Performed by: OBSTETRICS & GYNECOLOGY

## 2022-07-15 PROCEDURE — 86900 BLOOD TYPING SEROLOGIC ABO: CPT

## 2022-07-15 PROCEDURE — 3E0P7VZ INTRODUCTION OF HORMONE INTO FEMALE REPRODUCTIVE, VIA NATURAL OR ARTIFICIAL OPENING: ICD-10-PCS | Performed by: OBSTETRICS & GYNECOLOGY

## 2022-07-15 PROCEDURE — 86850 RBC ANTIBODY SCREEN: CPT

## 2022-07-15 PROCEDURE — 85025 COMPLETE CBC W/AUTO DIFF WBC: CPT

## 2022-07-15 PROCEDURE — 85027 COMPLETE CBC AUTOMATED: CPT

## 2022-07-15 PROCEDURE — 86592 SYPHILIS TEST NON-TREP QUAL: CPT

## 2022-07-15 PROCEDURE — 59025 FETAL NON-STRESS TEST: CPT | Performed by: OBSTETRICS & GYNECOLOGY

## 2022-07-15 PROCEDURE — 82570 ASSAY OF URINE CREATININE: CPT | Performed by: OBSTETRICS & GYNECOLOGY

## 2022-07-15 PROCEDURE — 80053 COMPREHEN METABOLIC PANEL: CPT

## 2022-07-15 PROCEDURE — 84156 ASSAY OF PROTEIN URINE: CPT | Performed by: OBSTETRICS & GYNECOLOGY

## 2022-07-15 PROCEDURE — PNV: Performed by: OBSTETRICS & GYNECOLOGY

## 2022-07-15 PROCEDURE — 76815 OB US LIMITED FETUS(S): CPT | Performed by: OBSTETRICS & GYNECOLOGY

## 2022-07-15 PROCEDURE — 81001 URINALYSIS AUTO W/SCOPE: CPT

## 2022-07-15 RX ORDER — DEXTROSE AND SODIUM CHLORIDE 5; .45 G/100ML; G/100ML
125 INJECTION, SOLUTION INTRAVENOUS CONTINUOUS
Status: DISCONTINUED | OUTPATIENT
Start: 2022-07-15 | End: 2022-07-16

## 2022-07-15 RX ORDER — ONDANSETRON 2 MG/ML
4 INJECTION INTRAMUSCULAR; INTRAVENOUS EVERY 6 HOURS PRN
Status: DISCONTINUED | OUTPATIENT
Start: 2022-07-15 | End: 2022-07-16

## 2022-07-15 RX ORDER — CALCIUM CARBONATE 200(500)MG
1000 TABLET,CHEWABLE ORAL DAILY PRN
Status: DISCONTINUED | OUTPATIENT
Start: 2022-07-15 | End: 2022-07-16

## 2022-07-15 RX ORDER — OXYTOCIN/RINGER'S LACTATE 30/500 ML
1-30 PLASTIC BAG, INJECTION (ML) INTRAVENOUS
Status: DISCONTINUED | OUTPATIENT
Start: 2022-07-16 | End: 2022-07-16

## 2022-07-15 RX ORDER — ACETAMINOPHEN 325 MG/1
650 TABLET ORAL EVERY 6 HOURS PRN
Status: DISCONTINUED | OUTPATIENT
Start: 2022-07-15 | End: 2022-07-16

## 2022-07-15 RX ADMIN — Medication 25 MCG: at 18:40

## 2022-07-15 RX ADMIN — SODIUM CHLORIDE 5 MILLION UNITS: 0.9 INJECTION, SOLUTION INTRAVENOUS at 18:10

## 2022-07-15 RX ADMIN — DEXTROSE AND SODIUM CHLORIDE 125 ML/HR: 5; .45 INJECTION, SOLUTION INTRAVENOUS at 18:10

## 2022-07-15 RX ADMIN — SODIUM CHLORIDE 2.5 MILLION UNITS: 9 INJECTION, SOLUTION INTRAVENOUS at 22:05

## 2022-07-15 NOTE — PROGRESS NOTES
OB/GYN  PN Visit  [place]  [id number]  7/15/2022  11:18 AM  Dr Erika Ellis MD    S: 29 y o  [id number] here for PN visit  Chief Complaint   Patient presents with    Routine Prenatal Visit     Patient reports no concerns         OB complaints:  Contractions: no  Leakage: no  Bleeding: no  Fetal movement: yes    Patient declines headache or blurry vision or epigastric pain      O:  /80   Pulse 91   Temp 97 5 °F (36 4 °C) (Tympanic)   Wt 126 kg (277 lb)   LMP 10/15/2021 (Exact Date)   BMI 40 91 kg/m²      BP recheck 150/80      Review of Systems   Constitutional: Negative  HENT: Negative  Eyes: Negative  Respiratory: Negative  Cardiovascular: Negative  Gastrointestinal: Negative  Endocrine: Negative  Genitourinary:        As noted in HPI   Musculoskeletal: Negative  Skin: Negative  Allergic/Immunologic: Negative  Neurological: Negative  Hematological: Negative  Psychiatric/Behavioral: Negative  Physical Exam  Constitutional:       General: She is not in acute distress  Appearance: Normal appearance  She is well-developed  Genitourinary:      No inguinal adenopathy present in the right or left side  Pelvic exam was performed with patient in the lithotomy position  HENT:      Head: Normocephalic  Cardiovascular:      Rate and Rhythm: Normal rate  Pulmonary:      Effort: Pulmonary effort is normal    Abdominal:      General: There is no distension  Palpations: Abdomen is soft  Tenderness: There is no abdominal tenderness  There is no guarding  Hernia: There is no hernia in the left inguinal area or right inguinal area  Musculoskeletal:         General: No swelling or deformity  Lymphadenopathy:      Lower Body: No right inguinal adenopathy  No left inguinal adenopathy  Neurological:      General: No focal deficit present  Mental Status: She is alert and oriented to person, place, and time     Skin: General: Skin is warm and dry  Psychiatric:         Mood and Affect: Mood normal          Behavior: Behavior normal    Vitals reviewed  Pregravid Weight/BMI: 93 4 kg (206 lb) (BMI 30 41)  Current Weight: 126 kg (277 lb)   Total Weight Gain: 32 2 kg (71 lb)   Pre-Enoc Vitals    Flowsheet Row Most Recent Value   Prenatal Assessment    Fetal Heart Rate 138   Prenatal Vitals    Blood Pressure 150/80   Weight - Scale 126 kg (277 lb)   Urine Albumin/Glucose    Dilation/Effacement/Station    Cervical Dilation   5   Cervical Effacement 50   Fetal Station -3   Vaginal Drainage    Edema            Problem List        Endocrine    Pre-existing diabetes mellitus affecting pregnancy in third trimester, antepartum    Overview     -T1DM  -On Humalog via Medtronic 530G insulin pump and Dexcom G6  Pending Medtronic upgrade  baby aspirin   Baseline labs WNL  EKG - completed 3rd trimester was NSR   Early and routine anatomy US WNL  fetal echo WNL  - serial US: 35 week growth US 83rd%  - twice weekly surveillance at 32 weeks  - delivery [facility]   Post delivery insulin pump settings using pre-pregnancy weight 91 kg times 0 5 units/kg equals 45 5 TDD  Basal settings:  MN-8 AM@ 0 75 units/hour;  8 AM-10 PM@ 0 95 units/hour;  10 PM-MN@ 0 75 units/hour  Bolus settings:  Increase carb ratio to 10  Increase correction factor to 40  Increase BG target to 120  Active Insulin Time 3 hours  Made aware will have higher post delivery glucose readings for safety reasons               Other    History of gestational hypertension    Supervision of high-risk pregnancy    Overview     Declines aneuploidy screening, msAFP  Declines flu, COVID vaccines   S/p tdap   Consents signed         Obesity affecting pregnancy in third trimester    Overview     prepreg BMI 30  Surveillance per DM1         History of postpartum depression    Short interval between pregnancies complicating pregnancy in third trimester, antepartum    39 weeks gestation of pregnancy    Insulin pump titration    Group B streptococcal carriage complicating pregnancy    Excessive weight gain during pregnancy in third trimester      Other Visit Diagnoses     Third trimester pregnancy    -  Primary    Elevated BP without diagnosis of hypertension               Patient with previous diagnosis of gestational hypertension  This is her 1st blood pressure elevation  Due to type 1 diabetes at 39 weeks and now elevated blood pressure, I recommended induction of labor  Discussed with patient performing laboratory testing today including protein creatinine ratio to rule out preeclampsia  Patient has limited childcare now, advised she may return home to collect her belongings and arrange for  but to present to the hospital as soon as possible for induction of labor  GBS prophylaxis and will need penicillin in labor  L and D notified    Dr Sayra Pena (on call) notified    Discussed induction of labor and cervical ripening process    Patient with insulin pump  During her previous labor and delivery admission, patient's own insulin pump was used during labor and she prefers this  Insulin dosing, basal and boluses were adjusted per discussion with MFM  Patient was allowed to eat/have snacks during labor to prevent hypoglycemia      Follow-up postpartum      NST and VINOD performed today, NST reactive and VINOD normal at 12 55  Fetus vertex presentation on ultrasound                Ave Mejias MD  7/15/2022  11:18 AM

## 2022-07-15 NOTE — NURSING NOTE
At 1900; pt's blood sugar from her Dexcom is 79  Basal insulin rate from pt's personal pump is 3  6units/hr

## 2022-07-15 NOTE — PATIENT INSTRUCTIONS
Pregnancy at 44 to 40 Weeks   [address] [address] :   What changes are happening with my body? You are now getting close to your due date  Your due date is just an estimate of when your baby will be born  Your baby may be born before or after your due date  Your breathing may be easier if your baby has moved down into a head-down position  You may need to urinate more often because the baby may be pressing on your bladder  You may also feel more discomfort and tire easily  You may also be having trouble sleeping  How do I care for myself at this stage of my pregnancy? Eat a variety of healthy foods  Healthy foods include fruits, vegetables, whole-grain breads, low-fat dairy foods, beans, lean meats, and fish  Drink liquids as directed  Ask how much liquid to drink each day and which liquids are best for you  Limit caffeine to less than 200 milligrams each day  Limit your intake of fish to 2 servings each week  Choose fish low in mercury such as canned light tuna, shrimp, salmon, cod, or tilapia  Do not  eat fish high in mercury such as swordfish, tilefish, oscar mackerel, and shark  Take prenatal vitamins as directed  Your need for certain vitamins and minerals, such as folic acid, increases during pregnancy  Prenatal vitamins provide some of the extra vitamins and minerals you need  Prenatal vitamins may also help to decrease the risk of certain birth defects  Rest as needed  Put your feet up if you have swelling in your ankles and feet  Talk to your healthcare provider about exercise  Moderate exercise can help you stay fit  Your healthcare provider will help you plan an exercise program that is safe for you during pregnancy  Do not smoke  Smoking increases your risk of a miscarriage and other health problems during your pregnancy  Smoking can cause your baby to be born early or weigh less at birth   Ask your healthcare provider for information if you need help quitting  Do not drink alcohol  Alcohol passes from your body to your baby through the placenta  It can affect your baby's brain development and cause fetal alcohol syndrome (FAS)  FAS is a group of conditions that causes mental, behavior, and growth problems  Talk to your healthcare provider before you take any medicines  Many medicines may harm your baby if you take them when you are pregnant  Do not take any medicines, vitamins, herbs, or supplements without first talking to your healthcare provider  Never use illegal or street drugs (such as marijuana or cocaine) while you are pregnant  What are some safety tips during pregnancy? Avoid hot tubs and saunas  Do not use a hot tub or sauna while you are pregnant, especially during your first trimester  Hot tubs and saunas may raise your baby's temperature and increase the risk of birth defects  Avoid toxoplasmosis  This is an infection caused by eating raw meat or being around infected cat feces  It can cause birth defects, miscarriages, and other problems  Wash your hands after you touch raw meat  Make sure any meat is well-cooked before you eat it  Avoid raw eggs and unpasteurized milk  Use gloves or ask someone else to clean your cat's litter box while you are pregnant  Ask your healthcare provider about travel  The most comfortable time to travel is during the second trimester  Ask your provider if you can travel after 36 weeks  You may not be able to travel in an airplane after 36 weeks  He or she may also recommend that you avoid long road trips  What changes are happening with my baby? Your baby is ready to be born  At birth, your baby may weigh about 6 to 9 pounds and be about 19 to 21 inches long  Your baby may be in a head-down position  Your baby will also rest lower in your abdomen  What do I need to know about prenatal care? Your healthcare provider will check your blood pressure and weight   You may also need the following:  A urine test  may also be done to check for sugar and protein  These can be signs of gestational diabetes or infection  Protein in your urine may also be a sign of preeclampsia  Preeclampsia is a condition that can develop during week 20 or later of your pregnancy  It causes high blood pressure, and it can cause problems with your kidneys and other organs  A gestational diabetes screen  may be done  Your healthcare provider may order either a 1-step or 2-step oral glucose tolerance test (OGTT)  1-step OGTT:  Your blood sugar level will be tested after you have not eaten for 8 hours (fasting)  You will then be given a glucose drink  Your level will be tested again 1 hour and 2 hours after you finish the drink  2-step OGTT:  You do not have to fast for the first part of the test  You will have the glucose drink at any time of day  Your blood sugar level will be checked 1 hour later  If your blood sugar is higher than a certain level, another test will be ordered  You will fast and your blood sugar level will be tested  You will have the glucose drink  Your blood will be tested again 1 hour, 2 hours, and 3 hours after you finish the glucose drink  Your baby's heart rate  will be checked  When should I seek immediate care? You develop a severe headache that does not go away  You have new or increased vision changes, such as blurred or spotted vision  You have new or increased swelling in your face or hands  You have vaginal spotting or bleeding  Your water broke or you feel warm water gushing or trickling from your vagina  When should I call my obstetrician? You have more than 5 contractions in 1 hour  You notice any changes in your baby's movements  You have abdominal cramps, pressure, or tightening  You have a change in vaginal discharge  You have chills or a fever  You have vaginal itching, burning, or pain      You have yellow, green, white, or foul-smelling vaginal discharge  You have pain or burning when you urinate, less urine than usual, or pink or bloody urine  You have questions or concerns about your condition or care  CARE AGREEMENT:   You have the right to help plan your care  Learn about your health condition and how it may be treated  Discuss treatment options with your healthcare providers to decide what care you want to receive  You always have the right to refuse treatment  The above information is an  only  It is not intended as medical advice for individual conditions or treatments  Talk to your doctor, nurse or pharmacist before following any medical regimen to see if it is safe and effective for you  © Copyright [employer] 2022 Information is for End User's use only and may not be sold, redistributed or otherwise used for commercial purposes   All illustrations and images included in CareNotes® are the copyrighted property of [employer]  or [address] [place] [address]

## 2022-07-15 NOTE — ASSESSMENT & PLAN NOTE
Systolic (81HWA), NGM:611 , Min:110 , XXT:409     Diastolic (93OAZ), GTM:87, Min:55, Max:73    CBC/CMP WNL, UPC 0 16  Pressures elevated, stable  Asymptomatic  Continue to monitor

## 2022-07-15 NOTE — H&P
[address] [facility] 29 y o  female MRN: [id number]  Unit/Bed#: L&D 326-01 Encounter: [id number]    Assessment: 29 y o  [id number] at 39w0d admitted for IOL  SVE: 1/20/-3  FHT: 120bpm  Clinical EFW: 6lb ; Cephalic confirmed by TAUS  GBS status: positive     Plan:   Gestational hypertension, third trimester  Assessment & Plan  Elevated blood pressures in office and on presentation today  Meeting criteria for GHTN  Follow-up CBC, CMP, and UPC collected on admission  Denies HA, LOV, CP, SOB, RUQ/epigastric pain, or acute swelling  Continue to monitor    Group B streptococcal carriage complicating pregnancy  Assessment & Plan  Penicillin infusion ordered for intrapartum management    Insulin pump titration  Assessment & Plan  Reviewed current settings with patient at bedside  History of well-controlled blood sugars and most recent A1C at 5 1%  Discussed with MFM, plan to reduce settings by 20% for intrapartum management  Postpartum settings to be set at pre-pregnancy weight-based calculation as documented in Dr Daniel Cho note on 7/15  Finger stick glucose checks q4h with continued Dexcom monitoring    * 39 weeks gestation of pregnancy  Assessment & Plan  Admit   T&S, CBC, CMP, UPC, UA  CLD  IV fluids, D5 1/2NS in setting of T1DM  GBS prophylaxis is needed, PCN ordered  Induction with vaginal cytotec and Murphy balloo          Discussed case and plan w/ Dr Flakita Cyr      Chief Complaint: "I am here for my induction and had elevated blood pressures in the office "    HPI: Ti Carter is a 29 y o  [id number] with an TRI of 7/22/2022, by Last Menstrual Period at 39w0d who is being admitted for induction of labor  She denies having uterine contractions, has no LOF, and reports no VB  She states she has felt good FM  Patient had elevated blood pressures in office and on admission    Patient denies associated symptoms including headache, loss of vision, chest pain, shortness of breath, right upper quadrant epigastric pain, or acute swelling  Patient has no acute concerns  Patient Active Problem List   Diagnosis    History of gestational hypertension    Pre-existing diabetes mellitus affecting pregnancy in third trimester, antepartum    Supervision of high-risk pregnancy    Obesity affecting pregnancy in third trimester    History of postpartum depression    Short interval between pregnancies complicating pregnancy in third trimester, antepartum    39 weeks gestation of pregnancy    Insulin pump titration    Group B streptococcal carriage complicating pregnancy    Excessive weight gain during pregnancy in third trimester       Baby complications/comments: none    Review of Systems   Constitutional: Negative for chills and fever  HENT: Negative for congestion, sinus pressure and sinus pain  Eyes: Negative for visual disturbance  Respiratory: Negative for cough, shortness of breath and wheezing  Cardiovascular: Negative for chest pain, palpitations and leg swelling  Gastrointestinal: Negative for abdominal pain, constipation, diarrhea, nausea and vomiting  Genitourinary: Negative for dysuria, vaginal bleeding and vaginal discharge  Skin: Negative for color change, pallor and rash  Neurological: Negative for weakness and light-headedness  Psychiatric/Behavioral: Negative for agitation and behavioral problems         OB Hx:  OB History    Para Term  AB Living   5 2 2   2 2   SAB IAB Ectopic Multiple Live Births   1 1     2      # Outcome Date GA Lbr Abrahan/2nd Weight Sex Delivery Anes PTL Lv   5 Current            4 Term 21 38w5d / 00:07 3515 g (7 lb 12 oz) M Vag-Spont EPI N ELROY      Complications: History of gestational hypertension   3 Term 19 38w0d 05:45 / 02:51 2985 g (6 lb 9 3 oz) M Vag-Spont EPI  ELROY   2 SAB 18 10w3d          1 IAB 2013               Past Medical Hx:  Past Medical History:   Diagnosis Date    [facility]     type 1    Diabetes mellitus ([facility] )        Past Surgical hx:  Past Surgical History:   Procedure Laterality Date    CT WRIST Rosiland Leonard LIG Right 2/3/2021    Procedure: RELEASE CARPAL TUNNEL ENDOSCOPIC;  Surgeon: Bernie Campbell MD;  Location: [facility];  Service: Orthopedics    CT WRIST Rosiland Leonard LIG Left 2/23/2021    Procedure: RELEASE CARPAL TUNNEL ENDOSCOPIC;  Surgeon: Bernie Campbell MD;  Location: [facility];  Service: Orthopedics    WISDOM TOOTH EXTRACTION         Social Hx:  Alcohol use: denies  Tobacco use: denies  Other substance use: denies      No Known Allergies    Medications Prior to Admission   Medication    Blood Pressure Monitoring (B-D ASSURE BPM/AUTO ARM CUFF) MISC    Continuous Blood Gluc  (Dexcom G6 ) DENIZ    Continuous Blood Gluc Sensor (Dexcom G6 Sensor) MISC    Continuous Blood Gluc Transmit (Dexcom G6 Transmitter) MISC    glucose blood test strip    HumaLOG 100 UNIT/ML injection    insulin glargine (LANTUS) 100 units/mL subcutaneous injection    insulin lispro (HumaLOG) 100 units/mL injection    insulin lispro (insulin lispro) 100 units/mL injection    Lancets MISC    Prenatal Vit-Fe Fumarate-FA (PRENATAL VITAMIN PO)       Objective:  Temp:  [97 5 °F (36 4 °C)-98 °F (36 7 °C)] 98 °F (36 7 °C)  HR:  [90-93] 90  Resp:  [16] 16  BP: (150-162)/(74-80) 160/74  Body mass index is 40 91 kg/m²  Physical Exam:  Physical Exam  Constitutional:       General: She is not in acute distress  Genitourinary:      Vulva normal       Genitourinary Comments: SVE 1/20/-3   HENT:      Head: Normocephalic  Right Ear: External ear normal       Left Ear: External ear normal    Eyes:      General: No scleral icterus  Right eye: No discharge  Left eye: No discharge  Conjunctiva/sclera: Conjunctivae normal    Cardiovascular:      Rate and Rhythm: Normal rate  Pulses: Normal pulses  Heart sounds: Normal heart sounds     Pulmonary: Effort: Pulmonary effort is normal  No respiratory distress  Breath sounds: Normal breath sounds  Abdominal:      Palpations: Abdomen is soft  Tenderness: There is no abdominal tenderness  There is no guarding  Comments: Gravid uterus   Musculoskeletal:         General: No swelling or tenderness  Normal range of motion  Cervical back: Normal range of motion  Right lower leg: No edema  Left lower leg: No edema  Neurological:      Mental Status: She is alert and oriented to person, place, and time  Mental status is at baseline  Skin:     General: Skin is warm and dry  Capillary Refill: Capillary refill takes less than 2 seconds  Psychiatric:         Behavior: Behavior normal          Thought Content: Thought content normal    Vitals and nursing note reviewed  Exam conducted with a chaperone present              FHT:   120bpm, moderate variability, 15x15 accelerations, no decelerations    TOCO:    ctx absent    Lab Results   Component Value Date    WBC 10 67 (H) 03/31/2022    HGB 11 8 03/31/2022    HCT 35 5 03/31/2022     03/31/2022     Lab Results   Component Value Date    K 3 9 06/02/2022     06/02/2022    CO2 24 06/02/2022    BUN 9 06/02/2022    CREATININE 0 67 06/02/2022    AST 20 06/02/2022    ALT 17 06/02/2022     Prenatal Labs: Reviewed     Blood type: O pos  Antibody: neg  GBS: pos  HIV: neg  Rubella: Immune  VDRL/RPR: Non reactive  HBsAg: Negative  Chlamydia: Negative  Gonorrhea: Negative  Diabetes 1 hour screen: n/a  3 hour glucose: n/a  Platelets: 540  Hgb: 10 6  >2 Midnights  INPATIENT     Signature/Title: Gretchen Ford MD  Date: 7/15/2022  Time: 4:42 PM

## 2022-07-15 NOTE — QUICK NOTE
PROCEDURE:  GALINDO BALLOON PLACEMENT    A 24F galindo with a 30cc balloon was selected, SVE was performed and cervix was located, galindo was introduced over sterile gloved hands  Balloon advanced through cervix beyond the internal cervical os  A small amount amount of sterile saline solution was instilled in the balloon to confirm placement  Placement was confirmed to be beyond the internal cervical os  A total of 60cc of sterile saline solution was placed into the balloon  Pt tolerated well  Instructions left with RN to place galindo to gravity with a 1L bag of IV fluid  Notify MD when galindo dislodged  25 mcg of vaginal Cytotec was placed after completion of Galindo balloon procedure      Aisha Covington MD

## 2022-07-16 ENCOUNTER — ANESTHESIA EVENT (INPATIENT)
Dept: ANESTHESIOLOGY | Facility: HOSPITAL | Age: 32
End: 2022-07-16
Payer: COMMERCIAL

## 2022-07-16 ENCOUNTER — ANESTHESIA (INPATIENT)
Dept: ANESTHESIOLOGY | Facility: HOSPITAL | Age: 32
End: 2022-07-16
Payer: COMMERCIAL

## 2022-07-16 PROBLEM — E66.01 MORBID OBESITY WITH BMI OF 40.0-44.9, ADULT (HCC): Status: ACTIVE | Noted: 2022-07-16

## 2022-07-16 LAB
BASE EXCESS BLDCOA CALC-SCNC: -1.3 MMOL/L (ref 3–11)
BASE EXCESS BLDCOV CALC-SCNC: -1.3 MMOL/L (ref 1–9)
GLUCOSE SERPL-MCNC: 87 MG/DL (ref 65–140)
GLUCOSE SERPL-MCNC: 99 MG/DL (ref 65–140)
HCO3 BLDCOA-SCNC: 26.3 MMOL/L (ref 17.3–27.3)
HCO3 BLDCOV-SCNC: 24 MMOL/L (ref 12.2–28.6)
O2 CT VFR BLDCOA CALC: 8 ML/DL
OXYHGB MFR BLDCOA: 35.2 %
OXYHGB MFR BLDCOV: 61.7 %
PCO2 BLDCOA: 55.4 MM[HG] (ref 30–60)
PCO2 BLDCOV: 42 MM HG (ref 27–43)
PH BLDCOA: 7.29 [PH] (ref 7.23–7.43)
PH BLDCOV: 7.37 [PH] (ref 7.19–7.49)
PO2 BLDCOA: 18.5 MM HG (ref 5–25)
PO2 BLDCOV: 25.7 MM HG (ref 15–45)
SAO2 % BLDCOV: 13.5 ML/DL

## 2022-07-16 PROCEDURE — 82948 REAGENT STRIP/BLOOD GLUCOSE: CPT

## 2022-07-16 PROCEDURE — 82805 BLOOD GASES W/O2 SATURATION: CPT | Performed by: OBSTETRICS & GYNECOLOGY

## 2022-07-16 PROCEDURE — 88307 TISSUE EXAM BY PATHOLOGIST: CPT | Performed by: PATHOLOGY

## 2022-07-16 PROCEDURE — 0HQ9XZZ REPAIR PERINEUM SKIN, EXTERNAL APPROACH: ICD-10-PCS | Performed by: OBSTETRICS & GYNECOLOGY

## 2022-07-16 PROCEDURE — 59400 OBSTETRICAL CARE: CPT | Performed by: OBSTETRICS & GYNECOLOGY

## 2022-07-16 RX ORDER — ONDANSETRON 2 MG/ML
4 INJECTION INTRAMUSCULAR; INTRAVENOUS EVERY 8 HOURS PRN
Status: DISCONTINUED | OUTPATIENT
Start: 2022-07-16 | End: 2022-07-17 | Stop reason: HOSPADM

## 2022-07-16 RX ORDER — LIDOCAINE HYDROCHLORIDE AND EPINEPHRINE 15; 5 MG/ML; UG/ML
INJECTION, SOLUTION EPIDURAL AS NEEDED
Status: DISCONTINUED | OUTPATIENT
Start: 2022-07-16 | End: 2022-07-16 | Stop reason: HOSPADM

## 2022-07-16 RX ORDER — ROPIVACAINE HYDROCHLORIDE 2 MG/ML
INJECTION, SOLUTION EPIDURAL; INFILTRATION; PERINEURAL CONTINUOUS PRN
Status: DISCONTINUED | OUTPATIENT
Start: 2022-07-16 | End: 2022-07-16 | Stop reason: HOSPADM

## 2022-07-16 RX ORDER — DIPHENHYDRAMINE HCL 25 MG
25 TABLET ORAL EVERY 6 HOURS PRN
Status: DISCONTINUED | OUTPATIENT
Start: 2022-07-16 | End: 2022-07-17 | Stop reason: HOSPADM

## 2022-07-16 RX ORDER — OXYTOCIN/RINGER'S LACTATE 30/500 ML
250 PLASTIC BAG, INJECTION (ML) INTRAVENOUS CONTINUOUS
Status: ACTIVE | OUTPATIENT
Start: 2022-07-16 | End: 2022-07-16

## 2022-07-16 RX ORDER — DIAPER,BRIEF,INFANT-TODD,DISP
1 EACH MISCELLANEOUS DAILY PRN
Status: DISCONTINUED | OUTPATIENT
Start: 2022-07-16 | End: 2022-07-17 | Stop reason: HOSPADM

## 2022-07-16 RX ORDER — BUPIVACAINE HYDROCHLORIDE 5 MG/ML
INJECTION, SOLUTION EPIDURAL; INTRACAUDAL AS NEEDED
Status: DISCONTINUED | OUTPATIENT
Start: 2022-07-16 | End: 2022-07-16 | Stop reason: HOSPADM

## 2022-07-16 RX ORDER — IBUPROFEN 600 MG/1
600 TABLET ORAL EVERY 6 HOURS
Status: DISCONTINUED | OUTPATIENT
Start: 2022-07-16 | End: 2022-07-17 | Stop reason: HOSPADM

## 2022-07-16 RX ORDER — DOCUSATE SODIUM 100 MG/1
100 CAPSULE, LIQUID FILLED ORAL 2 TIMES DAILY
Status: DISCONTINUED | OUTPATIENT
Start: 2022-07-16 | End: 2022-07-17 | Stop reason: HOSPADM

## 2022-07-16 RX ORDER — ACETAMINOPHEN 325 MG/1
650 TABLET ORAL EVERY 4 HOURS PRN
Status: DISCONTINUED | OUTPATIENT
Start: 2022-07-16 | End: 2022-07-17 | Stop reason: HOSPADM

## 2022-07-16 RX ORDER — CALCIUM CARBONATE 200(500)MG
1000 TABLET,CHEWABLE ORAL DAILY PRN
Status: DISCONTINUED | OUTPATIENT
Start: 2022-07-16 | End: 2022-07-17 | Stop reason: HOSPADM

## 2022-07-16 RX ORDER — CALCIUM CARBONATE 200(500)MG
1000 TABLET,CHEWABLE ORAL 3 TIMES DAILY PRN
Status: DISCONTINUED | OUTPATIENT
Start: 2022-07-16 | End: 2022-07-16

## 2022-07-16 RX ADMIN — IBUPROFEN 600 MG: 600 TABLET, FILM COATED ORAL at 23:31

## 2022-07-16 RX ADMIN — Medication 250 MILLI-UNITS/MIN: at 08:15

## 2022-07-16 RX ADMIN — SODIUM CHLORIDE 2.5 MILLION UNITS: 9 INJECTION, SOLUTION INTRAVENOUS at 02:18

## 2022-07-16 RX ADMIN — LIDOCAINE HYDROCHLORIDE AND EPINEPHRINE 3 ML: 15; 5 INJECTION, SOLUTION EPIDURAL at 05:11

## 2022-07-16 RX ADMIN — ONDANSETRON 4 MG: 2 INJECTION INTRAMUSCULAR; INTRAVENOUS at 04:00

## 2022-07-16 RX ADMIN — ROPIVACAINE HYDROCHLORIDE: 2 INJECTION, SOLUTION EPIDURAL; INFILTRATION at 05:30

## 2022-07-16 RX ADMIN — ACETAMINOPHEN 650 MG: 325 TABLET, FILM COATED ORAL at 20:00

## 2022-07-16 RX ADMIN — BUPIVACAINE HYDROCHLORIDE 1 ML: 5 INJECTION, SOLUTION EPIDURAL; INTRACAUDAL at 05:01

## 2022-07-16 RX ADMIN — ROPIVACAINE HYDROCHLORIDE 10 ML/HR: 2 INJECTION, SOLUTION EPIDURAL; INFILTRATION at 05:13

## 2022-07-16 RX ADMIN — SODIUM CHLORIDE 1000 ML: 0.9 INJECTION, SOLUTION INTRAVENOUS at 04:30

## 2022-07-16 RX ADMIN — ACETAMINOPHEN 650 MG: 325 TABLET, FILM COATED ORAL at 14:12

## 2022-07-16 RX ADMIN — IBUPROFEN 600 MG: 600 TABLET, FILM COATED ORAL at 18:09

## 2022-07-16 RX ADMIN — DEXTROSE AND SODIUM CHLORIDE 125 ML/HR: 5; .45 INJECTION, SOLUTION INTRAVENOUS at 05:18

## 2022-07-16 RX ADMIN — SODIUM CHLORIDE 2.5 MILLION UNITS: 9 INJECTION, SOLUTION INTRAVENOUS at 05:59

## 2022-07-16 RX ADMIN — Medication 2 MILLI-UNITS/MIN: at 00:12

## 2022-07-16 RX ADMIN — IBUPROFEN 600 MG: 600 TABLET, FILM COATED ORAL at 12:05

## 2022-07-16 RX ADMIN — CALCIUM CARBONATE (ANTACID) CHEW TAB 500 MG 1000 MG: 500 CHEW TAB at 04:20

## 2022-07-16 RX ADMIN — BENZOCAINE AND LEVOMENTHOL 1 APPLICATION: 200; 5 SPRAY TOPICAL at 12:06

## 2022-07-16 RX ADMIN — DOCUSATE SODIUM 100 MG: 100 CAPSULE, LIQUID FILLED ORAL at 12:06

## 2022-07-16 RX ADMIN — DOCUSATE SODIUM 100 MG: 100 CAPSULE, LIQUID FILLED ORAL at 18:09

## 2022-07-16 NOTE — OB LABOR/OXYTOCIN SAFETY PROGRESS
Labor Progress Note - Yamile Elders 29 y o  female MRN: [id number]    Unit/Bed#: L&D 326-01 Encounter: [id number]       Contraction Frequency (minutes): occassional  Contraction Quality: Not applicable  Tachysystole: No   Cervical Dilation: 1        Cervical Effacement: 20  Fetal Station: -3  Baseline Rate: 120 bpm  Fetal Heart Rate: 130 BPM                  Vital Signs:   Vitals:    07/15/22 2123   BP: 127/65   Pulse: 80   Resp:    Temp:        Notes/comments:   NST reactive and reassuring on monitor  Inconsistent contractions  Patient walking around labor floor  Plan for recheck in 2h and continue induction management with pitocin  Murphy balloon in place          Shelley Flores MD 7/15/2022 9:26 PM

## 2022-07-16 NOTE — OB LABOR/OXYTOCIN SAFETY PROGRESS
Oxytocin Safety Progress Check Note - Melva Hernandez 29 y o  female MRN: [id number]    Unit/Bed#: L&D 326-01 Encounter: [id number]    Dose (frank-units/min) Oxytocin: 10 frank-units/min (reduced for pt comofrt during epidural)  Contraction Frequency (minutes): 2-3  Contraction Quality: Moderate  Tachysystole: No   Cervical Dilation: 6        Cervical Effacement: 90  Fetal Station: 0  Baseline Rate: 140 bpm  Fetal Heart Rate: 140 BPM  FHR Category: Category I               Vital Signs:   Vitals:    07/16/22 0537   BP: 103/59   Pulse: 69   Resp:    Temp:        Notes/comments:   Comfortable with epidural  Continue current management       Deon Maciel MD 7/16/2022 5:48 AM

## 2022-07-16 NOTE — OB LABOR/OXYTOCIN SAFETY PROGRESS
Mychart msg sent to pt's wife informing of lab orders.     Cesia Flores RN   [facility]      Oxytocin Safety Progress Check Note - Meghan Hernandez 29 y o  female MRN: [id number]    Unit/Bed#: L&D 326-01 Encounter: [id number]    Dose (frank-units/min) Oxytocin: 10 frank-units/min (reduced for pt comofrt during epidural)  Contraction Frequency (minutes): 2-3  Contraction Quality: Moderate  Tachysystole: No   Cervical Dilation: 10     Dilation Complete Time: 0804  Cervical Effacement: 100  Fetal Station: 2  Baseline Rate: 120 bpm  Fetal Heart Rate: 120 BPM  FHR Category: Category I               Vital Signs:   Vitals:    07/16/22 0648   BP: 136/60   Pulse: 73   Resp:    Temp:        Notes/comments:   Complete +2, will start pushing    Vivian Vivas MD 7/16/2022 8:04 AM

## 2022-07-16 NOTE — ANESTHESIA PREPROCEDURE EVALUATION
Procedure:  LABOR ANALGESIA    Relevant Problems   GYN   (+) 39 weeks gestation of pregnancy   (+) Short interval between pregnancies complicating pregnancy in third trimester, antepartum   (+) Supervision of high-risk pregnancy      NEURO/PSYCH   (+) History of gestational hypertension   (+) History of postpartum depression      Cardiovascular and Mediastinum   (+) Gestational hypertension, third trimester      Endocrine   (+) Pre-existing diabetes mellitus affecting pregnancy in third trimester, antepartum      Other   (+) COVID-19 affecting pregnancy, antepartum (Resolved) (June 1/2022)   (+) Excessive weight gain during pregnancy in third trimester   (+) Insulin pump titration   (+) Morbid obesity with BMI of 40 0-44 9, adult (HCC)   (+) Obesity affecting pregnancy in third trimester        Physical Exam    Airway    Mallampati score: III  TM Distance: >3 FB  Neck ROM: full     Dental   No notable dental hx     Cardiovascular  Cardiovascular exam normal    Pulmonary  Pulmonary exam normal     Other Findings        Anesthesia Plan  ASA Score- 3     Anesthesia Type- epidural with ASA Monitors  Additional Monitors:   Airway Plan:     Comment: Hx difficult epidural in past, also relatively rapid delivery  Plan Factors-Exercise tolerance (METS): >4 METS  Chart reviewed  Patient is not a current smoker  Obstructive sleep apnea risk education given perioperatively  Induction-     Postoperative Plan-     Informed Consent- Anesthetic plan and risks discussed with patient

## 2022-07-16 NOTE — PLAN OF CARE
Problem: Potential for Falls  Goal: Patient will remain free of falls  Description: INTERVENTIONS:  - Educate patient/family on patient safety including physical limitations  - Instruct patient to call for assistance with activity   - Consult OT/PT to assist with strengthening/mobility   - Keep Call bell within reach  - Keep bed low and locked with side rails adjusted as appropriate  - Keep care items and personal belongings within reach  - Initiate and maintain comfort rounds  - Make Fall Risk Sign visible to staff  - Apply yellow socks and bracelet for high fall risk patients  - Consider moving patient to room near nurses station  Outcome: Progressing     Problem: POSTPARTUM  Goal: Experiences normal postpartum course  Description: INTERVENTIONS:  - Monitor maternal vital signs  - Assess uterine involution and lochia  Outcome: Progressing  Goal: Appropriate maternal -  bonding  Description: INTERVENTIONS:  - Identify family support  - Assess for appropriate maternal/infant bonding   -Encourage maternal/infant bonding opportunities  - Referral to  or  as needed  Outcome: Progressing  Goal: Establishment of infant feeding pattern  Description: INTERVENTIONS:  - Assess breast/bottle feeding  - Refer to lactation as needed  Outcome: Progressing  Goal: Incision(s), wounds(s) or drain site(s) healing without S/S of infection  Description: INTERVENTIONS  - Assess and document dressing, incision, wound bed, drain sites and surrounding tissue  - Provide patient and family education    Outcome: Progressing

## 2022-07-16 NOTE — NURSING NOTE
Murphy balloon came out  Dexcom blood sugar is 119  Current basal insulin rate is 3 65  Patient bolusing 1 9 units with her personal insulin pump per  guidelines  Dr Roxanne Mdeeiros has been made aware

## 2022-07-16 NOTE — OB LABOR/OXYTOCIN SAFETY PROGRESS
Oxytocin Safety Progress Check Note - Meghan Hernandez 29 y o  female MRN: [id number]    Unit/Bed#: L&D 326-01 Encounter: [id number]    Dose (frank-units/min) Oxytocin: 10 frank-units/min  Contraction Frequency (minutes): 2-3  Contraction Quality: Moderate  Tachysystole: No   Cervical Dilation: 4        Cervical Effacement: 70  Fetal Station: -1  Baseline Rate: 140 bpm  Fetal Heart Rate: 140 BPM  FHR Category: Category I               Vital Signs:   Vitals:    07/16/22 0314   BP: 144/67   Pulse: 78   Resp:    Temp:        Notes/comments:   AROM clear clear fluid appreciated  SVE 4/70/1  Patient moderately uncomfortable contractions but plans to proceed without epidural or immediate IV analgesia  Fetal heart tracing category 1 with contractions every 2-3 minutes on a rate of 10 milliunits per minute of Pitocin  Dr Mike Georges present during examination          Jeff Sarabia MD 7/16/2022 4:12 AM

## 2022-07-16 NOTE — L&D DELIVERY NOTE
Predelivery Diagnosis:  Pregnancy, term, 39 1/7 weeks  Pre-existing diabetes mellitus Type 1  Gestational Hypertension  Group B streptococcal carriage complicating pregnancy      Post-delivery Diagnosis:  Same      Delivering Physician:  Ramon SEPULVEDA  Assistant:  none    Type of Anesthesia: Epidural    QBL: 129 ml    Specimen:   Arterial cord blood gas  Venous cord blood gas   Cord blood  Placenta to pathology    Findings:     Infant Gender: F  Apgars: 9, 9  Presentation: Vertex   Position: BLANKA  Restitution: LOT  Nuchal cord: none  No shoulder dystocia  No meconium staining  Placenta: intact, normal  Laceration: 1st degree    Gases:  Umbilical Cord Venous Blood Gas:  Results from last 7 days   Lab Units 22  0815   PH COV  7 374   PCO2 COV mm HG 42 0   HCO3 COV mmol/L 24 0   BASE EXC COV mmol/L -1 3*   O2 CT CD VB mL/dL 13 5   O2 HGB, VENOUS CORD % 61 9     Umbilical Cord Arterial Blood Gas:  Results from last 7 days   Lab Units 22  0815   PH COA  7 295   PCO2 COA  55 4   PO2 COA mm HG 18 5   HCO3 COA mmol/L 26 3   BASE EXC COA mmol/L -1 3*   O2 CONTENT CORD ART ml/dl 8 0   O2 HGB, ARTERIAL CORD % 35 2           Delivery      With epidural anesthesia, patient delivered a viable infant with weight undetermined at this time   Delivery was a  to a sterile field and intact perineum  Fetal head was delivered and restituted spontaneously  With gentle downward traction the anterior shoulders delivered together with maternal expulsive efforts  With gentle upward traction the posterior shoulders together with maternal expulsive efforts delivered as well as the rest of the body without difficulty  Infant was bulb suctioned at delivery  Delayed clamping of umbilical cord was performed  Infant was placed on mother's abdomen, and later removed to the warmer by the nurse  Cord blood gases, both arterial and venous gases and cord blood was sent for analysis    Intact placenta with a normal configuration 3-vessel cord was delivered spontaneously  The uterus was aggressively massaged  All clots were cleared from the lower uterine segment  Oxytocin infusion was started to control postpartum bleeding  Inspection of the perineum revealed a 1st degree laceration  The vaginal mucosa was re approximated with interrupted Vicryl 3 0 Rapide  Sponge, instrument and needle count were correct x 2  There were no sponges left in the vagina  Final inspection of the perineum and vagina revealed no further lacerations or tears  There were no complications  Patient tolerated procedure well

## 2022-07-16 NOTE — NURSING NOTE
At 2010, pt's blood sugar from Dexcom is 103  Basal insulin rate from pt's personal pump is 3 65 units  hr

## 2022-07-16 NOTE — NURSING NOTE
At 0015, Dexcom blood sugar is 145  Pt stated she had a snack, she bolused 11 1 units of insulin through her personal pump  Basal insulin rate currently is 3 4 units per hour

## 2022-07-16 NOTE — OB LABOR/OXYTOCIN SAFETY PROGRESS
Oxytocin Safety Progress Check Note - Gerri Hernandez 29 y o  female MRN: [id number]    Unit/Bed#: L&D 326-01 Encounter: [id number]    Dose (frank-units/min) Oxytocin: 4 frank-units/min  Contraction Frequency (minutes): 5-7  Contraction Quality: Mild  Tachysystole: No   Cervical Dilation: 4        Cervical Effacement: 50  Fetal Station: -3  Baseline Rate: 125 bpm  Fetal Heart Rate: 130 BPM  FHR Category: Category I               Vital Signs:   Vitals:    07/15/22 2209   BP: 122/66   Pulse: 75   Resp: 18   Temp: 97 8 °F (36 6 °C)       Notes/comments:   SVE deferred  FHT category 1  Patient with several dose of correctional insulin doses administered s/p snacks  Continue to monitor  Pitocin at 4 mU/min, ctx irregular  Continue titration          London Crockett MD 7/16/2022 1:06 AM

## 2022-07-16 NOTE — PLAN OF CARE
Problem: Potential for Falls  Goal: Patient will remain free of falls  Description: INTERVENTIONS:  - Educate patient/family on patient safety including physical limitations  - Instruct patient to call for assistance with activity   - Consult OT/PT to assist with strengthening/mobility   - Keep Call bell within reach  - Keep bed low and locked with side rails adjusted as appropriate  - Keep care items and personal belongings within reach  - Initiate and maintain comfort rounds  - Make Fall Risk Sign visible to staff  - Offer Toileting every  Hours, in advance of need  - Initiate/Maintain alarm  - Obtain necessary fall risk management equipment:   - Apply yellow socks and bracelet for high fall risk patients  - Consider moving patient to room near nurses station  Outcome: Progressing     Problem: POSTPARTUM  Goal: Experiences normal postpartum course  Description: INTERVENTIONS:  - Monitor maternal vital signs  - Assess uterine involution and lochia  Outcome: Progressing  Goal: Appropriate maternal -  bonding  Description: INTERVENTIONS:  - Identify family support  - Assess for appropriate maternal/infant bonding   -Encourage maternal/infant bonding opportunities  - Referral to  or  as needed  Outcome: Progressing  Goal: Establishment of infant feeding pattern  Description: INTERVENTIONS:  - Assess breast/bottle feeding  - Refer to lactation as needed  Outcome: Progressing  Goal: Incision(s), wounds(s) or drain site(s) healing without S/S of infection  Description: INTERVENTIONS  - Assess and document dressing, incision, wound bed, drain sites and surrounding tissue  - Provide patient and family education  - Perform skin care/dressing changes rocky  Outcome: Progressing     Problem: Knowledge Deficit  Goal: Verbalizes understanding of labor plan  Description: Assess patient/family/caregiver's baseline knowledge level and ability to understand information    Provide education via patient/family/caregiver's preferred learning method at appropriate level of understanding  1  Provide teaching at level of understanding  2  Provide teaching via preferred learning method(s)  Outcome: Completed     Problem: Labor & Delivery  Goal: Manages discomfort  Description: Assess and monitor for signs and symptoms of discomfort  Assess patient's pain level regularly and per hospital policy  Administer medications as ordered  Support use of nonpharmacological methods to help control pain such as distraction, imagery, relaxation, and application of heat and cold  Collaborate with interdisciplinary team and patient to determine appropriate pain management plan  1  Include patient in decisions related to comfort  2  Offer non-pharmacological pain management interventions  3  Report ineffective pain management to physician  Outcome: Completed  Goal: Patient vital signs are stable  Description: 1  Assess vital signs - vaginal delivery    Outcome: Completed

## 2022-07-16 NOTE — ANESTHESIA PROCEDURE NOTES
CSE Block    Patient location during procedure: OB  Start time: 7/16/2022 5:11 AM  Reason for block: procedure for pain  Staffing  Performed: Anesthesiologist   Anesthesiologist: Oscar Crump MD  Preanesthetic Checklist  Completed: patient identified, IV checked, site marked, risks and benefits discussed, surgical consent, monitors and equipment checked, pre-op evaluation and timeout performed  CSE  Patient position: sitting  Prep: Betadine  Patient monitoring: heart rate, continuous pulse ox and frequent blood pressure checks  Approach: midline  Spinal Needle  Needle type: pencil-tip   Needle gauge: 24 G  Needle length: 10 cm  Needle insertion depth: 7 cm  Epidural Needle  Injection technique: GURJIT saline  Needle type: Tuohy   Needle gauge: 18 G  Needle length: 9 cm  Needle insertion depth: 7 cm  Location: L3-L4  Catheter  Catheter type: side hole  Catheter size: 20 G  Catheter at skin depth: 12 cm  Catheter securement method: clear occlusive dressing  Test dose: negative  Assessment  Sensory level: T10  Injection Assessment:  no paresthesia on injection, no pain on injection, negative aspiration for heme and positive aspiration for clear CSF (csf withspinal porttion only)  Additional Notes  Decisionn or single shot spinal to asses space and depth given hx difficult epidural in past  Easy clear free flowing CSF, locl injected   Then epidurl attempted with good first aattempt success

## 2022-07-17 VITALS
HEIGHT: 69 IN | DIASTOLIC BLOOD PRESSURE: 63 MMHG | RESPIRATION RATE: 18 BRPM | HEART RATE: 89 BPM | BODY MASS INDEX: 41.03 KG/M2 | TEMPERATURE: 97.6 F | OXYGEN SATURATION: 97 % | SYSTOLIC BLOOD PRESSURE: 119 MMHG | WEIGHT: 277 LBS

## 2022-07-17 LAB — RPR SER QL: NORMAL

## 2022-07-17 PROCEDURE — 99024 POSTOP FOLLOW-UP VISIT: CPT | Performed by: STUDENT IN AN ORGANIZED HEALTH CARE EDUCATION/TRAINING PROGRAM

## 2022-07-17 PROCEDURE — 99212 OFFICE O/P EST SF 10 MIN: CPT

## 2022-07-17 PROCEDURE — G0463 HOSPITAL OUTPT CLINIC VISIT: HCPCS

## 2022-07-17 RX ORDER — ACETAMINOPHEN 325 MG/1
650 TABLET ORAL EVERY 4 HOURS PRN
Qty: 30 TABLET | Refills: 0 | Status: SHIPPED | OUTPATIENT
Start: 2022-07-17 | End: 2022-07-26 | Stop reason: ALTCHOICE

## 2022-07-17 RX ORDER — IBUPROFEN 600 MG/1
600 TABLET ORAL EVERY 6 HOURS
Qty: 30 TABLET | Refills: 0 | Status: SHIPPED | OUTPATIENT
Start: 2022-07-17 | End: 2022-07-26 | Stop reason: ALTCHOICE

## 2022-07-17 RX ADMIN — IBUPROFEN 600 MG: 600 TABLET, FILM COATED ORAL at 08:17

## 2022-07-17 RX ADMIN — DOCUSATE SODIUM 100 MG: 100 CAPSULE, LIQUID FILLED ORAL at 08:17

## 2022-07-17 RX ADMIN — ACETAMINOPHEN 650 MG: 325 TABLET, FILM COATED ORAL at 02:56

## 2022-07-17 NOTE — PLAN OF CARE
Problem: Potential for Falls  Goal: Patient will remain free of falls  Description: INTERVENTIONS:  - Educate patient/family on patient safety including physical limitations  - Instruct patient to call for assistance with activity   - Consult OT/PT to assist with strengthening/mobility   - Keep Call bell within reach  - Keep bed low and locked with side rails adjusted as appropriate  - Keep care items and personal belongings within reach  - Initiate and maintain comfort rounds  - Make Fall Risk Sign visible to staff  - Apply yellow socks and bracelet for high fall risk patients  - Consider moving patient to room near nurses station  2022 1339 by Frandy Knapp RN  Outcome: Completed  2022 075 by Frandy Knapp RN  Outcome: Progressing     Problem: POSTPARTUM  Goal: Experiences normal postpartum course  Description: INTERVENTIONS:  - Monitor maternal vital signs  - Assess uterine involution and lochia  2022 1339 by Frandy Knapp RN  Outcome: Completed  2022 075 by Frandy Knapp RN  Outcome: Progressing  Goal: Appropriate maternal -  bonding  Description: INTERVENTIONS:  - Identify family support  - Assess for appropriate maternal/infant bonding   -Encourage maternal/infant bonding opportunities  - Referral to  or  as needed  2022 1339 by Frandy Knapp RN  Outcome: Completed  2022 by Frandy Knapp RN  Outcome: Progressing  Goal: Establishment of infant feeding pattern  Description: INTERVENTIONS:  - Assess breast/bottle feeding  - Refer to lactation as needed  2022 1339 by Frandy Knapp RN  Outcome: Completed  2022 075 by Frandy Knapp RN  Outcome: Progressing  Goal: Incision(s), wounds(s) or drain site(s) healing without S/S of infection  Description: INTERVENTIONS  - Assess and document dressing, incision, wound bed, drain sites and surrounding tissue  - Provide patient and family education  7/17/2022 1339 by Chloe Ormond, RN  Outcome: Completed  7/17/2022 0752 by Chloe Ormond, RN  Outcome: Progressing

## 2022-07-17 NOTE — LACTATION NOTE
This note was copied from a baby's chart  CONSULT - LACTATION  Baby Girl Mat ) Mary 1 days female MRN: [id number]    [address] [facility] NURSERY Room / Bed: L&D 304(N)/L&D 304(n) Encounter: [id number]    Maternal Information     MOTHER:  Duong Hinojosa  Maternal Age: 29 y o    OB History: # 1 - Date: , Sex: None, Weight: None, GA: None, Delivery: None, Apgar1: None, Apgar5: None, Living: None, Birth Comments: None    # 2 - Date: 18, Sex: None, Weight: None, GA: 10w3d, Delivery: None, Apgar1: None, Apgar5: None, Living: None, Birth Comments: None    # 3 - Date: 19, Sex: Male, Weight: 2985 g (6 lb 9 3 oz), GA: 38w0d, Delivery: Vaginal, Spontaneous, Apgar1: 8, Apgar5: 9, Living: Living, Birth Comments: None    # 4 - Date: 21, Sex: Male, Weight: 3515 g (7 lb 12 oz), GA: 38w5d, Delivery: Vaginal, Spontaneous, Apgar1: 9, Apgar5: 9, Living: Living, Birth Comments: None    # 5 - Date: 22, Sex: Female, Weight: 3742 g (8 lb 4 oz), GA: 39w1d, Delivery: Vaginal, Spontaneous, Apgar1: 9, Apgar5: 9, Living: Living, Birth Comments: None   Previouse breast reduction surgery?  No    Lactation history:   Has patient previously breast fed: Yes   How long had patient previously breast fed: 7 months for first child, 6 months for second child   Previous breast feeding complications: Infant separation (full time employment)     Past Surgical History:   Procedure Laterality Date    ME WRIST [place] LIG Right 2/3/2021    Procedure: RELEASE CARPAL TUNNEL ENDOSCOPIC;  Surgeon: Karey Adam MD;  [facility];  Service: Orthopedics    ME WRIST [place] LIG Left 2021    Procedure: RELEASE CARPAL TUNNEL ENDOSCOPIC;  Surgeon: Karey Adam MD;  [facility];  Service: Orthopedics    WISDOM TOOTH EXTRACTION          Birth information:  YOB: 2020   Time of birth: 7:12 AM   Sex: female   Delivery type: Vaginal, Spontaneous   Birth Weight: 3742 g (8 lb 4 oz)   Percent of Weight Change: -4%     Gestational Age: 36w3d   [unfilled]    Assessment     Breast and nipple assessment: Not assessed at this time     Assessment: sleepy    Feeding assessment: no latch    Feeding recommendations:  breast feed on demand     Hyacinth Chavez presents as confident with breastfeeding in discussion  Hx: BF 7 m and 6 m  Declines RSB and D/C booklets  Has ameda and willkayleen breast pumps for home use  Expanded breastfeeding goals with Chani  Encouraged parents to call for assistance, questions, and concerns about breastfeeding  Extension provided        Kaitlin Tesfaye RN 2022 11:57 AM

## 2022-07-17 NOTE — DISCHARGE SUMMARY
Discharge Summary - OB/GYN   Bismark Hernandez 29 y o  female MRN: [id number]  Unit/Bed#: L&D 304-01 Encounter: [id number]      Admission Date: 7/15/2022     Discharge Date: 2022    Admitting Diagnosis:   1  Pregnancy at 39w1d  2  GHTN  3  GBS positive status  4  T1DM on insulin pump    Discharge Diagnosis:   Same, delivered    Procedures: spontaneous vaginal delivery    Delivering Attending: Waldemar Hall MD  Discharge Attending: [facility] Course:     Rashmi Gomez is a 29 y o  [id number] at 39w1d wks who was initially admitted for induction of labor  Patient was started with vaginal cytotec and Murphy balloon placement for induction management with pitocin to follow  She received Pencillin for GBS positive status throughout her labor course  An epidural was placed for analgesia  Patient made steady cervical change to complete and began pushing  She delivered a viable female  on 22 at   Weight 8lbs 4oz via spontaneous vaginal delivery  Apgars were 9 (1 min) and 9 (5 min)   was transferred to  nursery  Patient tolerated the procedure well and was transferred to recovery in stable condition  Her post-partum course was uncomplicated  Her post-partum pain was well controlled with oral analgesics  On day of discharge, she was ambulating and able to reasonably perform all ADLs  She was voiding and had appropriate bowel function  Pain was well controlled  She was discharged home on post-partum day #1 without complications  Patient was instructed to follow up with her OB as an outpatient and was given appropriate warnings to call provider if she develops signs of infection or uncontrolled pain  Complications: none apparent    Condition at discharge: good     Discharge instructions/Information to patient and family:   See after visit summary for information provided to patient and family        Provisions for Follow-Up Care:  See after visit summary for information related to follow-up care and any pertinent home health orders  Disposition: Home    Planned Readmission: No    Discharge Medications: For a complete list of the patient's medications, please refer to her med rec

## 2022-07-17 NOTE — NURSING NOTE
Discharge teaching provided to patient  "Save Your Life" magnet explained  Questions encouraged and answered to her satisfaction

## 2022-07-17 NOTE — NURSING NOTE
Patient reported sugar of 80 at 0600, gave herself 7 units via personal pump with meal   Post breakfast blood sugar of 123 at 0818

## 2022-07-17 NOTE — PROGRESS NOTES
Progress Note - OB/GYN  Rosalee Hernandez 29 y o  female MRN: [id number]  Unit/Bed#: L&D 304-01 Encounter: [id number]    Assessment and Plan     Rosalee Hernandez is a patient of: Dr Chayito Madsen  She is PPD# 1 s/p  spontaneous vaginal delivery  Recovering well and is stable       Gestational hypertension, third trimester  Assessment & Plan  Systolic (80VVK), AYM:623 , Min:110 , ZGX:436     Diastolic (16FCD), IPT:38, Min:55, Max:73    CBC/CMP WNL, UPC 0 16  Pressures elevated, stable  Asymptomatic  Continue to monitor    Group B streptococcal carriage complicating pregnancy  Assessment & Plan  S/p Penicillin infusion intrapartum    Insulin pump titration  Assessment & Plan  Insulin pump on postpartum settings as previously described during     *  (spontaneous vaginal delivery)  Assessment & Plan  Pain well-controlled  Voiding spontaneously, passing flatus  Lochia WNL  Patient requests discharge          Disposition    - Anticipate discharge home on PPD# 1/2      Subjective/Objective     Chief Complaint: Postpartum State     Subjective:    Rosalee Hernandez is PPD/POD#1 s/p  spontaneous vaginal delivery  She has no current complaints  Pain is well controlled  Patient is currently voiding  She is ambulating  Patient is currently passing flatus and has had no bowel movement  She is tolerating PO, and denies nausea or vomitting  Patient denies fever, chills, chest pain, shortness of breath, or calf tenderness  Lochia is minimal  She is  Breastfeeding  She is recovering well and is stable         Vitals:   /63 (BP Location: Left arm)   Pulse 89   Temp 97 6 °F (36 4 °C) (Oral)   Resp 18   Ht 5' 9" (1 753 m)   Wt 126 kg (277 lb)   LMP 10/15/2021 (Exact Date)   SpO2 97%   Breastfeeding Yes   BMI 40 91 kg/m²       Intake/Output Summary (Last 24 hours) at 2022 0959  Last data filed at 2022 1420  Gross per 24 hour   Intake --   Output 600 ml   Net -600 ml       Invasive Devices  Timeline    None Physical Exam:   GEN: Rosalee Hernandez appears well, alert and oriented x 3, pleasant and cooperative   CARDIO: RRR, no murmurs or rubs  RESP:  CTAB, no wheezes or rales  ABDOMEN: soft, no tenderness, no distention, fundus @ 1cm below umbilicus  EXTREMITIES: SCDs on, non tender, no erythema, b/l Ansley's sign negative      Labs:     Hemoglobin   Date Value Ref Range Status   07/15/2022 10 6 (L) 11 5 - 15 4 g/dL Final   07/15/2022 10 8 (L) 11 5 - 15 4 g/dL Final     WBC   Date Value Ref Range Status   07/15/2022 10 46 (H) 4 31 - 10 16 Thousand/uL Final   07/15/2022 9 49 4 31 - 10 16 Thousand/uL Final     Platelets   Date Value Ref Range Status   07/15/2022 254 149 - 390 Thousands/uL Final   07/15/2022 245 149 - 390 Thousands/uL Final     Creatinine   Date Value Ref Range Status   07/15/2022 0 74 0 60 - 1 30 mg/dL Final     Comment:     Standardized to IDMS reference method   06/02/2022 0 67 0 60 - 1 30 mg/dL Final     Comment:     Standardized to IDMS reference method     AST   Date Value Ref Range Status   07/15/2022 19 5 - 45 U/L Final     Comment:     Specimen collection should occur prior to Sulfasalazine administration due to the potential for falsely depressed results  06/02/2022 20 5 - 45 U/L Final     Comment:     Specimen collection should occur prior to Sulfasalazine administration due to the potential for falsely depressed results  ALT   Date Value Ref Range Status   07/15/2022 19 12 - 78 U/L Final     Comment:     Specimen collection should occur prior to Sulfasalazine and/or Sulfapyridine administration due to the potential for falsely depressed results  06/02/2022 17 12 - 78 U/L Final     Comment:     Specimen collection should occur prior to Sulfasalazine and/or Sulfapyridine administration due to the potential for falsely depressed results             Jonna River MD  7/17/2022  9:59 AM

## 2022-07-18 NOTE — UTILIZATION REVIEW
Inpatient Admission Authorization Request   Notification of Maternity/Delivery &  Birth Information for Admission   SERVICING FACILITY:   [address] [place] [address], [place] [address] [address] [facility]  Tax ID: [id number]  NPI: [id number]  Place of Service: Inpatient [address] [facility]  [address]  Place of Service Code: 24     ATTENDING PROVIDER:  Attending Name and NPI#: Waldemar Hall Md [[id number]]  Address: [address] [place] [address], [place] [address] [address] [facility]  Phone: [phone]     UTILIZATION REVIEW CONTACT:  Nae Hsu, Utilization   Network Utilization Review Department  Phone: [phone]  Fax [phone]  Email: [email or web address]     PHYSICIAN ADVISORY SERVICES:  FOR PWBT-CD-DLVA REVIEW - MEDICAL NECESSITY DENIAL  Phone: [phone]  Fax: [phone]  Email: [email or web address]     TYPE OF REQUEST:  Inpatient Status     ADMISSION INFORMATION:  ADMISSION DATE/TIME: 7/15/22  4:42 PM  PATIENT DIAGNOSIS CODE/DESCRIPTION:  Abdominal pain during pregnancy [O26 899, R10 9] The primary encounter diagnosis was  (spontaneous vaginal delivery)  A diagnosis of 39 weeks gestation of pregnancy was also pertinent to this visit  1   (spontaneous vaginal delivery)    2  39 weeks gestation of pregnancy      DISCHARGE DATE/TIME: 2022  1:56 PM   MOTHER AND  INFORMATION:  Mother: Rashmi Gomez 1990   Delivering clinician: Waldemar Hall   OB History        5    Para   3    Term   3            AB   2    Living   3       SAB   1    IAB   1    Ectopic        Multiple   0    Live Births   3               [place] Name & MRN:   Information for the patient's :  Cuco العلي, Dariela [employer]) [[id number]]      Delivery Information:  Sex: female  Delivered 2022 8:14 AM by Vaginal, Spontaneous; Gestational Age: 36w3d    [place] Measurements:  Weight: 8 lb 4 oz (3742 g);   Height: 20"    APGAR 1 minute 5 minutes 10 minutes   Totals: 9 9       Birth Information: 29 y o  female MRN: [id number] Unit/Bed#: L&D 304-01 Estimated Date of Delivery: 22  Birthweight: No birth weight on file  Gestational Age: <None> Delivery Type: Vaginal, Spontaneous      IMPORTANT INFORMATION:  Please contact Tom Longo directly with any questions or concerns regarding this request  Department voicemails are confidential     Send requests for admission clinical reviews, concurrent reviews, approvals, and administrative denials due to lack of clinical to fax [phone]

## 2022-07-26 ENCOUNTER — POSTPARTUM VISIT (OUTPATIENT)
Dept: OBGYN CLINIC | Facility: CLINIC | Age: 32
End: 2022-07-26

## 2022-07-26 VITALS
WEIGHT: 251.4 LBS | BODY MASS INDEX: 37.23 KG/M2 | HEIGHT: 69 IN | HEART RATE: 74 BPM | TEMPERATURE: 97.8 F | DIASTOLIC BLOOD PRESSURE: 74 MMHG | SYSTOLIC BLOOD PRESSURE: 118 MMHG | OXYGEN SATURATION: 97 %

## 2022-07-26 DIAGNOSIS — Z86.59 HISTORY OF POSTPARTUM DEPRESSION: ICD-10-CM

## 2022-07-26 DIAGNOSIS — Z87.59 HISTORY OF POSTPARTUM DEPRESSION: ICD-10-CM

## 2022-07-26 PROCEDURE — PNV: Performed by: OBSTETRICS & GYNECOLOGY

## 2022-07-26 NOTE — PROGRESS NOTES
Patient ID: Keron Dietz is a 29 y o  female  Chief Complaint   Patient presents with    Postpartum Care     No concerns         She presents for routine postpartum visit  She is now a [id number] who is 10 days s/p  on 22 - IOL at 39+ weeks for type 1 DM   Anesthesia: epidural   Perineum: 1st degree laceration      Postpartum course was uncomplicated - discharged home on PPD#1  Since d/c home she has had no complaints  She denies abn bleeding, pelvic pain, breast complaints, bowel/bladder dysfunction, depression/anx  Brandi Rodriguez is thriving and is Breastfeeding       Plans for contraception: POP         The following portions of the patient's history were reviewed and updated as appropriate: allergies, current medications, past family history, past medical history, past social history, past surgical history, and problem list     Review of Systems   Constitutional: Negative for appetite change, chills and fever  Eyes: Negative for visual disturbance  Respiratory: Negative for cough, chest tightness and shortness of breath  Cardiovascular: Negative for chest pain  Gastrointestinal: Negative for abdominal distention, abdominal pain, constipation, diarrhea, nausea and vomiting  Endocrine: Negative for cold intolerance and heat intolerance  Genitourinary: Negative for difficulty urinating, dyspareunia, dysuria, frequency, genital sores, pelvic pain, urgency, vaginal bleeding, vaginal discharge and vaginal pain  Musculoskeletal: Negative for arthralgias  Neurological: Negative for light-headedness and headaches  Hematological: Does not bruise/bleed easily  Psychiatric/Behavioral: Negative for behavioral problems  All other systems reviewed and are negative                 Objective:  /74 (BP Location: Right arm, Patient Position: Sitting, Cuff Size: Adult)   Pulse 74   Temp 97 8 °F (36 6 °C) (Tympanic)   Ht 5' 9" (1 753 m)   Wt 114 kg (251 lb 6 4 oz)   LMP 10/15/2021 (Exact Date)   SpO2 97%   Breastfeeding Yes   BMI 37 13 kg/m²     Physical Exam  Constitutional:       General: She is not in acute distress  Appearance: Normal appearance  HENT:      Head: Normocephalic and atraumatic  Cardiovascular:      Rate and Rhythm: Normal rate  Pulmonary:      Effort: Pulmonary effort is normal  No respiratory distress  Neurological:      General: No focal deficit present  Mental Status: She is alert  Psychiatric:         Mood and Affect: Mood normal          Behavior: Behavior normal    Vitals and nursing note reviewed  EDPS score - 11    Assessment/Plan:    Problem List Items Addressed This Visit        Other    History of postpartum depression     History of postpartum depression with first and second children  EDPS score today 11   Reports better support with this pregnancy due to her partner being off for 6 weeks of leave   Denies any SI/HI  Precautions reviewed, resources reviewed           (spontaneous vaginal delivery) - Primary     10 days PostPartum  The patient may advance activity as tolerated and may resume sexual activity at 4-6 wks PP  Contraception discussed  Patient plans: potentially POP - to be discussed further at next visit   EDPS score 11   Signs and symptoms of postpartum depression reviewed  Will f/u in 4 weeks for postpartum exam

## 2022-07-26 NOTE — PATIENT INSTRUCTIONS
P O  [place] [address]   [address] [place] [address], [address] [facility]   [phone] ([phone])

## 2022-07-26 NOTE — ASSESSMENT & PLAN NOTE
10 days PostPartum  The patient may advance activity as tolerated and may resume sexual activity at 4-6 wks PP  Contraception discussed  Patient plans: potentially POP - to be discussed further at next visit   EDPS score 11   Signs and symptoms of postpartum depression reviewed  Will f/u in 4 weeks for postpartum exam

## 2022-07-26 NOTE — ASSESSMENT & PLAN NOTE
History of postpartum depression with first and second children  EDPS score today 11   Reports better support with this pregnancy due to her partner being off for 6 weeks of leave   Denies any SI/HI  Precautions reviewed, resources reviewed

## 2022-08-28 PROBLEM — E10.9 DM (DIABETES MELLITUS), TYPE 1 (HCC): Status: ACTIVE | Noted: 2021-12-21

## 2022-08-28 PROBLEM — O16.9 HYPERTENSION IN DELIVERED PREGNANCY: Status: ACTIVE | Noted: 2022-07-15

## 2022-08-28 PROBLEM — E66.01 MORBID OBESITY WITH BMI OF 40.0-44.9, ADULT (HCC): Status: RESOLVED | Noted: 2022-07-16 | Resolved: 2022-08-28

## 2022-08-28 PROBLEM — O09.90 SUPERVISION OF HIGH-RISK PREGNANCY: Status: RESOLVED | Noted: 2022-01-07 | Resolved: 2022-08-28

## 2022-08-28 PROBLEM — O26.03 EXCESSIVE WEIGHT GAIN DURING PREGNANCY IN THIRD TRIMESTER: Status: RESOLVED | Noted: 2022-07-13 | Resolved: 2022-08-28

## 2022-08-28 PROBLEM — O99.213 OBESITY AFFECTING PREGNANCY IN THIRD TRIMESTER: Status: RESOLVED | Noted: 2022-01-07 | Resolved: 2022-08-28

## 2022-08-28 PROBLEM — Z46.81 INSULIN PUMP TITRATION: Status: RESOLVED | Noted: 2022-02-17 | Resolved: 2022-08-28

## 2022-08-28 PROBLEM — O09.893 SHORT INTERVAL BETWEEN PREGNANCIES COMPLICATING PREGNANCY IN THIRD TRIMESTER, ANTEPARTUM: Status: RESOLVED | Noted: 2022-02-06 | Resolved: 2022-08-28

## 2022-08-28 PROBLEM — O99.820 GROUP B STREPTOCOCCAL CARRIAGE COMPLICATING PREGNANCY: Status: RESOLVED | Noted: 2022-07-03 | Resolved: 2022-08-28

## 2022-08-28 PROBLEM — Z87.59 HISTORY OF GESTATIONAL HYPERTENSION: Status: RESOLVED | Noted: 2021-12-21 | Resolved: 2022-08-28

## 2022-08-28 NOTE — PROGRESS NOTES
Subjective       Chief Complaint   Patient presents with    Postpartum Care     PPD Score 11  post partum  delivered  on 2022     Patient not on medication  She does not have a therapist     Preethimora Charles is a 29 y o  female who presents for a postpartum visit  She is 6 weeks postpartum following a spontaneous vaginal delivery  I have fully reviewed the prenatal and intrapartum course  The delivery was at 39 1 gestational weeks  Outcome: spontaneous vaginal delivery  Anesthesia: epidural  Postpartum course has been normal  Baby's course has been normal  Baby is feeding by breast  Bleeding no bleeding  Bowel function is normal  Bladder function is normal  Patient is not sexually active  Contraception method is none  Postpartum depression screening: negative  The following portions of the patient's history were reviewed and updated as appropriate: allergies, current medications, past family history, past medical history, past social history, past surgical history and problem list     Postpartum Depression: Medium Risk    Last EPDS Total Score: 8    Last EPDS Self Harm Result: Never       Last PAP: 3/8/19        Review of Systems   Constitutional: Negative  HENT: Negative  Eyes: Negative  Respiratory: Negative  Cardiovascular: Negative  Gastrointestinal: Negative  Endocrine: Negative  Genitourinary:        As noted in HPI   Musculoskeletal: Negative  Skin: Negative  Allergic/Immunologic: Negative  Neurological: Negative  Hematological: Negative  Psychiatric/Behavioral: Negative            Objective     /72 (BP Location: Right arm, Patient Position: Sitting, Cuff Size: Adult)   Pulse 57   Ht 5' 9" (1 753 m)   Wt 107 kg (235 lb)   BMI 34 70 kg/m²    General:  alert and oriented, in no acute distress   Abdomen: soft, non-tender; bowel sounds normal; no masses,  no organomegaly    Vulva:  normal   Vagina: normal vagina, no discharge, exudate, lesion, or erythema   Cervix:  no cervical motion tenderness   Corpus: normal size, contour, position, consistency, mobility, non-tender   Adnexa:  normal adnexa       Assessment/Plan     6 weeks postpartum exam      1  Contraception: oral progesterone-only contraceptive   Advised safe and effective  2  Follow-up with [facility] endocrinology  3  Follow up in: 3 months or as needed    4  Positive depression screen 11 - declines medications, wishes referral to behavioral health - referred to BABY and ME    Problem List Items Addressed This Visit        Endocrine    DM (diabetes mellitus), type 1 ([facility] [address] )       Cardiovascular and Mediastinum    Hypertension in delivered pregnancy       Other    History of postpartum depression     (spontaneous vaginal delivery)    Positive depression screening      Other Visit Diagnoses     Postpartum care and examination    -  Primary    Encounter for BCP (birth control pills) initial prescription        Relevant Medications    norethindrone (Brandi) 0 35 MG tablet

## 2022-08-29 ENCOUNTER — POSTPARTUM VISIT (OUTPATIENT)
Dept: OBGYN CLINIC | Facility: CLINIC | Age: 32
End: 2022-08-29

## 2022-08-29 VITALS
HEIGHT: 69 IN | HEART RATE: 57 BPM | BODY MASS INDEX: 34.8 KG/M2 | SYSTOLIC BLOOD PRESSURE: 122 MMHG | WEIGHT: 235 LBS | DIASTOLIC BLOOD PRESSURE: 72 MMHG

## 2022-08-29 DIAGNOSIS — O16.9 HYPERTENSION IN DELIVERED PREGNANCY: ICD-10-CM

## 2022-08-29 DIAGNOSIS — Z13.31 POSITIVE DEPRESSION SCREENING: ICD-10-CM

## 2022-08-29 DIAGNOSIS — Z30.011 ENCOUNTER FOR BCP (BIRTH CONTROL PILLS) INITIAL PRESCRIPTION: ICD-10-CM

## 2022-08-29 DIAGNOSIS — Z86.59 HISTORY OF POSTPARTUM DEPRESSION: ICD-10-CM

## 2022-08-29 DIAGNOSIS — E10.9 TYPE 1 DIABETES MELLITUS WITHOUT COMPLICATION (HCC): ICD-10-CM

## 2022-08-29 DIAGNOSIS — Z87.59 HISTORY OF POSTPARTUM DEPRESSION: ICD-10-CM

## 2022-08-29 PROCEDURE — 99024 POSTOP FOLLOW-UP VISIT: CPT | Performed by: OBSTETRICS & GYNECOLOGY

## 2022-08-29 RX ORDER — ACETAMINOPHEN AND CODEINE PHOSPHATE 120; 12 MG/5ML; MG/5ML
1 SOLUTION ORAL DAILY
Qty: 84 TABLET | Refills: 2 | Status: SHIPPED | OUTPATIENT
Start: 2022-08-29

## 2022-11-28 NOTE — PROGRESS NOTES
Assessment        Diagnoses and all orders for this visit:    Encntr for gyn exam (general) (routine) w/o abn findings    Cervical cancer screening  -     Liquid-based pap, screening    Encounter for BCP (birth control pills) initial prescription  -     norethindrone (Brandi) 0 35 MG tablet; Take 1 tablet (0 35 mg total) by mouth daily             Plan      All questions answered  Await pap smear results  Contraception: oral progesterone-only contraceptive  Discussed healthy lifestyle modifications  Follow up in 1 year  Follow up as needed  Pap smear  Deann Rosario is a 29 y o  female who presents for annual exam       Chief Complaint   Patient presents with   • Gynecologic Exam     3/8/19  last pap       She is breastfeeding  4 5 months postpartum  She has no problems today  Last Pap: 3/18/19  Last mammogram: n/a    HPV vaccine completed: no   Current contraception: oral progesterone-only contraceptive  History of abnormal Pap smear: no  History of abnormal mammogram: no  Family history of uterine or ovarian cancer: no  Family history of breast cancer: no  Family history of colon cancer: no        Menstrual History:  OB History        5    Para   3    Term   3            AB   2    Living   3       SAB   1    IAB   1    Ectopic        Multiple   0    Live Births   3                Menarche age: 17  No LMP recorded               Past Medical History:   Diagnosis Date   • Diabetes ([address] )     type 1   • Diabetes mellitus ([address] )      Past Surgical History:   Procedure Laterality Date   • DC WRIST [facility] LIG Right 2/3/2021    Procedure: RELEASE CARPAL TUNNEL ENDOSCOPIC;  Surgeon: Chandni Gutierrez MD;  [facility];  Service: Orthopedics   • DC WRIST [facility] LIG Left 2021    Procedure: RELEASE CARPAL TUNNEL ENDOSCOPIC;  Surgeon: Chandni Gutierrez MD;  [facility];  Service: Orthopedics   • [address] [place] [address] EXTRACTION       Family History   Problem Relation Age of Onset   • Hypertension Father    • Other Father         bladder cancer   • No Known Problems Sister    • No Known Problems Brother    • No Known Problems Son    • Diabetes type I Maternal Grandfather    • Breast cancer Other    • Ovarian cancer Neg Hx        Social History     Tobacco Use   • Smoking status: Never   • Smokeless tobacco: Never   Vaping Use   • Vaping Use: Never used   Substance Use Topics   • Alcohol use: Not Currently   • Drug use: Never          Current Outpatient Medications:   •  Continuous Blood Gluc  (Dexcom G6 ) DENIZ, Use as directed , Disp: 1 Device, Rfl: 0  •  Continuous Blood Gluc Sensor (Dexcom G6 Sensor) MISC, 1 box=1 month supply or 3 sensors, use 1 sensor every 10 days  , Disp: 1 each, Rfl: 12  •  Continuous Blood Gluc Transmit (Dexcom G6 Transmitter) MISC, Transmitter change every 90 days  , Disp: 1 each, Rfl: 3  •  glucose blood test strip, Use 1 each daily as needed Use as instructed, Disp: , Rfl:   •  HumaLOG 100 UNIT/ML injection, Inject 100 Units under the skin if needed Titrate as needed, Disp: , Rfl:   •  insulin glargine (LANTUS) 100 units/mL subcutaneous injection, Inject 10 Units under the skin, Disp: , Rfl:   •  Lancets MISC, by Does not apply route, Disp: , Rfl:   •  norethindrone (Brandi) 0 35 MG tablet, Take 1 tablet (0 35 mg total) by mouth daily, Disp: 84 tablet, Rfl: 3  •  Prenatal Vit-Fe Fumarate-FA (PRENATAL VITAMIN PO), Take 1 tablet by mouth daily, Disp: , Rfl:   •  Blood Pressure Monitoring (B-D ASSURE BPM/AUTO ARM CUFF) MISC, Use 2 (two) times a day Call if BP > 140/90 (Patient not taking: Reported on 12/2/2022), Disp: 1 each, Rfl: 0  •  insulin lispro (HumaLOG) 100 units/mL injection, Use up to 200 units via insulin pump daily  T2DM and pregnancy   (Patient not taking: Reported on 12/2/2022), Disp: 180 mL, Rfl: 0  •  insulin lispro (insulin lispro) 100 units/mL injection, INJECT UP  UNITS VIA INSULIN PUMP DAILY e10 65 (Patient not taking: Reported on 12/2/2022), Disp: , Rfl:     No Known Allergies        Review of Systems   Constitutional: Negative  HENT: Negative  Eyes: Negative  Respiratory: Negative  Cardiovascular: Negative  Gastrointestinal: Negative  Endocrine: Negative  Genitourinary:        As noted in HPI   Musculoskeletal: Negative  Skin: Negative  Allergic/Immunologic: Negative  Neurological: Negative  Hematological: Negative  Psychiatric/Behavioral: Negative  /82 (BP Location: Right arm, Patient Position: Sitting, Cuff Size: Adult)   Ht 5' 9" (1 753 m)   Wt 104 kg (229 lb)   BMI 33 82 kg/m²         Physical Exam  Constitutional:       Appearance: She is well-developed  Genitourinary:      Vulva, bladder and rectum normal       No lesions in the vagina  Genitourinary Comments:         Right Labia: No rash, tenderness, lesions, skin changes or Bartholin's cyst      Left Labia: No tenderness, lesions, skin changes, Bartholin's cyst or rash  No inguinal adenopathy present in the right or left side  No vaginal discharge, tenderness or bleeding  No vaginal prolapse present  No vaginal atrophy present  Right Adnexa: not tender, not full and no mass present  Left Adnexa: not tender, not full and no mass present  No cervical motion tenderness, friability, lesion or polyp  Uterus is not enlarged or tender  Pelvic exam was performed with patient in the lithotomy position  Rectum:      No external hemorrhoid  Breasts:     Right: No mass, nipple discharge, skin change or tenderness  Left: No mass, nipple discharge, skin change or tenderness  HENT:      Head: Normocephalic  Nose: Nose normal    Eyes:      Conjunctiva/sclera: Conjunctivae normal    Neck:      Thyroid: No thyromegaly  Cardiovascular:      Rate and Rhythm: Normal rate and regular rhythm        Heart sounds: Normal heart sounds  No murmur heard  Pulmonary:      Effort: Pulmonary effort is normal  No respiratory distress  Breath sounds: Normal breath sounds  No wheezing or rales  Abdominal:      General: There is no distension  Palpations: Abdomen is soft  There is no mass  Tenderness: There is no abdominal tenderness  There is no guarding or rebound  Musculoskeletal:         General: No tenderness  Cervical back: Neck supple  No muscular tenderness  Lymphadenopathy:      Cervical: No cervical adenopathy  Lower Body: No right inguinal adenopathy  No left inguinal adenopathy  Neurological:      Mental Status: She is alert and oriented to person, place, and time  Skin:     General: Skin is warm and dry  Psychiatric:         Mood and Affect: Mood normal          Behavior: Behavior normal              No future appointments

## 2022-11-28 NOTE — PATIENT INSTRUCTIONS
Wellness Visit for Adults   AMBULATORY CARE:   A wellness visit  is when you see your healthcare provider to get screened for health problems  Your healthcare provider will also give you advice on how to stay healthy  Write down your questions so you remember to ask them  Ask your healthcare provider how often you should have a wellness visit  What happens at a wellness visit:  Your healthcare provider will ask about your health, and your family history of health problems  This includes high blood pressure, heart disease, and cancer  He or she will ask if you have symptoms that concern you, if you smoke, and about your mood  You may also be asked about your intake of medicines, supplements, food, and alcohol  Any of the following may be done: Your weight  will be checked  Your height may also be checked so your body mass index (BMI) can be calculated  Your BMI shows if you are at a healthy weight  Your blood pressure  and heart rate will be checked  Your temperature may also be checked  Blood and urine tests  may be done  Blood tests may be done to check your cholesterol levels  Abnormal cholesterol levels increase your risk for heart disease and stroke  You may also need a blood or urine test to check for diabetes if you are at increased risk  Urine tests may be done to look for signs of an infection or kidney disease  A physical exam  includes checking your heartbeat and lungs with a stethoscope  Your healthcare provider may also check your skin to look for sun damage  Screening tests  may be recommended  A screening test is done to check for diseases that may not cause symptoms  The screening tests you may need depend on your age, gender, family history, and lifestyle habits  For example, colorectal screening may be recommended if you are 48years old or older  Screening tests you need if you are a woman:   A Pap smear  is used to screen for cervical cancer   Pap smears are usually done every 3 to 5 years depending on your age  You may need them more often if you have had abnormal Pap smear test results in the past  Ask your healthcare provider how often you should have a Pap smear  A mammogram  is an x-ray of your breasts to screen for breast cancer  Experts recommend mammograms every 2 years starting at age 48 years  You may need a mammogram at age 52 years or younger if you have an increased risk for breast cancer  Talk to your healthcare provider about when you should start having mammograms and how often you need them  Vaccines you may need:   Get an influenza vaccine  every year  The influenza vaccine protects you from the flu  Several types of viruses cause the flu  The viruses change over time, so new vaccines are made each year  Get a tetanus-diphtheria (Td) booster vaccine  every 10 years  This vaccine protects you against tetanus and diphtheria  Tetanus is a severe infection that may cause painful muscle spasms and lockjaw  Diphtheria is a severe bacterial infection that causes a thick covering in the back of your mouth and throat  Get a human papillomavirus (HPV) vaccine  if you are female and aged 23 to 32 or male 23 to 24 and never received it  This vaccine protects you from HPV infection  HPV is the most common infection spread by sexual contact  HPV may also cause vaginal, penile, and anal cancers  Get a pneumococcal vaccine  if you are aged 72 years or older  The pneumococcal vaccine is an injection given to protect you from pneumococcal disease  Pneumococcal disease is an infection caused by pneumococcal bacteria  The infection may cause pneumonia, meningitis, or an ear infection  Get a shingles vaccine  if you are 60 or older, even if you have had shingles before  The shingles vaccine is an injection to protect you from the varicella-zoster virus  This is the same virus that causes chickenpox   Shingles is a painful rash that develops in people who had chickenpox or have been exposed to the virus  How to eat healthy:  My Plate is a model for planning healthy meals  It shows the types and amounts of foods that should go on your plate  Fruits and vegetables make up about half of your plate, and grains and protein make up the other half  A serving of dairy is included on the side of your plate  The amount of calories and serving sizes you need depends on your age, gender, weight, and height  Examples of healthy foods are listed below:  Eat a variety of vegetables  such as dark green, red, and orange vegetables  You can also include canned vegetables low in sodium (salt) and frozen vegetables without added butter or sauces  Eat a variety of fresh fruits , canned fruit in 100% juice, frozen fruit, and dried fruit  Include whole grains  At least half of the grains you eat should be whole grains  Examples include whole-wheat bread, wheat pasta, brown rice, and whole-grain cereals such as oatmeal     Eat a variety of protein foods such as seafood (fish and shellfish), lean meat, and poultry without skin (turkey and chicken)  Examples of lean meats include pork leg, shoulder, or tenderloin, and beef round, sirloin, tenderloin, and extra lean ground beef  Other protein foods include eggs and egg substitutes, beans, peas, soy products, nuts, and seeds  Choose low-fat dairy products such as skim or 1% milk or low-fat yogurt, cheese, and cottage cheese  Limit unhealthy fats  such as butter, hard margarine, and shortening  Exercise:  Exercise at least 30 minutes per day on most days of the week  Some examples of exercise include walking, biking, dancing, and swimming  You can also fit in more physical activity by taking the stairs instead of the elevator or parking farther away from stores  Include muscle strengthening activities 2 days each week  Regular exercise provides many health benefits   It helps you manage your weight, and decreases your risk for type 2 diabetes, heart disease, stroke, and high blood pressure  Exercise can also help improve your mood  Ask your healthcare provider about the best exercise plan for you  General health and safety guidelines:   Do not smoke  Nicotine and other chemicals in cigarettes and cigars can cause lung damage  Ask your healthcare provider for information if you currently smoke and need help to quit  E-cigarettes or smokeless tobacco still contain nicotine  Talk to your healthcare provider before you use these products  Limit alcohol  A drink of alcohol is 12 ounces of beer, 5 ounces of wine, or 1½ ounces of liquor  Lose weight, if needed  Being overweight increases your risk of certain health conditions  These include heart disease, high blood pressure, type 2 diabetes, and certain types of cancer  Protect your skin  Do not sunbathe or use tanning beds  Use sunscreen with a SPF 15 or higher  Apply sunscreen at least 15 minutes before you go outside  Reapply sunscreen every 2 hours  Wear protective clothing, hats, and sunglasses when you are outside  Drive safely  Always wear your seatbelt  Make sure everyone in your car wears a seatbelt  A seatbelt can save your life if you are in an accident  Do not use your cell phone when you are driving  This could distract you and cause an accident  Pull over if you need to make a call or send a text message  Practice safe sex  Use latex condoms if are sexually active and have more than one partner  Your healthcare provider may recommend screening tests for sexually transmitted infections (STIs)  Wear helmets, lifejackets, and protective gear  Always wear a helmet when you ride a bike or motorcycle, go skiing, or play sports that could cause a head injury  Wear protective equipment when you play sports  Wear a lifejacket when you are on a boat or doing water sports      © Copyright [employer] 2022 Information is for End User's use only and may not be sold, redistributed or otherwise used for commercial purposes  All illustrations and images included in CareNotes® are the copyrighted property of A D A M , Inc  or Natalia Escobar  The above information is an  only  It is not intended as medical advice for individual conditions or treatments  Talk to your doctor, nurse or pharmacist before following any medical regimen to see if it is safe and effective for you

## 2022-12-02 ENCOUNTER — ANNUAL EXAM (OUTPATIENT)
Dept: OBGYN CLINIC | Facility: CLINIC | Age: 32
End: 2022-12-02

## 2022-12-02 VITALS
SYSTOLIC BLOOD PRESSURE: 122 MMHG | BODY MASS INDEX: 33.92 KG/M2 | WEIGHT: 229 LBS | DIASTOLIC BLOOD PRESSURE: 82 MMHG | HEIGHT: 69 IN

## 2022-12-02 DIAGNOSIS — Z30.011 ENCOUNTER FOR BCP (BIRTH CONTROL PILLS) INITIAL PRESCRIPTION: ICD-10-CM

## 2022-12-02 DIAGNOSIS — Z12.4 CERVICAL CANCER SCREENING: ICD-10-CM

## 2022-12-02 DIAGNOSIS — Z01.419 ENCNTR FOR GYN EXAM (GENERAL) (ROUTINE) W/O ABN FINDINGS: Primary | ICD-10-CM

## 2022-12-02 RX ORDER — ACETAMINOPHEN AND CODEINE PHOSPHATE 120; 12 MG/5ML; MG/5ML
1 SOLUTION ORAL DAILY
Qty: 84 TABLET | Refills: 3 | Status: SHIPPED | OUTPATIENT
Start: 2022-12-02

## 2022-12-05 LAB
HPV HR 12 DNA CVX QL NAA+PROBE: NEGATIVE
HPV16 DNA CVX QL NAA+PROBE: NEGATIVE
HPV18 DNA CVX QL NAA+PROBE: NEGATIVE

## 2022-12-09 LAB
LAB AP GYN PRIMARY INTERPRETATION: NORMAL
Lab: NORMAL

## 2023-02-16 DIAGNOSIS — Z96.41 INSULIN PUMP IN PLACE: ICD-10-CM

## 2023-02-16 DIAGNOSIS — O24.011 PRE-EXISTING TYPE 1 DIABETES MELLITUS WITH HYPERGLYCEMIA DURING PREGNANCY IN FIRST TRIMESTER (HCC): ICD-10-CM

## 2023-02-16 DIAGNOSIS — E10.65 PRE-EXISTING TYPE 1 DIABETES MELLITUS WITH HYPERGLYCEMIA DURING PREGNANCY IN FIRST TRIMESTER (HCC): ICD-10-CM

## 2023-02-16 RX ORDER — INSULIN LISPRO 100 [IU]/ML
INJECTION, SOLUTION INTRAVENOUS; SUBCUTANEOUS
Qty: 180 ML | OUTPATIENT
Start: 2023-02-16

## 2023-03-24 ENCOUNTER — TELEPHONE (OUTPATIENT)
Dept: OBGYN CLINIC | Facility: CLINIC | Age: 33
End: 2023-03-24

## 2023-03-24 NOTE — TELEPHONE ENCOUNTER
Pt called, stating that she just found out she is pregnant w/home UPT - LMP 2/11  She has a dentist appt on Monday and needs a letter stating it is ok for her to be treated  We have not seen her for this pregnancy yet  Please advise if ok for a letter  Thank you

## 2023-05-11 ENCOUNTER — OFFICE VISIT (OUTPATIENT)
Dept: OBGYN CLINIC | Facility: CLINIC | Age: 33
End: 2023-05-11

## 2023-05-11 VITALS
WEIGHT: 220.8 LBS | HEIGHT: 69 IN | HEART RATE: 100 BPM | DIASTOLIC BLOOD PRESSURE: 62 MMHG | BODY MASS INDEX: 32.7 KG/M2 | SYSTOLIC BLOOD PRESSURE: 114 MMHG

## 2023-05-11 DIAGNOSIS — N91.2 AMENORRHEA: Primary | ICD-10-CM

## 2023-05-11 DIAGNOSIS — E10.9 TYPE 1 DIABETES MELLITUS WITHOUT COMPLICATION (HCC): ICD-10-CM

## 2023-05-11 DIAGNOSIS — O24.011 PRE-EXISTING TYPE 1 DIABETES MELLITUS DURING PREGNANCY IN FIRST TRIMESTER: ICD-10-CM

## 2023-05-11 DIAGNOSIS — Z3A.12 12 WEEKS GESTATION OF PREGNANCY: ICD-10-CM

## 2023-05-11 PROBLEM — O24.012 PRE-EXISTING TYPE 1 DIABETES MELLITUS WITH HYPERGLYCEMIA DURING PREGNANCY IN SECOND TRIMESTER (HCC): Status: ACTIVE | Noted: 2023-05-11

## 2023-05-11 PROBLEM — E10.65 PRE-EXISTING TYPE 1 DIABETES MELLITUS WITH HYPERGLYCEMIA DURING PREGNANCY IN SECOND TRIMESTER (HCC): Status: ACTIVE | Noted: 2023-05-11

## 2023-05-11 NOTE — PROGRESS NOTES
Subjective   Patient ID: Dayana Duke is a 29 y o  female  Patient is here for a problem visit  Chief Complaint   Patient presents with   • Amenorrhea     Sure LMP 23, 12+5 WGA, 23   Stopped breastfeeding 4 months ago   Regular cycles since then  Planned and desired pregnancy     Recently started amoxicillin for sinus infection - reports intermittent constipation and GI upset since taking  Denies these sx prior to starting amoxicillin     No cramping or VB     Menstrual History:  OB History        6    Para   3    Term   3            AB   2    Living   3       SAB   1    IAB   1    Ectopic        Multiple   0    Live Births   3                  Patient's last menstrual period was 2023  Past Medical History:   Diagnosis Date   • Diabetes ([facility] [address] )     type 1   • Diabetes mellitus ([facility] [address] )        Past Surgical History:   Procedure Laterality Date   • SD NDSC WRST SURG W/RLS TRANSVRS CARPL LIGM Right 2/3/2021    Procedure: RELEASE CARPAL TUNNEL ENDOSCOPIC;  Surgeon: Avani Keating MD;  [facility];  Service: Orthopedics   • SD [address] [facility] WRST SURG W/RLS TRANSVRS CARPL LIGM Left 2021    Procedure: RELEASE CARPAL TUNNEL ENDOSCOPIC;  Surgeon: Avani Keating MD;  [facility];  Service: Orthopedics   • WISDOM TOOTH EXTRACTION         Social History     Tobacco Use   • Smoking status: Never   • Smokeless tobacco: Never   Vaping Use   • Vaping Use: Never used   Substance Use Topics   • Alcohol use: Not Currently   • Drug use: Never        No Known Allergies      Current Outpatient Medications:   •  Continuous Blood Gluc  (Dexcom G6 ) DENIZ, Use as directed , Disp: 1 Device, Rfl: 0  •  Continuous Blood Gluc Sensor (Dexcom G6 Sensor) MISC, 1 box=1 month supply or 3 sensors, use 1 sensor every 10 days  , Disp: 1 each, Rfl: 12  •  Continuous Blood Gluc Transmit (Dexcom G6 Transmitter) MISC, Transmitter change every 90 days  , Disp: 1 each, "Rfl: 3  •  glucose blood test strip, Use 1 each daily as needed Use as instructed, Disp: , Rfl:   •  HumaLOG 100 UNIT/ML injection, Inject 100 Units under the skin if needed Titrate as needed, Disp: , Rfl:   •  insulin glargine (LANTUS) 100 units/mL subcutaneous injection, Inject 10 Units under the skin, Disp: , Rfl:   •  insulin lispro (HumaLOG) 100 units/mL injection, Use up to 200 units via insulin pump daily  T2DM and pregnancy  , Disp: 180 mL, Rfl: 0  •  Lancets MISC, by Does not apply route, Disp: , Rfl:   •  Prenatal Vit-Fe Fumarate-FA (PRENATAL VITAMIN PO), Take 1 tablet by mouth daily, Disp: , Rfl:   •  Blood Pressure Monitoring (B-D ASSURE BPM/AUTO ARM CUFF) MISC, Use 2 (two) times a day Call if BP > 140/90 (Patient not taking: Reported on 12/2/2022), Disp: 1 each, Rfl: 0  •  insulin lispro (insulin lispro) 100 units/mL injection, INJECT UP  UNITS VIA INSULIN PUMP DAILY e10 65 (Patient not taking: Reported on 12/2/2022), Disp: , Rfl:       Review of Systems   Constitutional: Negative for appetite change, chills and fever  Eyes: Negative for visual disturbance  Respiratory: Negative for cough, chest tightness and shortness of breath  Cardiovascular: Negative for chest pain  Gastrointestinal: Positive for abdominal pain and constipation  Negative for abdominal distention, diarrhea, nausea and vomiting  Endocrine: Negative for cold intolerance and heat intolerance  Genitourinary: Negative for difficulty urinating, dyspareunia, dysuria, frequency, genital sores, pelvic pain, urgency, vaginal bleeding, vaginal discharge and vaginal pain  Musculoskeletal: Negative for arthralgias  Neurological: Negative for light-headedness and headaches  Hematological: Does not bruise/bleed easily  Psychiatric/Behavioral: Negative for behavioral problems  All other systems reviewed and are negative          /62   Pulse 100   Ht 5' 9\" (1 753 m)   Wt 100 kg (220 lb 12 8 oz)   LMP 02/11/2023   " BMI 32 61 kg/m²       Physical Exam  Constitutional:       General: She is not in acute distress  Appearance: Normal appearance  HENT:      Head: Normocephalic and atraumatic  Cardiovascular:      Rate and Rhythm: Normal rate  Pulmonary:      Effort: Pulmonary effort is normal  No respiratory distress  Abdominal:      General: There is no distension  Palpations: Abdomen is soft  Tenderness: There is no abdominal tenderness  There is no guarding or rebound  Neurological:      General: No focal deficit present  Mental Status: She is alert  Psychiatric:         Mood and Affect: Mood normal          Behavior: Behavior normal    Vitals and nursing note reviewed  AMB US OB < 14 weeks single or first gestation level 1  Narrative: FIRST TRIMESTER OBSTETRIC ULTRASOUND  05/11/23    Fanny Domínguez MD       INDICATION: Amenorrhea, viability    COMPARISON: None  TECHNIQUE:   Transabdominal imaging was performed to assess the gestation,   myometrial/endometrial architecture and ovarian parenchymal detail  The study includes volumetric sweeps and traditional still imaging   technique  FINDINGS:     A single intrauterine gestation is identified  Cardiac activity is detected at 143 bpm       PLACENTA:  Present and normal in size and appearance  MEAN CROWN RUMP LENGTH:  66 9 mm = 13 weeks 0 days   AMNIOTIC FLUID/SAC SHAPE:  Within expected normal range  UTERUS/ADNEXA:   No adnexal mass or pathologic cyst   No free fluid identified  Impression:    Single intrauterine pregnancy of 13 weeks 0 days gestational age  TRI by [address] [facility] 11/16/23  TRI by LMP 11/18/23  Fetal cardiac activity detected  No adnexal masses seen  Final TRI: 11/18/23 by LMP      RAMONA Peguero [facility] [address]  [address] [address]  Dept: [phone]  Dept Fax: Araceli : [phone]  Loc: [address] Fax: [phone]        Appropriate laboratory testing, imaging studies, and prior external records were reviewed:     Assessment/Plan:       Problem List Items Addressed This Visit        Endocrine    DM (diabetes mellitus), type 1 ([address] )    Relevant Orders    Ambulatory Referral to Maternal Fetal Medicine    Pre-existing type 1 diabetes mellitus during pregnancy in first trimester       Other    Amenorrhea - Primary     SLIUP c/w stated LMP, TRI updated  Taking PNV  Advised to start aspirin   MFM referral placed - she likely declines aneuploidy screening  Will return for OB intake within next week  Precautions reviewed         Relevant Orders    AMB US OB < 14 weeks single or first gestation level 1 (Completed)   Other Visit Diagnoses     12 weeks gestation of pregnancy        Relevant Orders    Ambulatory Referral to Maternal Fetal Medicine    AMB US OB < 14 weeks single or first gestation level 1 (Completed)

## 2023-05-12 ENCOUNTER — TELEPHONE (OUTPATIENT)
Dept: PERINATAL CARE | Facility: OTHER | Age: 33
End: 2023-05-12

## 2023-05-12 NOTE — TELEPHONE ENCOUNTER
Called patient to schedule MFM appointment, based on referral issued to Maternal Fetal Medicine by [facility] office      Left voicemail requesting patient to call back and schedule appointment, with office number for return call [phone] or [phone]

## 2023-05-16 ENCOUNTER — TELEPHONE (OUTPATIENT)
Facility: HOSPITAL | Age: 33
End: 2023-05-16

## 2023-05-16 NOTE — TELEPHONE ENCOUNTER
Called patient to schedule MFM appointment, based on referral issued to Maternal Fetal Medicine by [facility] office  Unable to reach patient by phone  Sent message in [facility] requesting patient to call back and schedule appointment, with office number for return call [phone]

## 2023-05-16 NOTE — PLAN OF CARE
Problem: PAIN - ADULT  Goal: Verbalizes/displays adequate comfort level or baseline comfort level  Description: Interventions:  - Encourage patient to monitor pain and request assistance  - Assess pain using appropriate pain scale  - Administer analgesics based on type and severity of pain and evaluate response  - Implement non-pharmacological measures as appropriate and evaluate response  - Consider cultural and social influences on pain and pain management  - Notify physician/advanced practitioner if interventions unsuccessful or patient reports new pain  Outcome: Progressing     Problem: INFECTION - ADULT  Goal: Absence or prevention of progression during hospitalization  Description: INTERVENTIONS:  - Assess and monitor for signs and symptoms of infection  - Monitor lab/diagnostic results  - Monitor all insertion sites, i e  indwelling lines, tubes, and drains  - Monitor endotracheal if appropriate and nasal secretions for changes in amount and color  - [place] appropriate cooling/warming therapies per order  - Administer medications as ordered  - Instruct and encourage patient and family to use good hand hygiene technique  - Identify and instruct in appropriate isolation precautions for identified infection/condition  Outcome: Progressing  Goal: Absence of fever/infection during neutropenic period  Description: INTERVENTIONS:  - Monitor WBC    Outcome: Progressing     Problem: SAFETY ADULT  Goal: Patient will remain free of falls  Description: INTERVENTIONS:  - Assess patient frequently for physical needs  -  Identify cognitive and physical deficits and behaviors that affect risk of falls    -  [place] fall precautions as indicated by assessment   - Educate patient/family on patient safety including physical limitations  - Instruct patient to call for assistance with activity based on assessment  - Modify environment to reduce risk of injury  - Consider OT/PT consult to assist with What Type Of Note Output Would You Prefer (Optional)?: Standard Output Hpi Title: Evaluation of Skin Lesions strengthening/mobility  Outcome: Progressing  Goal: Maintain or return to baseline ADL function  Description: INTERVENTIONS:  -  Assess patient's ability to carry out ADLs; assess patient's baseline for ADL function and identify physical deficits which impact ability to perform ADLs (bathing, care of mouth/teeth, toileting, grooming, dressing, etc )  - Assess/evaluate cause of self-care deficits   - Assess range of motion  - Assess patient's mobility; develop plan if impaired  - Assess patient's need for assistive devices and provide as appropriate  - Encourage maximum independence but intervene and supervise when necessary  - Involve family in performance of ADLs  - Assess for home care needs following discharge   - Consider OT consult to assist with ADL evaluation and planning for discharge  - Provide patient education as appropriate  Outcome: Progressing  Goal: Maintain or return mobility status to optimal level  Description: INTERVENTIONS:  - Assess patient's baseline mobility status (ambulation, transfers, stairs, etc )    - Identify cognitive and physical deficits and behaviors that affect mobility  - Identify mobility aids required to assist with transfers and/or ambulation (gait belt, sit-to-stand, lift, walker, cane, etc )  - [place] fall precautions as indicated by assessment  - Record patient progress and toleration of activity level on Mobility SBAR; progress patient to next Phase/Stage  - Instruct patient to call for assistance with activity based on assessment  - Consider rehabilitation consult to assist with strengthening/weightbearing, etc   Outcome: Progressing     Problem: DISCHARGE PLANNING  Goal: Discharge to home or other facility with appropriate resources  Description: INTERVENTIONS:  - Identify barriers to discharge w/patient and caregiver  - Arrange for needed discharge resources and transportation as appropriate  - Identify discharge learning needs (meds, wound care, etc )  - Arrange for How Severe Are Your Spot(S)?: mild interpretive services to assist at discharge as needed  - Refer to Case Management Department for coordinating discharge planning if the patient needs post-hospital services based on physician/advanced practitioner order or complex needs related to functional status, cognitive ability, or social support system  Outcome: Progressing     Problem: Knowledge Deficit  Goal: Patient/family/caregiver demonstrates understanding of disease process, treatment plan, medications, and discharge instructions  Description: Complete learning assessment and assess knowledge base    Interventions:  - Provide teaching at level of understanding  - Provide teaching via preferred learning methods  Outcome: Progressing Have Your Spot(S) Been Treated In The Past?: has not been treated

## 2023-05-18 ENCOUNTER — TELEPHONE (OUTPATIENT)
Facility: HOSPITAL | Age: 33
End: 2023-05-18

## 2023-05-18 NOTE — TELEPHONE ENCOUNTER
Called patient to schedule MFM appointment, based on referral issued to Maternal Fetal Medicine by [facility] office  Our office has tried to contact this patient on 5/12,5/15 and 5/18  We've also sent patient a my chart message  Patient has not returned any of our phone calls to schedule an appointment

## 2023-05-25 ENCOUNTER — TELEMEDICINE (OUTPATIENT)
Facility: HOSPITAL | Age: 33
End: 2023-05-25

## 2023-05-25 VITALS — WEIGHT: 220 LBS | BODY MASS INDEX: 32.58 KG/M2 | HEIGHT: 69 IN

## 2023-05-25 DIAGNOSIS — Z3A.14 14 WEEKS GESTATION OF PREGNANCY: ICD-10-CM

## 2023-05-25 DIAGNOSIS — O24.012 PRE-EXISTING TYPE 1 DIABETES MELLITUS WITH HYPERGLYCEMIA DURING PREGNANCY IN SECOND TRIMESTER (HCC): Primary | ICD-10-CM

## 2023-05-25 DIAGNOSIS — E10.65 PRE-EXISTING TYPE 1 DIABETES MELLITUS WITH HYPERGLYCEMIA DURING PREGNANCY IN SECOND TRIMESTER (HCC): Primary | ICD-10-CM

## 2023-05-25 NOTE — PROGRESS NOTES
Virtual Regular Visit    Verification of patient location:[place]    Patient is located at Home in the following state in which I hold an active license [place]      Assessment/Plan:    Problem List Items Addressed This Visit        Endocrine    Pre-existing type 1 diabetes mellitus with hyperglycemia during pregnancy in second trimester ([facility] [address] ) - Primary     -3/17/2023 A1c 5 6%  -Repeat A1c, check CMP and UPCR  -Check A1c and CMP  -A1c goal is less than 6% with minimal hypoglycemia  -Humalog via Medtronic 770G insulin pump and Dexcom G6 CGM  -Due to overnight glucose readings less than 60; basal settings adjusted as follows:  -MN from 1 35 to 1 20 units/hour;  -3 AM from 1 85 to 1 75 units/hour;  -5 AM@ 1 75 units/hour;  -11:30 AM@ 2 05 units/hour;  -4 PM from 1 80 to 1 75 units/hour;  -Add 10 PM@ 1 65 units/hour  Decrease BG target to 90  Download insulin pump on 5/30/2023  Decrease basal rates by another 10% if needed with hypoglycemia  Try to use 15 by 15 rule when treating hypoglycemia    -Start self monitoring blood glucose fasting and 2 hours after start of each meal  Glucose goals: fasting 60-90 mg/dL, 140 mg/dL or less 1 hour post meals, and 120 mg/dL or less 2 hours post meal    -Please input glucose readings into your insulin pump along with carbohydrates  -Start GDM 2200 calorie meal plan with 3 meals and 3 snacks including recommended combination of carb, protein and fat per meal/snack   -Please eat meal or snack every 2-3 5 hours while awake   -No more than 8 to 10 hours of fasting overnight   -Stay active if no restriction from your OB, walk up to 30 minutes a day  -Always have glucose available to treat hypoglycemia  Use 15:15 rule  Refer to hypoglycemia patient education sheet   SMBG when experiencing signs and symptoms of hypoglycemia and prior to driving    -Continue prenatal vitamin and baby aspirin as recommended   -At 36 weeks gestation, stop baby aspirin    -Continue follow-up with your OB and MFM as recommended   -Fetal growth ultrasound every 4 to 6 weeks as recommended   -Fetal echo at 22-24 weeks gestation   -Starting at 32 weeks gestation, NST twice a week and VINOD weekly  -Stay in close contact with diabetes education team   -Insulin requirements during pregnancy; basal/bolus concept and Metformin discussed  -Very important to maintain tight glucose control during pregnancy to decrease risk factors including fetal macrosomia; birth injury; risk of ; polyhydramnios; pre-term labor; pre-eclampsia;  hypoglycemia; jaundice and stillbirth    -Schedule eye exam   -Schedule endocrinology follow up for end   Lab Results   Component Value Date    HGBA1C 5 9 (H) 07/15/2022            Relevant Medications    glucagon 1 MG injection    Other Relevant Orders    Comprehensive metabolic panel    Hemoglobin A1C    Protein / creatinine ratio, urine       Other    14 weeks gestation of pregnancy    Relevant Orders    Comprehensive metabolic panel    Hemoglobin A1C    Protein / creatinine ratio, urine    BMI 32 0-32 9,adult     -First visit weight 220 lbs  -Recommended weight gain 11 to 20 lbs             Relevant Orders    Comprehensive metabolic panel    Hemoglobin A1C    Protein / creatinine ratio, urine            Reason for visit is   Chief Complaint   Patient presents with   • Virtual Regular Visit   • Diabetes Type 1        Encounter provider Claudette Kuster, [facility]    Provider located at [facility] [id number]      Recent Visits  Date Type Provider Dept   23 Telephone Danyelle Tejeda An    Showing recent visits within past 7 days and meeting all other requirements  Today's Visits  Date Type Provider Dept   [facility] [id number] [facility]    Showing today's visits and meeting all other requirements  Future Appointments  No visits were found meeting these conditions  Showing future appointments within next 150 days and meeting all other requirements       The patient was identified by name and date of birth  Savi Hernandez was informed that this is a telemedicine visit and that the visit is being conducted through the [address] [employer] platform  She agrees to proceed     My office door was closed  No one else was in the room  She acknowledged consent and understanding of privacy and security of the video platform  The patient has agreed to participate and understands they can discontinue the visit at any time  Patient is aware this is a billable service  Subjective  Felicia Dia is a 29 y o  female  15 5/7 weeks gestation T1DM on Humalog via Medtronic 770G insulin pump and Dexcom G6 CGM  Due to hyperglycemia, did increase basal rates but now having glucose readings less than 60 and over treating  Has picked up 15 gram fruit snacks to avoid over treating a low  Eating 2 meals and 2 snacks a day  Not interested in seeing the dietitian  Has 1 1/3 yo son, 3 yo son and 9 month daughter   In the future, will start working 2 days a week, 6 PM to 2 AM          Past Medical History:   Diagnosis Date   • Diabetes ([facility] [address] )     type 1   • Diabetes mellitus ([facility] [address] )        Past Surgical History:   Procedure Laterality Date   • ID NDSC WRST SURG W/RLS TRANSVRS CARPL LIGM Right 2/3/2021    Procedure: RELEASE CARPAL TUNNEL ENDOSCOPIC;  Surgeon: Moise Smith MD;  Location: [facility];  Service: Orthopedics   • ID [id number] Fl[address] WRST SURG W/RLS TRANSVRS CARPL LIGM Left 2021    Procedure: RELEASE CARPAL TUNNEL ENDOSCOPIC;  Surgeon: Moise Smith MD;  Location: [facility];  Service: Orthopedics   • WISDOM TOOTH EXTRACTION         Current Outpatient Medications   Medication Sig Dispense Refill   • glucagon 1 MG injection Inject 1 mg under the skin     • Blood Pressure Monitoring (B-D ASSURE BPM/AUTO ARM CUFF) MISC Use 2 (two) times a day Call if BP > 140/90 (Patient not taking: Reported on 12/2/2022) 1 each 0   • Continuous Blood Gluc  (Dexcom G6 ) DENIZ Use as directed  1 Device 0   • Continuous Blood Gluc Sensor (Dexcom G6 Sensor) MISC 1 box=1 month supply or 3 sensors, use 1 sensor every 10 days  1 each 12   • Continuous Blood Gluc Transmit (Dexcom G6 Transmitter) MISC Transmitter change every 90 days  1 each 3   • glucose blood test strip Use 1 each daily as needed Use as instructed     • HumaLOG 100 UNIT/ML injection Inject 100 Units under the skin if needed Titrate as needed     • insulin glargine (LANTUS) 100 units/mL subcutaneous injection Inject 10 Units under the skin     • insulin lispro (HumaLOG) 100 units/mL injection Use up to 200 units via insulin pump daily  T2DM and pregnancy  180 mL 0   • insulin lispro (insulin lispro) 100 units/mL injection INJECT UP  UNITS VIA INSULIN PUMP DAILY e10 65 (Patient not taking: Reported on 12/2/2022)     • Lancets MISC by Does not apply route     • Prenatal Vit-Fe Fumarate-FA (PRENATAL VITAMIN PO) Take 1 tablet by mouth daily       No current facility-administered medications for this visit  No Known Allergies    Review of Systems   Constitutional: Positive for fatigue  Negative for fever  HENT: Negative for congestion  Eyes: Negative for visual disturbance  Needs eye exam     Respiratory: Negative for cough and shortness of breath  Cardiovascular: Negative for chest pain and palpitations  Gastrointestinal: Negative for constipation, nausea and vomiting  Endocrine: Negative for polydipsia, polyphagia and polyuria  Genitourinary: Negative for difficulty urinating and vaginal bleeding  Neurological: Negative for headaches  Psychiatric/Behavioral: Negative for sleep disturbance  Video Exam  Dexcom and insulin pump reviewed during visit  No glucose readings or carbs noted on download and encouraged to start adding   Hypoglycemia noted on Dexcom portal  "See attached file for insulin pump settings  Vitals:    05/25/23 1436   Weight: 99 8 kg (220 lb)   Height: 5' 9\" (1 753 m)       Physical Exam  HENT:      Head: Normocephalic  Nose: Nose normal    Eyes:      Conjunctiva/sclera: Conjunctivae normal    Pulmonary:      Effort: Pulmonary effort is normal    Musculoskeletal:      Cervical back: Normal range of motion  Neurological:      Mental Status: She is alert and oriented to person, place, and time  Psychiatric:         Mood and Affect: Mood normal          Behavior: Behavior normal          Thought Content: Thought content normal          Judgment: Judgment normal           Visit Time  Total Visit Duration: 59 minutes         "

## 2023-05-25 NOTE — ASSESSMENT & PLAN NOTE
-3/17/2023 A1c 5 6%  -Repeat A1c, check CMP and UPCR  -Check A1c and CMP  -A1c goal is less than 6% with minimal hypoglycemia  -Humalog via Medtronic 770G insulin pump and Dexcom G6 CGM  -Due to overnight glucose readings less than 60; basal settings adjusted as follows:  -MN from 1 35 to 1 20 units/hour;  -3 AM from 1 85 to 1 75 units/hour;  -5 AM@ 1 75 units/hour;  -11:30 AM@ 2 05 units/hour;  -4 PM from 1 80 to 1 75 units/hour;  -Add 10 PM@ 1 65 units/hour  Decrease BG target to 90  Download insulin pump on 5/30/2023  Decrease basal rates by another 10% if needed with hypoglycemia  Try to use 15 by 15 rule when treating hypoglycemia    -Start self monitoring blood glucose fasting and 2 hours after start of each meal  Glucose goals: fasting 60-90 mg/dL, 140 mg/dL or less 1 hour post meals, and 120 mg/dL or less 2 hours post meal    -Please input glucose readings into your insulin pump along with carbohydrates  -Start GDM 2200 calorie meal plan with 3 meals and 3 snacks including recommended combination of carb, protein and fat per meal/snack   -Please eat meal or snack every 2-3 5 hours while awake   -No more than 8 to 10 hours of fasting overnight   -Stay active if no restriction from your OB, walk up to 30 minutes a day  -Always have glucose available to treat hypoglycemia  Use 15:15 rule  Refer to hypoglycemia patient education sheet  SMBG when experiencing signs and symptoms of hypoglycemia and prior to driving    -Continue prenatal vitamin and baby aspirin as recommended   -At 36 weeks gestation, stop baby aspirin    -Continue follow-up with your OB and MFM as recommended   -Fetal growth ultrasound every 4 to 6 weeks as recommended   -Fetal echo at 22-24 weeks gestation   -Starting at 32 weeks gestation, NST twice a week and VINOD weekly  -Stay in close contact with diabetes education team   -Insulin requirements during pregnancy; basal/bolus concept and Metformin discussed    -Very important to maintain tight glucose control during pregnancy to decrease risk factors including fetal macrosomia; birth injury; risk of ; polyhydramnios; pre-term labor; pre-eclampsia;  hypoglycemia; jaundice and stillbirth    -Schedule eye exam   -Schedule endocrinology follow up for end of 2023                 Lab Results   Component Value Date    HGBA1C 5 9 (H) 07/15/2022

## 2023-05-25 NOTE — PATIENT INSTRUCTIONS
3/17/2023 A1c 5 6%  -Repeat A1c, check CMP and UPCR  -Check A1c and CMP  -A1c goal is less than 6% with minimal hypoglycemia  -Humalog via Medtronic 770G insulin pump and Dexcom G6 CGM  -Due to overnight glucose readings less than 60; basal settings adjusted as follows:  -MN from 1 35 to 1 20 units/hour;  -3 AM from 1 85 to 1 75 units/hour;  -5 AM@ 1 75 units/hour;  -11:30 AM@ 2 05 units/hour;  -4 PM from 1 80 to 1 75 units/hour;  -Add 10 PM@ 1 65 units/hour  Decrease BG target to 90  Download insulin pump on 5/30/2023  Decrease basal rates by another 10% if needed with hypoglycemia  Try to use 15 by 15 rule when treating hypoglycemia    -Start self monitoring blood glucose fasting and 2 hours after start of each meal  Glucose goals: fasting 60-90 mg/dL, 140 mg/dL or less 1 hour post meals, and 120 mg/dL or less 2 hours post meal    -Please input glucose readings into your insulin pump along with carbohydrates  -Start GDM 2200 calorie meal plan with 3 meals and 3 snacks including recommended combination of carb, protein and fat per meal/snack   -Please eat meal or snack every 2-3 5 hours while awake   -No more than 8 to 10 hours of fasting overnight   -Stay active if no restriction from your OB, walk up to 30 minutes a day  -Always have glucose available to treat hypoglycemia  Use 15:15 rule  Refer to hypoglycemia patient education sheet  SMBG when experiencing signs and symptoms of hypoglycemia and prior to driving    -Continue prenatal vitamin and baby aspirin as recommended   -At 36 weeks gestation, stop baby aspirin    -Continue follow-up with your OB and MFM as recommended   -Fetal growth ultrasound every 4 to 6 weeks as recommended   -Fetal echo at 22-24 weeks gestation   -Starting at 32 weeks gestation, NST twice a week and VINOD weekly  -Stay in close contact with diabetes education team   -Insulin requirements during pregnancy; basal/bolus concept and Metformin discussed    -Very important to maintain tight glucose control during pregnancy to decrease risk factors including fetal macrosomia; birth injury; risk of ; polyhydramnios; pre-term labor; pre-eclampsia;  hypoglycemia; jaundice and stillbirth    -Schedule eye exam   -Schedule endocrinology follow up for end

## 2023-06-02 ENCOUNTER — TREATMENT (OUTPATIENT)
Facility: HOSPITAL | Age: 33
End: 2023-06-02

## 2023-06-02 DIAGNOSIS — O24.012 TYPE 1 DIABETES MELLITUS DURING PREGNANCY IN SECOND TRIMESTER: ICD-10-CM

## 2023-06-02 DIAGNOSIS — Z3A.15 15 WEEKS GESTATION OF PREGNANCY: Primary | ICD-10-CM

## 2023-06-02 NOTE — PROGRESS NOTES
Colonel Lui is a 29 y o  female on a/an Medtronic 770 G insulin pump and Dexcom G6 CGM   Estimated Date of Delivery: 11/18/23   Currently: 15w6d  Encounter Diagnosis     ICD-10-CM    1  15 weeks gestation of pregnancy  Z3A 15       2  Type 1 diabetes mellitus during pregnancy in second trimester  O24 012        Spoke with patient via phone today to clarify low blood sugar readings  Per patient: Low blood sugar this morning (6/2) around 2am was result of over bolus  True lower blood sugars the remainder of morning  Patient ate breakfast this morning and yesterday at 9am  Low blood sugar on 5/31 at 11pm was result of over bolus per patient  Discussed this information with Dr Paulina Arita today  Plan:     Current Insulin pump settings:    Basal rate:    MN @ 1 20 units/hr  3am @ 1 75 units/hr  5am from 1 75 to 1 60 units/hr  11:30am @ 2 05 units/hr   4pm @ 1 75 units/hr   10pm @ 1 65 units/hr     Insulin to carb ratio:   MN @ 9 g/u     8pm @ 9g/u     Insulin sensitivity factor: MN @ 40    BG target: 90    Type of insulin: Humalog   Self-monitoring blood glucose reported by patient: No   CGM: yes Dexcom       Diet: 2200 calorie GDM diet with 3 meals and 3 snacks including recommended combination of carb, protein and fat per meal/snack  It has been previously Discussed with patient in case of malfunctioning of the pump to use basal and bolus therapy as back up  Notified patient to call office with any issues

## 2023-06-07 ENCOUNTER — TREATMENT (OUTPATIENT)
Facility: HOSPITAL | Age: 33
End: 2023-06-07

## 2023-06-07 DIAGNOSIS — Z3A.16 16 WEEKS GESTATION OF PREGNANCY: ICD-10-CM

## 2023-06-07 DIAGNOSIS — O24.012 TYPE 1 DIABETES MELLITUS DURING PREGNANCY IN SECOND TRIMESTER: Primary | ICD-10-CM

## 2023-06-07 NOTE — PROGRESS NOTES
Leslie Sahu is a 29 y o  female on a/an Medtronic 770 G insulin pump and Dexcom G6 CGM   Estimated Date of Delivery: 11/18/23   Currently: 16w4d  Encounter Diagnosis     ICD-10-CM    1  Type 1 diabetes mellitus during pregnancy in second trimester  O24 012       2  16 weeks gestation of pregnancy  Z3A 16             Current Insulin pump settings:    Basal rate:    MN @ 1 20 units/hr  3am @ 1 75 units/hr  5am @ 1 60 units/hr  11:30am @ 2 00 units/hr   4pm @ 1 75 units/hr   10pm @ 1 65 units/hr     Insulin to carb ratio:   MN from 9 to 7 g/u     8pm from 9 to 7 g/u     Insulin sensitivity factor (Correction) : MN from 40 to 34     BG target: 90    Type of insulin: Humalog   Self-monitoring blood glucose reported by patient: No   CGM: yes Dexcom       Diet: 2200 calorie GDM diet with 3 meals and 3 snacks including recommended combination of carb, protein and fat per meal/snack  It has been previously Discussed with patient in case of malfunctioning of the pump to use basal and bolus therapy as back up  Notified patient to call office with any issues

## 2023-06-21 ENCOUNTER — TELEPHONE (OUTPATIENT)
Facility: HOSPITAL | Age: 33
End: 2023-06-21

## 2023-06-21 NOTE — TELEPHONE ENCOUNTER
Attempted to call patient to remind her to download insulin pump information  Was not able to get in touch with patient and could not leave voicemail at this time due to no communication consent on file at this time

## 2023-06-28 ENCOUNTER — TELEPHONE (OUTPATIENT)
Facility: HOSPITAL | Age: 33
End: 2023-06-28

## 2023-06-28 NOTE — TELEPHONE ENCOUNTER
Attempted to call patient to ask her to download insulin pump information  Last download is from 6/9  Unable to reach her at this time  Unable to leave voicemail - no communication consent in patient's chart

## 2023-06-29 ENCOUNTER — INITIAL PRENATAL (OUTPATIENT)
Dept: OBGYN CLINIC | Facility: CLINIC | Age: 33
End: 2023-06-29

## 2023-06-29 ENCOUNTER — APPOINTMENT (OUTPATIENT)
Dept: LAB | Facility: CLINIC | Age: 33
End: 2023-06-29
Payer: COMMERCIAL

## 2023-06-29 VITALS
BODY MASS INDEX: 33.82 KG/M2 | HEART RATE: 71 BPM | SYSTOLIC BLOOD PRESSURE: 116 MMHG | WEIGHT: 229 LBS | DIASTOLIC BLOOD PRESSURE: 68 MMHG

## 2023-06-29 DIAGNOSIS — O24.012 PRE-EXISTING TYPE 1 DIABETES MELLITUS WITH HYPERGLYCEMIA DURING PREGNANCY IN SECOND TRIMESTER (HCC): ICD-10-CM

## 2023-06-29 DIAGNOSIS — O09.899 SUPERVISION OF OTHER HIGH RISK PREGNANCY, ANTEPARTUM: Primary | ICD-10-CM

## 2023-06-29 DIAGNOSIS — O24.012 PRE-EXISTING TYPE 1 DIABETES MELLITUS WITH HYPERGLYCEMIA DURING PREGNANCY IN SECOND TRIMESTER (HCC): Primary | ICD-10-CM

## 2023-06-29 DIAGNOSIS — Z3A.19 19 WEEKS GESTATION OF PREGNANCY: ICD-10-CM

## 2023-06-29 DIAGNOSIS — E10.65 PRE-EXISTING TYPE 1 DIABETES MELLITUS WITH HYPERGLYCEMIA DURING PREGNANCY IN SECOND TRIMESTER (HCC): Primary | ICD-10-CM

## 2023-06-29 DIAGNOSIS — E10.9 TYPE 1 DIABETES MELLITUS WITHOUT COMPLICATION (HCC): ICD-10-CM

## 2023-06-29 DIAGNOSIS — E10.65 PRE-EXISTING TYPE 1 DIABETES MELLITUS WITH HYPERGLYCEMIA DURING PREGNANCY IN SECOND TRIMESTER (HCC): ICD-10-CM

## 2023-06-29 DIAGNOSIS — Z3A.14 14 WEEKS GESTATION OF PREGNANCY: ICD-10-CM

## 2023-06-29 DIAGNOSIS — O09.899 SUPERVISION OF OTHER HIGH RISK PREGNANCY, ANTEPARTUM: ICD-10-CM

## 2023-06-29 PROBLEM — Z13.31 POSITIVE DEPRESSION SCREENING: Status: RESOLVED | Noted: 2022-08-29 | Resolved: 2023-06-29

## 2023-06-29 PROBLEM — Z87.59 HISTORY OF GESTATIONAL HYPERTENSION: Status: ACTIVE | Noted: 2023-06-29

## 2023-06-29 PROBLEM — O16.9 HYPERTENSION IN DELIVERED PREGNANCY: Status: RESOLVED | Noted: 2022-07-15 | Resolved: 2023-06-29

## 2023-06-29 PROBLEM — N91.2 AMENORRHEA: Status: RESOLVED | Noted: 2023-05-11 | Resolved: 2023-06-29

## 2023-06-29 LAB
ALBUMIN SERPL BCP-MCNC: 3.1 G/DL (ref 3.5–5)
ALP SERPL-CCNC: 51 U/L (ref 46–116)
ALT SERPL W P-5'-P-CCNC: 12 U/L (ref 12–78)
ANION GAP SERPL CALCULATED.3IONS-SCNC: 5 MMOL/L
AST SERPL W P-5'-P-CCNC: 18 U/L (ref 5–45)
BASOPHILS # BLD AUTO: 0.04 THOUSANDS/ÂΜL (ref 0–0.1)
BASOPHILS NFR BLD AUTO: 1 % (ref 0–1)
BILIRUB SERPL-MCNC: 0.34 MG/DL (ref 0.2–1)
BILIRUB UR QL STRIP: NEGATIVE
BUN SERPL-MCNC: 9 MG/DL (ref 5–25)
CALCIUM ALBUM COR SERPL-MCNC: 9.6 MG/DL (ref 8.3–10.1)
CALCIUM SERPL-MCNC: 8.9 MG/DL (ref 8.3–10.1)
CHLORIDE SERPL-SCNC: 111 MMOL/L (ref 96–108)
CLARITY UR: CLEAR
CO2 SERPL-SCNC: 23 MMOL/L (ref 21–32)
COLOR UR: NORMAL
CREAT SERPL-MCNC: 0.63 MG/DL (ref 0.6–1.3)
CREAT UR-MCNC: 66.5 MG/DL
EOSINOPHIL # BLD AUTO: 0.25 THOUSAND/ÂΜL (ref 0–0.61)
EOSINOPHIL NFR BLD AUTO: 3 % (ref 0–6)
ERYTHROCYTE [DISTWIDTH] IN BLOOD BY AUTOMATED COUNT: 13.4 % (ref 11.6–15.1)
EST. AVERAGE GLUCOSE BLD GHB EST-MCNC: 97 MG/DL
GFR SERPL CREATININE-BSD FRML MDRD: 120 ML/MIN/1.73SQ M
GLUCOSE P FAST SERPL-MCNC: 111 MG/DL (ref 65–99)
GLUCOSE UR STRIP-MCNC: NEGATIVE MG/DL
HBA1C MFR BLD: 5 %
HBV SURFACE AG SER QL: NORMAL
HCT VFR BLD AUTO: 36.8 % (ref 34.8–46.1)
HGB BLD-MCNC: 12 G/DL (ref 11.5–15.4)
HGB UR QL STRIP.AUTO: NEGATIVE
HIV 1+2 AB+HIV1 P24 AG SERPL QL IA: NORMAL
HIV 2 AB SERPL QL IA: NORMAL
HIV1 AB SERPL QL IA: NORMAL
HIV1 P24 AG SERPL QL IA: NORMAL
IMM GRANULOCYTES # BLD AUTO: 0.03 THOUSAND/UL (ref 0–0.2)
IMM GRANULOCYTES NFR BLD AUTO: 0 % (ref 0–2)
KETONES UR STRIP-MCNC: NEGATIVE MG/DL
LEUKOCYTE ESTERASE UR QL STRIP: NEGATIVE
LYMPHOCYTES # BLD AUTO: 1.29 THOUSANDS/ÂΜL (ref 0.6–4.47)
LYMPHOCYTES NFR BLD AUTO: 15 % (ref 14–44)
MCH RBC QN AUTO: 29.6 PG (ref 26.8–34.3)
MCHC RBC AUTO-ENTMCNC: 32.6 G/DL (ref 31.4–37.4)
MCV RBC AUTO: 91 FL (ref 82–98)
MONOCYTES # BLD AUTO: 0.45 THOUSAND/ÂΜL (ref 0.17–1.22)
MONOCYTES NFR BLD AUTO: 5 % (ref 4–12)
NEUTROPHILS # BLD AUTO: 6.43 THOUSANDS/ÂΜL (ref 1.85–7.62)
NEUTS SEG NFR BLD AUTO: 76 % (ref 43–75)
NITRITE UR QL STRIP: NEGATIVE
NRBC BLD AUTO-RTO: 0 /100 WBCS
PH UR STRIP.AUTO: 7.5 [PH]
PLATELET # BLD AUTO: 228 THOUSANDS/UL (ref 149–390)
PMV BLD AUTO: 11.8 FL (ref 8.9–12.7)
POTASSIUM SERPL-SCNC: 4.4 MMOL/L (ref 3.5–5.3)
PROT SERPL-MCNC: 7.1 G/DL (ref 6.4–8.4)
PROT UR STRIP-MCNC: NEGATIVE MG/DL
PROT UR-MCNC: 12 MG/DL
PROT/CREAT UR: 0.18 MG/G{CREAT} (ref 0–0.1)
RBC # BLD AUTO: 4.05 MILLION/UL (ref 3.81–5.12)
RUBV IGG SERPL IA-ACNC: 66.9 IU/ML
SODIUM SERPL-SCNC: 139 MMOL/L (ref 135–147)
SP GR UR STRIP.AUTO: 1.01 (ref 1–1.03)
TREPONEMA PALLIDUM IGG+IGM AB [PRESENCE] IN SERUM OR PLASMA BY IMMUNOASSAY: NORMAL
TSH SERPL DL<=0.05 MIU/L-ACNC: 1.36 UIU/ML (ref 0.45–4.5)
UROBILINOGEN UR STRIP-ACNC: <2 MG/DL
WBC # BLD AUTO: 8.49 THOUSAND/UL (ref 4.31–10.16)

## 2023-06-29 PROCEDURE — OBC: Performed by: PHYSICIAN ASSISTANT

## 2023-06-29 PROCEDURE — 84443 ASSAY THYROID STIM HORMONE: CPT

## 2023-06-29 PROCEDURE — 86480 TB TEST CELL IMMUN MEASURE: CPT

## 2023-06-29 PROCEDURE — 87086 URINE CULTURE/COLONY COUNT: CPT

## 2023-06-29 PROCEDURE — 86850 RBC ANTIBODY SCREEN: CPT

## 2023-06-29 PROCEDURE — 84156 ASSAY OF PROTEIN URINE: CPT | Performed by: NURSE PRACTITIONER

## 2023-06-29 PROCEDURE — 86706 HEP B SURFACE ANTIBODY: CPT

## 2023-06-29 PROCEDURE — PNV: Performed by: PHYSICIAN ASSISTANT

## 2023-06-29 PROCEDURE — 87491 CHLMYD TRACH DNA AMP PROBE: CPT | Performed by: PHYSICIAN ASSISTANT

## 2023-06-29 PROCEDURE — 82570 ASSAY OF URINE CREATININE: CPT | Performed by: NURSE PRACTITIONER

## 2023-06-29 PROCEDURE — 82105 ALPHA-FETOPROTEIN SERUM: CPT | Performed by: PHYSICIAN ASSISTANT

## 2023-06-29 PROCEDURE — 86780 TREPONEMA PALLIDUM: CPT

## 2023-06-29 PROCEDURE — 87340 HEPATITIS B SURFACE AG IA: CPT

## 2023-06-29 PROCEDURE — 86870 RBC ANTIBODY IDENTIFICATION: CPT

## 2023-06-29 PROCEDURE — 85025 COMPLETE CBC W/AUTO DIFF WBC: CPT

## 2023-06-29 PROCEDURE — 87389 HIV-1 AG W/HIV-1&-2 AB AG IA: CPT

## 2023-06-29 PROCEDURE — 87591 N.GONORRHOEAE DNA AMP PROB: CPT | Performed by: PHYSICIAN ASSISTANT

## 2023-06-29 PROCEDURE — 36415 COLL VENOUS BLD VENIPUNCTURE: CPT

## 2023-06-29 PROCEDURE — 86900 BLOOD TYPING SEROLOGIC ABO: CPT

## 2023-06-29 PROCEDURE — 86803 HEPATITIS C AB TEST: CPT

## 2023-06-29 PROCEDURE — 83036 HEMOGLOBIN GLYCOSYLATED A1C: CPT

## 2023-06-29 PROCEDURE — 86901 BLOOD TYPING SEROLOGIC RH(D): CPT

## 2023-06-29 PROCEDURE — 80053 COMPREHEN METABOLIC PANEL: CPT

## 2023-06-29 PROCEDURE — 81003 URINALYSIS AUTO W/O SCOPE: CPT

## 2023-06-29 PROCEDURE — 86762 RUBELLA ANTIBODY: CPT

## 2023-06-29 PROCEDURE — 83020 HEMOGLOBIN ELECTROPHORESIS: CPT

## 2023-06-29 RX ORDER — ASPIRIN 81 MG/1
162 TABLET, CHEWABLE ORAL DAILY
COMMUNITY

## 2023-06-29 NOTE — PROGRESS NOTES
29 y o  y/o female here for OB intake  TV u/s done 5/11/22 confirms SLIUP  consistent with 2/11/23 LMP, final EDC 11/18/23 by LMP  Current Outpatient Medications on File Prior to Visit   Medication Sig Dispense Refill   • Blood Pressure Monitoring (B-D ASSURE BPM/AUTO ARM CUFF) MISC Use 2 (two) times a day Call if BP > 140/90 (Patient not taking: Reported on 12/2/2022) 1 each 0   • Continuous Blood Gluc  (Dexcom G6 ) DENIZ Use as directed  1 Device 0   • Continuous Blood Gluc Sensor (Dexcom G6 Sensor) MISC 1 box=1 month supply or 3 sensors, use 1 sensor every 10 days  1 each 12   • Continuous Blood Gluc Transmit (Dexcom G6 Transmitter) MISC Transmitter change every 90 days  1 each 3   • glucagon 1 MG injection Inject 1 mg under the skin     • glucose blood test strip Use 1 each daily as needed Use as instructed     • HumaLOG 100 UNIT/ML injection Inject 100 Units under the skin if needed Titrate as needed     • insulin glargine (LANTUS) 100 units/mL subcutaneous injection Inject 10 Units under the skin     • insulin lispro (HumaLOG) 100 units/mL injection Use up to 200 units via insulin pump daily  T2DM and pregnancy  180 mL 0   • insulin lispro (insulin lispro) 100 units/mL injection INJECT UP  UNITS VIA INSULIN PUMP DAILY e10 65 (Patient not taking: Reported on 12/2/2022)     • Lancets MISC by Does not apply route     • Prenatal Vit-Fe Fumarate-FA (PRENATAL VITAMIN PO) Take 1 tablet by mouth daily       No current facility-administered medications on file prior to visit         Past Surgical History:   Procedure Laterality Date   • [place] NDSC WRST SURG W/RLS TRANSVRS CARPL LIGM Right 2/3/2021    Procedure: RELEASE CARPAL TUNNEL ENDOSCOPIC;  Surgeon: Missael Saucedo MD;  [facility];  Service: Orthopedics   • [place] [address] WRST SURG W/RLS TRANSVRS CARPL LIGM Left 2/23/2021    Procedure: RELEASE CARPAL TUNNEL ENDOSCOPIC;  Surgeon: Missael Saucedo MD;  [facility]; Service: Orthopedics   • WISDOM TOOTH EXTRACTION         Past Medical History:   Diagnosis Date   • Diabetes ([facility] [address] ) 1994    type 1   • Diabetes mellitus ([facility] [address] )        OB History        6    Para   3    Term   3            AB   2    Living   3       SAB   1    IAB   1    Ectopic        Multiple   0    Live Births   3                 Previous pregnancy history/ complications: gestational HTN, postpartum depression    Genetic screen:  Canavan disease- negative  Cerebral palsy- negative  Cleft lip/palate- negative  Congenital anomalies- negative  Congenital heart disease- negative  Consanguinity- negative  Cystic fibrosis- negative  Down's syndrome- negative  Hemophilia- negative  Walker's chorea- negative  Mental retardation/ intellectual disability/ cognitive delays- negative  Muscular dystrophy- negative  Neural tube defect- negative  Sickle cell anemia- negative  Giovanny-sachs disease- negative  Fragile X- negative  Thalassemia- negative  Autism- negative  Type 1 diabetes- negative  PKU- negative  Premature ovarian failure- negative      Age of patient: 29  Age of father of the baby: 32, Jesús Hernandez    Exposure risk:  Occupation: stay at home mom  Exposure chemicals/radiation:no  Alcohol exposure: no  Medications taking since LMP:no  Drug exposure:no  Smoker: no  Cat litter exposure: no    Depression screen:  negative  Domestic violence screen: negative      ZIKA screening:  Travel/ or plans for travel outside / [place] area/ [place]:  no,  zika precautions given    TB screening:   ? HIV-no  ? Close contact with individuals with known or suspected TB-no  ? Medical conditions known to increase risk of disease if infected  ? Ie  Diabetes, lupus, cancer, alcoholism, drug addiction- + diabetes  ? Birth in or emigration from high prevalent countries- no  ? Medically underserved- no  ? Homeless- no  ?  Living or working in long term care facilities - no    DRUG screening:  Parents:  Did any of your parents have a problem with alcohol or other drug use?  no    Partner: Does your partner have a problem with alcohol or drug use? no    Past: In the past, have you had difficulties in your life because of alcohol or other drugs, including prescription medication? no    Present:  In the past month have you drunk any alcohol or used other drugs?  no    Thyroid disease risk:  BMI >30: yes  Type 1 diabetes: yes  Fhx or personal hx of thyroid disease: yes    Diabetes risk screening:   + type 1 diabetes    Other screening:  Ashkenazi Zoroastrianism/ Western [place] Greek/ Cajun descent: no, [facility][1] screening offered: no    Hx of gastric bypass/ gastric sleeve/ gastric band: no    Hx of HSV for patient or partner: no    Hx of MRSA: no    Last pap: 12/2/22  Hx of abnormal pap smear: no  Hx of procedures to the cervix: no    Hx of chlamydia/ gonorrhea/ PID: no    Hx recurrent pregnancy loss/ stillbirth: no    Was this pregnancy a result of infertility treatment: no    Vaccine Hx:  Varicella: + disease  Hep B vaccine: yes   COVID vaccine :  Not completed     Hx of covid in last 3 months: no    ASA/ PEC screen:  Previous uncomplicated full-term delivery: yes  Multifetal gestation- 2 points: 0  Chronic HTN- 2 points: 0  Type 1 or 2 pre-gestational diabetes- 2 points: 2  Renal disease- 2 points: 0  Autoimmune disease- 2 points: 0  History of preeclampsia- 2 points: 0  IVF pregnancy- 1 point: 0  >10 year pregnancy interval-1 point: 0  Previous pregnancy with IUGR/ SGA/ adverse pregnancy outcome-1 point: 0  Nulliparity- 1 point: 0  BMI >30- 1 point: 1  Family history of preeclampsia in mother or sister- 1 point: 0  Age >or = 28- 1 point: 0   Race- 1 point: 0  Low socioeconomic status-1 point: 0    TOTAL SCORE: 3    Pertinent + Pre eclampsia risk factors: pre existing diabetes, BMI >30    Pt has been recommended to take ASA 162mg during this pregnancy to lower her risk of preeclampsia: yes    Other:  Pre pregnancy weight: 220lb    Ok with blood transfusion if needed: yes    Plans for feeding baby: breast    Blood type: O positive      Preferred lab: Jace Paez form needed: no      PROBLEM LIST includes:  Type 1 pre existing diabetes: patient has already met with diabetes education team   TSH,  Quant TB, ECG ordered  Eye exam recommended  Hx of gestational HTN: CMP/ urine TP/ creatinine ordered by diabetes team  BMI 32  Hx of postpartum depression: will watch      -Pregnancy: H&P completed today  Prenatal course discussed with patient, including appointment schedule  Warning signs discussed and reviewed  OB folder  given with warning signs of pregnancy, prenatal visit schedule, safe medications in pregnancy, childbirth classes, lab location info, MFM info  -Genetic testing: nuchal translucency/Sequential screening/ NIPT reviewed with patient - patient declines screening for chromosomal abnormality  Has US with MFM scheduled 7/7/23    -Carrier screening including Cystic fibrosis and spinal muscular atrophy reviewed: patient declines testing      -OB exam: to be completed today, see other encounter

## 2023-06-29 NOTE — PATIENT INSTRUCTIONS
"Again, congratulations on your pregnancy! NEXT STEPS  Get Prenatal bloodwork if not already done  Call [facility] to schedule next ultrasound (done 12-13 wks)   Contact information for [facility] (AKA Maternal Fetal Medicine)- Main Number [phone]   Return to our office in 4 weeks for routine prenatal visit (or sooner if any problems/concerns arise- see packet for things to report)  Check out [employer] website to read the [employer]"      It can be found by going to [employer]-->select services-->select women's health-->select Obstetrics  [email or web address]  ----------------------------------------------------  [facility] [address], [address]  [address] [facility], [address]    Warning Signs During Pregnancy  The list below includes warning signs your providers would like you to be aware of  If you experience any of these at any time during your pregnancy, please call us as soon as possible  Vaginal bleeding   Sharp abdominal pain that does not go away   Fever (more than 100  4? F and is not relieved with Tylenol)   Persistent vomiting lasting greater than 24 hours   Chest pain/Shortness of breath   Pain or burning when you urinate    Call the OFFICE [phone] for any questions/emergencies  At night or on the weekend, calls go through a triage service, please indicate it is an emergency and the DOCTOR on call will be paged    ---------------------------------------------------------------    Discomforts of Early Pregnancy  Tips for coping with nausea and vomiting during pregnancy   Eat meals and snacks slowly   Eat every 1-2 hours to avoid a full stomach   Don’t skip meals, avoid empty stomach   Eat a snack prior to getting out of bed   Avoid food and beverages with a strong aroma   Avoid dehydration - drink enough fluid to keep the urine pale yellow   Drink fluids before a meal to minimize " the effect of a full stomach   Limit the amount of coffee and beverages that contain caffeine   Eliminate spicy, odorous, high fat (fried foods), acidic (tomato products) and sweet foods   Fluids that contain lemon (lemonade), mint (tea) or orange can usually be well tolerated   Snacks and meals that contain low-fat protein (lean meats, fish, poultry and eggs) along with eating easily digestible carbs (fruit, rice, toast, crackers and dry cereal) may be tolerated better   Foods with ginger may be well tolerated  May use ginger root powder, capsules or extract (up to 1000 mg per day)   Drink liquids in small amounts    If symptoms persist, please contact your provider  Tips for coping with constipation during pregnancy   Increase fiber and fluids   - Drink 8-10 cups of liquid, like water or low-sugar juice daily  - Keep urine pale with fluids (water, milk), fruit and vegetables   Eat a well-balanced diet that contains high fiber food (fruits, vegetables, whole grain breads and cereals, bran and dried beans)   Take a 30-minute walk daily   You may take a mild stool softener such as Colace®    If symptoms persist, please contact your provider  For any emergencies, PLEASE CALL THE OFFICE at [phone]  If the office is closed, the doctor on call will be paged by the answering service  Medications and Pregnancy- see handout in packetExpected Weight Gain During Pregnancy  If you have a healthy BMI (18-25) prior to pregnancy:  The recommended weight gain is between 25-35 pounds  Approximate weight gain  in the first trimester is 1-4 5 pounds  An expected weight gain during the second and  third trimester is approximately one pound per week  If you have a BMI of less than 18 prior to pregnancy,  you are considered underweight:  The recommended weight gain is between 28-40 pounds  Approximate weight gain  in the first trimester is 1-4 5 pounds   An expected weight gain during the second and  third trimester is just over one pound per week  If your BMI is 25 to 29 9: you are considered overweight:  You should gain 1/2 to 2/3 pound during the second and third trimester, for a total  weight gain of 15 to 25 pounds  If you have a BMI of greater than 30 prior to pregnancy,  you are considered overweight:  The recommended weight gain is between 15-25 pounds  Approximate weight gain  in the first trimester is 1-4 5 pounds  An expected weight gain during the second  and third trimester is approximately 0 5 pound per week  Foods to avoid during pregnancy:   Unpasteurized milk, juice and cheese  - Soft cheeses like feta or brie (if made with UNPASTEURIZED milk)   Unheated deli meats like lunchmeat and hotdogs   Undercooked poultry, beef, pork, seafood including raw sushi    What fish is safe to eat during pregnancy? Eat 8 to 12 ounces of fish a week  Pick from this group frequently, especially if you follow  the American Heart Association’s recommendation to eat fish at least 2 times a week  AVOID HIGH MERCURY FISH  A single meal of the following fish can put  you over the Environmental Protection  Agency’s safe limit for the month  High mercury fish:  Shark  Swordfish  [employer]  Tile Fish    Caffeine and Pregnancy  The  and [facility] of Obstetrics and Gynecologists (ACOG) urge pregnant  women to limit their caffeine consumption to no more than 200 milligrams (mg) per day  This is  comparable to having one 12-ounce cup of coffee a day  This level has been shown not to increase risk  of miscarriage, growth or  labor complications  Effects of higher levels are not known  Exercise During Pregnancy  A daily exercise program that consists of 30 minutes a day is recommended     Low impact exercises like walking and swimming are great exercises throughout  all of pregnancy   If you’re an avid strength  avoid lifting very heavy weights - nothing more  than 30 pounds    Drink plenty of fluids while exercising to stay hydrated  Be careful to avoid overheating  ACTIVITIES TO AVOID   Exercises that can make you lose your balance  Activities that can put your baby at risk i e  horseback riding, scuba diving, skiing  or snowboarding  Any other sport that puts you at risk for getting hit in the  abdominal area  Do not use saunas, steam rooms or hot tubs (that have a higher temperature  than 100F)   After the first trimester, avoid exercises that require you to lay flat on your back  Avoid exceeding a heart rate greater than 140 beats per minute  As long as you are  able to hold a conversation while exercising your heart rate is likely acceptable    Vaccines and Pregnancy    Information for pregnant women  Vaccines help protect you and your baby against serious diseases  Whooping Cough Vaccine  Whooping cough (or pertussis) can be serious for anyone, but for your , it can be lifethreatening  Up to 20 babies die each year in the [facility] due to whooping cough  When you get the whooping cough vaccine during your pregnancy, your body will create protective  antibodies and pass some of them to your baby before birth  These antibodies will provide your  baby some short-term, early protection against whooping cough  Learn more at www cdc gov/pertussis/pregnant/     Flu Vaccine  Changes in your immune, heart, and lung functions during pregnancy make you more likely to get  seriously ill from the flu  Catching the flu also increases your chances for serious problems for your  developing baby, including premature labor and delivery  Get the flu shot if you are pregnant during  flu season--it’s the best way to protect yourself and your baby for several months after birth from flu-related  complications  Flu seasons vary in their timing from season to season, but CDC recommends getting vaccinated  by the end of October, if possible   This timing helps protect you before flu activity begins to increase  Find more on how to prevent the flu by visiting www cdc gov/flu/     Covid Vaccine  Similar to the flu, Changes in your immune, heart, and lung functions during pregnancy make you more likely to get  seriously ill from COVID  It  also increases your chances for serious problems for your  developing baby, including premature labor and delivery  Please consider getting your covid vaccine if you haven't already  This is endorsed by both Ana Luisa Little of Obstetrics and Gynecology and Damir Hdez of Maternal Fetal Medicine    Fetal Activity  You will most likely start to feel your baby move (also known as quickening) between  25 and 20 weeks of pregnancy  First time moms might feel their baby’s movements  closer to 25 weeks while a second time mom might feel those first movements closer  to 16 weeks

## 2023-06-29 NOTE — PROGRESS NOTES
29 y o  y/o female here for New OB exam   She is 19w5d pregnant  OB intake done prior, see other encounter  TVUS 5/11/23 confirms SLIUP consistent with 2/11/23 LMP, final [facility] 11/18/23  Exposure chemicals/radiation:no  Alcohol exposure: no  Medications taking since LMP:insulin  Drug exposure:no  Smoker: no     ASA/ PEC screen: 3,  162 ASA recommended  yes      Current Outpatient Medications on File Prior to Visit   Medication Sig Dispense Refill   • Blood Pressure Monitoring (B-D ASSURE BPM/AUTO ARM CUFF) MISC Use 2 (two) times a day Call if BP > 140/90 (Patient not taking: Reported on 12/2/2022) 1 each 0   • Continuous Blood Gluc  (Dexcom G6 ) DENIZ Use as directed  1 Device 0   • Continuous Blood Gluc Sensor (Dexcom G6 Sensor) MISC 1 box=1 month supply or 3 sensors, use 1 sensor every 10 days  1 each 12   • Continuous Blood Gluc Transmit (Dexcom G6 Transmitter) MISC Transmitter change every 90 days  1 each 3   • glucagon 1 MG injection Inject 1 mg under the skin     • glucose blood test strip Use 1 each daily as needed Use as instructed     • HumaLOG 100 UNIT/ML injection Inject 100 Units under the skin if needed Titrate as needed     • insulin glargine (LANTUS) 100 units/mL subcutaneous injection Inject 10 Units under the skin     • insulin lispro (HumaLOG) 100 units/mL injection Use up to 200 units via insulin pump daily  T2DM and pregnancy  180 mL 0   • insulin lispro (insulin lispro) 100 units/mL injection INJECT UP  UNITS VIA INSULIN PUMP DAILY e10 65 (Patient not taking: Reported on 12/2/2022)     • Lancets MISC by Does not apply route     • Prenatal Vit-Fe Fumarate-FA (PRENATAL VITAMIN PO) Take 1 tablet by mouth daily       No current facility-administered medications on file prior to visit         Past Surgical History:   Procedure Laterality Date   • [place] NDSC WRST SURG W/RLS TRANSVRS CARPL LIGM Right 2/3/2021    Procedure: RELEASE CARPAL TUNNEL ENDOSCOPIC;  Surgeon: Kiara Barahona MD;  [facility];  Service: Orthopedics   • [place] [address] WRST SURG W/RLS TRANSVRS CARPL LIGM Left 2/23/2021    Procedure: RELEASE CARPAL TUNNEL ENDOSCOPIC;  Surgeon: Kiara Barahona MD;  [facility];  Service: Orthopedics   • WISDOM TOOTH EXTRACTION         Past Medical History:   Diagnosis Date   • Diabetes ([facility] [address] ) 1994    type 1   • Diabetes mellitus ([facility] [address] )      Vitals:    06/29/23 0743   BP: 116/68   Pulse: 71         Neck: supple without nodes or thyromegaly  Heart: regular rate and rhythm  Lungs: clear to auscultation without rales, rhonci  Breasts: nontender, no masses, no discoloration, no discharge  Abdomen: soft, nontender, no masses  Ext genitalia: no lesions, no discoloration  Urethra: no discharge or erythema  Bladder: nontender, good support  Vagina: no discharge, no lesions  Cervix: no lesions, no cervical motion tenderness, posterior, long, closed  Adnexa: nontender, no masses  Uterus: nontender, 19 wk size    PROBLEM LIST includes:  Type 1 pre existing diabetes: patient has already met with diabetes education team   TSH,  Quant TB, ECG ordered  Eye exam recommended  Hx of gestational HTN: CMP/ urine TP/ creatinine ordered by diabetes team  BMI 32  Hx of postpartum depression: will watch         - Pregnancy: H&P completed today  PN Labs ordered today  Prenatal course discussed with patient, including appointment schedule  Warning signs discussed and reviewed  OB folder given with warning signs of pregnancy, prenatal visit schedule, safe medications in pregnancy, childbirth classes, lab location info, MFM info  -Screening: pap smear up to date so not collected today  GC/CT collected  -Genetic/carrier testing: Sequential screening/ NIPT/ nuchal translucency/ cystic fibrosis/ SMA reviewed with patient - patient declines genetic/ carrier testing  Has appt with MFM scheduled 7/7/23    - Follow up: Return in 4 weeks

## 2023-06-30 LAB
ABO GROUP BLD: NORMAL
BACTERIA UR CULT: NORMAL
BLD GP AB SCN SERPL QL: NEGATIVE
BLOOD GROUP ANTIBODIES SERPL: NEGATIVE
C TRACH DNA SPEC QL NAA+PROBE: NEGATIVE
HBV SURFACE AB SER-ACNC: 432 MIU/ML
HCV AB SER QL: NORMAL
N GONORRHOEA DNA SPEC QL NAA+PROBE: NEGATIVE
RH BLD: POSITIVE
SPECIMEN EXPIRATION DATE: NORMAL

## 2023-07-01 LAB
GAMMA INTERFERON BACKGROUND BLD IA-ACNC: 0.03 IU/ML
M TB IFN-G BLD-IMP: NEGATIVE
M TB IFN-G CD4+ BCKGRND COR BLD-ACNC: 0 IU/ML
M TB IFN-G CD4+ BCKGRND COR BLD-ACNC: 0 IU/ML
MITOGEN IGNF BCKGRD COR BLD-ACNC: >10 IU/ML

## 2023-07-03 ENCOUNTER — TELEPHONE (OUTPATIENT)
Facility: HOSPITAL | Age: 33
End: 2023-07-03

## 2023-07-03 LAB
HGB A MFR BLD: 3.3 % (ref 1.8–3.2)
HGB A MFR BLD: 96.7 % (ref 96.4–98.8)
HGB F MFR BLD: 0 % (ref 0–2)
HGB FRACT BLD-IMP: ABNORMAL
HGB S MFR BLD: 0 %

## 2023-07-03 NOTE — TELEPHONE ENCOUNTER
Called patient left voicemail stating " physicians office calling, please return call at [phone]". Unable to leave detailed message due to no communication consent on file.

## 2023-07-06 ENCOUNTER — TELEPHONE (OUTPATIENT)
Dept: OBGYN CLINIC | Facility: CLINIC | Age: 33
End: 2023-07-06

## 2023-07-06 LAB
2ND TRIMESTER 4 SCREEN SERPL-IMP: NORMAL
AFP ADJ MOM SERPL: 0.78
AFP INTERP AMN-IMP: NORMAL
AFP INTERP SERPL-IMP: NORMAL
AFP INTERP SERPL-IMP: NORMAL
AFP SERPL-MCNC: 34.1 NG/ML
AGE AT DELIVERY: 31.3 YR
GA METHOD: NORMAL
GA: 19.7 WEEKS
IDDM PATIENT QL: NO
MULTIPLE PREGNANCY: NO
NEURAL TUBE DEFECT RISK FETUS: NORMAL %

## 2023-07-06 NOTE — TELEPHONE ENCOUNTER
Left message on machine for pt to call office re: discuss hemoglobin electrophoresis results        Hgb F Quant 0.0 - 2.0 % 0.0    Hgb A 96.4 - 98.8 % 96.7    Hgb S Quant 0.0 % 0.0    Hgb A2 Quant 1.8 - 3.2 % 3.3 High     Hgb Interp. Comment    Comment: Hgb A2 is borderline high; this may indicate a Beta Thalassemia Minor. Suggest clinical and hematologic correlation.

## 2023-07-07 ENCOUNTER — ROUTINE PRENATAL (OUTPATIENT)
Dept: PERINATAL CARE | Facility: OTHER | Age: 33
End: 2023-07-07
Payer: COMMERCIAL

## 2023-07-07 VITALS
SYSTOLIC BLOOD PRESSURE: 130 MMHG | HEIGHT: 69 IN | HEART RATE: 105 BPM | DIASTOLIC BLOOD PRESSURE: 66 MMHG | WEIGHT: 229.2 LBS | BODY MASS INDEX: 33.95 KG/M2

## 2023-07-07 DIAGNOSIS — O44.02 PLACENTA PREVIA ANTEPARTUM IN SECOND TRIMESTER: ICD-10-CM

## 2023-07-07 DIAGNOSIS — Z13.79 GENETIC SCREENING: ICD-10-CM

## 2023-07-07 DIAGNOSIS — O24.012 TYPE 1 DIABETES MELLITUS AFFECTING PREGNANCY IN SECOND TRIMESTER, ANTEPARTUM: ICD-10-CM

## 2023-07-07 DIAGNOSIS — Z87.59 HISTORY OF GESTATIONAL HYPERTENSION: ICD-10-CM

## 2023-07-07 DIAGNOSIS — O35.BXX0 ANOMALY OF HEART OF FETUS AFFECTING PREGNANCY, ANTEPARTUM, SINGLE OR UNSPECIFIED FETUS: Primary | ICD-10-CM

## 2023-07-07 DIAGNOSIS — Z3A.20 20 WEEKS GESTATION OF PREGNANCY: ICD-10-CM

## 2023-07-07 PROCEDURE — 76811 OB US DETAILED SNGL FETUS: CPT | Performed by: OBSTETRICS & GYNECOLOGY

## 2023-07-07 PROCEDURE — 76817 TRANSVAGINAL US OBSTETRIC: CPT | Performed by: OBSTETRICS & GYNECOLOGY

## 2023-07-07 NOTE — PROGRESS NOTES
Ultrasound Probe Disinfection    A transvaginal ultrasound was performed. Prior to use, disinfection was performed with High Level Disinfection Process ([employer]). Probe serial number [id number]: [id number] was used.       Howard Morales  07/07/23  1:38 PM

## 2023-07-07 NOTE — LETTER
July 8, 2023     Alonzo Harris, [address]   [address]    Patient: Mckayla Rose   YOB: 1990   Date of Visit: 7/7/2023       Dear Dr. Corey Morrow: Thank you for referring Malcolm  to me for evaluation. Below are my notes for this consultation. If you have questions, please do not hesitate to call me. I look forward to following your patient along with you. Sincerely,        Aleksandra Padilla MD        CC: MD Alona Skinner, RN  Madyson Carlson, Lyubov Lockwood MD  7/8/2023  9:30 AM  Sign when Signing Visit  A fetal ultrasound was completed. See Ob procedures in Epic for an interpretation and recommendations. Do not hesitate to contact us in [facility] if you have questions. Lavelle Ganser Render Matt MD, [address]  Maternal Fetal Medicine

## 2023-07-08 PROBLEM — O35.BXX0 FETAL CARDIAC ANOMALY AFFECTING PREGNANCY, ANTEPARTUM: Status: ACTIVE | Noted: 2023-07-08

## 2023-07-08 PROBLEM — O24.012 TYPE 1 DIABETES MELLITUS AFFECTING PREGNANCY IN SECOND TRIMESTER, ANTEPARTUM: Status: ACTIVE | Noted: 2023-07-08

## 2023-07-08 PROBLEM — Z3A.20 20 WEEKS GESTATION OF PREGNANCY: Status: ACTIVE | Noted: 2023-05-25

## 2023-07-08 PROBLEM — O44.02 PLACENTA PREVIA ANTEPARTUM IN SECOND TRIMESTER: Status: ACTIVE | Noted: 2023-07-08

## 2023-07-08 NOTE — PROGRESS NOTES
A fetal ultrasound was completed. See Ob procedures in Epic for an interpretation and recommendations. Do not hesitate to contact us in [facility] if you have questions. Josefina Christy MD, [address] [facility]  Maternal Fetal Medicine

## 2023-07-10 ENCOUNTER — TELEPHONE (OUTPATIENT)
Facility: HOSPITAL | Age: 33
End: 2023-07-10

## 2023-07-10 ENCOUNTER — TELEPHONE (OUTPATIENT)
Dept: GASTROENTEROLOGY | Facility: CLINIC | Age: 33
End: 2023-07-10

## 2023-07-10 NOTE — TELEPHONE ENCOUNTER
----- Message from Santo Cifuentes MD sent at 7/10/2023  2:12 PM EDT -----  You can open up the 18th at 8am and I can see her then  ----- Message -----  From: Cici Mendoza RN  Sent: 7/7/2023   3:59 PM EDT  To: Daniel Lloyd, RN; MD Dr. Lavinia Cruz,     Would you be willing to help us with scheduling this patient around July 17? Your next available is not until August 22, 2023. This patient is a type 1 diabetic. Thanks,  Cici Mendoza  ----- Message -----  From: Elian Burch  Sent: 7/7/2023   3:47 PM EDT  To: Cici Mendoza RN; Daniel Lloyd, RN    Pt needs an appt with  around July 17.  Thank you

## 2023-07-10 NOTE — TELEPHONE ENCOUNTER
Left voicemail stating fetal echocardiogram with Dr. Lavinia Arndt was scheduled for July 18, 2023 at 8 am per Dr. Lavinia Arndt. Call [phone] if this is inconvenient.

## 2023-07-10 NOTE — TELEPHONE ENCOUNTER
Called patient, informed her of a referral the [facility] got for her to see Cardiology. I informed patient that she is to call [facility] for this and gave her number. [phone]    Patient understood and states she will call to schedule.

## 2023-07-11 PROBLEM — D56.3 BETA THALASSEMIA MINOR: Status: ACTIVE | Noted: 2023-07-11

## 2023-07-11 NOTE — TELEPHONE ENCOUNTER
Spoke with patient and reviewed that labs showing possible beta thalassemia minor. Patient says she has had anemia with prior pregnancies. No known family history of thalassemia. Discussed option for visit with genetic counselor to review findings. Patient declines. Also reviewed recommendation for testing of FOB to see if he is carrier of thalassemia. FOB is not a [facility] patient. He will check with his PCP to see if they will order the testing.

## 2023-07-11 NOTE — TELEPHONE ENCOUNTER
Left message on machine for pt to call office re: hemoglobin electrophoresis results    Hgb F Quant 0.0 - 2.0 % 0.0    Hgb A 96.4 - 98.8 % 96.7    Hgb S Quant 0.0 % 0.0    Hgb A2 Quant 1.8 - 3.2 % 3.3 High     Hgb Interp. Comment    Comment: Hgb A2 is borderline high; this may indicate a Beta Thalassemia Minor. Suggest clinical and hematologic correlation.

## 2023-07-13 ENCOUNTER — OB ABSTRACT (OUTPATIENT)
Dept: OBGYN CLINIC | Facility: CLINIC | Age: 33
End: 2023-07-13

## 2023-07-17 ENCOUNTER — TREATMENT (OUTPATIENT)
Facility: HOSPITAL | Age: 33
End: 2023-07-17

## 2023-07-17 DIAGNOSIS — O24.012 PRE-EXISTING TYPE 1 DIABETES MELLITUS WITH HYPERGLYCEMIA DURING PREGNANCY IN SECOND TRIMESTER (HCC): Primary | ICD-10-CM

## 2023-07-17 DIAGNOSIS — E10.65 PRE-EXISTING TYPE 1 DIABETES MELLITUS WITH HYPERGLYCEMIA DURING PREGNANCY IN SECOND TRIMESTER (HCC): Primary | ICD-10-CM

## 2023-07-17 DIAGNOSIS — Z3A.22 22 WEEKS GESTATION OF PREGNANCY: ICD-10-CM

## 2023-07-17 NOTE — PROGRESS NOTES
Problem List Items Addressed This Visit        Endocrine    Pre-existing type 1 diabetes mellitus with hyperglycemia during pregnancy in second trimester ([address]) - Primary     -A1c 5.0% normal and better than at goal.  -A1c goal is less than 6% with minimal hypoglycemia. -Humalog via Medtronic 770G insulin pump and Dexcom G6 CGM. Due to higher glucose readings noted especially overnight and you needing to correct high readings; please adjust basal settings as follows:  MN from 1.05 to 1.25 units/hour;  3 AM from 1.30 to 1.50 units/hour;  5 AM from 1.25 to 1.45 units/hour;  11:30 AM@ 1.55 units/hour;  4 PM from 1.30 to 1.55 units/hour;  10 PM from 1.35 to 1.55 units/hour. Try to use 15 by 15 rule when treating hypoglycemia. Insulin pump download every week. Lab Results   Component Value Date    HGBA1C 5.0 06/29/2023               Other    22 weeks gestation of pregnancy    BMI 33.0-33.9,adult   Refer to attached insulin pump.

## 2023-07-17 NOTE — ASSESSMENT & PLAN NOTE
-A1c 5.0% normal and better than at goal.  -A1c goal is less than 6% with minimal hypoglycemia. -Humalog via Medtronic 770G insulin pump and Dexcom G6 CGM. Due to higher glucose readings noted especially overnight and you needing to correct high readings; please adjust basal settings as follows:  MN from 1.05 to 1.25 units/hour;  3 AM from 1.30 to 1.50 units/hour;  5 AM from 1.25 to 1.45 units/hour;  11:30 AM@ 1.55 units/hour;  4 PM from 1.30 to 1.55 units/hour;  10 PM from 1.35 to 1.55 units/hour. Try to use 15 by 15 rule when treating hypoglycemia. Insulin pump download every week.      Lab Results   Component Value Date    HGBA1C 5.0 06/29/2023

## 2023-07-18 ENCOUNTER — ROUTINE PRENATAL (OUTPATIENT)
Dept: PEDIATRIC CARDIOLOGY | Age: 33
End: 2023-07-18
Payer: COMMERCIAL

## 2023-07-18 DIAGNOSIS — O35.BXX0 ANOMALY OF HEART OF FETUS AFFECTING PREGNANCY, ANTEPARTUM, SINGLE OR UNSPECIFIED FETUS: Primary | ICD-10-CM

## 2023-07-18 PROCEDURE — 76820 UMBILICAL ARTERY ECHO: CPT | Performed by: PEDIATRICS

## 2023-07-18 PROCEDURE — 76827 ECHO EXAM OF FETAL HEART: CPT | Performed by: PEDIATRICS

## 2023-07-18 PROCEDURE — 93325 DOPPLER ECHO COLOR FLOW MAPG: CPT | Performed by: PEDIATRICS

## 2023-07-18 PROCEDURE — 76825 ECHO EXAM OF FETAL HEART: CPT | Performed by: PEDIATRICS

## 2023-07-18 PROCEDURE — 99205 OFFICE O/P NEW HI 60 MIN: CPT | Performed by: PEDIATRICS

## 2023-07-18 NOTE — PROGRESS NOTES
Fetal Cardiology Consult    Date of Visit:    2023  Gestational Age:  22w3d   Estimated Date of Delivery: 23   Referring provider:                 Elizabeth Byers     Reason for consultation: VSD    Fetal Echocardiogram demonstrated a tiny muscular VSD identified only on color doppler. Otherwise normal cardiac anatomy and function. I reviewed the VSD findings and natural course. We also discussed, that due to the technical limitations of fetal echocardiography and the nature of fetal circulation, a number of cardiovascular defects cannot be definitively ruled out at this stage. Examples include but are not limited to: ASD, PDA, small VSD, coarctation of the aorta, partial anomalous pulmonary venous connection. Please see full echocardiogram report under OB procedures. Assessment and Recommendations:  -There is a tiny muscular VSD identified only on color doppler. Otherwise normal cardiac anatomy and function.   -Normal prenatal care, delivery, and  care are recommended. -Recommend  outpatient cardiology consult with echocardiogram at 1-2 weeks of life (Please schedule appointment prior to hospital discharge. Phone [phone]). I spent 60 minutes - on the day of service - reviewing the patient's chart, reviewing previous imaging studies, counseling the patient about the fetal findings, coordinating care, and documenting care. Alicja Bishop MD  Pediatric Cardiology  [address]  [address]  Fax: [phone]  [email or web address]

## 2023-07-18 NOTE — PROGRESS NOTES
Fetal Cardiology Consult     Date of Visit:                            2023  Gestational Age:                     22w3d   Estimated Date of Delivery: 23   Referring provider:                 Bharath Triana      Reason for consultation: VSD     Fetal Echocardiogram demonstrated a tiny muscular VSD identified only on color doppler. Otherwise normal cardiac anatomy and function. I reviewed the VSD findings and natural course. We also discussed, that due to the technical limitations of fetal echocardiography and the nature of fetal circulation, a number of cardiovascular defects cannot be definitively ruled out at this stage.  Examples include but are not limited to: ASD, PDA, small VSD, coarctation of the aorta, partial anomalous pulmonary venous connection. Please see full echocardiogram report under OB procedures.     Assessment and Recommendations:  -There is a tiny muscular VSD identified only on color doppler. Otherwise normal cardiac anatomy and function.   -Normal prenatal care, delivery, and  care are recommended. -Recommend  outpatient cardiology consult with echocardiogram at 1-2 weeks of life (Please schedule appointment prior to hospital discharge. Phone [phone]).    I spent 60 minutes - on the day of service - reviewing the patient's chart, reviewing previous imaging studies, counseling the patient about the fetal findings, coordinating care, and documenting care.   Duane Easterly, MD  Pediatric Cardiology  [address] [place] [address]  Fax: [phone]  [email or web address]

## 2023-07-19 ENCOUNTER — TELEMEDICINE (OUTPATIENT)
Facility: HOSPITAL | Age: 33
End: 2023-07-19

## 2023-07-19 ENCOUNTER — TELEPHONE (OUTPATIENT)
Facility: HOSPITAL | Age: 33
End: 2023-07-19

## 2023-07-19 DIAGNOSIS — Z3A.22 22 WEEKS GESTATION OF PREGNANCY: ICD-10-CM

## 2023-07-19 DIAGNOSIS — Z46.81 INSULIN PUMP TITRATION: ICD-10-CM

## 2023-07-19 DIAGNOSIS — E10.65 PRE-EXISTING TYPE 1 DIABETES MELLITUS WITH HYPERGLYCEMIA DURING PREGNANCY IN SECOND TRIMESTER (HCC): Primary | ICD-10-CM

## 2023-07-19 DIAGNOSIS — O24.012 PRE-EXISTING TYPE 1 DIABETES MELLITUS WITH HYPERGLYCEMIA DURING PREGNANCY IN SECOND TRIMESTER (HCC): Primary | ICD-10-CM

## 2023-07-19 NOTE — PROGRESS NOTES
Virtual Regular Visit    Verification of patient location:[place]    Patient is located at Home in the following state in which I hold an active license [place]      Assessment/Plan:    Problem List Items Addressed This Visit        Endocrine    Pre-existing type 1 diabetes mellitus with hyperglycemia during pregnancy in second trimester ([address]) - Primary     -A1c 5.0% normal and better than at goal.  -A1c goal is less than 6% with minimal hypoglycemia. -Humalog via Medtronic 770G insulin pump and Dexcom G6 CGM. Due to higher glucose readings noted especially overnight and you needing to correct high readings; please adjust basal settings as follows:  MN from 1.05 to 1.25 units/hour;  3 AM from 1.30 to 1.50 units/hour;  5 AM from 1.25 to 1.45 units/hour;  11:30 AM from 1.55 to 1.85 units/hour;  4 PM from 1.30 to 1.55 units/hour;  10 PM from 1.35 to 1.55 units/hour. Decrease carb ratio to 8 and insulin sensitivity to 23. Try to use 15 by 15 rule when treating hypoglycemia. Insulin pump download every week. Lab Results   Component Value Date    HGBA1C 5.0 2023     Lab Results   Component Value Date    HGBA1C 5.0 2023            Relevant Orders    Mychart glucose flowsheet       Other    22 weeks gestation of pregnancy    Relevant Orders    Mychart glucose flowsheet    BMI 33.0-33.9,adult     -First visit weight 220 lbs. -Current weight 229 lbs. -TWG 9 lbs. -Recommended weight gain 11 to 20 lbs.            Relevant Orders    Mychart glucose flowsheet    Insulin pump titration            Reason for visit is   Chief Complaint   Patient presents with   • Virtual Regular Visit   • Diabetes Type 1        Encounter provider Ifrah Azul, [address] [place] [address]    Provider located at [address] [place] [phone]      Recent Visits  Date Type Provider Dept   23 Telephone Klarissa Vale    23 Maynor MARCO An    Showing recent visits within past 7 days and meeting all other requirements  Future Appointments  No visits were found meeting these conditions. Showing future appointments within next 150 days and meeting all other requirements       The patient was identified by name and date of birth. Nic Hernandez was informed that this is a telemedicine visit and that the visit is being conducted through the [facility] 22 Now platform. She agrees to proceed. .  My office door was closed. No one else was in the room. She acknowledged consent and understanding of privacy and security of the video platform. The patient has agreed to participate and understands they can discontinue the visit at any time. Patient is aware this is a billable service. Deann Weeks is a 34 y.o. female  22 4/7 weeks gestation T1DM on humalog via Medtronic 770G insulin pump and Dexcom G6 CGM. Due to hyperglycemia; did self adjust insulin and had not seen [employer] [facility] [employer] with recommendations prior to visit. Encouraged to download insulin consistently and review [employer] messages. Bolus settings adjusted during visit and recommended to be cautious with correction bolus insulin especially overnight since correction factor or insulin sensitivity decreased. Up to date weight gain 9 lbs. Did not start working, no need for work day insulin pump settings.        HPI     Past Medical History:   Diagnosis Date   • Diabetes ([address])     type 1   • Diabetes mellitus ([address])        Past Surgical History:   Procedure Laterality Date   • [place] NDSC WRST SURG W/RLS TRANSVRS CARPL LIGM Right 2/3/2021    Procedure: RELEASE CARPAL TUNNEL ENDOSCOPIC;  Surgeon: Marcin Hobson MD;  Location: [facility];  Service: Orthopedics   • [place] [address] WRST SURG W/RLS TRANSVRS CARPL LIGM Left 2021    Procedure: RELEASE CARPAL TUNNEL ENDOSCOPIC;  Surgeon: Marcin Hobson MD;  Location: [facility];  Service: Orthopedics   • WISDOM TOOTH EXTRACTION Current Outpatient Medications   Medication Sig Dispense Refill   • aspirin 81 mg chewable tablet Chew 162 mg daily     • Blood Pressure Monitoring (B-D ASSURE BPM/AUTO ARM CUFF) MISC Use 2 (two) times a day Call if BP > 140/90 1 each 0   • Continuous Blood Gluc  (Dexcom G6 ) DENIZ Use as directed. 1 Device 0   • Continuous Blood Gluc Sensor (Dexcom G6 Sensor) MISC 1 box=1 month supply or 3 sensors, use 1 sensor every 10 days. 1 each 12   • Continuous Blood Gluc Transmit (Dexcom G6 Transmitter) MISC Transmitter change every 90 days. 1 each 3   • glucagon 1 MG injection Inject 1 mg under the skin     • glucose blood test strip Use 1 each daily as needed Use as instructed     • HumaLOG 100 UNIT/ML injection Inject 100 Units under the skin if needed Titrate as needed     • insulin glargine (LANTUS) 100 units/mL subcutaneous injection Inject 10 Units under the skin     • insulin lispro (HumaLOG) 100 units/mL injection Use up to 200 units via insulin pump daily. T2DM and pregnancy. 180 mL 0   • insulin lispro (insulin lispro) 100 units/mL injection INJECT UP  UNITS VIA INSULIN PUMP DAILY e10.65 (Patient not taking: Reported on 12/2/2022)     • Lancets MISC by Does not apply route     • Prenatal Vit-Fe Fumarate-FA (PRENATAL VITAMIN PO) Take 1 tablet by mouth daily       No current facility-administered medications for this visit. No Known Allergies    Review of Systems   Constitutional: Negative for fatigue and fever. Eyes: Negative for visual disturbance. Respiratory: Negative for cough and shortness of breath. Cardiovascular: Negative for chest pain and palpitations. Gastrointestinal: Negative for constipation, nausea and vomiting. Endocrine: Negative for polydipsia, polyphagia and polyuria. Genitourinary: Negative for difficulty urinating and vaginal bleeding. Neurological: Negative for headaches. Psychiatric/Behavioral: Negative for sleep disturbance.        Video Exam  Refer to attached file for insulin pump download. There were no vitals filed for this visit. Physical Exam  HENT:      Head: Normocephalic. Nose: Nose normal.   Eyes:      Conjunctiva/sclera: Conjunctivae normal.   Pulmonary:      Effort: Pulmonary effort is normal.   Neurological:      Mental Status: She is alert and oriented to person, place, and time. Psychiatric:         Mood and Affect: Mood normal.         Behavior: Behavior normal.         Thought Content: Thought content normal.         Judgment: Judgment normal.          Visit Time  Total Visit Duration: 31 minutes.

## 2023-07-19 NOTE — ASSESSMENT & PLAN NOTE
-A1c 5.0% normal and better than at goal.  -A1c goal is less than 6% with minimal hypoglycemia. -Humalog via Medtronic 770G insulin pump and Dexcom G6 CGM. Due to higher glucose readings noted especially overnight and you needing to correct high readings; please adjust basal settings as follows:  MN from 1.05 to 1.25 units/hour;  3 AM from 1.30 to 1.50 units/hour;  5 AM from 1.25 to 1.45 units/hour;  11:30 AM from 1.55 to 1.85 units/hour;  4 PM from 1.30 to 1.55 units/hour;  10 PM from 1.35 to 1.55 units/hour. Decrease carb ratio to 8 and insulin sensitivity to 23. Try to use 15 by 15 rule when treating hypoglycemia. Insulin pump download every week.      Lab Results   Component Value Date    HGBA1C 5.0 06/29/2023     Lab Results   Component Value Date    HGBA1C 5.0 06/29/2023

## 2023-07-24 ENCOUNTER — TELEPHONE (OUTPATIENT)
Dept: PERINATAL CARE | Facility: OTHER | Age: 33
End: 2023-07-24

## 2023-07-24 PROBLEM — Z46.81 INSULIN PUMP TITRATION: Status: ACTIVE | Noted: 2023-07-24

## 2023-07-24 NOTE — TELEPHONE ENCOUNTER
Left patient a message that her MFM appointment had to be rescheduled to:    9/26/23 at 2:15 PM in [facility]. 9/28 at 1:30 PM  10/10 at 2:15 PM  10/12 at 3:15 PM    The new time, date and location were provided. The patient has been instructed to please call us back at [phone] with any questions or concerns.

## 2023-07-24 NOTE — ASSESSMENT & PLAN NOTE
-First visit weight 220 lbs. -Current weight 229 lbs. -TWG 9 lbs. -Recommended weight gain 11 to 20 lbs.

## 2023-07-24 NOTE — TELEPHONE ENCOUNTER
Left patient a message that her MFM appointment had to be rescheduled to 2 PM on 10/3/23 at [facility] office. The new time, date and location were provided. The patient has been instructed to please call us back at [phone] with any questions or concerns.

## 2023-07-27 ENCOUNTER — ROUTINE PRENATAL (OUTPATIENT)
Dept: OBGYN CLINIC | Facility: CLINIC | Age: 33
End: 2023-07-27

## 2023-07-27 VITALS
HEART RATE: 88 BPM | BODY MASS INDEX: 34.54 KG/M2 | WEIGHT: 233.2 LBS | DIASTOLIC BLOOD PRESSURE: 70 MMHG | SYSTOLIC BLOOD PRESSURE: 126 MMHG | HEIGHT: 69 IN

## 2023-07-27 DIAGNOSIS — O35.BXX0 ANOMALY OF HEART OF FETUS AFFECTING PREGNANCY, ANTEPARTUM, SINGLE OR UNSPECIFIED FETUS: ICD-10-CM

## 2023-07-27 DIAGNOSIS — Z3A.23 23 WEEKS GESTATION OF PREGNANCY: ICD-10-CM

## 2023-07-27 DIAGNOSIS — Z46.81 INSULIN PUMP TITRATION: ICD-10-CM

## 2023-07-27 DIAGNOSIS — D56.3 BETA THALASSEMIA MINOR: Primary | ICD-10-CM

## 2023-07-27 DIAGNOSIS — O24.012 PRE-EXISTING TYPE 1 DIABETES MELLITUS WITH HYPERGLYCEMIA DURING PREGNANCY IN SECOND TRIMESTER (HCC): Primary | ICD-10-CM

## 2023-07-27 DIAGNOSIS — O44.02 PLACENTA PREVIA ANTEPARTUM IN SECOND TRIMESTER: ICD-10-CM

## 2023-07-27 DIAGNOSIS — E10.65 PRE-EXISTING TYPE 1 DIABETES MELLITUS WITH HYPERGLYCEMIA DURING PREGNANCY IN SECOND TRIMESTER (HCC): ICD-10-CM

## 2023-07-27 DIAGNOSIS — O24.012 PRE-EXISTING TYPE 1 DIABETES MELLITUS WITH HYPERGLYCEMIA DURING PREGNANCY IN SECOND TRIMESTER (HCC): ICD-10-CM

## 2023-07-27 DIAGNOSIS — Z87.59 HISTORY OF GESTATIONAL HYPERTENSION: ICD-10-CM

## 2023-07-27 DIAGNOSIS — E10.65 PRE-EXISTING TYPE 1 DIABETES MELLITUS WITH HYPERGLYCEMIA DURING PREGNANCY IN SECOND TRIMESTER (HCC): Primary | ICD-10-CM

## 2023-07-27 DIAGNOSIS — O09.899 SUPERVISION OF OTHER HIGH RISK PREGNANCY, ANTEPARTUM: ICD-10-CM

## 2023-07-27 PROCEDURE — PNV: Performed by: OBSTETRICS & GYNECOLOGY

## 2023-07-27 RX ORDER — INSULIN LISPRO 100 [IU]/ML
INJECTION, SOLUTION INTRAVENOUS; SUBCUTANEOUS
Qty: 90 ML | Refills: 0 | Status: SHIPPED | OUTPATIENT
Start: 2023-07-27

## 2023-07-27 NOTE — ASSESSMENT & PLAN NOTE
Needs optho exam - reports none in last year  She is aware of recommendation for repeating EKG  Repeat A1C ordered with labs

## 2023-07-27 NOTE — PROGRESS NOTES
S: 34 y.o. [id number] 23w5d here for routine prenatal visit. Chief Complaint   Patient presents with   • Routine Prenatal Visit         OB complaints:  Contractions: no  Leakage: no  Bleeding: no  Fetal movement: yes      O:  /70   Pulse 88   Ht 5' 9" (1.753 m)   Wt 106 kg (233 lb 3.2 oz)   LMP 2023   BMI 34.44 kg/m²       Review of Systems   Constitutional: Negative for chills and fever. Eyes: Negative for visual disturbance. Respiratory: Negative for chest tightness and shortness of breath. Cardiovascular: Negative for chest pain. Gastrointestinal: Negative for abdominal pain, diarrhea, nausea and vomiting. Genitourinary: Negative for pelvic pain and vaginal bleeding. As noted in HPI   Skin: Negative for rash. Neurological: Negative for headaches. All other systems reviewed and are negative. Physical Exam  Constitutional:       General: She is not in acute distress. Appearance: Normal appearance. HENT:      Head: Normocephalic and atraumatic. Cardiovascular:      Rate and Rhythm: Normal rate. Pulmonary:      Effort: Pulmonary effort is normal. No respiratory distress. Abdominal:      General: There is no distension. Palpations: Abdomen is soft. Tenderness: There is no abdominal tenderness. There is no guarding or rebound. Comments: gravid   Neurological:      General: No focal deficit present. Mental Status: She is alert. Psychiatric:         Mood and Affect: Mood normal.         Behavior: Behavior normal.   Vitals and nursing note reviewed.               Fetal Heart Rate: 130    Pregravid Weight/BMI: 99.8 kg (220 lb) (BMI 32.47)  Current Weight: 106 kg (233 lb 3.2 oz)   Total Weight Gain: 5.987 kg (13 lb 3.2 oz)     Pre- Vitals    Flowsheet Row Most Recent Value   Prenatal Assessment    Fetal Heart Rate 130   Movement Present   Prenatal Vitals    Blood Pressure 126/70   Weight - Scale 106 kg (233 lb 3.2 oz)   Urine Albumin/Glucose    Dilation/Effacement/Station    Vaginal Drainage    Edema                   Problem List        Endocrine    Pre-existing type 1 diabetes mellitus with hyperglycemia during pregnancy in second trimester ([address])    Overview     ASA recommended  Baseline labs normal, UPC 0.18   ECG ordered  1st trimester Ophthalmology exam recommended   q trimester UCx per MFM  HgbA1c ordered by diabetes team - 5.0%  Anatomy US at [address] scheduled 23  Fetal echo - small VSD,  f/u recommended   Growth US   testing @ 32 wks twice weekly  Induction at 44 WGA           Current Assessment & Plan     Needs optho exam - reports none in last year  She is aware of recommendation for repeating EKG  Repeat A1C ordered with labs               Cardiovascular and Mediastinum    Fetal muscular VSD - tiny    Overview     -There is a tiny muscular VSD identified only on color doppler. Otherwise normal cardiac anatomy and function.   -Normal prenatal care, delivery, and  care are recommended. -Recommend  outpatient cardiology consult with echocardiogram at 1-2 weeks of life (Please schedule appointment prior to hospital discharge. Phone [phone]).                Other    History of postpartum depression    23 weeks gestation of pregnancy    BMI 33.0-33.9,adult    Supervision of other high risk pregnancy, antepartum    Overview     ASA recommended  CF/ SMA testing: declines  Covid vaccine: not completed   Declines aneuploidy screening   MsAFP: negative           Current Assessment & Plan     OB precautions reviewed         History of gestational hypertension    Overview     2nd pregnancy          Placenta previa antepartum in second trimester    Beta thalassemia minor    Overview     Hemoglobin electrophoresis showing HgbA2 borderline high:  May indicated beta thalassemia minor  Consider ferritin/ TIBC with 28 wks labs         Current Assessment & Plan     Discussed suspected beta thalassemia based on Hgb electrophoresis but would have to be confirmed with DNA testing - order placed  Discussed if she is a carrier would likely refer to genetic counseling for partner testing          Insulin pump titration                         Angelo Sotelo MD  7/27/2023  3:44 PM

## 2023-07-27 NOTE — ASSESSMENT & PLAN NOTE
Discussed suspected beta thalassemia based on Hgb electrophoresis but would have to be confirmed with DNA testing - order placed  Discussed if she is a carrier would likely refer to genetic counseling for partner testing

## 2023-07-28 ENCOUNTER — TELEPHONE (OUTPATIENT)
Facility: HOSPITAL | Age: 33
End: 2023-07-28

## 2023-07-28 NOTE — TELEPHONE ENCOUNTER
Patient called office to request Humalog refill. I also asked her to address the message regarding low blood sugars sent by [facility]. She said she had some Dexcom readings that were low to the 40's, but were not accurate. She also has made self adjustments recently. At this time she was in the process of taking her children to the physician and could not talk on the phone. She explained she would send a message when she gets home with adjustments that were made to her insulin pump settings and the date they were made, she said she would also upload insulin pump to be reviewed.

## 2023-08-02 ENCOUNTER — TREATMENT (OUTPATIENT)
Facility: HOSPITAL | Age: 33
End: 2023-08-02

## 2023-08-02 DIAGNOSIS — Z3A.24 24 WEEKS GESTATION OF PREGNANCY: ICD-10-CM

## 2023-08-02 DIAGNOSIS — O24.012 TYPE 1 DIABETES MELLITUS DURING PREGNANCY IN SECOND TRIMESTER: Primary | ICD-10-CM

## 2023-08-02 NOTE — PROGRESS NOTES
Renae Adams is a 34 y.o. female on a/an Medtronic 770 G insulin pump and Dexcom G6 CGM . Estimated Date of Delivery: 11/18/23   Currently: 24w4d  Encounter Diagnosis     ICD-10-CM    1. Type 1 diabetes mellitus during pregnancy in second trimester  O24.012       2. 24 weeks gestation of pregnancy  Z3A.24           Plan:     Current Insulin pump settings:    Basal rate:    MN from 1.50 to 1.60 units/hr  3am from 1.80 to 1.90 units/hr  5am from 1.75 to 1.85 units/hr  11:30am from 2.15 to 2.25 units/hr   4pm from 1.85 to 1.95 units/hr   10pm from 1.85 to 1.95 units/hr     Insulin to carb ratio:   MN @ 6 to 5 g/u     8pm from 6 to 5 g/u     Insulin sensitivity factor: MN @ 23    BG target: 90    Type of insulin: Humalog   Self-monitoring blood glucose reported by patient: No   CGM: yes - Dexcom       Diet: 2200 calorie GDM diet with 3 meals and 3 snacks including recommended combination of carb, protein and fat per meal/snack. It has been previously Discussed with patient in case of malfunctioning of the pump to use basal and bolus therapy as back up. Notified patient to call office with any issues.

## 2023-08-08 ENCOUNTER — ULTRASOUND (OUTPATIENT)
Dept: PERINATAL CARE | Facility: OTHER | Age: 33
End: 2023-08-08
Payer: COMMERCIAL

## 2023-08-08 VITALS
HEIGHT: 69 IN | BODY MASS INDEX: 35.4 KG/M2 | HEART RATE: 96 BPM | SYSTOLIC BLOOD PRESSURE: 130 MMHG | DIASTOLIC BLOOD PRESSURE: 70 MMHG | WEIGHT: 239 LBS

## 2023-08-08 DIAGNOSIS — E10.65 PRE-EXISTING TYPE 1 DIABETES MELLITUS WITH HYPERGLYCEMIA DURING PREGNANCY IN SECOND TRIMESTER (HCC): Primary | ICD-10-CM

## 2023-08-08 DIAGNOSIS — O24.012 PRE-EXISTING TYPE 1 DIABETES MELLITUS WITH HYPERGLYCEMIA DURING PREGNANCY IN SECOND TRIMESTER (HCC): Primary | ICD-10-CM

## 2023-08-08 DIAGNOSIS — Z3A.25 25 WEEKS GESTATION OF PREGNANCY: ICD-10-CM

## 2023-08-08 PROCEDURE — 99213 OFFICE O/P EST LOW 20 MIN: CPT | Performed by: OBSTETRICS & GYNECOLOGY

## 2023-08-08 PROCEDURE — 76816 OB US FOLLOW-UP PER FETUS: CPT | Performed by: OBSTETRICS & GYNECOLOGY

## 2023-08-08 NOTE — PROGRESS NOTES
"    Detox Recovery Unit Progress Note   Clinician: Vickey Silveira MD  Admission Date: 1/5/2023  10:25 EST 01/07/23    Behavioral Health Treatment Plan and Problem List: I have reviewed and approved the Behavioral Health Treatment Plan and Problem list.    Allergies  Allergies   Allergen Reactions   • Penicillins Hives       Hospital Day: 2 days      Assessment completed within view of staff    History  CC: withdrawal symptoms    Interval HPI: Patient seen and evaluated by me.  Chart reviewed. Patient is withdrawing from heroin, meth and benzos  Current withdrawal symptoms include: Insomnia, tremor, hot and cold flashes, leg cramps, nausea, malaise, fatigue    ROS otherwise as below.        Interval Review of Systems:   General ROS: negative for - fever.  Positive for withdrawal symptoms mentioned above.  Endocrine ROS: negative for - palpitations  Respiratory ROS: no cough, shortness of breath, or wheezing  Cardiovascular ROS: no chest pain or dyspnea on exertion  Gastrointestinal ROS: no abdominal pain,no black or bloody stools    /81 (BP Location: Right arm, Patient Position: Sitting)   Pulse 99   Temp 97.7 °F (36.5 °C) (Temporal)   Resp 18   Ht 147.3 cm (58\")   Wt 54.4 kg (120 lb)   LMP 12/22/2022   SpO2 97%   Breastfeeding No   BMI 25.08 kg/m²     Mental Status Exam  Mood: dysphoric  Affect: mood congruent  Thought Processes: linear  Oriented X3:  yes  Thought Content: sensorium intact   Suicidal Thoughts: denies  Homicidal Thoughts: denies        Medical Decision Making:   Labs:     Lab Results (last 24 hours)     ** No results found for the last 24 hours. **            Radiology:     Imaging Results (Last 24 Hours)     ** No results found for the last 24 hours. **            EKG:     ECG/EMG Results (most recent)     Procedure Component Value Units Date/Time    ECG 12 Lead Other [[id number]] Collected: 01/06/23 0130     Updated: 01/06/23 0135     QT Interval 390 ms      QTC Interval 432 ms     " The patient was seen today for an ultrasound. Please see ultrasound report (located under Ob Procedures) for additional details. Thank you very much for allowing us to participate in the care of this very nice patient. Should you have any questions, please do not hesitate to contact me[place] [id number] [facility]  Attending Physician, [address] [place] [address] Narrative:      Test Reason : Other~  Blood Pressure :   */*   mmHG  Vent. Rate :  74 BPM     Atrial Rate :  74 BPM     P-R Int : 120 ms          QRS Dur :  82 ms      QT Int : 390 ms       P-R-T Axes :  74  72  64 degrees     QTc Int : 432 ms    Normal sinus rhythm with sinus arrhythmia  Voltage criteria for left ventricular hypertrophy  Abnormal ECG  No previous ECGs available    Referred By:            Confirmed By:            Medications:  LORazepam, 1.5 mg, Oral, 3 times per day   Followed by  [START ON 1/8/2023] LORazepam, 1 mg, Oral, 3 times per day   Followed by  [START ON 1/9/2023] LORazepam, 0.5 mg, Oral, 3 times per day  nicotine, 1 patch, Transdermal, Q24H           All medications reviewed.      Assessment and Plan:   Opioid use disorder, severe, dependence (HCC)  -Clonidine detox  -Symptom control medications       Severe benzodiazepine use disorder (HCC)]  -Ativan detox       Methamphetamine use disorder, severe (HCC)  -Supportive treatment      R/O UTI  -Patient reports no dysuria. Monitor       Nicotine use disorder  -Nicotine replacement         Continue hospitalization for medical management of drug withdrawal and patient safety and stabilization.  Continue individual and group chemical dependency counseling.   Therapist and treatment team will continue to assist patient in establishing plans for follow-up and rehabilitation that will serve as the next step in care once patient has completed the medical detox.

## 2023-08-16 ENCOUNTER — TELEPHONE (OUTPATIENT)
Facility: HOSPITAL | Age: 33
End: 2023-08-16

## 2023-08-16 NOTE — TELEPHONE ENCOUNTER
Called patient, left a voicemail to ask her to download insulin pump. Also asked her to send a message on [employer] when she downloads insulin pump and provide any insight she has on her blood sugars and if she had any low blood sugars recently that had to be treated.

## 2023-08-22 PROBLEM — Z3A.27 27 WEEKS GESTATION OF PREGNANCY: Status: ACTIVE | Noted: 2023-05-25

## 2023-08-24 NOTE — PROGRESS NOTES
35w3d pregnant female, here for prenatal visit. Pt well, without complaints. She is feeling fetal movement. Fetal kick counts reviewed. Patient has not gone for labs. Reminded to compete    OB folder given and reviewed contents: fetal kick counting, perineal/ vaginal massage, [facility] providers, consent for delivery,  birth plan guide, photography release, birth room support person form, birth certificate worksheet    Reviewed breastfeeding, pediatrician & classes. Recommendation for Tdap vaccine in 3rd trimester reviewed. Pt declines Tdap vaccine today.  : patient wants to get at next visit. Ulisses Akhtar Pediatrician: [facility] Dr. Zhao Expose  Plans to breastfeed: yes. Declines breastpump order      Current Outpatient Medications:   •  aspirin 81 mg chewable tablet, Chew 162 mg daily, Disp: , Rfl:   •  Blood Pressure Monitoring (B-D ASSURE BPM/AUTO ARM CUFF) MISC, Use 2 (two) times a day Call if BP > 140/90 (Patient not taking: Reported on 8/8/2023), Disp: 1 each, Rfl: 0  •  Continuous Blood Gluc  (Dexcom G6 ) DENIZ, Use as directed., Disp: 1 Device, Rfl: 0  •  Continuous Blood Gluc Sensor (Dexcom G6 Sensor) MISC, 1 box=1 month supply or 3 sensors, use 1 sensor every 10 days. , Disp: 1 each, Rfl: 12  •  Continuous Blood Gluc Transmit (Dexcom G6 Transmitter) MISC, Transmitter change every 90 days. , Disp: 1 each, Rfl: 3  •  glucagon 1 MG injection, Inject 1 mg under the skin (Patient not taking: Reported on 8/8/2023), Disp: , Rfl:   •  glucose blood test strip, Use 1 each daily as needed Use as instructed, Disp: , Rfl:   •  HumaLOG 100 UNIT/ML injection, Use up to 100 units a day via insulin pump, to be titrated. T1DM and pregnancy. 90 day supply. , Disp: 90 mL, Rfl: 0  •  insulin glargine (LANTUS) 100 units/mL subcutaneous injection, Inject 10 Units under the skin (Patient not taking: Reported on 8/8/2023), Disp: , Rfl:   •  insulin lispro (HumaLOG) 100 units/mL injection, Use up to 200 units via insulin pump daily. T2DM and pregnancy. , Disp: 180 mL, Rfl: 0  •  Lancets MISC, by Does not apply route, Disp: , Rfl:   •  Prenatal Vit-Fe Fumarate-FA (PRENATAL VITAMIN PO), Take 1 tablet by mouth daily, Disp: , Rfl:     Pregravid Weight/BMI: 99.8 kg (220 lb) (BMI 32.47)  Current Weight:     Total Weight Gain: 8.618 kg (19 lb)        Vitals:    23 0727   BP: 128/70   Pulse: 96   Temp: (!) 96.9 °F (36.1 °C)   SpO2: 98%         Fundal height: 28  Fetal Heart Rate: 148    Problem List        Endocrine    Pre-existing type 1 diabetes mellitus with hyperglycemia during pregnancy in second trimester ([address])    Overview     ASA recommended  Baseline labs normal, UPC 0.18   ECG ordered  1st trimester Ophthalmology exam recommended   q trimester UCx per MFM  HgbA1c ordered by diabetes team - 5.0%  Anatomy US at [address] scheduled 23  Fetal echo - small VSD,  f/u recommended   Growth US   testing @ 32 wks twice weekly  Induction at 44 WGA              Cardiovascular and Mediastinum    Fetal muscular VSD - tiny    Overview     -There is a tiny muscular VSD identified only on color doppler. Otherwise normal cardiac anatomy and function.   -Normal prenatal care, delivery, and  care are recommended. -Recommend  outpatient cardiology consult with echocardiogram at 1-2 weeks of life (Please schedule appointment prior to hospital discharge. Phone [phone]).                Other    History of postpartum depression    28 weeks gestation of pregnancy    BMI 33.0-33.9,adult    Supervision of other high risk pregnancy, antepartum    Overview     ASA recommended  CF/ SMA testing: declines  Covid vaccine: not completed   Declines aneuploidy screening   MsAFP: negative           History of gestational hypertension    Overview     2nd pregnancy          Placenta previa antepartum in second trimester    Beta thalassemia minor    Overview     Hemoglobin electrophoresis showing HgbA2 borderline high: May indicated beta thalassemia minor  Consider ferritin/ TIBC with 28 wks labs         Insulin pump titration     Ob visit in 2 weeks  US scheduled 8/31/23  Continue care with diabetes team  Get labs ASAP   Plan for Tdap at next visit  Consents given for patient to review and bring back to next visit

## 2023-08-25 ENCOUNTER — TELEPHONE (OUTPATIENT)
Facility: HOSPITAL | Age: 33
End: 2023-08-25

## 2023-08-25 NOTE — TELEPHONE ENCOUNTER
Left voicemail asking patient to upload insulin pump so a review can be done and send a message once it is completed. Also asked her to provide any insight she thinks is important to give. Any questions or concerns she can call office at [phone].

## 2023-08-28 ENCOUNTER — APPOINTMENT (OUTPATIENT)
Dept: LAB | Facility: CLINIC | Age: 33
End: 2023-08-28
Payer: COMMERCIAL

## 2023-08-28 ENCOUNTER — ROUTINE PRENATAL (OUTPATIENT)
Dept: OBGYN CLINIC | Facility: CLINIC | Age: 33
End: 2023-08-28

## 2023-08-28 VITALS
HEIGHT: 69 IN | OXYGEN SATURATION: 98 % | HEART RATE: 96 BPM | TEMPERATURE: 96.9 F | WEIGHT: 245 LBS | DIASTOLIC BLOOD PRESSURE: 70 MMHG | SYSTOLIC BLOOD PRESSURE: 128 MMHG | BODY MASS INDEX: 36.29 KG/M2

## 2023-08-28 DIAGNOSIS — E10.69 TYPE I DIABETES MELLITUS WITH HYPEROSMOLAR COMA (HCC): Primary | ICD-10-CM

## 2023-08-28 DIAGNOSIS — Z3A.14 14 WEEKS GESTATION OF PREGNANCY: ICD-10-CM

## 2023-08-28 DIAGNOSIS — O35.BXX0 ANOMALY OF HEART OF FETUS AFFECTING PREGNANCY, ANTEPARTUM, SINGLE OR UNSPECIFIED FETUS: ICD-10-CM

## 2023-08-28 DIAGNOSIS — E10.65 TYPE I DIABETES MELLITUS WITH HYPEROSMOLAR COMA (HCC): Primary | ICD-10-CM

## 2023-08-28 DIAGNOSIS — E87.0 TYPE I DIABETES MELLITUS WITH HYPEROSMOLAR COMA (HCC): Primary | ICD-10-CM

## 2023-08-28 DIAGNOSIS — E10.65 PRE-EXISTING TYPE 1 DIABETES MELLITUS WITH HYPERGLYCEMIA DURING PREGNANCY IN SECOND TRIMESTER (HCC): ICD-10-CM

## 2023-08-28 DIAGNOSIS — Z46.81 INSULIN PUMP TITRATION: ICD-10-CM

## 2023-08-28 DIAGNOSIS — O44.02 PLACENTA PREVIA ANTEPARTUM IN SECOND TRIMESTER: ICD-10-CM

## 2023-08-28 DIAGNOSIS — D56.3 BETA THALASSEMIA MINOR: ICD-10-CM

## 2023-08-28 DIAGNOSIS — O24.012 PRE-EXISTING TYPE 1 DIABETES MELLITUS WITH HYPERGLYCEMIA DURING PREGNANCY IN SECOND TRIMESTER (HCC): ICD-10-CM

## 2023-08-28 DIAGNOSIS — D56.3 BETA THALASSEMIA MINOR: Primary | ICD-10-CM

## 2023-08-28 DIAGNOSIS — Z87.59 HISTORY OF GESTATIONAL HYPERTENSION: ICD-10-CM

## 2023-08-28 DIAGNOSIS — Z3A.27 27 WEEKS GESTATION OF PREGNANCY: ICD-10-CM

## 2023-08-28 LAB
ERYTHROCYTE [DISTWIDTH] IN BLOOD BY AUTOMATED COUNT: 12.4 % (ref 11.6–15.1)
EST. AVERAGE GLUCOSE BLD GHB EST-MCNC: 111 MG/DL
HBA1C MFR BLD: 5.5 %
HCT VFR BLD AUTO: 34.7 % (ref 34.8–46.1)
HGB BLD-MCNC: 11.3 G/DL (ref 11.5–15.4)
MCH RBC QN AUTO: 29.3 PG (ref 26.8–34.3)
MCHC RBC AUTO-ENTMCNC: 32.6 G/DL (ref 31.4–37.4)
MCV RBC AUTO: 90 FL (ref 82–98)
PLATELET # BLD AUTO: 254 THOUSANDS/UL (ref 149–390)
PMV BLD AUTO: 10.6 FL (ref 8.9–12.7)
RBC # BLD AUTO: 3.86 MILLION/UL (ref 3.81–5.12)
TREPONEMA PALLIDUM IGG+IGM AB [PRESENCE] IN SERUM OR PLASMA BY IMMUNOASSAY: NORMAL
WBC # BLD AUTO: 10.71 THOUSAND/UL (ref 4.31–10.16)

## 2023-08-28 PROCEDURE — PNV: Performed by: PHYSICIAN ASSISTANT

## 2023-08-28 PROCEDURE — 87147 CULTURE TYPE IMMUNOLOGIC: CPT

## 2023-08-28 PROCEDURE — 36415 COLL VENOUS BLD VENIPUNCTURE: CPT

## 2023-08-28 PROCEDURE — 87086 URINE CULTURE/COLONY COUNT: CPT

## 2023-08-28 PROCEDURE — 83036 HEMOGLOBIN GLYCOSYLATED A1C: CPT

## 2023-08-28 PROCEDURE — 85027 COMPLETE CBC AUTOMATED: CPT

## 2023-08-28 PROCEDURE — 86780 TREPONEMA PALLIDUM: CPT

## 2023-08-28 RX ORDER — DOCUSATE SODIUM 100 MG/1
100 CAPSULE, LIQUID FILLED ORAL 2 TIMES DAILY
COMMUNITY

## 2023-08-29 ENCOUNTER — TELEMEDICINE (OUTPATIENT)
Facility: HOSPITAL | Age: 33
End: 2023-08-29

## 2023-08-29 DIAGNOSIS — Z46.81 INSULIN PUMP TITRATION: ICD-10-CM

## 2023-08-29 DIAGNOSIS — E10.65 PRE-EXISTING TYPE 1 DIABETES MELLITUS WITH HYPERGLYCEMIA DURING PREGNANCY IN THIRD TRIMESTER (HCC): Primary | ICD-10-CM

## 2023-08-29 DIAGNOSIS — Z3A.28 28 WEEKS GESTATION OF PREGNANCY: ICD-10-CM

## 2023-08-29 DIAGNOSIS — O24.013 PRE-EXISTING TYPE 1 DIABETES MELLITUS WITH HYPERGLYCEMIA DURING PREGNANCY IN THIRD TRIMESTER (HCC): Primary | ICD-10-CM

## 2023-08-29 LAB
BACTERIA UR CULT: ABNORMAL
BACTERIA UR CULT: ABNORMAL

## 2023-08-29 NOTE — ASSESSMENT & PLAN NOTE
-Last A1c 5.5% at goal.  -A1c goal is less than 6% with minimal hypoglycemia. -Humalog via Medtronic 770G insulin pump and Makeblock G6 CGM. Insulin pump basal settings patient self adjusted as follows:  MN@ 2.50 units/hour;  3 AM@ 3.00 units/hour;  5 AM@ 2.95 units/hour;  11:30 AM@ 3.50 units/hour;  4 PM@ 3.00 units/hour;  10 PM@ units/hour. Due to increase in post meal correction bolus; during visit bolus settings adjusted as follows:  -decrease carb ratio to 4 and insulin sensitivity to 14. Try to use 15 by 15 rule when treating hypoglycemia. Insulin pump download every week.      Lab Results   Component Value Date    HGBA1C 5.5 08/28/2023     Lab Results   Component Value Date    HGBA1C 5.5 08/28/2023     Lab Results   Component Value Date    HGBA1C 5.5 08/28/2023

## 2023-08-29 NOTE — PROGRESS NOTES
Virtual Regular Visit    Verification of patient location:    Patient is located at Home in the following state in which I hold an active license [place]      Assessment/Plan:    Problem List Items Addressed This Visit        Endocrine    Pre-existing type 1 diabetes mellitus with hyperglycemia during pregnancy in third trimester ([address]) - Primary     -Last A1c 5.5% at goal.  -A1c goal is less than 6% with minimal hypoglycemia. -Humalog via Medtronic 770G insulin pump and DexBensussen Deutsch G6 CGM. Insulin pump basal settings patient self adjusted as follows:  MN@ 2.50 units/hour;  3 AM@ 3.00 units/hour;  5 AM@ 2.95 units/hour;  11:30 AM@ 3.50 units/hour;  4 PM@ 3.00 units/hour;  10 PM@ units/hour. Due to increase in post meal correction bolus; during visit bolus settings adjusted as follows:  -decrease carb ratio to 4 and insulin sensitivity to 14. Try to use 15 by 15 rule when treating hypoglycemia. Insulin pump download every week. Lab Results   Component Value Date    HGBA1C 5.5 08/28/2023     Lab Results   Component Value Date    HGBA1C 5.5 08/28/2023     Lab Results   Component Value Date    HGBA1C 5.5 08/28/2023            Relevant Orders    Comprehensive metabolic panel    Hemoglobin A1C    Protein, Total w/Creat, Random Urine       Other    28 weeks gestation of pregnancy    Relevant Orders    Comprehensive metabolic panel    Hemoglobin A1C    Protein, Total w/Creat, Random Urine    BMI 36.0-36.9,adult     -First visit weight 220 lbs. -Current weight 246 lbs. -TWG 26 lbs. -Recommended weight gain 11 to 20 lbs. -Follow GDM meal plan.            Insulin pump titration    Relevant Orders    Comprehensive metabolic panel    Hemoglobin A1C    Protein, Total w/Creat, Random Urine            Reason for visit is   Chief Complaint   Patient presents with   • Virtual Regular Visit   • Diabetes Type 1        Encounter provider Cris Barrios, [facility]    Provider located at [address] [place] [address] [address] [facility],[address] [place] [address]      Recent Visits  Date Type Provider Dept   23 [place], [place] An    23 Telephone Manda Huber RN An    Showing recent visits within past 7 days and meeting all other requirements  Future Appointments  No visits were found meeting these conditions. Showing future appointments within next 150 days and meeting all other requirements       The patient was identified by name and date of birth. Stewart Hernandez was informed that this is a telemedicine visit and that the visit is being conducted through the [employer] [facility] [employer] platform. She agrees to proceed. .  My office door was closed. No one else was in the room. She acknowledged consent and understanding of privacy and security of the video platform. The patient has agreed to participate and understands they can discontinue the visit at any time. Patient is aware this is a billable service. Deann Lee is a 34 y.o. female  28 3/7 weeks gestation T1DM on Medtronic 770G insulin pump and Dexcom G6 CGM. Due to hyperglycemia, has self adjusted insulin pump multiple times. Review of insulin pump shows need for post meal correction bolus. Does not input glucose readings and carbs into PDM for insulin pump recommendations but instead does math in head to self bolus. Difficult to make make recommendations without all information. Since post meal glucose readings are above goal requiring correction bolus; carb ratio and correction factor decreased during visit. Reported that current CGM sensor is reading lower than actual finger sticks, hoping next sensor is better. Positive fetal movement.      HPI     Past Medical History:   Diagnosis Date   • Diabetes ([address])     type 1   • Diabetes mellitus ([address])        Past Surgical History:   Procedure Laterality Date   • [place] NDSC WRST SURG W/RLS TRANSVRS CARPL LIGM Right 2/3/2021 Procedure: RELEASE CARPAL TUNNEL ENDOSCOPIC;  Surgeon: Alee Anand MD;  Location: [facility];  Service: Orthopedics   • [place] [address] WRST SURG W/RLS TRANSVRS CARPL LIGM Left 2/23/2021    Procedure: RELEASE CARPAL TUNNEL ENDOSCOPIC;  Surgeon: Alee Anand MD;  Location: [facility];  Service: Orthopedics   • WISDOM TOOTH EXTRACTION         Current Outpatient Medications   Medication Sig Dispense Refill   • aspirin 81 mg chewable tablet Chew 162 mg daily     • Blood Pressure Monitoring (B-D ASSURE BPM/AUTO ARM CUFF) MISC Use 2 (two) times a day Call if BP > 140/90 (Patient not taking: Reported on 8/8/2023) 1 each 0   • Continuous Blood Gluc  (Dexcom G6 ) DENIZ Use as directed. 1 Device 0   • Continuous Blood Gluc Sensor (Dexcom G6 Sensor) MISC 1 box=1 month supply or 3 sensors, use 1 sensor every 10 days. 1 each 12   • Continuous Blood Gluc Transmit (Dexcom G6 Transmitter) MISC Transmitter change every 90 days. 1 each 3   • docusate sodium (COLACE) 100 mg capsule Take 100 mg by mouth 2 (two) times a day     • glucagon 1 MG injection Inject 1 mg under the skin (Patient not taking: Reported on 8/8/2023)     • glucose blood test strip Use 1 each daily as needed Use as instructed     • HumaLOG 100 UNIT/ML injection Use up to 100 units a day via insulin pump, to be titrated. T1DM and pregnancy. 90 day supply. 90 mL 0   • insulin glargine (LANTUS) 100 units/mL subcutaneous injection Inject 10 Units under the skin (Patient not taking: Reported on 8/8/2023)     • insulin lispro (HumaLOG) 100 units/mL injection Use up to 200 units via insulin pump daily. T2DM and pregnancy. 180 mL 0   • Lancets MISC by Does not apply route     • Prenatal Vit-Fe Fumarate-FA (PRENATAL VITAMIN PO) Take 1 tablet by mouth daily       No current facility-administered medications for this visit. No Known Allergies    Review of Systems   Constitutional: Negative for fatigue and fever.    Eyes: Negative for visual disturbance. Respiratory: Negative for cough and shortness of breath. Cardiovascular: Negative for chest pain and palpitations. Gastrointestinal: Negative for constipation, nausea and vomiting. Endocrine: Negative for polydipsia, polyphagia and polyuria. Genitourinary: Negative for vaginal bleeding. Neurological: Negative for headaches. Psychiatric/Behavioral: Negative for sleep disturbance. Video Exam  Wt 246 lbs. Refer to attachment for insulin pump download. There were no vitals filed for this visit. Physical Exam  HENT:      Head: Normocephalic. Nose: Nose normal.   Eyes:      Conjunctiva/sclera: Conjunctivae normal.   Pulmonary:      Effort: Pulmonary effort is normal.   Neurological:      Mental Status: She is alert and oriented to person, place, and time. Psychiatric:         Mood and Affect: Mood normal.         Behavior: Behavior normal.         Thought Content: Thought content normal.         Judgment: Judgment normal.          Visit Time  Total Visit Duration: 30 minutes.

## 2023-08-30 PROBLEM — R82.71 GBS BACTERIURIA: Status: ACTIVE | Noted: 2023-08-30

## 2023-08-31 ENCOUNTER — ULTRASOUND (OUTPATIENT)
Dept: PERINATAL CARE | Facility: OTHER | Age: 33
End: 2023-08-31
Payer: COMMERCIAL

## 2023-08-31 VITALS
HEART RATE: 94 BPM | SYSTOLIC BLOOD PRESSURE: 130 MMHG | DIASTOLIC BLOOD PRESSURE: 64 MMHG | WEIGHT: 246.4 LBS | HEIGHT: 69 IN | BODY MASS INDEX: 36.5 KG/M2

## 2023-08-31 DIAGNOSIS — O35.BXX0 ANOMALY OF HEART OF FETUS AFFECTING PREGNANCY, ANTEPARTUM, SINGLE OR UNSPECIFIED FETUS: ICD-10-CM

## 2023-08-31 DIAGNOSIS — O24.013 PRE-EXISTING TYPE 1 DIABETES MELLITUS WITH HYPERGLYCEMIA DURING PREGNANCY IN THIRD TRIMESTER (HCC): ICD-10-CM

## 2023-08-31 DIAGNOSIS — E10.65 PRE-EXISTING TYPE 1 DIABETES MELLITUS WITH HYPERGLYCEMIA DURING PREGNANCY IN THIRD TRIMESTER (HCC): ICD-10-CM

## 2023-08-31 DIAGNOSIS — Z3A.28 28 WEEKS GESTATION OF PREGNANCY: ICD-10-CM

## 2023-08-31 PROCEDURE — 76816 OB US FOLLOW-UP PER FETUS: CPT | Performed by: OBSTETRICS & GYNECOLOGY

## 2023-08-31 NOTE — ASSESSMENT & PLAN NOTE
-First visit weight 220 lbs. -Current weight 246 lbs. -TWG 26 lbs. -Recommended weight gain 11 to 20 lbs. -Follow GDM meal plan.

## 2023-08-31 NOTE — PROGRESS NOTES
The patient was seen today for an ultrasound. Please see ultrasound report (located under Ob Procedures) for additional details. Thank you very much for allowing us to participate in the care of this very nice patient. Should you have any questions, please do not hesitate to contact me. [place] [address] [facility]  Attending Physician, [address] [place] [address]

## 2023-09-08 ENCOUNTER — TELEPHONE (OUTPATIENT)
Facility: HOSPITAL | Age: 33
End: 2023-09-08

## 2023-09-12 NOTE — PROGRESS NOTES
Patient here for prenatal visit. She is 30w6d pregnant. Patient doing well without c/o. Fetal movement: yes  Bleeding: no  Contractions: no  Edema: no    OB consent: patient forgot to bring to visit today. Will bring next visit    Tdap : elects today    Pediatrician: [facility] Dr. Luisa Maciel to breastfeed: yes. Declines breastpump order    Contraception plans: undecided, but potentially the progesterone only pill. Circumcision intent: yes if male    Current Outpatient Medications on File Prior to Visit   Medication Sig Dispense Refill   • aspirin 81 mg chewable tablet Chew 162 mg daily     • Blood Pressure Monitoring (B-D ASSURE BPM/AUTO ARM CUFF) MISC Use 2 (two) times a day Call if BP > 140/90 (Patient not taking: Reported on 8/8/2023) 1 each 0   • Continuous Blood Gluc  (Dexcom G6 ) DENIZ Use as directed. 1 Device 0   • Continuous Blood Gluc Sensor (Dexcom G6 Sensor) MISC 1 box=1 month supply or 3 sensors, use 1 sensor every 10 days. 1 each 12   • Continuous Blood Gluc Transmit (Dexcom G6 Transmitter) MISC Transmitter change every 90 days. 1 each 3   • docusate sodium (COLACE) 100 mg capsule Take 100 mg by mouth 2 (two) times a day     • glucagon 1 MG injection Inject 1 mg under the skin (Patient not taking: Reported on 8/8/2023)     • glucose blood test strip Use 1 each daily as needed Use as instructed     • HumaLOG 100 UNIT/ML injection Use up to 100 units a day via insulin pump, to be titrated. T1DM and pregnancy. 90 day supply. 90 mL 0   • insulin glargine (LANTUS) 100 units/mL subcutaneous injection Inject 10 Units under the skin (Patient not taking: Reported on 8/8/2023)     • insulin lispro (HumaLOG) 100 units/mL injection Use up to 200 units via insulin pump daily. T2DM and pregnancy.  180 mL 0   • Lancets MISC by Does not apply route     • Prenatal Vit-Fe Fumarate-FA (PRENATAL VITAMIN PO) Take 1 tablet by mouth daily       No current facility-administered medications on file prior to visit. Pregravid Weight/BMI: 99.8 kg (220 lb) (BMI 32.47)  Current Weight:     Total Weight Gain: 14 kg (30 lb 12.8 oz)        Vitals:    09/15/23 0722   BP: 134/60   Pulse: 87   Temp: 97.5 °F (36.4 °C)         Fundal height: 30  Fetal Heart Rate: 130    Problem List        Endocrine    Pre-existing type 1 diabetes mellitus with hyperglycemia during pregnancy in third trimester ([address])    Overview     ASA recommended  Baseline labs normal, UPC 0.18   ECG ordered  1st trimester Ophthalmology exam recommended   q trimester UCx per [facility]  HgbA1c ordered by diabetes team - 5.0%  Anatomy US at [address] scheduled 23  Fetal echo - small VSD,  f/u recommended   Growth US   testing @ 32 wks twice weekly  Induction at 44 WGA              Cardiovascular and Mediastinum    Fetal muscular VSD - tiny    Overview     -There is a tiny muscular VSD identified only on color doppler. Otherwise normal cardiac anatomy and function.   -Normal prenatal care, delivery, and  care are recommended. -Recommend  outpatient cardiology consult with echocardiogram at 1-2 weeks of life (Please schedule appointment prior to hospital discharge. Phone [phone]).                Other    History of postpartum depression    30 weeks gestation of pregnancy    BMI 36.0-36.9,adult    Supervision of other high risk pregnancy, antepartum    Overview     ASA recommended  CF/ SMA testing: declines  Covid vaccine: not completed   Declines aneuploidy screening   MsAFP: negative           History of gestational hypertension    Overview     2nd pregnancy          Placenta previa antepartum in second trimester    Beta thalassemia minor    Overview     Hemoglobin electrophoresis showing HgbA2 borderline high:  May indicated beta thalassemia minor  Consider ferritin/ TIBC with 28 wks labs         Insulin pump titration    GBS bacteriuria     Ob visit in 2 wks  twice weekly  testing scheduled with M  Continue care with diabetes team  Tdap completed today  Ob consents : to bring to next visit.

## 2023-09-15 ENCOUNTER — ROUTINE PRENATAL (OUTPATIENT)
Dept: OBGYN CLINIC | Facility: CLINIC | Age: 33
End: 2023-09-15
Payer: COMMERCIAL

## 2023-09-15 VITALS
DIASTOLIC BLOOD PRESSURE: 60 MMHG | BODY MASS INDEX: 37.04 KG/M2 | HEART RATE: 87 BPM | WEIGHT: 250.8 LBS | SYSTOLIC BLOOD PRESSURE: 134 MMHG | TEMPERATURE: 97.5 F

## 2023-09-15 DIAGNOSIS — O09.899 SUPERVISION OF OTHER HIGH RISK PREGNANCY, ANTEPARTUM: Primary | ICD-10-CM

## 2023-09-15 DIAGNOSIS — Z3A.30 30 WEEKS GESTATION OF PREGNANCY: ICD-10-CM

## 2023-09-15 DIAGNOSIS — O35.BXX0 ANOMALY OF HEART OF FETUS AFFECTING PREGNANCY, ANTEPARTUM, SINGLE OR UNSPECIFIED FETUS: ICD-10-CM

## 2023-09-15 DIAGNOSIS — R82.71 GBS BACTERIURIA: ICD-10-CM

## 2023-09-15 DIAGNOSIS — Z87.59 HISTORY OF GESTATIONAL HYPERTENSION: ICD-10-CM

## 2023-09-15 DIAGNOSIS — E10.65 PRE-EXISTING TYPE 1 DIABETES MELLITUS WITH HYPERGLYCEMIA DURING PREGNANCY IN THIRD TRIMESTER (HCC): ICD-10-CM

## 2023-09-15 DIAGNOSIS — Z46.81 INSULIN PUMP TITRATION: ICD-10-CM

## 2023-09-15 DIAGNOSIS — D56.3 BETA THALASSEMIA MINOR: ICD-10-CM

## 2023-09-15 DIAGNOSIS — Z23 NEED FOR TDAP VACCINATION: ICD-10-CM

## 2023-09-15 DIAGNOSIS — O44.02 PLACENTA PREVIA ANTEPARTUM IN SECOND TRIMESTER: ICD-10-CM

## 2023-09-15 DIAGNOSIS — O24.013 PRE-EXISTING TYPE 1 DIABETES MELLITUS WITH HYPERGLYCEMIA DURING PREGNANCY IN THIRD TRIMESTER (HCC): ICD-10-CM

## 2023-09-15 PROCEDURE — 90471 IMMUNIZATION ADMIN: CPT

## 2023-09-15 PROCEDURE — PNV: Performed by: PHYSICIAN ASSISTANT

## 2023-09-15 PROCEDURE — 90715 TDAP VACCINE 7 YRS/> IM: CPT

## 2023-09-15 NOTE — PATIENT INSTRUCTIONS
Pregnancy at 31 to 34 Weeks   AMBULATORY CARE:   Changes happening with your body: You may continue to have symptoms such as shortness of breath, heartburn, contractions, or swelling of your ankles and feet. You may be gaining about 1 pound a week now. Seek care immediately if:   You develop a severe headache that does not go away. You have new or increased vision changes, such as blurred or spotted vision. You have new or increased swelling in your face or hands. You have vaginal spotting or bleeding. Your water broke or you feel warm water gushing or trickling from your vagina. Call your obstetrician if:   You have more than 5 contractions in 1 hour. You notice any changes in your baby's movements. You have abdominal cramps, pressure, or tightening. You have a change in vaginal discharge. You have chills or a fever. You have vaginal itching, burning, or pain. You have yellow, green, white, or foul-smelling vaginal discharge. You have pain or burning when you urinate, less urine than usual, or pink or bloody urine. You have questions or concerns about your condition or care. How to care for yourself at this stage of your pregnancy:       Eat a variety of healthy foods. Healthy foods include fruits, vegetables, whole-grain breads, low-fat dairy foods, beans, lean meats, and fish. Drink liquids as directed. Ask how much liquid to drink each day and which liquids are best for you. Limit caffeine to less than 200 milligrams each day. Limit your intake of fish to 2 servings each week. Choose fish low in mercury such as canned light tuna, shrimp, salmon, cod, or tilapia. Do not  eat fish high in mercury such as swordfish, tilefish, oscar mackerel, and shark. Manage heartburn  by eating 4 or 5 small meals each day instead of large meals. Avoid spicy food. Manage swelling  by lying down and putting your feet up. Take prenatal vitamins as directed.   Your need for certain vitamins and minerals, such as folic acid, increases during pregnancy. Prenatal vitamins provide some of the extra vitamins and minerals you need. Prenatal vitamins may also help to decrease the risk of certain birth defects. Talk to your healthcare provider about exercise. Moderate exercise can help you stay fit. Your healthcare provider will help you plan an exercise program that is safe for you during pregnancy. Do not smoke. Smoking increases your risk of a miscarriage and other health problems during your pregnancy. Smoking can cause your baby to be born too early or weigh less at birth. Ask your healthcare provider for information if you need help quitting. Do not drink alcohol. Alcohol passes from your body to your baby through the placenta. It can affect your baby's brain development and cause fetal alcohol syndrome (FAS). FAS is a group of conditions that causes mental, behavior, and growth problems. Talk to your healthcare provider before you take any medicines. Many medicines may harm your baby if you take them when you are pregnant. Do not take any medicines, vitamins, herbs, or supplements without first talking to your healthcare provider. Never use illegal or street drugs (such as marijuana or cocaine) while you are pregnant. Safety tips during pregnancy:   Avoid hot tubs and saunas. Do not use a hot tub or sauna while you are pregnant, especially during your first trimester. Hot tubs and saunas may raise your baby's temperature and increase the risk of birth defects. Avoid toxoplasmosis. This is an infection caused by eating raw meat or being around infected cat feces. It can cause birth defects, miscarriages, and other problems. Wash your hands after you touch raw meat. Make sure any meat is well-cooked before you eat it. Avoid raw eggs and unpasteurized milk. Use gloves or ask someone else to clean your cat's litter box while you are pregnant. Changes happening with your baby:  By 34 weeks, your baby may weigh more than 5 pounds. Your baby will be about 12 ½ inches long from the top of the head to the rump (baby's bottom). Your baby is gaining about ½ pound a week. Your baby's eyes open and close now. Your baby's kicks and movements are more forceful at this time. What you need to know about prenatal care: Your healthcare provider will check your blood pressure and weight. You may also need the following:  A urine test  may also be done to check for sugar and protein. These can be signs of gestational diabetes or infection. Protein in your urine may also be a sign of preeclampsia. Preeclampsia is a condition that can develop during week 20 or later of your pregnancy. It causes high blood pressure, and it can cause problems with your kidneys and other organs. A gestational diabetes screen  may be done. Your healthcare provider may order either a 1-step or 2-step oral glucose tolerance test (OGTT). 1-step OGTT:  Your blood sugar level will be tested after you have not eaten for 8 hours (fasting). You will then be given a glucose drink. Your level will be tested again 1 hour and 2 hours after you finish the drink. 2-step OGTT:  You do not have to fast for the first part of the test. You will have the glucose drink at any time of day. Your blood sugar level will be checked 1 hour later. If your blood sugar is higher than a certain level, another test will be ordered. You will fast and your blood sugar level will be tested. You will have the glucose drink. Your blood will be tested again 1 hour, 2 hours, and 3 hours after you finish the glucose drink. A Tdap vaccine  may be recommended by your healthcare provider. Fundal height  is a measurement of your uterus to check your baby's growth. This number is usually the same as the number of weeks that you have been pregnant.  Your healthcare provider may also check your baby's position. Your baby's heart rate  will be checked. Follow up with your obstetrician as directed:  Write down your questions so you remember to ask them during your visits. © Copyright [employer] 2022 Information is for End User's use only and may not be sold, redistributed or otherwise used for commercial purposes. The above information is an  only. It is not intended as medical advice for individual conditions or treatments. Talk to your doctor, nurse or pharmacist before following any medical regimen to see if it is safe and effective for you.

## 2023-09-22 ENCOUNTER — TELEPHONE (OUTPATIENT)
Facility: HOSPITAL | Age: 33
End: 2023-09-22

## 2023-09-22 NOTE — TELEPHONE ENCOUNTER
Called patient, left voicemail asking patient to download insulin pump for review and to send message once insulin pump has been downloaded. (last download was 9/1) Also asked patient to provide insight of blood sugars, if she is experiencing any low blood sugars, and to let diabetes team know. Send [employer] message or call office at [phone].      Next [facility] appt -  9/28

## 2023-09-26 ENCOUNTER — ROUTINE PRENATAL (OUTPATIENT)
Dept: PERINATAL CARE | Facility: OTHER | Age: 33
End: 2023-09-26
Payer: COMMERCIAL

## 2023-09-26 VITALS
BODY MASS INDEX: 37.5 KG/M2 | HEIGHT: 69 IN | WEIGHT: 253.2 LBS | HEART RATE: 94 BPM | SYSTOLIC BLOOD PRESSURE: 128 MMHG | DIASTOLIC BLOOD PRESSURE: 64 MMHG

## 2023-09-26 DIAGNOSIS — O24.013 PREGNANCY COMPLICATED BY PRE-EXISTING TYPE 1 DIABETES, THIRD TRIMESTER: ICD-10-CM

## 2023-09-26 DIAGNOSIS — Z3A.32 32 WEEKS GESTATION OF PREGNANCY: Primary | ICD-10-CM

## 2023-09-26 PROCEDURE — 59025 FETAL NON-STRESS TEST: CPT | Performed by: OBSTETRICS & GYNECOLOGY

## 2023-09-26 NOTE — LETTER
NST sleeve cover sheet    Patient name: Berenice Marquez  : 1990  MRN: [id number]    TRI: Estimated Date of Delivery: 23    Obstetrician: __________Tuazon_____________________    Reason(s) for testing:  ________________pre-DM Type I ________________________      Testing frequency:    _x_ 2x/wk  ___ 1x/wk  ___ Dopplers  ___ BPP?       Last growth scan: __________________________________________

## 2023-09-26 NOTE — PROGRESS NOTES
Non-Stress Testing:    Non-Stress test, equipment, procedure, and expected outcomes explained. Reviewed fetal kick counts and when to call OB. Verified patient understanding of fetal kick counts with teach back method. Patient reports feeling daily fetal movements. Patient has no questions or concerns.    NST reviewed by Dr. Reginaldo Greene

## 2023-09-28 ENCOUNTER — ULTRASOUND (OUTPATIENT)
Dept: PERINATAL CARE | Facility: OTHER | Age: 33
End: 2023-09-28
Payer: COMMERCIAL

## 2023-09-28 VITALS
SYSTOLIC BLOOD PRESSURE: 124 MMHG | DIASTOLIC BLOOD PRESSURE: 70 MMHG | HEART RATE: 110 BPM | HEIGHT: 69 IN | WEIGHT: 253.2 LBS | BODY MASS INDEX: 37.5 KG/M2

## 2023-09-28 DIAGNOSIS — Z3A.32 32 WEEKS GESTATION OF PREGNANCY: ICD-10-CM

## 2023-09-28 DIAGNOSIS — E10.65 PRE-EXISTING TYPE 1 DIABETES MELLITUS WITH HYPERGLYCEMIA DURING PREGNANCY IN THIRD TRIMESTER (HCC): Primary | ICD-10-CM

## 2023-09-28 DIAGNOSIS — O24.013 PRE-EXISTING TYPE 1 DIABETES MELLITUS WITH HYPERGLYCEMIA DURING PREGNANCY IN THIRD TRIMESTER (HCC): Primary | ICD-10-CM

## 2023-09-28 PROCEDURE — 76816 OB US FOLLOW-UP PER FETUS: CPT | Performed by: OBSTETRICS & GYNECOLOGY

## 2023-09-28 PROCEDURE — 59025 FETAL NON-STRESS TEST: CPT | Performed by: OBSTETRICS & GYNECOLOGY

## 2023-09-28 PROCEDURE — 99213 OFFICE O/P EST LOW 20 MIN: CPT | Performed by: OBSTETRICS & GYNECOLOGY

## 2023-09-28 NOTE — PROGRESS NOTES
Repeat Non-Stress Testing:    Patient verbalizes +FM. Pt denies ALL:               Leaking of fluid   Contractions   Vaginal bleeding   Decreased fetal movement    Patient is performing daily kick counts. Patient has no questions or concerns. NST strip reviewed by Dr. Tung Peters.

## 2023-09-28 NOTE — PROGRESS NOTES
The patient was seen today for an ultrasound. Please see ultrasound report (located under Ob Procedures) for additional details. Thank you very much for allowing us to participate in the care of this very nice patient. Should you have any questions, please do not hesitate to contact me. [place] [id number] [facility]  Attending Physician, [address] [place] [address]

## 2023-09-29 ENCOUNTER — DOCUMENTATION (OUTPATIENT)
Facility: HOSPITAL | Age: 33
End: 2023-09-29

## 2023-09-29 ENCOUNTER — TELEPHONE (OUTPATIENT)
Facility: HOSPITAL | Age: 33
End: 2023-09-29

## 2023-09-29 NOTE — PROGRESS NOTES
Reviewed low blood sugars from Dexcom from 9/27-9/29 with Dr. Yeimi Trinidad at this time. Dr. Yeimi Trinidad aware voicemail left for patient, waiting for return phone call.

## 2023-09-29 NOTE — TELEPHONE ENCOUNTER
Attempted to call patient - left voicemail asking patient to download insulin pump ( last download was 9/1), once she downloads asked her to send a message. Also asked patient to provide insight into blood sugars. Many low blood sugars noted on Dexcom report. Asked if she is treating low blood sugars, symptomatic, etc. Asked her to call office at [phone] or send MeetBall message to address this as soon as possible.

## 2023-10-02 ENCOUNTER — ROUTINE PRENATAL (OUTPATIENT)
Dept: OBGYN CLINIC | Facility: CLINIC | Age: 33
End: 2023-10-02
Payer: COMMERCIAL

## 2023-10-02 VITALS
SYSTOLIC BLOOD PRESSURE: 124 MMHG | BODY MASS INDEX: 37.95 KG/M2 | TEMPERATURE: 97.7 F | DIASTOLIC BLOOD PRESSURE: 70 MMHG | WEIGHT: 257 LBS | HEART RATE: 85 BPM

## 2023-10-02 DIAGNOSIS — O09.899 SUPERVISION OF OTHER HIGH RISK PREGNANCY, ANTEPARTUM: ICD-10-CM

## 2023-10-02 DIAGNOSIS — Z87.59 HISTORY OF GESTATIONAL HYPERTENSION: ICD-10-CM

## 2023-10-02 DIAGNOSIS — O35.BXX0 ANOMALY OF HEART OF FETUS AFFECTING PREGNANCY, ANTEPARTUM, SINGLE OR UNSPECIFIED FETUS: ICD-10-CM

## 2023-10-02 DIAGNOSIS — Z3A.33 33 WEEKS GESTATION OF PREGNANCY: ICD-10-CM

## 2023-10-02 DIAGNOSIS — E10.65 PRE-EXISTING TYPE 1 DIABETES MELLITUS WITH HYPERGLYCEMIA DURING PREGNANCY IN THIRD TRIMESTER (HCC): ICD-10-CM

## 2023-10-02 DIAGNOSIS — Z46.81 INSULIN PUMP TITRATION: Primary | ICD-10-CM

## 2023-10-02 DIAGNOSIS — D56.3 BETA THALASSEMIA MINOR: ICD-10-CM

## 2023-10-02 DIAGNOSIS — O24.013 PRE-EXISTING TYPE 1 DIABETES MELLITUS WITH HYPERGLYCEMIA DURING PREGNANCY IN THIRD TRIMESTER (HCC): ICD-10-CM

## 2023-10-02 DIAGNOSIS — R82.71 GBS BACTERIURIA: ICD-10-CM

## 2023-10-02 DIAGNOSIS — O44.02 PLACENTA PREVIA ANTEPARTUM IN SECOND TRIMESTER: ICD-10-CM

## 2023-10-02 PROCEDURE — 59025 FETAL NON-STRESS TEST: CPT | Performed by: OBSTETRICS & GYNECOLOGY

## 2023-10-02 PROCEDURE — PNV: Performed by: OBSTETRICS & GYNECOLOGY

## 2023-10-02 NOTE — TELEPHONE ENCOUNTER
Left message on machine for pt to call office re: to discuss that the diabetes team has been trying to reach her to discuss blood sugars.

## 2023-10-02 NOTE — ASSESSMENT & PLAN NOTE
NST today reactive/reassuring  Continue twice weekly surveillance, she alternates with MFM   There were episodes of hypoglycemia noted last week - pt states her Dexcom always reads about 30 points lower by third trimester for reason so she verifies with fingersticks which were normal. Message sent to diabetic education team and I advised pt to reach out to them with values as well

## 2023-10-02 NOTE — PROGRESS NOTES
S: 34 y.o. [id number] 33w2d here for routine prenatal visit. Chief Complaint   Patient presents with   • Routine Prenatal Visit         OB complaints:  Contractions: no  Leakage: no  Bleeding: no  Fetal movement: yes      O:  /70 (BP Location: Left arm, Cuff Size: Large)   Pulse 85   Temp 97.7 °F (36.5 °C) (Tympanic)   Wt 117 kg (257 lb)   LMP 2023   BMI 37.95 kg/m²       Review of Systems   Constitutional: Negative for chills and fever. Eyes: Negative for visual disturbance. Respiratory: Negative for chest tightness and shortness of breath. Cardiovascular: Negative for chest pain. Gastrointestinal: Negative for abdominal pain, diarrhea, nausea and vomiting. Genitourinary: Negative for pelvic pain and vaginal bleeding. As noted in HPI   Skin: Negative for rash. Neurological: Negative for headaches. All other systems reviewed and are negative. Physical Exam  Constitutional:       General: She is not in acute distress. Appearance: Normal appearance. HENT:      Head: Normocephalic and atraumatic. Cardiovascular:      Rate and Rhythm: Normal rate. Pulmonary:      Effort: Pulmonary effort is normal. No respiratory distress. Abdominal:      General: There is no distension. Palpations: Abdomen is soft. Tenderness: There is no abdominal tenderness. There is no guarding or rebound. Comments: gravid   Neurological:      General: No focal deficit present. Mental Status: She is alert. Psychiatric:         Mood and Affect: Mood normal.         Behavior: Behavior normal.   Vitals and nursing note reviewed.               Fetal Heart Rate: NST    Pregravid Weight/BMI: 99.8 kg (220 lb) (BMI 32.47)  Current Weight: 117 kg (257 lb)   Total Weight Gain: 16.8 kg (37 lb)     Pre-Enoc Vitals    Flowsheet Row Most Recent Value   Prenatal Assessment    Fetal Heart Rate NST   Movement Present   Prenatal Vitals    Blood Pressure 124/70   Weight - Scale 117 kg (257 lb)   Urine Albumin/Glucose    Dilation/Effacement/Station    Vaginal Drainage    Edema           NST Completed today:  Baseline: 120 BPM  Variability: Moderate  Accelerations: Yes  Decelerations: None  Contractions: Not present  Nonstress Test Interpretation: Reactive        Problem List        Endocrine    Pre-existing type 1 diabetes mellitus with hyperglycemia during pregnancy in third trimester (HCC)    Overview     ASA recommended  Baseline labs normal, UPC 0.18   ECG ordered. Not done.  EKG with most recent pregnancy normal   1st trimester Ophthalmology exam recommended   q trimester UCx per MFM  HgbA1c ordered by diabetes team - 5.0%  Fetal echo - small VSD,  f/u recommended   - Serial growth US  -  testing @ 32 wks twice weekly  - Induction at 44 WGA           Current Assessment & Plan     NST today reactive/reassuring  Continue twice weekly surveillance, she alternates with MFM   There were episodes of hypoglycemia noted last week - pt states her Dexcom always reads about 30 points lower by third trimester for reason so she verifies with fingersticks which were normal. Message sent to diabetic education team and I advised pt to reach out to them with values as well             Cardiovascular and Mediastinum    Fetal muscular VSD - tiny    Overview     -There is a tiny muscular VSD identified only on color doppler. Otherwise normal cardiac anatomy and function.   -Normal prenatal care, delivery, and  care are recommended. -Recommend  outpatient cardiology consult with echocardiogram at 1-2 weeks of life (Please schedule appointment prior to hospital discharge. Phone [phone]).                Other    History of postpartum depression    Overview     H/o postpartum depression with first and second children          33 weeks gestation of pregnancy    BMI 36.0-36.9,adult    Supervision of other high risk pregnancy, antepartum    Overview     ASA recommended  CF/ SMA testing: declines  Covid vaccine: not completed   Declines aneuploidy screening   MsAFP: negative  S/p tdap   Declines flu vaccine  Delivery consent signed           Current Assessment & Plan     Reviewed benefits of influenza vaccination in pregnancy including but not limited to reduction in maternal influenza hospitalization, reduction in risk of stillbirth, and reduction in influenza-related morbidity and mortality among infants. Reviewed safety of influenza vaccine in pregnancy and overall very low risk of reaction or adverse effects. Patient voiced understanding of all this and declines vaccination today.    Delivery consent reviewed and signed  OB precautions reviewed           History of gestational hypertension    Overview     2nd pregnancy          Beta thalassemia minor    Overview     Hemoglobin electrophoresis showing HgbA2 borderline high:  May indicated beta thalassemia minor  Consider ferritin/ TIBC with 28 wks labs         Insulin pump titration    GBS bacteriuria                         Jason Burns MD  10/2/2023  3:37 PM

## 2023-10-02 NOTE — ASSESSMENT & PLAN NOTE
Reviewed benefits of influenza vaccination in pregnancy including but not limited to reduction in maternal influenza hospitalization, reduction in risk of stillbirth, and reduction in influenza-related morbidity and mortality among infants. Reviewed safety of influenza vaccine in pregnancy and overall very low risk of reaction or adverse effects. Patient voiced understanding of all this and declines vaccination today.    Delivery consent reviewed and signed  OB precautions reviewed

## 2023-10-04 ENCOUNTER — TELEPHONE (OUTPATIENT)
Dept: PERINATAL CARE | Facility: CLINIC | Age: 33
End: 2023-10-04

## 2023-10-04 NOTE — TELEPHONE ENCOUNTER
Reason for Call: Diabetes Type 1 (33w4d), Insulin Pump (Medtronic; Recently downloaded up to 10/3/23), and CGM Results ([employer])  · Reviewed reports with Dr. Jeff Gallegos  · See message sent to pt below for details. Outcome: No Answer. Left VM.      Louis Arango RD  Diabetes Educator   [facility]  Diabetes and Pregnancy Program

## 2023-10-05 ENCOUNTER — ULTRASOUND (OUTPATIENT)
Dept: PERINATAL CARE | Facility: OTHER | Age: 33
End: 2023-10-05
Payer: COMMERCIAL

## 2023-10-05 VITALS
SYSTOLIC BLOOD PRESSURE: 120 MMHG | DIASTOLIC BLOOD PRESSURE: 58 MMHG | HEIGHT: 69 IN | WEIGHT: 257.4 LBS | HEART RATE: 84 BPM | BODY MASS INDEX: 38.12 KG/M2

## 2023-10-05 DIAGNOSIS — E10.65 PRE-EXISTING TYPE 1 DIABETES MELLITUS WITH HYPERGLYCEMIA DURING PREGNANCY IN THIRD TRIMESTER (HCC): Primary | ICD-10-CM

## 2023-10-05 DIAGNOSIS — Z3A.33 33 WEEKS GESTATION OF PREGNANCY: ICD-10-CM

## 2023-10-05 DIAGNOSIS — O24.013 PRE-EXISTING TYPE 1 DIABETES MELLITUS WITH HYPERGLYCEMIA DURING PREGNANCY IN THIRD TRIMESTER (HCC): Primary | ICD-10-CM

## 2023-10-05 DIAGNOSIS — O24.013 TYPE 1 DIABETES MELLITUS DURING PREGNANCY IN THIRD TRIMESTER: Primary | ICD-10-CM

## 2023-10-05 PROCEDURE — 76815 OB US LIMITED FETUS(S): CPT | Performed by: OBSTETRICS & GYNECOLOGY

## 2023-10-05 PROCEDURE — 59025 FETAL NON-STRESS TEST: CPT | Performed by: OBSTETRICS & GYNECOLOGY

## 2023-10-05 NOTE — PROGRESS NOTES
Repeat Non-Stress Testing:    Patient verbalizes +FM. Pt denies ALL:               Leaking of fluid   Contractions   Vaginal bleeding   Decreased fetal movement    Patient is performing daily kick counts. Patient has no questions or concerns. NST strip reviewed by Dr. Lori Wilson.

## 2023-10-06 ENCOUNTER — TELEPHONE (OUTPATIENT)
Dept: PERINATAL CARE | Facility: CLINIC | Age: 33
End: 2023-10-06

## 2023-10-06 RX ORDER — INSULIN LISPRO 100 [IU]/ML
INJECTION, SOLUTION INTRAVENOUS; SUBCUTANEOUS
Qty: 170 ML | Refills: 0 | Status: SHIPPED | OUTPATIENT
Start: 2023-10-06 | End: 2023-11-18

## 2023-10-06 NOTE — TELEPHONE ENCOUNTER
Reason for Call: Refill Request (Pt requested refill of Humalog and commented "This needs to be increased as daily basal/bolus's have increased". Need to confirm current basal regimen prior to ordering refill. ) and Diabetes Type 1 (Currently Pregnant; 33w6d; Medtronic Insulin Pump + Dexcom CGM)    Medtronic Report: last downloaded on 10/3/23                 Outcome: No Answer. Left VM.      Shobha Flores RD  Diabetes Educator   [facility]  Diabetes and Pregnancy Program

## 2023-10-09 ENCOUNTER — TELEPHONE (OUTPATIENT)
Dept: OBGYN CLINIC | Facility: CLINIC | Age: 33
End: 2023-10-09

## 2023-10-09 PROBLEM — Z3A.34 34 WEEKS GESTATION OF PREGNANCY: Status: ACTIVE | Noted: 2023-05-25

## 2023-10-09 NOTE — TELEPHONE ENCOUNTER
Patient used salon pass patches for muscle relief, used 3 patches for 45 mins before. Patient states she feels fetal movement. Spoke with dr. Colt Davalos patient is to no longer use them and can use the following. Patient advised of these recommendations to use and understood.   Patient can use    Heating pad  ice  Icy hot pad menthol only

## 2023-10-09 NOTE — PROGRESS NOTES
Patient here for prenatal visit. She is 34w3d pregnant. Patient doing well without c/o. Fetal movement: yes  Bleeding: no  Contractions: no  Edema: no        Tdap : completed  Flu vaccine: patient declines    Reminded to begin Perineal massage     Infant feeding intent: breast  Contraception plans: progesterone only pill      Current Outpatient Medications on File Prior to Visit   Medication Sig Dispense Refill   • aspirin 81 mg chewable tablet Chew 162 mg daily     • Continuous Blood Gluc  (Dexcom G6 ) DENIZ Use as directed. 1 Device 0   • Continuous Blood Gluc Sensor (Dexcom G6 Sensor) MISC 1 box=1 month supply or 3 sensors, use 1 sensor every 10 days. 1 each 12   • Continuous Blood Gluc Transmit (Dexcom G6 Transmitter) MISC Transmitter change every 90 days. 1 each 3   • docusate sodium (COLACE) 100 mg capsule Take 100 mg by mouth 2 (two) times a day     • glucagon 1 MG injection Inject 1 mg under the skin     • glucose blood test strip Use 1 each daily as needed Use as instructed     • HumaLOG 100 UNIT/ML injection Use up to 100 units a day via insulin pump, to be titrated. T1DM and pregnancy. 90 day supply. 170 mL 0   • insulin glargine (LANTUS) 100 units/mL subcutaneous injection Inject 10 Units under the skin     • insulin lispro (HumaLOG) 100 units/mL injection Use up to 200 units via insulin pump daily. T2DM and pregnancy. 180 mL 0   • Lancets MISC by Does not apply route     • Prenatal Vit-Fe Fumarate-FA (PRENATAL VITAMIN PO) Take 1 tablet by mouth daily     • Blood Pressure Monitoring (B-D ASSURE BPM/AUTO ARM CUFF) MISC Use 2 (two) times a day Call if BP > 140/90 (Patient not taking: Reported on 9/26/2023) 1 each 0     No current facility-administered medications on file prior to visit.        Pregravid Weight/BMI: 99.8 kg (220 lb) (BMI 32.47)  Current Weight:     Total Weight Gain: 16.8 kg (37 lb)        Vitals:    10/10/23 0709   BP: 130/60   Pulse: 92   Temp: (!) 97.2 °F (36.2 °C) Fundal height: 34  Fetal Heart Rate: 125    NST Completed today:  Baseline: 125 BPM  Variability: Moderate  Accelerations: Yes  Decelerations: None  Contractions: Not present  Nonstress Test Interpretation: Reactive            Physical Exam  Constitutional:       General: She is not in acute distress. Appearance: She is well-developed. Abdominal:      Palpations: Abdomen is soft. Tenderness: There is no abdominal tenderness. There is no guarding. Neurological:      Mental Status: She is alert and oriented to person, place, and time. Skin:     General: Skin is warm and dry. Psychiatric:         Behavior: Behavior normal.     Problem List        Endocrine    Pre-existing type 1 diabetes mellitus with hyperglycemia during pregnancy in third trimester ([address])    Overview     ASA recommended  Baseline labs normal, UPC 0.18   ECG ordered. Not done.  EKG with most recent pregnancy normal   1st trimester Ophthalmology exam recommended   q trimester UCx per MFM  HgbA1c ordered by diabetes team - 5.0%  Fetal echo - small VSD,  f/u recommended   - Serial growth US  -  testing @ 32 wks twice weekly  - Induction at 44 WGA              Cardiovascular and Mediastinum    Fetal muscular VSD - tiny    Overview     -There is a tiny muscular VSD identified only on color doppler. Otherwise normal cardiac anatomy and function.   -Normal prenatal care, delivery, and  care are recommended. -Recommend  outpatient cardiology consult with echocardiogram at 1-2 weeks of life (Please schedule appointment prior to hospital discharge. Phone [phone]).                Other    History of postpartum depression    Overview     H/o postpartum depression with first and second children          34 weeks gestation of pregnancy    BMI 36.0-36.9,adult    Supervision of other high risk pregnancy, antepartum    Overview     ASA recommended  CF/ SMA testing: declines  Covid vaccine: not completed   Declines aneuploidy screening   MsAFP: negative  S/p tdap   Declines flu vaccine  Delivery consent signed           History of gestational hypertension    Overview     2nd pregnancy          Beta thalassemia minor    Overview     Hemoglobin electrophoresis showing HgbA2 borderline high:  May indicated beta thalassemia minor  Consider ferritin/ TIBC with 28 wks labs         Insulin pump titration    GBS bacteriuria          OB visit in 1 wk with NST  VINOD/ NST scheduled 10/12/23 with [facility] & [facility] precautions reviewed. Reviewed perineal massage recommendation.   Continue care with diabetes education team.

## 2023-10-10 ENCOUNTER — ROUTINE PRENATAL (OUTPATIENT)
Dept: OBGYN CLINIC | Facility: CLINIC | Age: 33
End: 2023-10-10

## 2023-10-10 VITALS
BODY MASS INDEX: 37.95 KG/M2 | SYSTOLIC BLOOD PRESSURE: 130 MMHG | DIASTOLIC BLOOD PRESSURE: 60 MMHG | TEMPERATURE: 97.2 F | WEIGHT: 257 LBS | HEART RATE: 92 BPM

## 2023-10-10 DIAGNOSIS — R82.71 GBS BACTERIURIA: ICD-10-CM

## 2023-10-10 DIAGNOSIS — Z3A.33 33 WEEKS GESTATION OF PREGNANCY: ICD-10-CM

## 2023-10-10 DIAGNOSIS — O24.013 PRE-EXISTING TYPE 1 DIABETES MELLITUS WITH HYPERGLYCEMIA DURING PREGNANCY IN THIRD TRIMESTER (HCC): ICD-10-CM

## 2023-10-10 DIAGNOSIS — D56.3 BETA THALASSEMIA MINOR: ICD-10-CM

## 2023-10-10 DIAGNOSIS — O35.BXX0 ANOMALY OF HEART OF FETUS AFFECTING PREGNANCY, ANTEPARTUM, SINGLE OR UNSPECIFIED FETUS: ICD-10-CM

## 2023-10-10 DIAGNOSIS — Z46.81 INSULIN PUMP TITRATION: ICD-10-CM

## 2023-10-10 DIAGNOSIS — E10.65 PRE-EXISTING TYPE 1 DIABETES MELLITUS WITH HYPERGLYCEMIA DURING PREGNANCY IN THIRD TRIMESTER (HCC): ICD-10-CM

## 2023-10-10 DIAGNOSIS — Z87.59 HISTORY OF GESTATIONAL HYPERTENSION: ICD-10-CM

## 2023-10-10 DIAGNOSIS — O24.013 TYPE 1 DIABETES MELLITUS DURING PREGNANCY IN THIRD TRIMESTER: ICD-10-CM

## 2023-10-10 DIAGNOSIS — Z3A.34 34 WEEKS GESTATION OF PREGNANCY: ICD-10-CM

## 2023-10-10 DIAGNOSIS — O09.899 SUPERVISION OF OTHER HIGH RISK PREGNANCY, ANTEPARTUM: Primary | ICD-10-CM

## 2023-10-10 PROCEDURE — PNV: Performed by: PHYSICIAN ASSISTANT

## 2023-10-10 RX ORDER — INSULIN LISPRO 100 [IU]/ML
INJECTION, SOLUTION INTRAVENOUS; SUBCUTANEOUS
Qty: 180 ML | Refills: 0 | Status: SHIPPED | OUTPATIENT
Start: 2023-10-10 | End: 2023-11-22

## 2023-10-12 ENCOUNTER — ULTRASOUND (OUTPATIENT)
Dept: PERINATAL CARE | Facility: OTHER | Age: 33
End: 2023-10-12
Payer: COMMERCIAL

## 2023-10-12 VITALS
HEIGHT: 69 IN | HEART RATE: 91 BPM | SYSTOLIC BLOOD PRESSURE: 128 MMHG | BODY MASS INDEX: 38.27 KG/M2 | DIASTOLIC BLOOD PRESSURE: 64 MMHG | WEIGHT: 258.4 LBS

## 2023-10-12 DIAGNOSIS — E10.65 PRE-EXISTING TYPE 1 DIABETES MELLITUS WITH HYPERGLYCEMIA DURING PREGNANCY IN THIRD TRIMESTER (HCC): Primary | ICD-10-CM

## 2023-10-12 DIAGNOSIS — Z3A.34 34 WEEKS GESTATION OF PREGNANCY: ICD-10-CM

## 2023-10-12 DIAGNOSIS — O24.013 PRE-EXISTING TYPE 1 DIABETES MELLITUS WITH HYPERGLYCEMIA DURING PREGNANCY IN THIRD TRIMESTER (HCC): Primary | ICD-10-CM

## 2023-10-12 PROCEDURE — 59025 FETAL NON-STRESS TEST: CPT | Performed by: OBSTETRICS & GYNECOLOGY

## 2023-10-12 PROCEDURE — 76815 OB US LIMITED FETUS(S): CPT | Performed by: OBSTETRICS & GYNECOLOGY

## 2023-10-12 NOTE — PROGRESS NOTES
Repeat Non-Stress Testing:    Patient verbalizes +FM. Pt denies ALL:               Leaking of fluid   Contractions   Vaginal bleeding   Decreased fetal movement    Patient is performing daily kick counts. Patient has no questions or concerns. NST strip reviewed by Dr. Joce Coughlin.   BPP done with U/S

## 2023-10-16 ENCOUNTER — TELEPHONE (OUTPATIENT)
Dept: PERINATAL CARE | Facility: CLINIC | Age: 33
End: 2023-10-16

## 2023-10-16 ENCOUNTER — ROUTINE PRENATAL (OUTPATIENT)
Dept: OBGYN CLINIC | Facility: CLINIC | Age: 33
End: 2023-10-16

## 2023-10-16 VITALS
HEIGHT: 69 IN | BODY MASS INDEX: 38.66 KG/M2 | SYSTOLIC BLOOD PRESSURE: 124 MMHG | DIASTOLIC BLOOD PRESSURE: 68 MMHG | WEIGHT: 261 LBS | HEART RATE: 101 BPM

## 2023-10-16 DIAGNOSIS — O35.BXX0 ANOMALY OF HEART OF FETUS AFFECTING PREGNANCY, ANTEPARTUM, SINGLE OR UNSPECIFIED FETUS: ICD-10-CM

## 2023-10-16 DIAGNOSIS — D56.3 BETA THALASSEMIA MINOR: ICD-10-CM

## 2023-10-16 DIAGNOSIS — R82.71 GBS BACTERIURIA: ICD-10-CM

## 2023-10-16 DIAGNOSIS — E10.65 PRE-EXISTING TYPE 1 DIABETES MELLITUS WITH HYPERGLYCEMIA DURING PREGNANCY IN THIRD TRIMESTER (HCC): ICD-10-CM

## 2023-10-16 DIAGNOSIS — Z3A.35 35 WEEKS GESTATION OF PREGNANCY: ICD-10-CM

## 2023-10-16 DIAGNOSIS — Z46.81 INSULIN PUMP TITRATION: Primary | ICD-10-CM

## 2023-10-16 DIAGNOSIS — O24.013 PRE-EXISTING TYPE 1 DIABETES MELLITUS WITH HYPERGLYCEMIA DURING PREGNANCY IN THIRD TRIMESTER (HCC): ICD-10-CM

## 2023-10-16 DIAGNOSIS — Z87.59 HISTORY OF GESTATIONAL HYPERTENSION: ICD-10-CM

## 2023-10-16 NOTE — PROGRESS NOTES
S: 34 y.o. [id number] 35w2d here for routine prenatal visit. Chief Complaint   Patient presents with    Routine Prenatal Visit         OB complaints:  Contractions: no  Leakage: no  Bleeding: no  Fetal movement: yes      O:  /68 (BP Location: Right arm, Patient Position: Sitting, Cuff Size: Large)   Pulse 101   Ht 5' 9" (1.753 m)   Wt 118 kg (261 lb)   LMP 2023   BMI 38.54 kg/m²       Review of Systems   Constitutional:  Negative for chills and fever. Eyes:  Negative for visual disturbance. Respiratory:  Negative for chest tightness and shortness of breath. Cardiovascular:  Negative for chest pain. Gastrointestinal:  Negative for abdominal pain, diarrhea, nausea and vomiting. Genitourinary:  Negative for pelvic pain and vaginal bleeding. As noted in HPI   Skin:  Negative for rash. Neurological:  Negative for headaches. All other systems reviewed and are negative. Physical Exam  Constitutional:       General: She is not in acute distress. Appearance: Normal appearance. HENT:      Head: Normocephalic and atraumatic. Cardiovascular:      Rate and Rhythm: Normal rate. Pulmonary:      Effort: Pulmonary effort is normal. No respiratory distress. Abdominal:      General: There is no distension. Palpations: Abdomen is soft. Tenderness: There is no abdominal tenderness. There is no guarding or rebound. Comments: gravid   Neurological:      General: No focal deficit present. Mental Status: She is alert. Psychiatric:         Mood and Affect: Mood normal.         Behavior: Behavior normal.   Vitals and nursing note reviewed.               Fetal Heart Rate: NST    Pregravid Weight/BMI: 99.8 kg (220 lb) (BMI 32.47)  Current Weight: 118 kg (261 lb)   Total Weight Gain: 18.6 kg (41 lb)     Pre- Vitals      Flowsheet Row Most Recent Value   Prenatal Assessment    Fetal Heart Rate NST   Movement Present   Prenatal Vitals    Blood Pressure 124/68   Weight - Scale 118 kg (261 lb)   Urine Albumin/Glucose    Dilation/Effacement/Station    Vaginal Drainage    Edema             NST Completed today:  Baseline: 135 BPM  Variability: Moderate  Accelerations: Yes  Decelerations: None  Contractions: Not present  Nonstress Test Interpretation: Reactive          Problem List       Pre-existing type 1 diabetes mellitus with hyperglycemia during pregnancy in third trimester (HCC)    Overview     ASA recommended  Baseline labs normal, UPC 0.18   ECG ordered. Not done.  EKG with most recent pregnancy normal   1st trimester Ophthalmology exam recommended   q trimester UCx per MFM  HgbA1c ordered by diabetes team - 5.0%  Fetal echo - small VSD,  f/u recommended   - Serial growth US  -  testing @ 32 wks twice weekly  - Induction at 44 WGA           Current Assessment & Plan     NST today reactive/reassuring  Has NST/VINOD later this week with MFM  Continue twice weekly surveillance         History of postpartum depression    Overview     H/o postpartum depression with first and second children          35 weeks gestation of pregnancy    BMI 36.0-36.9,adult    Supervision of other high risk pregnancy, antepartum    Overview     ASA recommended  CF/ SMA testing: declines  Covid vaccine: not completed   Declines aneuploidy screening   MsAFP: negative  S/p tdap   Declines flu vaccine  Delivery consent signed           Current Assessment & Plan     OB precautions reviewed         History of gestational hypertension    Overview     2nd pregnancy          Current Assessment & Plan     Advised can stop taking aspirin next week          Fetal muscular VSD - tiny    Overview     -There is a tiny muscular VSD identified only on color doppler. Otherwise normal cardiac anatomy and function.   -Normal prenatal care, delivery, and  care are recommended.    -Recommend  outpatient cardiology consult with echocardiogram at 1-2 weeks of life (Please schedule appointment prior to hospital discharge. Phone [phone]).             Beta thalassemia minor    Overview     Hemoglobin electrophoresis showing HgbA2 borderline high:  May indicated beta thalassemia minor  Consider ferritin/ TIBC with 28 wks labs         Insulin pump titration    GBS bacteriuria    Overview     8/28/23 Ucx                               Dahiana Ramirez MD  10/16/2023  4:14 PM

## 2023-10-16 NOTE — ASSESSMENT & PLAN NOTE
NST today reactive/reassuring  Has NST/VINOD later this week with MFM  Continue twice weekly surveillance

## 2023-10-16 NOTE — TELEPHONE ENCOUNTER
Reason for Call: Reminder to [facility], [facility] Insulin Pump (Medtronic 770G; Late Date Available: 10/3/23) and Diabetes Type 1 (Currently Pregnant; 35w2d)    Outcome: No Answer. Left VM.      Momo Kuhn RD  Diabetes Educator   [facility]  Diabetes and Pregnancy Program

## 2023-10-17 ENCOUNTER — TELEMEDICINE (OUTPATIENT)
Facility: HOSPITAL | Age: 33
End: 2023-10-17

## 2023-10-17 VITALS — BODY MASS INDEX: 38.66 KG/M2 | WEIGHT: 261 LBS | HEIGHT: 69 IN

## 2023-10-17 DIAGNOSIS — E10.65 PRE-EXISTING TYPE 1 DIABETES MELLITUS WITH HYPERGLYCEMIA DURING PREGNANCY IN THIRD TRIMESTER (HCC): Primary | ICD-10-CM

## 2023-10-17 DIAGNOSIS — O24.013 PRE-EXISTING TYPE 1 DIABETES MELLITUS WITH HYPERGLYCEMIA DURING PREGNANCY IN THIRD TRIMESTER (HCC): Primary | ICD-10-CM

## 2023-10-17 DIAGNOSIS — Z46.81 INSULIN PUMP TITRATION: ICD-10-CM

## 2023-10-17 DIAGNOSIS — Z3A.35 35 WEEKS GESTATION OF PREGNANCY: ICD-10-CM

## 2023-10-17 NOTE — ASSESSMENT & PLAN NOTE
-Last A1c 5.5% at goal.  -Repeat A1c, CMP and UPCR. -A1c goal is less than 6% with minimal hypoglycemia. -Humalog via Medtronic 770G insulin pump and Dexcom G6 CGM. -Due to reports of CGM being inaccurate, please input fasting and 2 hours post meal glucose readings into your PDM. -Insulin pump settings self adjusted weekly due to hyperglycemia. Please refer to attached file for current insulin pump settings.   -Due to increase in post meal correction bolus; during visit bolus settings adjusted as follows:  -decrease carb ratio to 2.7 and insulin sensitivity to 10.  -Try to use 15 by 15 rule when treating hypoglycemia.   -Insulin pump download every week. -NST twice a week and VINOD weekly. -Fetal growth ultrasound as scheduled.  -Continue follow up with OB and MFM as recommended.   -Follow up with endocrinology postpartum.      Lab Results   Component Value Date    HGBA1C 5.5 08/28/2023     Lab Results   Component Value Date    HGBA1C 5.5 08/28/2023     Lab Results   Component Value Date    HGBA1C 5.5 08/28/2023

## 2023-10-17 NOTE — PATIENT INSTRUCTIONS
-Last A1c 5.5% at goal.  -Repeat A1c, CMP and UPCR. -A1c goal is less than 6% with minimal hypoglycemia. -Humalog via Medtronic 770G insulin pump and Dexcom G6 CGM. -Due to reports of CGM being inaccurate, please input fasting and 2 hours post meal glucose readings into your PDM. -Insulin pump settings self adjusted weekly due to hyperglycemia. Please refer to attached file for current insulin pump settings.   -Due to increase in post meal correction bolus; during visit bolus settings adjusted as follows:  -decrease carb ratio to 2.7 and insulin sensitivity to 10.  -Try to use 15 by 15 rule when treating hypoglycemia.   -Insulin pump download every week. -NST twice a week and VINOD weekly. -Fetal growth ultrasound as scheduled.  -Continue follow up with OB and MFM as recommended.   -Follow up with endocrinology postpartum.

## 2023-10-17 NOTE — ASSESSMENT & PLAN NOTE
-First visit weight 220 lbs. -Current weight 261 lbs. -TWG 41 lbs. -Recommended weight gain 11 to 20 lbs. -Follow GDM meal plan.

## 2023-10-17 NOTE — PROGRESS NOTES
Virtual Regular Visit    Verification of patient location:    Patient is located at Home in the following state in which I hold an active license [place]      Assessment/Plan:    Problem List Items Addressed This Visit          Endocrine    Pre-existing type 1 diabetes mellitus with hyperglycemia during pregnancy in third trimester ([address]) - Primary     -Last A1c 5.5% at goal.  -Repeat A1c, CMP and UPCR. -A1c goal is less than 6% with minimal hypoglycemia. -Humalog via Medtronic 770G insulin pump and Dexcom G6 CGM. -Due to reports of CGM being inaccurate, please input fasting and 2 hours post meal glucose readings into your PDM. -Insulin pump settings self adjusted weekly due to hyperglycemia. Please refer to attached file for current insulin pump settings.   -Due to increase in post meal correction bolus; during visit bolus settings adjusted as follows:  -decrease carb ratio to 2.7 and insulin sensitivity to 10.  -Try to use 15 by 15 rule when treating hypoglycemia.   -Insulin pump download every week. -NST twice a week and VINOD weekly. -Fetal growth ultrasound as scheduled.  -Continue follow up with OB and MFM as recommended.   -Follow up with endocrinology postpartum. Lab Results   Component Value Date    HGBA1C 5.5 08/28/2023     Lab Results   Component Value Date    HGBA1C 5.5 08/28/2023     Lab Results   Component Value Date    HGBA1C 5.5 08/28/2023             Other    35 weeks gestation of pregnancy    BMI 38.0-38.9,adult     -First visit weight 220 lbs. -Current weight 261 lbs. -TWG 41 lbs. -Recommended weight gain 11 to 20 lbs. -Follow GDM meal plan.            Insulin pump titration            Reason for visit is   Chief Complaint   Patient presents with    Diabetes Type 1    Virtual Regular Visit          Encounter provider Eric Sullivan, [address] [place] [address]    Provider located at [address] [place] [address] [place] [id number]      Recent Visits  No visits were found meeting these conditions. Showing recent visits within past 7 days and meeting all other requirements  Today's Visits  Date Type Provider Dept   10/17/23 MARCO Mcguire An    Showing today's visits and meeting all other requirements  Future Appointments  No visits were found meeting these conditions. Showing future appointments within next 150 days and meeting all other requirements       The patient was identified by name and date of birth. Stewart Hernandez was informed that this is a telemedicine visit and that the visit is being conducted through the [facility]. She agrees to proceed. .  My office door was closed. The patient was notified the following individuals were present in the room Dr. Gianna Gomez, endocrinology fellow. She acknowledged consent and understanding of privacy and security of the video platform. The patient has agreed to participate and understands they can discontinue the visit at any time. Patient is aware this is a billable service. Deann Lee is a 34 y.o. female 28 3/7 weeks gestation T1DM on Medtronic 770 G and Dexcom G6 CGM. Red Mehta is a 35 yo T1DM on Medtronic 770G insulin pump and Dexcom G6 CGM, 35 3/7 weeks gestation. Reported CGM inaccurate at times, reporting 40's and true reading is 80. Does complete multiple daily finger sticks fasting, before meals, 1 and 2 hours post meals. Might have a glucose reading less than 60, gets clammy, will treat with fruit snacks. Lows happen post correction bolus doses. In last week 2 to 3 episodes of readings less than 60. Also reported due to CGM being inaccurate especially at night, will silence. FBS 85-90; before meals 60-70; 1 hour post meal highest 180; 2 hours post meal 120 or less. Due to hyperglycemia has self adjusted insulin pump weekly; currently using more basal than bolus insulin.  Due to correcting post meal highs and higher 1 hour post meals; ICR and ISF decreased. Eating as recommended. Weight gain up to date 41 lbs better than last pregnancy. Going for NST twice a week and VINOD weekly. Will keep insulin pump in place during labor and does have pre-pregnancy insulin pump settings saved. Without complaints except for hip pain. Past Medical History:   Diagnosis Date    Diabetes ([address]) 1994    type 1    Diabetes mellitus ([address])        Past Surgical History:   Procedure Laterality Date    [place] [facility]SC WRST SURG W/RLS TRANSVRS CARPL LIGM Right 2/3/2021    Procedure: RELEASE CARPAL TUNNEL ENDOSCOPIC;  Surgeon: Sarah Ny MD;  Location: [facility];  Service: Orthopedics    [place] [address] WRST SURG W/RLS TRANSVRS CARPL LIGM Left 2/23/2021    Procedure: RELEASE CARPAL TUNNEL ENDOSCOPIC;  Surgeon: Sarah Ny MD;  Location: [facility];  Service: Orthopedics    WISDOM TOOTH EXTRACTION         Current Outpatient Medications   Medication Sig Dispense Refill    aspirin 81 mg chewable tablet Chew 162 mg daily      Blood Pressure Monitoring (B-D ASSURE BPM/AUTO ARM CUFF) MISC Use 2 (two) times a day Call if BP > 140/90 (Patient not taking: Reported on 9/26/2023) 1 each 0    Continuous Blood Gluc  (Dexcom G6 ) DENIZ Use as directed. 1 Device 0    Continuous Blood Gluc Sensor (Dexcom G6 Sensor) MISC 1 box=1 month supply or 3 sensors, use 1 sensor every 10 days. 1 each 12    Continuous Blood Gluc Transmit (Dexcom G6 Transmitter) MISC Transmitter change every 90 days. 1 each 3    docusate sodium (COLACE) 100 mg capsule Take 100 mg by mouth 2 (two) times a day      glucagon 1 MG injection Inject 1 mg under the skin (Patient not taking: Reported on 10/12/2023)      glucose blood test strip Use 1 each daily as needed Use as instructed      HumaLOG 100 UNIT/ML injection Use up to 200 units a day via insulin pump, to be titrated. T1DM and pregnancy. 90 day supply.  180 mL 0    insulin glargine (LANTUS) 100 units/mL subcutaneous injection Inject 10 Units under the skin      Lancets MISC by Does not apply route      Prenatal Vit-Fe Fumarate-FA (PRENATAL VITAMIN PO) Take 1 tablet by mouth daily       No current facility-administered medications for this visit. No Known Allergies    Review of Systems   Constitutional:  Negative for fatigue. HENT:  Negative for congestion and trouble swallowing. Eyes:  Negative for visual disturbance. Respiratory:  Negative for cough and shortness of breath. Cardiovascular:  Negative for chest pain and palpitations. Gastrointestinal:  Negative for constipation, diarrhea and vomiting. Endocrine: Negative for polydipsia, polyphagia and polyuria. Genitourinary:  Negative for vaginal bleeding. Musculoskeletal:         Hip pain   Neurological:  Negative for headaches. Psychiatric/Behavioral:  Negative for sleep disturbance. Video Exam  Refer to attached file for insulin pump download. Vitals:    10/17/23 1509   Weight: 118 kg (261 lb)   Height: 5' 9" (1.753 m)       Physical Exam  HENT:      Head: Normocephalic. Nose: Nose normal.   Eyes:      Conjunctiva/sclera: Conjunctivae normal.   Pulmonary:      Effort: Pulmonary effort is normal.   Neurological:      Mental Status: She is alert and oriented to person, place, and time. Psychiatric:         Mood and Affect: Mood normal.         Behavior: Behavior normal.         Thought Content: Thought content normal.         Judgment: Judgment normal.          Visit Time  Total Visit Duration: 34 minutes.

## 2023-10-19 ENCOUNTER — ULTRASOUND (OUTPATIENT)
Dept: PERINATAL CARE | Facility: OTHER | Age: 33
End: 2023-10-19
Payer: COMMERCIAL

## 2023-10-19 VITALS
DIASTOLIC BLOOD PRESSURE: 64 MMHG | WEIGHT: 262 LBS | HEIGHT: 69 IN | SYSTOLIC BLOOD PRESSURE: 134 MMHG | BODY MASS INDEX: 38.8 KG/M2 | HEART RATE: 103 BPM

## 2023-10-19 DIAGNOSIS — Z3A.35 35 WEEKS GESTATION OF PREGNANCY: Primary | ICD-10-CM

## 2023-10-19 DIAGNOSIS — O24.013 PRE-EXISTING TYPE 1 DIABETES MELLITUS WITH HYPERGLYCEMIA DURING PREGNANCY IN THIRD TRIMESTER (HCC): ICD-10-CM

## 2023-10-19 DIAGNOSIS — E10.65 PRE-EXISTING TYPE 1 DIABETES MELLITUS WITH HYPERGLYCEMIA DURING PREGNANCY IN THIRD TRIMESTER (HCC): ICD-10-CM

## 2023-10-19 PROCEDURE — 76815 OB US LIMITED FETUS(S): CPT | Performed by: OBSTETRICS & GYNECOLOGY

## 2023-10-19 PROCEDURE — 59025 FETAL NON-STRESS TEST: CPT | Performed by: OBSTETRICS & GYNECOLOGY

## 2023-10-19 NOTE — PROGRESS NOTES
Repeat Non-Stress Testing:    Patient verbalizes +FM. Pt denies ALL:               Leaking of fluid   Contractions   Vaginal bleeding   Decreased fetal movement    Patient is performing daily kick counts. Patient has no questions or concerns. NST strip reviewed by Dr. Mirela Ospina.

## 2023-10-23 NOTE — PROGRESS NOTES
Patient here for prenatal visit. She is 36w3d pregnant. patient has stopped the aspirin. Doing well without c/o. Fetal movement: yes  Bleeding: no  Contractions: no  Edema: no    Tdap: completed  Flu vaccine: patient declines    Contraceptive plans: breast  Intent to feed baby: progesterone only pill        Current Outpatient Medications on File Prior to Visit   Medication Sig Dispense Refill    aspirin 81 mg chewable tablet Chew 162 mg daily      Blood Pressure Monitoring (B-D ASSURE BPM/AUTO ARM CUFF) MISC Use 2 (two) times a day Call if BP > 140/90 (Patient not taking: Reported on 9/26/2023) 1 each 0    Continuous Blood Gluc  (Dexcom G6 ) EDNIZ Use as directed. 1 Device 0    Continuous Blood Gluc Sensor (Dexcom G6 Sensor) MISC 1 box=1 month supply or 3 sensors, use 1 sensor every 10 days. 1 each 12    Continuous Blood Gluc Transmit (Dexcom G6 Transmitter) MISC Transmitter change every 90 days. 1 each 3    docusate sodium (COLACE) 100 mg capsule Take 100 mg by mouth 2 (two) times a day      glucagon 1 MG injection Inject 1 mg under the skin      glucose blood test strip Use 1 each daily as needed Use as instructed      HumaLOG 100 UNIT/ML injection Use up to 200 units a day via insulin pump, to be titrated. T1DM and pregnancy. 90 day supply. 180 mL 0    insulin glargine (LANTUS) 100 units/mL subcutaneous injection Inject 10 Units under the skin      Lancets MISC by Does not apply route      Prenatal Vit-Fe Fumarate-FA (PRENATAL VITAMIN PO) Take 1 tablet by mouth daily       No current facility-administered medications on file prior to visit.        Pregravid Weight/BMI: 99.8 kg (220 lb) (BMI 32.47)  Current Weight:     Total Weight Gain: 19.1 kg (42 lb)        Vitals:    10/24/23 0700   BP: 116/70   Pulse: 94       Fundal height: 36  Fetal Heart Rate: 130    NST Completed today:  Baseline: 130 BPM  Variability: Moderate  Accelerations: Yes  Decelerations: None  Contractions: Not present  Nonstress Test Interpretation: Reactive      Physical Exam  Constitutional:       General: She is not in acute distress. Appearance: She is well-developed. Abdominal:      Palpations: Abdomen is soft. Tenderness: There is no abdominal tenderness. There is no guarding. Neurological:      Mental Status: She is alert and oriented to person, place, and time. Skin:     General: Skin is warm and dry. Psychiatric:         Behavior: Behavior normal.      Problem List          Endocrine    Pre-existing type 1 diabetes mellitus with hyperglycemia during pregnancy in third trimester ([address])    Overview     ASA recommended  Baseline labs normal, UPC 0.18   ECG ordered. Not done.  EKG with most recent pregnancy normal   1st trimester Ophthalmology exam recommended   q trimester UCx per MFM  HgbA1c ordered by diabetes team - 5.0%  Fetal echo - small VSD,  f/u recommended   - Serial growth US  -  testing @ 32 wks twice weekly  - Induction at 44 WGA              Cardiovascular and Mediastinum    Fetal muscular VSD - tiny    Overview     -There is a tiny muscular VSD identified only on color doppler. Otherwise normal cardiac anatomy and function.   -Normal prenatal care, delivery, and  care are recommended. -Recommend  outpatient cardiology consult with echocardiogram at 1-2 weeks of life (Please schedule appointment prior to hospital discharge. Phone [phone]).                Other    History of postpartum depression    Overview     H/o postpartum depression with first and second children          36 weeks gestation of pregnancy    BMI 38.0-38.9,adult    Supervision of other high risk pregnancy, antepartum    Overview     ASA recommended  CF/ SMA testing: declines  Covid vaccine: not completed   Declines aneuploidy screening   MsAFP: negative  S/p tdap   Declines flu vaccine  Delivery consent signed           History of gestational hypertension    Overview     2nd pregnancy          Beta thalassemia minor    Overview     Hemoglobin electrophoresis showing HgbA2 borderline high:  May indicated beta thalassemia minor  Consider ferritin/ TIBC with 28 wks labs         Insulin pump titration    GBS bacteriuria    Overview     8/28/23 Ucx           + GBS in urine, will treat in labor  Continue twice weekly surveillance  VINOD/ NST with MFM 10/26/23  Continue care with diabetes team

## 2023-10-24 ENCOUNTER — ROUTINE PRENATAL (OUTPATIENT)
Dept: OBGYN CLINIC | Facility: CLINIC | Age: 33
End: 2023-10-24
Payer: COMMERCIAL

## 2023-10-24 ENCOUNTER — APPOINTMENT (OUTPATIENT)
Dept: LAB | Facility: CLINIC | Age: 33
End: 2023-10-24
Payer: COMMERCIAL

## 2023-10-24 VITALS
BODY MASS INDEX: 39.13 KG/M2 | HEIGHT: 69 IN | WEIGHT: 264.2 LBS | DIASTOLIC BLOOD PRESSURE: 70 MMHG | SYSTOLIC BLOOD PRESSURE: 116 MMHG | HEART RATE: 94 BPM

## 2023-10-24 DIAGNOSIS — E10.65 PRE-EXISTING TYPE 1 DIABETES MELLITUS WITH HYPERGLYCEMIA DURING PREGNANCY IN THIRD TRIMESTER (HCC): ICD-10-CM

## 2023-10-24 DIAGNOSIS — Z87.59 HISTORY OF GESTATIONAL HYPERTENSION: ICD-10-CM

## 2023-10-24 DIAGNOSIS — O35.BXX0 ANOMALY OF HEART OF FETUS AFFECTING PREGNANCY, ANTEPARTUM, SINGLE OR UNSPECIFIED FETUS: ICD-10-CM

## 2023-10-24 DIAGNOSIS — O09.899 SUPERVISION OF OTHER HIGH RISK PREGNANCY, ANTEPARTUM: Primary | ICD-10-CM

## 2023-10-24 DIAGNOSIS — Z3A.28 28 WEEKS GESTATION OF PREGNANCY: ICD-10-CM

## 2023-10-24 DIAGNOSIS — Z3A.36 36 WEEKS GESTATION OF PREGNANCY: ICD-10-CM

## 2023-10-24 DIAGNOSIS — O24.013 PRE-EXISTING TYPE 1 DIABETES MELLITUS WITH HYPERGLYCEMIA DURING PREGNANCY IN THIRD TRIMESTER (HCC): ICD-10-CM

## 2023-10-24 DIAGNOSIS — Z46.81 INSULIN PUMP TITRATION: ICD-10-CM

## 2023-10-24 DIAGNOSIS — R82.71 GBS BACTERIURIA: ICD-10-CM

## 2023-10-24 DIAGNOSIS — D56.3 BETA THALASSEMIA MINOR: ICD-10-CM

## 2023-10-24 LAB
ALBUMIN SERPL BCP-MCNC: 3.2 G/DL (ref 3.5–5)
ALP SERPL-CCNC: 110 U/L (ref 34–104)
ALT SERPL W P-5'-P-CCNC: 10 U/L (ref 7–52)
ANION GAP SERPL CALCULATED.3IONS-SCNC: 8 MMOL/L
AST SERPL W P-5'-P-CCNC: 15 U/L (ref 13–39)
BILIRUB SERPL-MCNC: 0.24 MG/DL (ref 0.2–1)
BUN SERPL-MCNC: 9 MG/DL (ref 5–25)
CALCIUM ALBUM COR SERPL-MCNC: 9.5 MG/DL (ref 8.3–10.1)
CALCIUM SERPL-MCNC: 8.9 MG/DL (ref 8.4–10.2)
CHLORIDE SERPL-SCNC: 105 MMOL/L (ref 96–108)
CO2 SERPL-SCNC: 24 MMOL/L (ref 21–32)
CREAT SERPL-MCNC: 0.59 MG/DL (ref 0.6–1.3)
CREAT UR-MCNC: 71.3 MG/DL
EST. AVERAGE GLUCOSE BLD GHB EST-MCNC: 100 MG/DL
GFR SERPL CREATININE-BSD FRML MDRD: 122 ML/MIN/1.73SQ M
GLUCOSE P FAST SERPL-MCNC: 62 MG/DL (ref 65–99)
HBA1C MFR BLD: 5.1 %
POTASSIUM SERPL-SCNC: 3.7 MMOL/L (ref 3.5–5.3)
PROT SERPL-MCNC: 6 G/DL (ref 6.4–8.4)
PROT UR-MCNC: 11 MG/DL
PROT/CREAT UR: 0.15 MG/G{CREAT} (ref 0–0.1)
SODIUM SERPL-SCNC: 137 MMOL/L (ref 135–147)

## 2023-10-24 PROCEDURE — PNV: Performed by: PHYSICIAN ASSISTANT

## 2023-10-24 PROCEDURE — 59025 FETAL NON-STRESS TEST: CPT | Performed by: PHYSICIAN ASSISTANT

## 2023-10-24 PROCEDURE — 80053 COMPREHEN METABOLIC PANEL: CPT

## 2023-10-24 PROCEDURE — 84156 ASSAY OF PROTEIN URINE: CPT

## 2023-10-24 PROCEDURE — 36415 COLL VENOUS BLD VENIPUNCTURE: CPT

## 2023-10-24 PROCEDURE — 82570 ASSAY OF URINE CREATININE: CPT

## 2023-10-26 ENCOUNTER — ULTRASOUND (OUTPATIENT)
Dept: PERINATAL CARE | Facility: OTHER | Age: 33
End: 2023-10-26
Payer: COMMERCIAL

## 2023-10-26 VITALS
SYSTOLIC BLOOD PRESSURE: 136 MMHG | DIASTOLIC BLOOD PRESSURE: 64 MMHG | HEIGHT: 69 IN | BODY MASS INDEX: 38.98 KG/M2 | HEART RATE: 84 BPM | WEIGHT: 263.2 LBS

## 2023-10-26 DIAGNOSIS — O99.210 OBESITY AFFECTING PREGNANCY, ANTEPARTUM, UNSPECIFIED OBESITY TYPE: ICD-10-CM

## 2023-10-26 DIAGNOSIS — O24.013 PRE-EXISTING TYPE 1 DIABETES MELLITUS WITH HYPERGLYCEMIA DURING PREGNANCY IN THIRD TRIMESTER (HCC): ICD-10-CM

## 2023-10-26 DIAGNOSIS — Z36.89 ENCOUNTER FOR ULTRASOUND TO CHECK FETAL GROWTH: ICD-10-CM

## 2023-10-26 DIAGNOSIS — O35.BXX0 ANOMALY OF HEART OF FETUS AFFECTING PREGNANCY, ANTEPARTUM, SINGLE OR UNSPECIFIED FETUS: ICD-10-CM

## 2023-10-26 DIAGNOSIS — Z3A.36 36 WEEKS GESTATION OF PREGNANCY: Primary | ICD-10-CM

## 2023-10-26 DIAGNOSIS — E10.65 PRE-EXISTING TYPE 1 DIABETES MELLITUS WITH HYPERGLYCEMIA DURING PREGNANCY IN THIRD TRIMESTER (HCC): ICD-10-CM

## 2023-10-26 PROCEDURE — 59025 FETAL NON-STRESS TEST: CPT | Performed by: STUDENT IN AN ORGANIZED HEALTH CARE EDUCATION/TRAINING PROGRAM

## 2023-10-26 PROCEDURE — 99213 OFFICE O/P EST LOW 20 MIN: CPT | Performed by: STUDENT IN AN ORGANIZED HEALTH CARE EDUCATION/TRAINING PROGRAM

## 2023-10-26 PROCEDURE — 76816 OB US FOLLOW-UP PER FETUS: CPT | Performed by: STUDENT IN AN ORGANIZED HEALTH CARE EDUCATION/TRAINING PROGRAM

## 2023-10-26 NOTE — PROGRESS NOTES
[facility]: Ms. Marcia Kohli was seen today for NST (found under the pregnancy episode) which I reviewed the RN assessment and agree, and fetal growth ultrasound (see ultrasound report under OB procedures tab). The time spent on this established patient on the encounter date included 5 minutes previsit service time reviewing records and precharting, 5 minutes face-to-face service time counseling regarding results and coordinating care, and  5 minutes charting, totalling 5 minutes. Please don't hesitate to contact our office with any concerns or questions.   -Gisell Mckeon MD

## 2023-10-26 NOTE — PROGRESS NOTES
Repeat Non-Stress Testing:    Patient verbalizes +FM. Pt denies ALL:               Leaking of fluid   Contractions   Vaginal bleeding   Decreased fetal movement    Patient is performing daily kick counts. Patient has no questions or concerns. NST strip reviewed by Dr. Roxy Sapp.

## 2023-10-31 ENCOUNTER — ROUTINE PRENATAL (OUTPATIENT)
Dept: OBGYN CLINIC | Facility: CLINIC | Age: 33
End: 2023-10-31
Payer: COMMERCIAL

## 2023-10-31 VITALS
OXYGEN SATURATION: 98 % | DIASTOLIC BLOOD PRESSURE: 80 MMHG | SYSTOLIC BLOOD PRESSURE: 132 MMHG | HEART RATE: 95 BPM | HEIGHT: 69 IN | BODY MASS INDEX: 39.58 KG/M2 | WEIGHT: 267.2 LBS

## 2023-10-31 DIAGNOSIS — Z46.81 INSULIN PUMP TITRATION: Primary | ICD-10-CM

## 2023-10-31 DIAGNOSIS — Z3A.36 36 WEEKS GESTATION OF PREGNANCY: ICD-10-CM

## 2023-10-31 DIAGNOSIS — O24.013 PRE-EXISTING TYPE 1 DIABETES MELLITUS WITH HYPERGLYCEMIA DURING PREGNANCY IN THIRD TRIMESTER (HCC): ICD-10-CM

## 2023-10-31 DIAGNOSIS — D56.3 BETA THALASSEMIA MINOR: ICD-10-CM

## 2023-10-31 DIAGNOSIS — O35.BXX0 ANOMALY OF HEART OF FETUS AFFECTING PREGNANCY, ANTEPARTUM, SINGLE OR UNSPECIFIED FETUS: ICD-10-CM

## 2023-10-31 DIAGNOSIS — E10.65 PRE-EXISTING TYPE 1 DIABETES MELLITUS WITH HYPERGLYCEMIA DURING PREGNANCY IN THIRD TRIMESTER (HCC): ICD-10-CM

## 2023-10-31 DIAGNOSIS — R82.71 GBS BACTERIURIA: ICD-10-CM

## 2023-10-31 DIAGNOSIS — Z87.59 HISTORY OF GESTATIONAL HYPERTENSION: ICD-10-CM

## 2023-10-31 PROCEDURE — PNV: Performed by: OBSTETRICS & GYNECOLOGY

## 2023-10-31 PROCEDURE — 59025 FETAL NON-STRESS TEST: CPT | Performed by: OBSTETRICS & GYNECOLOGY

## 2023-10-31 NOTE — PROGRESS NOTES
S: 34 y.o. [id number] 37w3d here for routine prenatal visit. Chief Complaint   Patient presents with    Routine Prenatal Visit     NST         OB complaints:  Contractions: no  Leakage: no  Bleeding: no  Fetal movement: yes      O:  /80   Pulse 95   Ht 5' 9" (1.753 m)   Wt 121 kg (267 lb 3.2 oz)   LMP 2023   SpO2 98%   BMI 39.46 kg/m²       Review of Systems   Constitutional:  Negative for chills and fever. Eyes:  Negative for visual disturbance. Respiratory:  Negative for chest tightness and shortness of breath. Cardiovascular:  Negative for chest pain. Gastrointestinal:  Negative for abdominal pain, diarrhea, nausea and vomiting. Genitourinary:  Negative for pelvic pain and vaginal bleeding. As noted in HPI   Skin:  Negative for rash. Neurological:  Negative for headaches. All other systems reviewed and are negative. Physical Exam  Constitutional:       General: She is not in acute distress. Appearance: Normal appearance. HENT:      Head: Normocephalic and atraumatic. Cardiovascular:      Rate and Rhythm: Normal rate. Pulmonary:      Effort: Pulmonary effort is normal. No respiratory distress. Abdominal:      General: There is no distension. Palpations: Abdomen is soft. Tenderness: There is no abdominal tenderness. There is no guarding or rebound. Comments: gravid   Neurological:      General: No focal deficit present. Mental Status: She is alert. Psychiatric:         Mood and Affect: Mood normal.         Behavior: Behavior normal.   Vitals and nursing note reviewed.               Fetal Heart Rate: NST    Pregravid Weight/BMI: 99.8 kg (220 lb) (BMI 32.47)  Current Weight: 121 kg (267 lb 3.2 oz)   Total Weight Gain: 21.4 kg (47 lb 3.2 oz)     Pre- Vitals      Flowsheet Row Most Recent Value   Prenatal Assessment    Fetal Heart Rate NST   Movement Present   Prenatal Vitals    Blood Pressure 132/80   Weight - Scale 121 kg (267 lb 3.2 oz)   Urine Albumin/Glucose    Dilation/Effacement/Station    Vaginal Drainage    Edema                 NST Completed today:  Baseline: 120 BPM  Variability: Moderate  Accelerations: Yes  Decelerations: None  Contractions: Not present  Nonstress Test Interpretation: Reactive        Problem List       Pre-existing type 1 diabetes mellitus with hyperglycemia during pregnancy in third trimester ([facility])    Overview     ASA recommended  Baseline labs normal, UPC 0.18   ECG ordered. Not done.  EKG with most recent pregnancy normal   1st trimester Ophthalmology exam recommended   q trimester UCx per MFM  HgbA1c ordered by diabetes team - 5.0%  Fetal echo - small VSD,  f/u recommended   Serial growth US: 36 week US EFW 46th%  -  testing @ 32 wks twice weekly  - Induction at 39 WGA           Current Assessment & Plan     Growth last week normal, EFW 46th%  Has twice weekly testing set up   NST today reactive/reassuring   Prefers to set up IOL and have cervical check next visit. She is aware of recommendation for delivery at 39+ weeks. OB precautions reviewed         History of postpartum depression    Overview     H/o postpartum depression with first and second children          36 weeks gestation of pregnancy    BMI 38.0-38.9,adult    Supervision of other high risk pregnancy, antepartum    Overview     ASA recommended  CF/ SMA testing: declines  Covid vaccine: not completed   Declines aneuploidy screening   MsAFP: negative  S/p tdap   Declines flu vaccine  Delivery consent signed           History of gestational hypertension    Overview     2nd and 3rd pregnancies          Fetal muscular VSD - tiny    Overview     -There is a tiny muscular VSD identified only on color doppler. Otherwise normal cardiac anatomy and function.   -Normal prenatal care, delivery, and  care are recommended.    -Recommend  outpatient cardiology consult with echocardiogram at 1-2 weeks of life (Please schedule appointment prior to hospital discharge. Phone [phone]).             Beta thalassemia minor    Overview     Hemoglobin electrophoresis showing HgbA2 borderline high:  May indicated beta thalassemia minor  Consider ferritin/ TIBC with 28 wks labs         Insulin pump titration    GBS bacteriuria    Overview     8/28/23 Ucx                               Mariusz Pritchard MD  10/31/2023  2:46 PM

## 2023-10-31 NOTE — ASSESSMENT & PLAN NOTE
Growth last week normal, EFW 46th%  Has twice weekly testing set up   NST today reactive/reassuring   Prefers to set up IOL and have cervical check next visit. She is aware of recommendation for delivery at 39+ weeks.   OB precautions reviewed

## 2023-11-01 NOTE — TELEPHONE ENCOUNTER
LMOM to return call to discuss pump download      Thank you ONCOLOGY / HEMATOLOGY FOLLOW  UP NOTE    REASON FOR VISIT:   - Follow up for history of breast cancer     DIAGNOSIS / STAGE:   - Invasive ductal Carcinoma of the Right Breast, Grade 2, Stage IA (T1, N0, M0), ER/SD Positive, Her-2 Negative  - Invasive Lobular Carcinoma of the Left Breast with DCIS and LCIS, Grade 2, Stage 1A (T1, N0, M0).      CURRENT THERAPY:   - Surveillance     ONCOLOGIC HISTORY:   - Patient initially noticed right retracted nipple, which was followed by a mammogram and stereotactic ultrasound suggestive of right breast cancer.   - An MRI was performed which also revealed cancer in the left breast. Patient did not notice any lump or pain at the time of diagnosis.   - S/p bilateral mastectomy in December 2010. Right breast: 1.8 cm invasive ductal carcinoma with LCIS, ER/SD positive, HER-2 negative, sentinel lymph node biopsy negative, right breast Oncotype Dx score of 20. Left breast: 0.8 cm invasive lobular carcinoma with DCIS and LCIS, Oncotype Dx score of 24. Patient is BRCA negative. S/p adjuvant chemotherapy with Docetaxel plus Cytoxan x 4 cycles.   - Patient initially completed 5 years of adjuvant endocrine therapy with Tamoxifen. She then resumed Tamoxifen and switched to Arimidex, completed in 2016.   - on surveillance.       MEDICATIONS AND ALLERGIES:    Current Outpatient Medications   Medication Sig Dispense Refill   • omeprazole (PriLOSEC) 40 MG capsule TAKE ONE CAPSULE BY MOUTH EVERY DAY 90 capsule 1   • cyclobenzaprine (FLEXERIL) 10 MG tablet Take 1 tablet by mouth 3 times daily as needed for Muscle spasms. 90 tablet 0   • acitretin (SORIATANE) 25 MG capsule Take 1 capsule by mouth every other day. 45 capsule 1   • naproxen (NAPROSYN) 500 MG tablet TAKE ONE TABLET BY MOUTH TWICE DAILY WITH MEALS 180 tablet 1   • rosuvastatin (CRESTOR) 5 MG tablet TAKE ONE TABLET BY MOUTH DAILY 90 tablet 1   • ezetimibe (ZETIA) 10 MG tablet TAKE ONE TABLET BY MOUTH DAILY 90 tablet 1   • salicylic acid  60 % ointment Apply topically to the feet daily. 60 g 1   • Cholecalciferol (VITAMIN D) 2000 units tablet Take 2 and a half tabs by mouth daily. 75 tablet 0   • Magnesium Oxide 400 MG Cap Take 400 mg by mouth 2 times daily.      • coenzyme Q10 100 MG capsule Take 100 mg by mouth daily.      • clobetasol (TEMOVATE) 0.05 % ointment Apply topically 2 times daily.     • triamcinolone (ARISTOCORT) 0.1 % cream Apply topically 2 times daily.       No current facility-administered medications for this visit.     ALLERGIES:   Allergen Reactions   • Adhesive   (Environmental) RASH     On more sensitive areas, it's more severe. tegaderm is not a problem   • Atorvastatin MYALGIA     With confirmed CPK elevation   • Pine Tar RASH     Ingredient in a lot of adhesives           INTERVAL HISTORY:  Mrs. Kymberly Burnette is a 66 year old female with history of breast cancer who presents for a surveillance visit.     In the interim, the patient underwent an arthroscopy of the right shoulder with rotator cuff repair on 1/19/2023.     She is doing well. The patient has noticed a \"twinge in the breast that goes away after a couple seconds\". She noticed some fullness near the prior surgical incision. Energy and appetite remain stable. She intentionally lost 10 lbs but gained back the weight during recent vacation. The patient admits to needing to exercise more frequently. She denies headaches, cough, shortness of breath, dysphagia, odynophagia, pain or stiffness in shoulders, abdominal pain, cramping, fullness or bloating. She is up-to-date on all vaccinations.      REVIEW OF SYSTEMS:       GENERAL:  Patient denies headache, fevers, chills, night sweats, excessive fatigue, change in appetite, weight loss, dizziness  ALLERGIC/IMMUNOLOGIC: Verified allergies: Yes  EYES:  Patient denies significant visual difficulties, double vision, blurred vision  ENT/MOUTH: Patient denies problems with hearing, sore throat, sinus drainage, mouth  sores  ENDOCRINE:  Patient denies diabetes, thyroid disease, hormone replacement, hot flashes  HEMATOLOGIC/LYMPHATIC: Patient denies easy bruising, bleeding, tender lymph nodes, swollen lymph nodes  BREASTS: Patient denies abnormal masses of breast, nipple discharge, pain, but complains of: a lump right by the nipple right side per pt for a month now per pt   RESPIRATORY:  Patient denies lung pain with breathing, cough, coughing up blood, shortness of breath  CARDIOVASCULAR:  Patient denies anginal chest pain, palpitations, shortness of breath when lying flat, peripheral edema  GASTROINTESTINAL: Patient denies abdominal pain , nausea, vomiting, diarrhea, GI bleeding, constipation, change in bowel habits, heartburn, sensation of feeling full, difficulty swallowing  : Patient denies abnormal genital masses, blood in the urine, frequency, urgency, burning with urination, hesitancy, incontinence, vaginal bleeding, discharge  MUSCULOSKELETAL:  Patient denies joint pain, bone pain, joint swelling, redness, decreased range of motion, but complains of: right hip pain worse with movements per pt   SKIN:  Patient denies chronic rashes, inflammation, ulcerations, skin changes, itching  NEUROLOGIC:  Patient denies loss of balance, areas of focal weakness, abnormal gait, sensory problems, numbness, tingling  PSYCHIATRIC: Patient denies insomnia, depression, anxiety     This patient reported abnormal symptoms that needed immediate verbal communication: No         PHYSICAL EXAMINATION:    Vitals:    23 0911   BP: 132/70   BP Location: LUE - Left upper extremity   Patient Position: Sitting   Cuff Size: Large Adult   Pulse: 80   Resp: 16   Temp: 98 °F (36.7 °C)   TempSrc: Oral   SpO2: 98%   Weight: 97.2 kg (214 lb 4.6 oz)   Height: 5' 7.99\" (1.727 m)   PainSc:  0   LMP: 2011       ECO - Fully active, able to carry on all pre-disease activities without restrictions.    General: Patient appears comfortable and in no  acute distress. Well developed and well nourished  HEENT: Head is atraumatic, normocephalic. No scleral icterus or conjunctival erythema.   Lymphatics: There is no palpable cervical, supraclavicular or axillary adenopathy.  Respiratory: Normal respiratory effort. Good bilateral air entry with no wheezes or crackles.  Cardiovascular: The point of maximal impulse is normal and non-displaced. Regular rate and rhythm. Normal S1 and S2. No murmurs, rubs, or gallops. No edema noted.  Breast: Bilateral breast implants symmetric with no evidence of overlying erythema or palpable nodules.  Skin: No skin rashes or lesions to inspection or palpation.   Neurologic: No focal deficits.  Musculoskeletal: Gait is normal.  Psychiatric: The patient is alert and oriented times 3. Affect is not blunted and mood is appropriate.         LABS:  Lab Services on 11/01/2023   Component Date Value Ref Range Status   • Sodium 11/01/2023 143  135 - 145 mmol/L Final   • Potassium 11/01/2023 4.0  3.4 - 5.1 mmol/L Final   • Chloride 11/01/2023 109  97 - 110 mmol/L Final   • Carbon Dioxide 11/01/2023 29  21 - 32 mmol/L Final   • Anion Gap 11/01/2023 9  7 - 19 mmol/L Final   • Glucose 11/01/2023 88  70 - 99 mg/dL Final   • BUN 11/01/2023 13  6 - 20 mg/dL Final   • Creatinine 11/01/2023 0.68  0.51 - 0.95 mg/dL Final   • Glomerular Filtration Rate 11/01/2023 >90  >=60 Final    eGFR results = or >60 mL/min/1.73m2 = Normal kidney function. Estimated GFR calculated using the CKD-EPI-R (2021) equation that does not include race in the creatinine calculation.   • BUN/Cr 11/01/2023 19  7 - 25 Final   • Calcium 11/01/2023 9.0  8.4 - 10.2 mg/dL Final   • Bilirubin, Total 11/01/2023 0.5  0.2 - 1.0 mg/dL Final   • GOT/AST 11/01/2023 36  <=37 Units/L Final   • GPT/ALT 11/01/2023 40  <64 Units/L Final   • Alkaline Phosphatase 11/01/2023 99  45 - 117 Units/L Final   • Albumin 11/01/2023 4.0  3.6 - 5.1 g/dL Final   • Protein, Total 11/01/2023 7.3  6.4 - 8.2 g/dL  Final   • Globulin 11/01/2023 3.3  2.0 - 4.0 g/dL Final   • A/G Ratio 11/01/2023 1.2  1.0 - 2.4 Final         ASSESSMENT AND PLAN: Mrs. Kymberly Burnette is a 66 year old  female with the following hematology/oncology problems:    1. Invasive ductal Carcinoma of the right breast, Grade 2, Stage IA (T1, N0, M0), ER/MI Positive, Her-2 Negative. Oncotype Dx Score of 20. Status post bilateral mastectomy in 12/2010. Completed 4 cycles of adjuvant chemotherapy with Docetaxel plus Cytoxan. Patient initially completed 5 years of adjuvant endocrine therapy with Tamoxifen. She then resumed Tamoxifen and switched to Arimidex, completed in 2016.   - currently on surveillance and doing well clinically. Due to stability of disease, we discussed following up with PCP and being discharged from my care, for which the patient is in agreement with. Encouraged patient to contact our clinic with any questions or concerns.   - Vitals remain stable. Labs were reviewed during the visit today. CMP is stable.       2. Invasive Lobular Carcinoma of the Left Breast with DCIS and LCIS, Grade 2, Stage IA (T1, N0, M0). Oncotype Dx score of 24. BRCA Negative. Clinical course same as #1.         All the patient's questions were answered, I believe, to her satisfaction.    FOLLOW UP PLAN:   - No scheduled follow up       HOMERO WESLEY MD      This chart was documented by Radha Guzman, acting as a scribe for Homero Wesley MD. 11/1/2023, 9:32 AM.      I, Homero Wesley MD attest that I performed all of the work during this encounter and that the documentation recorded by the scribe accurately and completely reflects the service(s) I personally performed and the decisions made by me.

## 2023-11-02 ENCOUNTER — ULTRASOUND (OUTPATIENT)
Dept: PERINATAL CARE | Facility: OTHER | Age: 33
End: 2023-11-02
Payer: COMMERCIAL

## 2023-11-02 VITALS
WEIGHT: 268.4 LBS | DIASTOLIC BLOOD PRESSURE: 64 MMHG | BODY MASS INDEX: 39.75 KG/M2 | HEIGHT: 69 IN | HEART RATE: 80 BPM | SYSTOLIC BLOOD PRESSURE: 116 MMHG

## 2023-11-02 DIAGNOSIS — Z3A.37 37 WEEKS GESTATION OF PREGNANCY: ICD-10-CM

## 2023-11-02 DIAGNOSIS — O24.013 PRE-EXISTING TYPE 1 DIABETES MELLITUS WITH HYPERGLYCEMIA DURING PREGNANCY IN THIRD TRIMESTER (HCC): Primary | ICD-10-CM

## 2023-11-02 DIAGNOSIS — E10.65 PRE-EXISTING TYPE 1 DIABETES MELLITUS WITH HYPERGLYCEMIA DURING PREGNANCY IN THIRD TRIMESTER (HCC): Primary | ICD-10-CM

## 2023-11-02 PROCEDURE — 76815 OB US LIMITED FETUS(S): CPT | Performed by: STUDENT IN AN ORGANIZED HEALTH CARE EDUCATION/TRAINING PROGRAM

## 2023-11-02 PROCEDURE — 59025 FETAL NON-STRESS TEST: CPT | Performed by: STUDENT IN AN ORGANIZED HEALTH CARE EDUCATION/TRAINING PROGRAM

## 2023-11-02 NOTE — PROGRESS NOTES
Repeat Non-Stress Testing:    Patient verbalizes +FM. Pt denies ALL:               Leaking of fluid   Contractions   Vaginal bleeding   Decreased fetal movement    Patient is performing daily kick counts. Patient has no questions or concerns. NST strip reviewed by Dr. Zeeshan French.

## 2023-11-02 NOTE — PROGRESS NOTES
[facility]: Ms. Rubio Kilgore was seen today for NST (found under the pregnancy episode) which I reviewed the RN assessment and agree, and VINOD (see ultrasound report under OB procedures tab). Please don't hesitate to contact our office with any concerns or questions.   -Arely James MD

## 2023-11-07 ENCOUNTER — ROUTINE PRENATAL (OUTPATIENT)
Dept: OBGYN CLINIC | Facility: CLINIC | Age: 33
End: 2023-11-07
Payer: COMMERCIAL

## 2023-11-07 VITALS
WEIGHT: 270 LBS | OXYGEN SATURATION: 100 % | TEMPERATURE: 97.2 F | HEIGHT: 69 IN | SYSTOLIC BLOOD PRESSURE: 134 MMHG | DIASTOLIC BLOOD PRESSURE: 72 MMHG | HEART RATE: 89 BPM | BODY MASS INDEX: 39.99 KG/M2

## 2023-11-07 DIAGNOSIS — O24.013 PRE-EXISTING TYPE 1 DIABETES MELLITUS WITH HYPERGLYCEMIA DURING PREGNANCY IN THIRD TRIMESTER (HCC): ICD-10-CM

## 2023-11-07 DIAGNOSIS — O35.BXX0 ANOMALY OF HEART OF FETUS AFFECTING PREGNANCY, ANTEPARTUM, SINGLE OR UNSPECIFIED FETUS: Primary | ICD-10-CM

## 2023-11-07 DIAGNOSIS — Z3A.38 38 WEEKS GESTATION OF PREGNANCY: ICD-10-CM

## 2023-11-07 DIAGNOSIS — E10.65 PRE-EXISTING TYPE 1 DIABETES MELLITUS WITH HYPERGLYCEMIA DURING PREGNANCY IN THIRD TRIMESTER (HCC): ICD-10-CM

## 2023-11-07 DIAGNOSIS — Z87.59 HISTORY OF GESTATIONAL HYPERTENSION: ICD-10-CM

## 2023-11-07 DIAGNOSIS — D56.3 BETA THALASSEMIA MINOR: ICD-10-CM

## 2023-11-07 DIAGNOSIS — R82.71 GBS BACTERIURIA: ICD-10-CM

## 2023-11-07 DIAGNOSIS — Z46.81 INSULIN PUMP TITRATION: ICD-10-CM

## 2023-11-07 PROCEDURE — 59025 FETAL NON-STRESS TEST: CPT | Performed by: OBSTETRICS & GYNECOLOGY

## 2023-11-07 PROCEDURE — PNV: Performed by: OBSTETRICS & GYNECOLOGY

## 2023-11-07 NOTE — PATIENT INSTRUCTIONS
Induction of Labor    What is Induction of Labor? Induction of labor is when labor is started (induced) before it begins on its own. Medicines and other methods are used to start contractions and help your cervix soften, thin (efface), and dilate (open). You may be given antibiotics to prevent an infection during delivery. When can you have an elective induction? Before a decision can be made for an elective induction of labor, you healthcare provider must assess the following information about your pregnancy:  Due date  At least 39 weeks pregnant  Is your cervix showing signs of being ready for labor? How is labor induced? There are several ways to induce labor. Your doctor will choose what's best for you. Breaking your water  The doctor uses a plastic hook to make a small hole in the amniotic sac (bag of water)  Murphy Balloon  This is a small catheter with a balloon on the end which is inserted into the vagina. The balloon is inflated with saline to help the cervix expand. The intention is to mechanically dilate the cervix to a point where it is safe to start Pitocin. You may be out of bed with this method. In fact it is encouraged since gravity will assist in moving the balloon down through your cervix. Medications  Cytotec/prostaglandins  This is used when your cervix is still closed and thick. It is a small tablet inserted into your vagina by the physician, or it can be taken orally/buccally. This tablet may be given every 4-6 hours as needed until your cervix opens and thins. After the medication is placed you will be on the fetal heart monitor for a period of time. Whether you are on intermittent monitoring and are permitted to walk/sit in a chair versus continuous monitoring will be determined by your clinical status. Pitocin  This is an IV medication that is administered slowly through a pump and increased in small increments until a productive pattern of labor is achieved.  The goal is to cause your contractions to begin and to stay strong and regular. Your baby will need to be constantly monitored on this medication. You may either be in the bed, a chair or on the birthing ball, as long as the baby's heartrate can be monitored safely. What are the risks of an induction? You may have a longer labor. Medicines used to induce labor may cause too many contractions. This can lower your baby's heartbeat and decrease his or her oxygen supply. Induction of labor may also increase the risk of umbilical cord prolapse. This condition causes the umbilical cord to slip into the vagina before delivery. It can compress the cord and decrease your baby's oxygen supply. If this were to happen, you will need a           Preparing for your Induction      When you are scheduled for an overnight induction, it is most likely because your cervix is closed or "unfavorable". Your body is not ready for labor and therefore you may need medication overnight to prepare your cervix for the Pitocin in the morning. This type of induction can take a day, or more of labor. This is normal. We are asking  your body do something it is not quite ready to do yet. You may eat regular meals prior to when you arrive for your induction. Once you arrive at the hospital you will be on a clear liquid diet until you deliver the baby. That will consist of juices (not orange juice), soda, broths, popsicles, Jello and water ice. Please be prepared for an induction to take a day or two or even more until you deliver your baby. As mentioned earlier, we are asking your body to do something it is not quite ready to do yet.

## 2023-11-07 NOTE — PROGRESS NOTES
S: 34 y.o. [id number] here for routine prenatal visit. Chief Complaint   Patient presents with    Routine Prenatal Visit     NST today; no concerns;         OB complaints:  Contractions: no  Leakage: no  Bleeding: no  Fetal movement: yes      O:  /72 (BP Location: Right arm, Patient Position: Sitting, Cuff Size: Large)   Pulse 89   Temp (!) 97.2 °F (36.2 °C)   Ht 5' 9" (1.753 m)   Wt 122 kg (270 lb)   LMP 02/11/2023   SpO2 100%   BMI 39.87 kg/m²       Review of Systems   Constitutional:  Negative for chills and fever. Eyes:  Negative for visual disturbance. Respiratory:  Negative for chest tightness and shortness of breath. Cardiovascular:  Negative for chest pain. Gastrointestinal:  Negative for abdominal pain, diarrhea, nausea and vomiting. Genitourinary:  Negative for pelvic pain and vaginal bleeding. As noted in HPI   Skin:  Negative for rash. Neurological:  Negative for headaches. All other systems reviewed and are negative. Physical Exam  Constitutional:       General: She is not in acute distress. Appearance: Normal appearance. Genitourinary:      Right Labia: No rash, tenderness, lesions or skin changes. Left Labia: No tenderness, lesions, skin changes or rash. Pelvic exam was performed with patient in the lithotomy position. HENT:      Head: Normocephalic and atraumatic. Cardiovascular:      Rate and Rhythm: Normal rate. Pulmonary:      Effort: Pulmonary effort is normal. No respiratory distress. Abdominal:      General: There is no distension. Palpations: Abdomen is soft. Tenderness: There is no abdominal tenderness. There is no guarding or rebound. Comments: gravid   Neurological:      General: No focal deficit present. Mental Status: She is alert. Psychiatric:         Mood and Affect: Mood normal.         Behavior: Behavior normal.   Vitals and nursing note reviewed.               Fetal Heart Rate: NST    Pregravid Weight/BMI: 99.8 kg (220 lb) (BMI 32.47)  Current Weight: 122 kg (270 lb)   Total Weight Gain: 22.7 kg (50 lb)     Pre-Enoc Vitals      Flowsheet Row Most Recent Value   Prenatal Assessment    Fetal Heart Rate NST   Movement Present   Prenatal Vitals    Blood Pressure 134/72   Weight - Scale 122 kg (270 lb)   Urine Albumin/Glucose    Dilation/Effacement/Station    Cervical Dilation 1   Cervical Effacement 20   Fetal Station -3   Vaginal Drainage    Edema                 NST Completed today:  Baseline: 120 BPM  Variability: Moderate  Accelerations: Yes  Decelerations: None  Contractions: Not present  Nonstress Test Interpretation: Reactive        Problem List       Pre-existing type 1 diabetes mellitus with hyperglycemia during pregnancy in third trimester (HCC)    Overview     ASA recommended  Baseline labs normal, UPC 0.18   ECG ordered. Not done.   EKG with most recent pregnancy normal   1st trimester Ophthalmology exam recommended   q trimester UCx per MFM  HgbA1c ordered by diabetes team - 5.0%  Fetal echo - small VSD,  f/u recommended   Serial growth US: 36 week US EFW 46th%  -  testing @ 32 wks twice weekly  - Induction at 44 WGA           Current Assessment & Plan     NST today reactive/reassuring, has repeat with VINOD at [facility] later this week  SVE as above   Discussed IOL - prefers morning IOL as duration of process was not very long previously - scheduled and consent signed   OB precautions reviewed         History of postpartum depression    Overview     H/o postpartum depression with first and second children          38 weeks gestation of pregnancy    BMI 38.0-38.9,adult    Supervision of other high risk pregnancy, antepartum    Overview     ASA recommended  CF/ SMA testing: declines  Covid vaccine: not completed   Declines aneuploidy screening   MsAFP: negative  S/p tdap   Declines flu vaccine  Delivery consent signed           History of gestational hypertension Overview     2nd and 3rd pregnancies          Fetal muscular VSD - tiny    Overview     -There is a tiny muscular VSD identified only on color doppler. Otherwise normal cardiac anatomy and function.   -Normal prenatal care, delivery, and  care are recommended. -Recommend  outpatient cardiology consult with echocardiogram at 1-2 weeks of life (Please schedule appointment prior to hospital discharge. Phone [phone]).             Beta thalassemia minor    Overview     Hemoglobin electrophoresis showing HgbA2 borderline high:  May indicated beta thalassemia minor  Consider ferritin/ TIBC with 28 wks labs         Insulin pump titration    GBS bacteriuria    Overview     23 Ucx                               Deyvi Hart MD  2023  2:52 PM

## 2023-11-07 NOTE — ASSESSMENT & PLAN NOTE
NST today reactive/reassuring, has repeat with VINOD at [facility] later this week  SVE as above   Discussed IOL - prefers morning IOL as duration of process was not very long previously - scheduled and consent signed   OB precautions reviewed

## 2023-11-09 ENCOUNTER — ULTRASOUND (OUTPATIENT)
Dept: PERINATAL CARE | Facility: OTHER | Age: 33
End: 2023-11-09
Payer: COMMERCIAL

## 2023-11-09 VITALS
HEIGHT: 69 IN | HEART RATE: 86 BPM | SYSTOLIC BLOOD PRESSURE: 132 MMHG | WEIGHT: 270 LBS | BODY MASS INDEX: 39.99 KG/M2 | DIASTOLIC BLOOD PRESSURE: 64 MMHG

## 2023-11-09 DIAGNOSIS — O24.013 PRE-EXISTING TYPE 1 DIABETES MELLITUS WITH HYPERGLYCEMIA DURING PREGNANCY IN THIRD TRIMESTER (HCC): ICD-10-CM

## 2023-11-09 DIAGNOSIS — Z3A.38 38 WEEKS GESTATION OF PREGNANCY: Primary | ICD-10-CM

## 2023-11-09 DIAGNOSIS — E10.65 PRE-EXISTING TYPE 1 DIABETES MELLITUS WITH HYPERGLYCEMIA DURING PREGNANCY IN THIRD TRIMESTER (HCC): ICD-10-CM

## 2023-11-09 PROCEDURE — 59025 FETAL NON-STRESS TEST: CPT | Performed by: OBSTETRICS & GYNECOLOGY

## 2023-11-09 PROCEDURE — 76815 OB US LIMITED FETUS(S): CPT | Performed by: OBSTETRICS & GYNECOLOGY

## 2023-11-09 NOTE — PROGRESS NOTES
A fetal ultrasound and NST were completed. See Ob procedures in Epic for an interpretation and recommendations. Do not hesitate to contact us in [facility] if you have questions. Justin Roberts MD, [address] [facility]  Maternal Fetal Medicine Quinolones Pregnancy And Lactation Text: This medication is Pregnancy Category C and it isn't know if it is safe during pregnancy. It is also excreted in breast milk.

## 2023-11-09 NOTE — PROGRESS NOTES
Repeat Non-Stress Testing:    Patient verbalizes +FM. Pt denies ALL:               Leaking of fluid   Contractions   Vaginal bleeding   Decreased fetal movement    Patient is performing daily kick counts. Patient has no questions or concerns. NST strip reviewed by Dr. Kim Morales.

## 2023-11-14 ENCOUNTER — ROUTINE PRENATAL (OUTPATIENT)
Dept: OBGYN CLINIC | Facility: CLINIC | Age: 33
End: 2023-11-14
Payer: COMMERCIAL

## 2023-11-14 VITALS
HEIGHT: 69 IN | BODY MASS INDEX: 39.99 KG/M2 | HEART RATE: 94 BPM | TEMPERATURE: 98.5 F | WEIGHT: 270 LBS | DIASTOLIC BLOOD PRESSURE: 74 MMHG | SYSTOLIC BLOOD PRESSURE: 138 MMHG

## 2023-11-14 DIAGNOSIS — E10.65 PRE-EXISTING TYPE 1 DIABETES MELLITUS WITH HYPERGLYCEMIA DURING PREGNANCY IN THIRD TRIMESTER (HCC): ICD-10-CM

## 2023-11-14 DIAGNOSIS — O09.899 SUPERVISION OF OTHER HIGH RISK PREGNANCY, ANTEPARTUM: Primary | ICD-10-CM

## 2023-11-14 DIAGNOSIS — R82.71 GBS BACTERIURIA: ICD-10-CM

## 2023-11-14 DIAGNOSIS — O35.BXX0 ANOMALY OF HEART OF FETUS AFFECTING PREGNANCY, ANTEPARTUM, SINGLE OR UNSPECIFIED FETUS: ICD-10-CM

## 2023-11-14 DIAGNOSIS — D56.3 BETA THALASSEMIA MINOR: ICD-10-CM

## 2023-11-14 DIAGNOSIS — O24.013 PRE-EXISTING TYPE 1 DIABETES MELLITUS WITH HYPERGLYCEMIA DURING PREGNANCY IN THIRD TRIMESTER (HCC): ICD-10-CM

## 2023-11-14 DIAGNOSIS — Z46.81 INSULIN PUMP TITRATION: ICD-10-CM

## 2023-11-14 DIAGNOSIS — Z3A.39 39 WEEKS GESTATION OF PREGNANCY: ICD-10-CM

## 2023-11-14 DIAGNOSIS — Z87.59 HISTORY OF GESTATIONAL HYPERTENSION: ICD-10-CM

## 2023-11-14 DIAGNOSIS — Z86.59 HISTORY OF POSTPARTUM DEPRESSION: ICD-10-CM

## 2023-11-14 DIAGNOSIS — Z87.59 HISTORY OF POSTPARTUM DEPRESSION: ICD-10-CM

## 2023-11-14 PROCEDURE — 59025 FETAL NON-STRESS TEST: CPT | Performed by: OBSTETRICS & GYNECOLOGY

## 2023-11-14 PROCEDURE — PNV: Performed by: OBSTETRICS & GYNECOLOGY

## 2023-11-14 NOTE — PATIENT INSTRUCTIONS
Pregnancy at 44 to 40 Weeks   [address]:   What changes are happening with my body? You are now getting close to your due date. Your due date is just an estimate of when your baby will be born. Your baby may be born before or after your due date. Your breathing may be easier if your baby has moved down into a head-down position. You may need to urinate more often because the baby may be pressing on your bladder. You may also feel more discomfort and tire easily. You may also be having trouble sleeping. How do I care for myself at this stage of my pregnancy? Eat a variety of healthy foods. Healthy foods include fruits, vegetables, whole-grain breads, low-fat dairy foods, beans, lean meats, and fish. Drink liquids as directed. Ask how much liquid to drink each day and which liquids are best for you. Limit caffeine to less than 200 milligrams each day. Limit your intake of fish to 2 servings each week. Choose fish low in mercury such as canned light tuna, shrimp, salmon, cod, or tilapia. Do not  eat fish high in mercury such as swordfish, tilefish, oscar mackerel, and shark. Take prenatal vitamins as directed. Your need for certain vitamins and minerals, such as folic acid, increases during pregnancy. Prenatal vitamins provide some of the extra vitamins and minerals you need. Prenatal vitamins may also help to decrease the risk of certain birth defects. Rest as needed. Put your feet up if you have swelling in your ankles and feet. Talk to your healthcare provider about exercise. Moderate exercise can help you stay fit. Your healthcare provider will help you plan an exercise program that is safe for you during pregnancy. Do not smoke. Smoking increases your risk of a miscarriage and other health problems during your pregnancy. Smoking can cause your baby to be born early or weigh less at birth.  Ask your healthcare provider for information if you need help quitting. Do not drink alcohol. Alcohol passes from your body to your baby through the placenta. It can affect your baby's brain development and cause fetal alcohol syndrome (FAS). FAS is a group of conditions that causes mental, behavior, and growth problems. Talk to your healthcare provider before you take any medicines. Many medicines may harm your baby if you take them when you are pregnant. Do not take any medicines, vitamins, herbs, or supplements without first talking to your healthcare provider. Never use illegal or street drugs (such as marijuana or cocaine) while you are pregnant. What are some safety tips during pregnancy? Avoid hot tubs and saunas. Do not use a hot tub or sauna while you are pregnant, especially during your first trimester. Hot tubs and saunas may raise your baby's temperature and increase the risk of birth defects. Avoid toxoplasmosis. This is an infection caused by eating raw meat or being around infected cat feces. It can cause birth defects, miscarriages, and other problems. Wash your hands after you touch raw meat. Make sure any meat is well-cooked before you eat it. Avoid raw eggs and unpasteurized milk. Use gloves or ask someone else to clean your cat's litter box while you are pregnant. Ask your healthcare provider about travel. The most comfortable time to travel is during the second trimester. Ask your provider if you can travel after 36 weeks. You may not be able to travel in an airplane after 36 weeks. He or she may also recommend that you avoid long road trips. What changes are happening with my baby? Your baby is ready to be born. At birth, your baby may weigh about 6 to 9 pounds and be about 19 to 21 inches long. Your baby may be in a head-down position. Your baby will also rest lower in your abdomen. What do I need to know about prenatal care? Your healthcare provider will check your blood pressure and weight.  You may also need the following:  A urine test  may also be done to check for sugar and protein. These can be signs of gestational diabetes or infection. Protein in your urine may also be a sign of preeclampsia. Preeclampsia is a condition that can develop during week 20 or later of your pregnancy. It causes high blood pressure, and it can cause problems with your kidneys and other organs. A gestational diabetes screen  may be done. Your healthcare provider may order either a 1-step or 2-step oral glucose tolerance test (OGTT). 1-step OGTT:  Your blood sugar level will be tested after you have not eaten for 8 hours (fasting). You will then be given a glucose drink. Your level will be tested again 1 hour and 2 hours after you finish the drink. 2-step OGTT:  You do not have to fast for the first part of the test. You will have the glucose drink at any time of day. Your blood sugar level will be checked 1 hour later. If your blood sugar is higher than a certain level, another test will be ordered. You will fast and your blood sugar level will be tested. You will have the glucose drink. Your blood will be tested again 1 hour, 2 hours, and 3 hours after you finish the glucose drink. Your baby's heart rate  will be checked. When should I seek immediate care? You develop a severe headache that does not go away. You have new or increased vision changes, such as blurred or spotted vision. You have new or increased swelling in your face or hands. You have vaginal spotting or bleeding. Your water broke or you feel warm water gushing or trickling from your vagina. When should I call my obstetrician? You have more than 5 contractions in 1 hour. You notice any changes in your baby's movements. You have abdominal cramps, pressure, or tightening. You have a change in vaginal discharge. You have chills or a fever. You have vaginal itching, burning, or pain.     You have yellow, green, white, or foul-smelling vaginal discharge. You have pain or burning when you urinate, less urine than usual, or pink or bloody urine. You have questions or concerns about your condition or care. CARE AGREEMENT:   You have the right to help plan your care. Learn about your health condition and how it may be treated. Discuss treatment options with your healthcare providers to decide what care you want to receive. You always have the right to refuse treatment. The above information is an  only. It is not intended as medical advice for individual conditions or treatments. Talk to your doctor, nurse or pharmacist before following any medical regimen to see if it is safe and effective for you. © Copyright Chaka Chinchilla 2023 Information is for End User's use only and may not be sold, redistributed or otherwise used for commercial purposes.

## 2023-11-14 NOTE — PROGRESS NOTES
OB/GYN  PN Visit  Yessica Bull  [id number]  11/14/2023  3:01 PM  Dr. Steven Raymundo MD    S: 34 y.o. [id number] 39w3d here for PN visit. Chief Complaint   Patient presents with    Routine Prenatal Visit         OB complaints:  Contractions: no  Leakage: no  Bleeding: no  Fetal movement: yes      O:  /74 (BP Location: Right arm, Patient Position: Sitting, Cuff Size: Large)   Pulse 94   Temp 98.5 °F (36.9 °C)   Ht 5' 9" (1.753 m)   Wt 122 kg (270 lb)   LMP 02/11/2023   BMI 39.87 kg/m²       BP recheck 120/80    Review of Systems   Constitutional: Negative. HENT: Negative. Eyes: Negative. Respiratory: Negative. Cardiovascular: Negative. Gastrointestinal: Negative. Endocrine: Negative. Genitourinary:         As noted in HPI   Musculoskeletal: Negative. Skin: Negative. Allergic/Immunologic: Negative. Neurological: Negative. Hematological: Negative. Psychiatric/Behavioral: Negative. Physical Exam  Constitutional:       General: She is not in acute distress. Appearance: Normal appearance. She is well-developed. Genitourinary:      Right Labia: No rash, tenderness, lesions, skin changes or Bartholin's cyst.     Left Labia: No tenderness, lesions, skin changes, Bartholin's cyst or rash. No labial fusion noted. No inguinal adenopathy present in the right or left side. Pelvic exam was performed with patient in the lithotomy position. HENT:      Head: Normocephalic. Cardiovascular:      Rate and Rhythm: Normal rate. Pulmonary:      Effort: Pulmonary effort is normal.   Abdominal:      General: There is no distension. Palpations: Abdomen is soft. Tenderness: There is no abdominal tenderness. There is no guarding. Hernia: There is no hernia in the left inguinal area or right inguinal area. Musculoskeletal:         General: No swelling or deformity. Lymphadenopathy:      Lower Body: No right inguinal adenopathy.  No left inguinal adenopathy. Neurological:      General: No focal deficit present. Mental Status: She is alert and oriented to person, place, and time. Skin:     General: Skin is warm and dry. Psychiatric:         Mood and Affect: Mood normal.         Behavior: Behavior normal.   Vitals reviewed. Pregravid Weight/BMI: 99.8 kg (220 lb) (BMI 32.47)  Current Weight: 122 kg (270 lb)   Total Weight Gain: 22.7 kg (50 lb)   Pre- Vitals      Flowsheet Row Most Recent Value   Prenatal Assessment    Fetal Heart Rate 145   Fundal Height (cm) 38 cm   Movement Present   Presentation Vertex   Prenatal Vitals    Blood Pressure 138/74   Weight - Scale 122 kg (270 lb)   Urine Albumin/Glucose    Dilation/Effacement/Station    Cervical Dilation 1   Cervical Effacement 50   Fetal Station -3   Vaginal Drainage    Edema              Problem List          Endocrine    Pre-existing type 1 diabetes mellitus with hyperglycemia during pregnancy in third trimester (HCC)    Overview     ASA recommended  Baseline labs normal, UPC 0.18   ECG ordered. Not done.  EKG with most recent pregnancy normal   1st trimester Ophthalmology exam recommended   q trimester UCx per MFM  HgbA1c ordered by diabetes team - 5.0%  Fetal echo - small VSD,  f/u recommended   Serial growth US: 36 week US EFW 46th%  -  testing @ 32 wks twice weekly  - Induction at 44 WGA              Cardiovascular and Mediastinum    Fetal muscular VSD - tiny    Overview     -There is a tiny muscular VSD identified only on color doppler. Otherwise normal cardiac anatomy and function.   -Normal prenatal care, delivery, and  care are recommended. -Recommend  outpatient cardiology consult with echocardiogram at 1-2 weeks of life (Please schedule appointment prior to hospital discharge. Phone [phone]).                Other    History of postpartum depression    Overview     H/o postpartum depression with first and second children          39 weeks gestation of pregnancy    BMI 38.0-38.9,adult    Supervision of other high risk pregnancy, antepartum    Overview     ASA recommended  CF/ SMA testing: declines  Covid vaccine: not completed   Declines aneuploidy screening   MsAFP: negative  S/p tdap   Declines flu vaccine  Delivery consent signed           History of gestational hypertension    Overview     2nd and 3rd pregnancies          Beta thalassemia minor    Overview     Hemoglobin electrophoresis showing HgbA2 borderline high:  May indicated beta thalassemia minor  Consider ferritin/ TIBC with 28 wks labs         Insulin pump titration    GBS bacteriuria    Overview     8/28/23 Ucx            Induction of labor tomorrow. She is scheduled for pregestational diabetes. GBS prophylaxis is indicated for GBS bacteriuria  During labor, she prefers her subcutaneous insulin pump to continue. Plan typically is to reduce settings by 20%. Postpartum settings should be  set to prepregnancy settings. Patient is aware blood sugars will be checked compared to her Dexcom during labor. Post delivery insulin pump settings using pre-pregnancy weight 91 kg times 0.5 units/kg equals 45.5 TDD. Basal settings:  MN-8 AM@ 0.75 units/hour;  8 AM-10 PM@ 0.95 units/hour;  10 PM-MN@ 0.75 units/hour. Bolus settings:  Increase carb ratio to 10. Increase correction factor to 40. Increase BG target to 120.    She is aware she is allowed to eat during labor  Patient has close follow-up with her own endocrinologist through [address]   Date Time Provider 4600  [address]   11/15/2023  5:45 AM AL L&D ROOM AL L&D [facility]   12/14/2023  9:00 AM Angelina Dunn MD [facility]               Vanessa Ruano MD  11/14/2023  3:01 PM

## 2023-11-15 ENCOUNTER — ANESTHESIA EVENT (INPATIENT)
Dept: ANESTHESIOLOGY | Facility: HOSPITAL | Age: 33
End: 2023-11-15
Payer: COMMERCIAL

## 2023-11-15 ENCOUNTER — HOSPITAL ENCOUNTER (OUTPATIENT)
Dept: LABOR AND DELIVERY | Facility: HOSPITAL | Age: 33
Discharge: HOME | End: 2023-11-15
Payer: COMMERCIAL

## 2023-11-15 ENCOUNTER — ANESTHESIA (INPATIENT)
Dept: ANESTHESIOLOGY | Facility: HOSPITAL | Age: 33
End: 2023-11-15
Payer: COMMERCIAL

## 2023-11-15 ENCOUNTER — HOSPITAL ENCOUNTER (INPATIENT)
Facility: HOSPITAL | Age: 33
LOS: 1 days | Discharge: HOME | End: 2023-11-16
Attending: OBSTETRICS & GYNECOLOGY | Admitting: OBSTETRICS & GYNECOLOGY
Payer: COMMERCIAL

## 2023-11-15 DIAGNOSIS — Z3A.39 39 WEEKS GESTATION OF PREGNANCY: Primary | ICD-10-CM

## 2023-11-15 DIAGNOSIS — O13.9 GESTATIONAL HYPERTENSION: ICD-10-CM

## 2023-11-15 PROBLEM — E66.9 OBESITY: Status: ACTIVE | Noted: 2022-01-07

## 2023-11-15 LAB
ABO GROUP BLD: NORMAL
ALBUMIN SERPL BCP-MCNC: 3.2 G/DL (ref 3.5–5)
ALP SERPL-CCNC: 152 U/L (ref 34–104)
ALT SERPL W P-5'-P-CCNC: 12 U/L (ref 7–52)
ANION GAP SERPL CALCULATED.3IONS-SCNC: 6 MMOL/L
AST SERPL W P-5'-P-CCNC: 21 U/L (ref 13–39)
BASE EXCESS BLDCOV CALC-SCNC: -4 MMOL/L (ref 1–9)
BILIRUB SERPL-MCNC: 0.34 MG/DL (ref 0.2–1)
BLD GP AB SCN SERPL QL: NEGATIVE
BUN SERPL-MCNC: 10 MG/DL (ref 5–25)
CALCIUM ALBUM COR SERPL-MCNC: 9.4 MG/DL (ref 8.3–10.1)
CALCIUM SERPL-MCNC: 8.8 MG/DL (ref 8.4–10.2)
CHLORIDE SERPL-SCNC: 106 MMOL/L (ref 96–108)
CO2 SERPL-SCNC: 22 MMOL/L (ref 21–32)
CREAT SERPL-MCNC: 0.58 MG/DL (ref 0.6–1.3)
ERYTHROCYTE [DISTWIDTH] IN BLOOD BY AUTOMATED COUNT: 14.2 % (ref 11.6–15.1)
GFR SERPL CREATININE-BSD FRML MDRD: 123 ML/MIN/1.73SQ M
GLUCOSE SERPL-MCNC: 104 MG/DL (ref 65–140)
GLUCOSE SERPL-MCNC: 261 MG/DL (ref 65–140)
GLUCOSE SERPL-MCNC: 288 MG/DL (ref 65–140)
GLUCOSE SERPL-MCNC: 53 MG/DL (ref 65–140)
GLUCOSE SERPL-MCNC: 58 MG/DL (ref 65–140)
GLUCOSE SERPL-MCNC: 68 MG/DL (ref 65–140)
GLUCOSE SERPL-MCNC: 77 MG/DL (ref 65–140)
GLUCOSE SERPL-MCNC: 78 MG/DL (ref 65–140)
GLUCOSE SERPL-MCNC: 79 MG/DL (ref 65–140)
GLUCOSE SERPL-MCNC: 84 MG/DL (ref 65–140)
GLUCOSE SERPL-MCNC: 88 MG/DL (ref 65–140)
GLUCOSE SERPL-MCNC: 89 MG/DL (ref 65–140)
GLUCOSE SERPL-MCNC: 94 MG/DL (ref 65–140)
HCO3 BLDCOV-SCNC: 20.3 MMOL/L (ref 12.2–28.6)
HCT VFR BLD AUTO: 36.7 % (ref 34.8–46.1)
HGB BLD-MCNC: 11.9 G/DL (ref 11.5–15.4)
MCH RBC QN AUTO: 27.4 PG (ref 26.8–34.3)
MCHC RBC AUTO-ENTMCNC: 32.4 G/DL (ref 31.4–37.4)
MCV RBC AUTO: 84 FL (ref 82–98)
OXYHGB MFR BLDCOV: 70.7 %
PCO2 BLDCOV: 35.4 MM HG (ref 27–43)
PH BLDCOV: 7.38 [PH] (ref 7.19–7.49)
PLATELET # BLD AUTO: 240 THOUSANDS/UL (ref 149–390)
PMV BLD AUTO: 10.8 FL (ref 8.9–12.7)
PO2 BLDCOV: 32 MM HG (ref 15–45)
POTASSIUM SERPL-SCNC: 4 MMOL/L (ref 3.5–5.3)
PROT SERPL-MCNC: 6.3 G/DL (ref 6.4–8.4)
RBC # BLD AUTO: 4.35 MILLION/UL (ref 3.81–5.12)
RH BLD: POSITIVE
SAO2 % BLDCOV: 15.3 ML/DL
SODIUM SERPL-SCNC: 134 MMOL/L (ref 135–147)
SPECIMEN EXPIRATION DATE: NORMAL
TREPONEMA PALLIDUM IGG+IGM AB [PRESENCE] IN SERUM OR PLASMA BY IMMUNOASSAY: NORMAL
WBC # BLD AUTO: 10.25 THOUSAND/UL (ref 4.31–10.16)

## 2023-11-15 PROCEDURE — 86900 BLOOD TYPING SEROLOGIC ABO: CPT

## 2023-11-15 PROCEDURE — 3E0P7VZ INTRODUCTION OF HORMONE INTO FEMALE REPRODUCTIVE, VIA NATURAL OR ARTIFICIAL OPENING: ICD-10-PCS | Performed by: OBSTETRICS & GYNECOLOGY

## 2023-11-15 PROCEDURE — 86780 TREPONEMA PALLIDUM: CPT

## 2023-11-15 PROCEDURE — NC001 PR NO CHARGE: Performed by: OBSTETRICS & GYNECOLOGY

## 2023-11-15 PROCEDURE — 82805 BLOOD GASES W/O2 SATURATION: CPT | Performed by: OBSTETRICS & GYNECOLOGY

## 2023-11-15 PROCEDURE — 86850 RBC ANTIBODY SCREEN: CPT

## 2023-11-15 PROCEDURE — 85027 COMPLETE CBC AUTOMATED: CPT

## 2023-11-15 PROCEDURE — 10907ZC DRAINAGE OF AMNIOTIC FLUID, THERAPEUTIC FROM PRODUCTS OF CONCEPTION, VIA NATURAL OR ARTIFICIAL OPENING: ICD-10-PCS | Performed by: OBSTETRICS & GYNECOLOGY

## 2023-11-15 PROCEDURE — 86901 BLOOD TYPING SEROLOGIC RH(D): CPT

## 2023-11-15 PROCEDURE — 82948 REAGENT STRIP/BLOOD GLUCOSE: CPT

## 2023-11-15 PROCEDURE — 88307 TISSUE EXAM BY PATHOLOGIST: CPT | Performed by: SPECIALIST

## 2023-11-15 PROCEDURE — 59400 OBSTETRICAL CARE: CPT | Performed by: OBSTETRICS & GYNECOLOGY

## 2023-11-15 PROCEDURE — 80053 COMPREHEN METABOLIC PANEL: CPT

## 2023-11-15 RX ORDER — ROPIVACAINE HYDROCHLORIDE 2 MG/ML
INJECTION, SOLUTION EPIDURAL; INFILTRATION; PERINEURAL
Status: COMPLETED | OUTPATIENT
Start: 2023-11-15 | End: 2023-11-15

## 2023-11-15 RX ORDER — DOCUSATE SODIUM 100 MG/1
100 CAPSULE, LIQUID FILLED ORAL 2 TIMES DAILY
Status: DISCONTINUED | OUTPATIENT
Start: 2023-11-15 | End: 2023-11-16 | Stop reason: HOSPADM

## 2023-11-15 RX ORDER — ONDANSETRON 2 MG/ML
4 INJECTION INTRAMUSCULAR; INTRAVENOUS EVERY 4 HOURS PRN
Status: DISCONTINUED | OUTPATIENT
Start: 2023-11-15 | End: 2023-11-15

## 2023-11-15 RX ORDER — SODIUM CHLORIDE, SODIUM LACTATE, POTASSIUM CHLORIDE, CALCIUM CHLORIDE 600; 310; 30; 20 MG/100ML; MG/100ML; MG/100ML; MG/100ML
125 INJECTION, SOLUTION INTRAVENOUS CONTINUOUS
Status: DISCONTINUED | OUTPATIENT
Start: 2023-11-15 | End: 2023-11-15

## 2023-11-15 RX ORDER — ONDANSETRON 2 MG/ML
4 INJECTION INTRAMUSCULAR; INTRAVENOUS EVERY 8 HOURS PRN
Status: DISCONTINUED | OUTPATIENT
Start: 2023-11-15 | End: 2023-11-16 | Stop reason: HOSPADM

## 2023-11-15 RX ORDER — DIPHENHYDRAMINE HYDROCHLORIDE 50 MG/ML
12.5 INJECTION INTRAMUSCULAR; INTRAVENOUS EVERY 6 HOURS PRN
Status: DISCONTINUED | OUTPATIENT
Start: 2023-11-15 | End: 2023-11-16

## 2023-11-15 RX ORDER — IBUPROFEN 600 MG/1
600 TABLET ORAL EVERY 6 HOURS
Status: DISCONTINUED | OUTPATIENT
Start: 2023-11-15 | End: 2023-11-16 | Stop reason: HOSPADM

## 2023-11-15 RX ORDER — CALCIUM CARBONATE 500 MG/1
1000 TABLET, CHEWABLE ORAL 3 TIMES DAILY PRN
Status: DISCONTINUED | OUTPATIENT
Start: 2023-11-15 | End: 2023-11-16 | Stop reason: HOSPADM

## 2023-11-15 RX ORDER — BUPIVACAINE HYDROCHLORIDE 2.5 MG/ML
30 INJECTION, SOLUTION EPIDURAL; INFILTRATION; INTRACAUDAL ONCE AS NEEDED
Status: DISCONTINUED | OUTPATIENT
Start: 2023-11-15 | End: 2023-11-15

## 2023-11-15 RX ORDER — DIAPER,BRIEF,INFANT-TODD,DISP
1 EACH MISCELLANEOUS DAILY PRN
Status: DISCONTINUED | OUTPATIENT
Start: 2023-11-15 | End: 2023-11-16 | Stop reason: HOSPADM

## 2023-11-15 RX ORDER — ACETAMINOPHEN 325 MG/1
650 TABLET ORAL EVERY 4 HOURS PRN
Status: DISCONTINUED | OUTPATIENT
Start: 2023-11-15 | End: 2023-11-16

## 2023-11-15 RX ORDER — SODIUM CHLORIDE 9 MG/ML
125 INJECTION, SOLUTION INTRAVENOUS CONTINUOUS
Status: DISCONTINUED | OUTPATIENT
Start: 2023-11-15 | End: 2023-11-15

## 2023-11-15 RX ORDER — OXYTOCIN/RINGER'S LACTATE 30/500 ML
1-30 PLASTIC BAG, INJECTION (ML) INTRAVENOUS
Status: DISCONTINUED | OUTPATIENT
Start: 2023-11-15 | End: 2023-11-15

## 2023-11-15 RX ORDER — OXYTOCIN/RINGER'S LACTATE 30/500 ML
250 PLASTIC BAG, INJECTION (ML) INTRAVENOUS ONCE
Status: DISCONTINUED | OUTPATIENT
Start: 2023-11-15 | End: 2023-11-16 | Stop reason: HOSPADM

## 2023-11-15 RX ADMIN — ROPIVACAINE HYDROCHLORIDE 10 ML/HR: 2 INJECTION, SOLUTION EPIDURAL; INFILTRATION at 16:22

## 2023-11-15 RX ADMIN — SODIUM CHLORIDE 2.5 MILLION UNITS: 9 INJECTION, SOLUTION INTRAVENOUS at 15:32

## 2023-11-15 RX ADMIN — IBUPROFEN 600 MG: 600 TABLET, FILM COATED ORAL at 23:52

## 2023-11-15 RX ADMIN — IBUPROFEN 600 MG: 600 TABLET, FILM COATED ORAL at 18:36

## 2023-11-15 RX ADMIN — SODIUM CHLORIDE 2.5 MILLION UNITS: 9 INJECTION, SOLUTION INTRAVENOUS at 11:41

## 2023-11-15 RX ADMIN — DOCUSATE SODIUM 100 MG: 100 CAPSULE, LIQUID FILLED ORAL at 18:36

## 2023-11-15 RX ADMIN — SODIUM CHLORIDE, SODIUM LACTATE, POTASSIUM CHLORIDE, AND CALCIUM CHLORIDE 999 ML/HR: .6; .31; .03; .02 INJECTION, SOLUTION INTRAVENOUS at 15:36

## 2023-11-15 RX ADMIN — Medication 2 MILLI-UNITS/MIN: at 13:00

## 2023-11-15 RX ADMIN — Medication 25 MCG: at 07:29

## 2023-11-15 RX ADMIN — SODIUM CHLORIDE, SODIUM LACTATE, POTASSIUM CHLORIDE, AND CALCIUM CHLORIDE 125 ML/HR: .6; .31; .03; .02 INJECTION, SOLUTION INTRAVENOUS at 07:21

## 2023-11-15 RX ADMIN — SODIUM CHLORIDE 5 MILLION UNITS: 0.9 INJECTION, SOLUTION INTRAVENOUS at 07:27

## 2023-11-15 RX ADMIN — ROPIVACAINE HYDROCHLORIDE 10 ML: 2 INJECTION, SOLUTION EPIDURAL; INFILTRATION at 16:20

## 2023-11-15 RX ADMIN — ROPIVACAINE HYDROCHLORIDE: 2 INJECTION, SOLUTION EPIDURAL; INFILTRATION at 16:20

## 2023-11-15 NOTE — OB LABOR/OXYTOCIN SAFETY PROGRESS
Labor Progress Note - Stewart Hernandez 34 y.o. female MRN: [id number]    Unit/Bed#: L&D 325-01 Encounter: [id number]       Contraction Frequency (minutes): 5         Cervical Dilation: 3-4        Cervical Effacement: 60  Fetal Station: -3  Baseline Rate: 130 bpm  Fetal Heart Rate: 140 BPM  FHR Category: I               Vital Signs:   Vitals:    11/15/23 0826   BP: 115/59   Pulse: 84   Resp:    Temp:        Notes/comments:   Patient resting comfortably on exam, SVE as above. FHR category I. Begin pitocin and titrate per protocol.      Kobi Mancera MD 11/15/2023 12:43 PM

## 2023-11-15 NOTE — ANESTHESIA POSTPROCEDURE EVALUATION
Post-Op Assessment Note    CV Status:  Stable  Pain Score: 1    Pain management: adequate      Post-op block assessment: no complications and catheter intact   Mental Status:  Alert and awake   Hydration Status:  Euvolemic   PONV Controlled:  Controlled   Airway Patency:  Patent     Post Op Vitals Reviewed: Yes    No anethesia notable event occurred.     Staff: Anesthesiologist               /58 (11/15/23 1745)    Temp      Pulse 81 (11/15/23 1745)   Resp      SpO2

## 2023-11-15 NOTE — ANESTHESIA PROCEDURE NOTES
Epidural Block    Patient location during procedure: OB/L&D  Start time: 11/15/2023 4:13 PM  Reason for block: at surgeon's request  Staffing  Performed by: Aubrie Kahn DO  Authorized by: Aubrie Kahn DO    Preanesthetic Checklist  Completed: patient identified, IV checked, site marked, risks and benefits discussed, surgical consent, monitors and equipment checked, pre-op evaluation and timeout performed  Epidural  Patient position: sitting  Prep: Betadine  Sedation Level: no sedation  Patient monitoring: frequent blood pressure checks and heart rate  Approach: midline  Location: lumbar, L3-4  Injection technique: GURJIT air  Needle  Needle type: Tuohy   Needle gauge: 18 G  Catheter type: side hole  Catheter size: 20 G  Catheter securement method: tape  Test dose: negativeropivacaine (NAROPIN) 0.2% injection 10 mL - Epidural   10 mL - 11/15/2023 4:20:00 PM  Assessment  Sensory level: T10  Number of attempts: 1negative aspiration for CSF, negative aspiration for heme and no paresthesia on injection  patient tolerated the procedure well with no immediate complications

## 2023-11-15 NOTE — H&P
H&P - [address] Hernandez 34 y.o. female MRN: [id number]  Unit/Bed#: L&D 325-01 Encounter: [id number]    Assessment and Plan:  34 y.o.  at 39w4d who is being admitted for induction of labor for T1DM. By issue:  * 39 weeks gestation of pregnancy  Assessment & Plan  Admit   T&S, CBC, RPR  CLD  IV fluids  GBS prophylaxis is needed, PCN ordered  Induction with galindo balloon/cytotec      GBS bacteriuria  Assessment & Plan  Penicillin ordered for GBS prophylaxis    Fetal muscular VSD - tiny  Assessment & Plan  -There is a tiny muscular VSD identified only on color doppler. Otherwise normal cardiac anatomy and function.   -Normal prenatal care, delivery, and  care are recommended. -Recommend  outpatient cardiology consult with echocardiogram at 1-2 weeks of life (Please schedule appointment prior to hospital discharge. Phone [phone]). History of gestational hypertension  Assessment & Plan  F/u admission CBC and CMP  Monitor blood pressures    Pre-existing type 1 diabetes mellitus with hyperglycemia during pregnancy in third trimester [facility])  Assessment & Plan  Lab Results   Component Value Date    HGBA1C 5.1 10/24/2023       No results for input(s): "POCGLU" in the last 72 hours. Blood Sugar Average: Last 72 hrs:    Patient wishes to use subcutaneous insulin pump in labor  Fingersticks q1h        Plan of care discussed with Dr. Ramon Uriostegui. This patient will be an INPATIENT and I certify the anticipated length of stay is >2 Midnights. History of Present Illness     Chief Complaint: "here for my induction"    History of Present Illness:  Mj Guidry is a 34 y.o. [id number] with an TRI of 2023, by Last Menstrual Period at 39w4d weeks gestation who presents for induction of labor for T1DM. Contractions: denies  Loss of fluid: denies  Vaginal bleeding: denies  Fetal movement: endorses    ROS otherwise negative.      Obstetrician: Laura Winston    OB History  Para Term  AB Living   6 3 3   2 3   SAB IAB Ectopic Multiple Live Births   1 1   0 3      # Outcome Date GA Lbr Abrahan/2nd Weight Sex Delivery Anes PTL Lv   6 Current            5 Term 22 39w1d  3742 g (8 lb 4 oz) F Vag-Spont EPI N ELROY   4 Term 21 38w5d / 00:07 3515 g (7 lb 12 oz) M Vag-Spont EPI N ELROY      Complications: History of gestational hypertension   3 Term 19 38w0d 05:45 / 02:51 2985 g (6 lb 9.3 oz) M Vag-Spont EPI  ELROY   2 SAB 18 10w3d          1 IAB 2013               Baby complications/comments: Tiny fetal VSD    Review of Systems   Constitutional:  Negative for chills and fever. Eyes:  Negative for photophobia. Respiratory:  Negative for shortness of breath. Cardiovascular:  Negative for chest pain. Gastrointestinal:  Negative for abdominal pain, constipation, diarrhea, nausea and vomiting. Genitourinary:  Negative for vaginal bleeding and vaginal discharge. Musculoskeletal:  Negative for back pain. Neurological:  Negative for headaches.        Historical Information   Past Medical History:   Diagnosis Date    Diabetes ([address] [facility])     type 1    Diabetes mellitus ([address] [facility])      Past Surgical History:   Procedure Laterality Date    ME NDSC WRST SURG W/RLS TRANSVRS CARPL LIGM Right 2/3/2021    Procedure: RELEASE CARPAL TUNNEL ENDOSCOPIC;  Surgeon: Marcia Casanova MD;  Location: [facility];  Service: Orthopedics    ME [address] [facility] WRST SURG W/RLS TRANSVRS CARPL LIGM Left 2021    Procedure: RELEASE CARPAL TUNNEL ENDOSCOPIC;  Surgeon: Marcia Casanova MD;  Location: [facility];  Service: Orthopedics    WISDOM TOOTH EXTRACTION       Social History   Social History     Substance and Sexual Activity   Alcohol Use Not Currently     Social History     Substance and Sexual Activity   Drug Use Never     Social History     Tobacco Use   Smoking Status Never   Smokeless Tobacco Never     Family History:   Family History   Problem Relation Age of Onset    Thyroid cancer Mother     Hypertension Father     Other Father         bladder cancer    Hypothyroidism Sister     No Known Problems Brother     No Known Problems Son     Diabetes type I Maternal Grandfather     Heart attack Paternal Grandfather     Breast cancer Other     Ovarian cancer Neg Hx        Meds/Allergies      Medications Prior to Admission   Medication    Blood Pressure Monitoring (B-D ASSURE BPM/AUTO ARM CUFF) MISC    Continuous Blood Gluc  (Dexcom G6 ) DENIZ    Continuous Blood Gluc Sensor (Dexcom G6 Sensor) MISC    Continuous Blood Gluc Transmit (Dexcom G6 Transmitter) MISC    docusate sodium (COLACE) 100 mg capsule    glucagon 1 MG injection    glucose blood test strip    HumaLOG 100 UNIT/ML injection    insulin glargine (LANTUS) 100 units/mL subcutaneous injection    Lancets MISC    Prenatal Vit-Fe Fumarate-FA (PRENATAL VITAMIN PO)      No Known Allergies    Objective:  Vitals: Blood pressure 116/68, pulse 93, temperature 98.8 °F (37.1 °C), temperature source Oral, resp. rate 16, height 5' 9" (1.753 m), weight 122 kg (270 lb), last menstrual period 02/11/2023, currently breastfeeding. Body mass index is 39.87 kg/m².      Physical Exam  GEN: alert and oriented x 3, no apparent distress, appears well  CARDIAC: RRR  PULMONARY: effort wnl  ABDOMEN: gravid, soft, no tenderness  GENITOURINARY: external genitalia grossly normal   EXTREMITIES: nontender, no erythema  FETAL ASSESSMENT:  Fetal heart rate: 140bpm/moderate variability/15x15 accelerations/no decelerations; Category I  [place]: irritability    Prenatal Labs:   Blood type: O+  Antibody screen: negative  HIV: non-reactive  Hepatitis B surface antigen: non-reactive  Hepatitis C antibody: non-reactive  Syphilis screening: negative  Gonorrhea/Chlamydia: negative/negative  Rubella: Immune  Varicella: Unknown  Urine culture: negative  Group B strep: positive  Last Hemoglobin: 11.9g/dL  Last Platelet count: 652V    Invasive Devices       None Ferny Díaz MD  Obstetrics & Gynecology, PGY-2  11/15/2023, 6:06 AM

## 2023-11-15 NOTE — DISCHARGE SUMMARY
Discharge Summary - Lee Beck 34 y.o. female MRN: [id number]    Unit/Bed#: L&D 325-01 Encounter: [id number]    Admission Date: 11/15/2023     Discharge Date: ***    Patient Active Problem List   Diagnosis    Pre-existing type 1 diabetes mellitus with hyperglycemia during pregnancy in third trimester ([address])    Obesity    History of postpartum depression    39 weeks gestation of pregnancy    BMI 38.0-38.9,adult    Supervision of other high risk pregnancy, antepartum    History of gestational hypertension    Fetal muscular VSD - tiny    Beta thalassemia minor    Insulin pump titration    GBS bacteriuria     (spontaneous vaginal delivery)       OBGYN Practice: [facility] (Dr. Yudith Elliott &  [facility] Course:   Lee Beck is a 34 y.o. [id number] 39w4d . She presented to labor and delivery for induction for type I diabetes mellitus. Her pregnancy was complicated by type one diabetes mellitus, obesity, GBS bacteruria. On exam in triage she was noted to be 1/50/-3. She was induced with galindo and cytotec and advanced to pitocin. She managed her home subcutaneous insulin pump intrapartum with additional POC glucose checks per [facility] protocol. Amniotomy was performed for clear fluid, and patient received an epidural for pain management. She progressed to complete and began pushing. Delivery Findings:  Brook Huerta delivered a viable female  on 11/15/2023  5:15 PM  via Vaginal, Spontaneous  . The delivery was uncomplicated. Baby's Weight:  ;      Apgar scores: 8  and 9  at 1 and 5 minutes, respectively  Anesthesia: Epidural ,   QBL: Non-Surgical QBL (mL): 229         was transferred to *** nursery. Patient tolerated the procedure well and was transferred to recovery in stable condition. She met criteria for gestational hypertension with mild range blood pressures postpartum, with normal labs. She did not require treatment with antihypertensives.   Her post-partum pain was well controlled with oral analgesics. On day of discharge she was ambulating, voiding spontaneously, tolerating oral intake and hemodynamically stable. Mom's blood type is {beoabor:73210} and  Rhogam {WAS/WAS NOT:[id number]::"was not"} given. She was discharged home on postpartum day #*** without complications. Patient was instructed to follow up with her OB as an outpatient and was given appropriate warnings to call doctor or provider if she develops signs of infection or uncontrolled pain. Discharge Problem List by Issue:   GBS bacteriuria  Assessment & Plan  Penicillin ordered for GBS prophylaxis    Fetal muscular VSD - tiny  Assessment & Plan  -There is a tiny muscular VSD identified only on color doppler. Otherwise normal cardiac anatomy and function.   -Normal prenatal care, delivery, and  care are recommended. -Recommend  outpatient cardiology consult with echocardiogram at 1-2 weeks of life (Please schedule appointment prior to hospital discharge. Phone [phone]). History of gestational hypertension  Assessment & Plan  F/u admission CBC and CMP  Monitor blood pressures    Pre-existing type 1 diabetes mellitus with hyperglycemia during pregnancy in third trimester [facility])  Assessment & Plan  Lab Results   Component Value Date    HGBA1C 5.1 10/24/2023       No results for input(s): "POCGLU" in the last 72 hours. Blood Sugar Average: Last 72 hrs:    Patient wishes to use subcutaneous insulin pump in labor  Fingersticks q1h    * 39 weeks gestation of pregnancy  Assessment & Plan  Admit   T&S, CBC, RPR  CLD  IV fluids  GBS prophylaxis is needed, PCN ordered  Induction with galindo balloon/cytotec           Disposition: Home    Planned Readmission: No    Discharge Medications:   Please see AVS    Discharge instructions :   -Do not place anything (no partner, tampons or douche) in your vagina for 6 weeks.   -You may walk for exercise for the first 6 weeks then gradually return to your usual activities.   -Please do not drive for 1 week if you have no stitches and for 2 weeks if you have stitches or underwent a  delivery.    -You may take baths or shower per your preference.   -Please look at your bust (breasts) in the mirror daily and call your doctor for redness or tenderness or increased warmth. - If you have had a  section please look at your incision daily as well and call provider for increasing redness or steady drainage from the incision.   -Please call your doctor's office if temperature > 100.4*F or 38* C, worsening pain or a foul discharge.     Follow Up:  - Follow up in *** weeks for postpartum visit    ***

## 2023-11-15 NOTE — DISCHARGE INSTRUCTIONS
Self Care After Delivery   AMBULATORY CARE:   The postpartum period is the period of time from delivery to about 6 weeks. During this time you may experience many physical and emotional changes. It is important to understand what is normal and when you need to call your healthcare provider. It is also important to know how to care for yourself during this time. Call your local emergency number (911 in the [address]) for any of the following: You see or hear things that are not there, or have thoughts of harming yourself or your baby. You soak through 1 pad in 15 minutes, have blurry vision, clammy or pale skin, and feel faint. You faint or lose consciousness. You have trouble breathing. You cough up blood. Your  incision comes apart. Seek care immediately if:   Your heart is beating faster than usual.     You have a bad headache or changes in your vision. Your perineal tear, episiotomy site, or  incision is red, swollen, bleeding, or draining pus. You have severe abdominal pain. Call your doctor or obstetrician if:   Your leg is painful, red, and larger than usual.     You soak through 1 or more pads in an hour, or pass blood clots larger than a quarter from your vagina. You have a fever. You have new or worsening pain in your abdomen or vagina. You continue to have depression 1 to 2 weeks after you deliver. You have trouble sleeping. You have foul-smelling discharge from your vagina. You have pain or burning when you urinate. You do not have a bowel movement for 3 days or more. You have nausea or are vomiting. You have hard lumps or red streaks over your breasts. You have cracked nipples or bleed from your nipples. You have questions or concerns about your condition or care. Physical changes:  The following are normal changes after you give birth:  Pain in the area between your anus and vagina     Breast pain Constipation or hemorrhoids     Hot or cold flashes     Vaginal bleeding or discharge     Mild to moderate abdominal cramping     Difficulty controlling bowel movements or urine     Emotional changes: A drop in hormone levels after you deliver may cause changes in your emotions. You may feel irritable, sad, or anxious. You may cry easily or for no reason. You may also feel depressed. Depression that continues can be a sign of postpartum depression, a condition that can be treated. Treatment may include talk therapy, medicines, or both. Healthcare providers will ask how you are feeling and if you have any depression. These talks can happen during appointments for your medical care and for your baby's care, such as well child visits. Providers can help you find ways to care for yourself and your baby. Talk to your providers about the following:  When emotional changes or depression started, and if it is getting worse over time     Problems you are having with daily activities, sleep, or caring for your baby     If anything makes you feel worse, or makes you feel better     Feeling that you are not bonding with your baby the way you want     Any problems your baby has with sleeping or feeding     Your baby is fussy or cries a lot     Support you have from friends, family, or others     Breast care for breastfeeding mothers: You may have sore breasts for 3 to 6 days after you give birth. This happens as your milk begins to fill your breasts. You may also have sore breasts if you do not breastfeed frequently. Do the following to care for your breasts:  Apply a moist, warm, compress to your breast as directed. This may help soothe your breasts. Make sure the washcloth is not too hot before you apply it to your breast.     Nurse your baby or pump your milk frequently. This may prevent clogged milk ducts. Ask your healthcare provider how often to nurse or pump. Massage your breasts as directed.  This may help increase your milk flow. Gently rub your breasts in a circular motion before you breastfeed. You may need to gently squeeze your breast or nipple to help release milk. You can also use a breast pump to help release milk from your breast.     Wash your breasts with warm water only. Do not put soap on your nipples. Soap may cause your nipples to become dry. Apply lanolin cream to your nipples as directed. Lanolin cream may add moisture to your skin and prevent nipple dryness. Always wash off lanolin cream with warm water before you breastfeed. Place pads in your bra. Your nipples may leak milk when you are not breastfeeding. You can place pads inside of your bra to help prevent leaking onto your clothing. Ask your healthcare provider where to purchase bra pads. Get breastfeeding support if needed. Healthcare providers can answer questions about breastfeeding and provide you with support. Ask your healthcare provider who you can contact if you need breastfeeding support. Breast care for non-breastfeeding mothers: Milk will fill your breasts even if you bottle feed your baby. Do the following to help stop your milk from filling your breasts and causing pain:  Wear a bra with support at all times. A sports bra or a tight-fitting bra will help stop your milk from coming in. Apply ice on each breast for 15 to 20 minutes every hour or as directed. Use an ice pack, or put crushed ice in a plastic bag. Cover it with a towel before you apply it to your breast. Ice helps your milk ducts shrink. Keep your breasts away from warm water. Warm water will make it easier for milk to fill your breasts. Stand with your breasts away from warm water in the shower. Limit how much you touch your breasts. This will prevent them from filling with milk. Perineum care: Your perineum is the area between your rectum and vagina. It is normal to have swelling and pain in this area after you give birth.  If you had an episiotomy, your healthcare provider may give you special instructions. Clean your perineum after you use the bathroom. This may prevent infection and help with healing. Use a spray bottle with warm water to clean your perineum. You may also gently spray warm water against your perineum when you urinate. Always wipe front to back. Take a sitz bath as directed. A sitz bath may help relieve swelling and pain. Fill your bath tub or bucket with water up to your hips and sit in the water. Use cold water for 2 days after you deliver. Then use warm water. Ask your healthcare provider for more information about a sitz bath. Apply ice packs for the first 24 hours or as directed. Use a plastic glove filled with ice or buy an ice pack. Wrap the ice pack or plastic glove in a small towel or wash cloth. Place the ice pack on your perineum for 20 minutes at a time. Sit on a donut-shaped pillow. This may relieve pressure on your perineum when you sit. Use wipes that contain medicine or take pills as directed. Your healthcare provider may tell you to use witch hazel pads. You can place witch hazel pads in the refrigerator before you apply them to your perineum. Your provider may also tell you to take NSAIDs. Ask him or her how often to take pills or use the wipes. Do not go swimming or take tub baths for 4 to 6 weeks or as directed. This will help prevent an infection in your vagina or uterus. Bowel and bladder care: It may take 3 to 5 days to have a bowel movement after you deliver your baby. You can do the following to prevent or manage constipation, and get control of your bowel or bladder:  Take stool softeners as directed. A stool softener is medicine that will make your bowel movements softer. This may prevent or relieve constipation. A stool softener may also make bowel movements less painful. Drink plenty of liquids. Ask how much liquid to drink each day and which liquids are best for you. Liquids may help prevent constipation. Eat foods high in fiber. Examples include fruits, vegetables, grains, beans, and lentils. Ask your healthcare provider how much fiber you need each day. Fiber may prevent constipation. Do Kegel exercises as directed. Kegel exercises will help strengthen the muscles that control bowel movements and urination. Ask your healthcare provider for more information on Kegel exercises. Apply cold compresses or medicine to hemorrhoids as directed. This may relieve swelling and pain. Your healthcare provider may tell you to apply ice or wipes that contain medicine to your hemorrhoids. He or she may also tell you to use a sitz bath. Ask your provider for more information on how to manage hemorrhoids. Nutrition: Good nutrition is important in the postpartum period. It will help you return to a healthy weight, increase your energy levels, and prevent constipation. It will also help you get enough nutrients and calories if you are going to breastfeed your baby. Eat a variety of healthy foods. Healthy foods include fruits, vegetables, whole-grain breads, low-fat dairy products, beans, lean meats, and fish. You may need 500 to 700 extra calories each day if you breastfeed your baby. You may also need extra protein. Limit foods with added sugar and high amounts of fat. These foods are high in calories and low in healthy nutrients. Read food labels so you know how much sugar and fat is in the food you want to eat. Drink 8 to 10 glasses of water per day. Water will help you make plenty of milk for your baby. It will also help prevent constipation. Drink a glass of water every time you breastfeed your baby. Take vitamins as directed. Ask your healthcare provider what vitamins you need. Limit caffeine and alcohol if you are breastfeeding. Caffeine and alcohol can get into your breast milk. Caffeine and alcohol can make your baby fussy.  They can also interfere with your baby's sleep. Ask your healthcare provider if you can drink alcohol or caffeine. Rest and sleep: You may feel very tired in the postpartum period. Enough sleep will help you heal and give you energy to care for your baby. The following may help you get sleep and rest:  Nap when your baby naps. Your baby may nap several times during the day. Get rest during this time. Limit visitors. Many people may want to see you and your baby. Ask friends or family to visit on different days. This will give you time to rest.     Do not plan too much for one day. Put off household chores so that you have time to rest. Gradually do more each day. Ask for help from family, friends, or neighbors. Ask them to help you with laundry, cleaning, or errands. Also ask someone to watch the baby while you take a nap or relax. Ask your partner to help with the care of your baby. Pump some of your breast milk so your partner can feed your baby during the night. Exercise after delivery: Wait until your healthcare provider says it is okay to exercise. Exercise can help you lose weight, increase your energy levels, and manage your mood. It can also prevent constipation and blood clots. Start with gentle exercises such as walking. Do more as you have more energy. You may need to avoid abdominal exercises for 1 to 2 weeks after you deliver. Talk to your healthcare provider about an exercise plan that is right for you. Sexual activity after delivery:   Do not have sex until your healthcare provider says it is okay. You may need to wait 4 to 6 weeks before you have sex. This may prevent infection and allow time to heal.     Your menstrual cycle may begin as soon as 3 weeks after you deliver. Your period may be delayed if you breastfeed your baby. You can become pregnant before you get your first postpartum period. Talk to your healthcare provider about birth control that is right for you.  Some types of birth control are not safe during breastfeeding. For support and more information: Join a support group for new mothers. Ask for help from family and friends with chores, errands, and care of your baby. Office of Mandy Stone,  Department of Health and Human Services  [address], [address] [facility] , [address] [employer] [facility]  [address], [address] [facility] , [address] [facility]  Phone: [phone]  Web Address: www.womenshealth.gov  [facility] Postpartum [address] [facility] , [address]  [address] , [address]  Web Address: [employer]Fre[employer].lÃ¡. org/pregnancy/postpartum-care. aspx  Follow up with your doctor or obstetrician as directed: You will need to follow up within 2 to 6 weeks of delivery. Write down your questions so you remember to ask them at your visits. © Copyright [facility] Information is for End User's use only and may not be sold, redistributed or otherwise used for commercial purposes. All illustrations and images included in CareNotes® are the copyrighted property of [employer].D.A[employer]M., Inc. or [address] [place] [address]  The above information is an  only. It is not intended as medical advice for individual conditions or treatments. Talk to your doctor, nurse or pharmacist before following any medical regimen to see if it is safe and effective for you.

## 2023-11-15 NOTE — ASSESSMENT & PLAN NOTE
Admit   T&S, CBC, RPR  CLD  IV fluids  GBS prophylaxis is needed, PCN ordered  Induction with galindo balloon/cytotec

## 2023-11-15 NOTE — ASSESSMENT & PLAN NOTE
Lab Results   Component Value Date    HGBA1C 5.1 10/24/2023       Recent Labs     11/15/23  1623 11/15/23  1738 11/15/23  2120 11/15/23  2226   POCGLU 77 78 288* 261*       Blood Sugar Average: Last 72 hrs:  (P) [id number]  Patient wishes to use subcutaneous insulin pump postpartum  Fingersticks TID before meals + QHS

## 2023-11-15 NOTE — ANESTHESIA PREPROCEDURE EVALUATION
Procedure:  LABOR ANALGESIA    Relevant Problems   ENDO   (+) Pre-existing type 1 diabetes mellitus with hyperglycemia during pregnancy in third trimester ([address])      GYN   (+) 39 weeks gestation of pregnancy   (+) Supervision of other high risk pregnancy, antepartum      HEMATOLOGY   (+) Beta thalassemia minor        Physical Exam    Airway    Mallampati score: II         Dental       Cardiovascular  Rhythm: regular    Pulmonary   Breath sounds clear to auscultation    Other Findings  post-pubertal.      Anesthesia Plan  ASA Score- 2     Anesthesia Type- epidural with ASA Monitors. Additional Monitors:     Airway Plan:            Plan Factors-Exercise tolerance (METS): >4 METS. Chart reviewed. Existing labs reviewed. Patient summary reviewed. Patient is not a current smoker. Patient not instructed to abstain from smoking on day of procedure. Patient did not smoke on day of surgery. Obstructive sleep apnea risk education given perioperatively. Induction- intravenous. Postoperative Plan-     Informed Consent- Anesthetic plan and risks discussed with patient.

## 2023-11-15 NOTE — L&D DELIVERY NOTE
Vaginal Delivery Summary - OB/GYN   Onesimo Hernandez 34 y.o. female MRN: [id number]  Unit/Bed#: L&D 325-01 Encounter: [id number]    Pre-delivery Diagnosis:   Pregnancy at 39w4d   T1DM  Obesity  History of gestational hypertension  Fetal VSD  GBS bacteruria  History of postpartum depression    Post-delivery Diagnosis: same, delivered    Procedure: Spontaneous Vaginal Delivery    OBGYN Practice: [facility] (Dr. Nikhil Cross)    Attending Physician: Dr. Morris Perish  Resident Physician: Dr. Hayes Or    Anesthesia: Epidural ,     QBL: Non-Surgical QBL (mL): 548        Complications: none apparent    Specimens:   1. Arterial and venous cord gases  2. Cord blood  3. Segment of umbilical cord  4. Placenta to pathology    Findings:  1. Viable female  on 11/15/2023  5:15 PM  via Vaginal, Spontaneous   with APGAR scores of 8  and 9  at 1 and 5 minutes, respectively. Weight pending at time of dictation for skin to skin bonding. 2. Spontaneous delivery of intact placenta at 1721  3. Intact perineum    Gases:  Umbilical Artery  No results for input(s): "PHCART", "BECART" in the last 72 hours. Umbilical Vein  Recent Labs     11/15/23  1717   PHCVEN 7.377   BECVEN -4.0*         Brief history and labor course:  Minerva Cortes is a 34 y.o. [id number] 39w4d . She presented to labor and delivery for induction for type I diabetes mellitus. Her pregnancy was complicated by type one diabetes mellitus, obesity, GBS bacteruria. On exam in triage she was noted to be 1/50/-3. She was induced with galindo and cytotec and advanced to pitocin. She managed her home subcutaneous insulin pump intrapartum with additional POC glucose checks per [facility] protocol. Amniotomy was performed for clear fluid, and patient received an epidural for pain management. She progressed to complete and began pushing.      Description of delivery:    After pushing for 2 minutes, Antoinette delivered a viable female , weight pending as mother is doing skin to skin bonding. The fetal vertex delivered GENTRY position spontaneously. There was no nuchal cord. The anterior shoulder delivered atraumatically with maternal expulsive forces and the assistance of gentle downward traction. The posterior shoulder delivered with maternal expulsive forces and the assistance of gentle upward traction. The remainder of the fetus delivered spontaneously. Upon delivery, the infant was placed on Antoinette's abdomen and the cord was doubly clamped and cut. Delayed cord clamping was achieved. The infant was noted to cry spontaneously and was moving all extremities appropriately. There was no evidence for injury. Awaiting nurse resuscitators evaluated the . Arterial and venous cord blood gases and cord blood was collected for analysis. These were promptly sent to the lab. In the immediate post-partum, active management of the 3rd stage of labor was performed with massage, the administration of 30 units of IV pitocin, and gentle traction on the umbilical cord. The placenta delivered spontaneously and was noted to have a centrally inserted 3 vessel cord. The placenta was sent to pathology. Laceration Repair  The vagina, cervix, perineum, and rectum were inspected and there was noted to be a no lacerations. At the conclusion of the procedure, all needle, sponge, and instrument counts were noted to be correct. Dr. Anam De León was present and participated in all key portions of the case.     Disposition:  The patient and the  both tolerated the procedure well and are recovering in labor and delivery room       Darion Allan MD  PGY 1, Obstetrics and Gynecology  11/15/2023  5:59 PM

## 2023-11-15 NOTE — OB LABOR/OXYTOCIN SAFETY PROGRESS
Oxytocin Safety Progress Check Note - Chaka Hernandez 34 y.o. female MRN: [id number]    Unit/Bed#: L&D 325-01 Encounter: [id number]                 Cervical Dilation: 2        Cervical Effacement: 61  Fetal Station: -3     Fetal Heart Rate: 145 BPM  FHR Category: I               Vital Signs:   Vitals:    11/15/23 0636   BP: 116/68   Pulse: 93   Resp: 16   Temp: 98.8 °F (37.1 °C)       Notes/comments:   Galindo Balloon Induction Procedure    Reason for induction: T1DM. Induction plan previously discussed with Dr. Braulio Hubbard. Procedural risks reviewed, consent obtained. The patient was placed in dorsal lithotomy position. Using Sterile technique, a 24F galindo balloon was advanced beyond the internal cervix os and inflated with 60cc LR. Bleeding was noted from the galindo catheter, which was then clamped with an umbilical cord clamp. No further bleeding was noted. Cytotec 25mcg was then placed in the posterior fornix of the vagina. The procedure was uncomplicated and the patient tolerated the procedure well.         Severo Horsfall, MD 11/15/2023 7:49 AM

## 2023-11-15 NOTE — OB LABOR/OXYTOCIN SAFETY PROGRESS
Oxytocin Safety Progress Check Note - Onesimo Hernandez 34 y.o. female MRN: [id number]    Unit/Bed#: L&D 325-01 Encounter: [id number]    Dose (frank-units/min) Oxytocin: 10 frank-units/min  Contraction Frequency (minutes): 2-3  Contraction Quality: Strong  Tachysystole: No   Cervical Dilation: 3-4        Cervical Effacement: 70  Fetal Station: -3  Baseline Rate: 130 bpm  Fetal Heart Rate: 150 BPM  FHR Category: Category II               Vital Signs: Stable  Vitals:    11/15/23 1504   BP: 145/68   Pulse: 89   Resp:    Temp:        Notes/comments:   AROM for bloody fluid; epidural when desired  Continue to monitor blood sugars closely  Anticipate      Verenice Mary MD 11/15/2023 3:40 PM

## 2023-11-15 NOTE — OB LABOR/OXYTOCIN SAFETY PROGRESS
Oxytocin Safety Progress Check Note - Fatmata Hernandez 34 y.o. female MRN: [id number]    Unit/Bed#: L&D 325-01 Encounter: [id number]    Dose (frank-units/min) Oxytocin: 6 frank-units/min  Contraction Frequency (minutes): 2-3  Contraction Quality: Strong  Tachysystole: No   Cervical Dilation: 6        Cervical Effacement: 90  Fetal Station: 0  Baseline Rate: 130 bpm  Fetal Heart Rate: 150 BPM  FHR Category: II               Vital Signs:   Vitals:    11/15/23 1535   BP: 124/69   Pulse: 87   Resp:    Temp:        Notes/comments:   Patient feeling increased rectal pressure and pain, epidural was placed 10 minutes ago and has not yet taken effect. FHR with intermittent variable decelerations after epidural that resolved, moderate variability and normal baseline. SVE 6/90/0. Continue fluid bolus and pitocin titration.     Yanni Reon MD 11/15/2023 4:30 PM

## 2023-11-15 NOTE — ASSESSMENT & PLAN NOTE
-There is a tiny muscular VSD identified only on color doppler. Otherwise normal cardiac anatomy and function.   -Normal prenatal care, delivery, and  care are recommended. -Recommend  outpatient cardiology consult with echocardiogram at 1-2 weeks of life (Please schedule appointment prior to hospital discharge. Phone [phone]).

## 2023-11-16 VITALS
HEIGHT: 69 IN | HEART RATE: 69 BPM | OXYGEN SATURATION: 98 % | DIASTOLIC BLOOD PRESSURE: 76 MMHG | BODY MASS INDEX: 39.99 KG/M2 | RESPIRATION RATE: 16 BRPM | TEMPERATURE: 98 F | SYSTOLIC BLOOD PRESSURE: 136 MMHG | WEIGHT: 270 LBS

## 2023-11-16 PROBLEM — O13.9 GESTATIONAL HYPERTENSION: Status: ACTIVE | Noted: 2023-11-16

## 2023-11-16 LAB
DME PARACHUTE DELIVERY DATE ACTUAL: NORMAL
DME PARACHUTE DELIVERY DATE REQUESTED: NORMAL
DME PARACHUTE ITEM DESCRIPTION: NORMAL
DME PARACHUTE ORDER STATUS: NORMAL
DME PARACHUTE SUPPLIER NAME: NORMAL
DME PARACHUTE SUPPLIER PHONE: NORMAL
GLUCOSE SERPL-MCNC: 57 MG/DL (ref 65–140)
GLUCOSE SERPL-MCNC: 58 MG/DL (ref 65–140)

## 2023-11-16 PROCEDURE — 82948 REAGENT STRIP/BLOOD GLUCOSE: CPT

## 2023-11-16 PROCEDURE — 99024 POSTOP FOLLOW-UP VISIT: CPT | Performed by: OBSTETRICS & GYNECOLOGY

## 2023-11-16 PROCEDURE — NC001 PR NO CHARGE: Performed by: OBSTETRICS & GYNECOLOGY

## 2023-11-16 RX ORDER — METOCLOPRAMIDE HYDROCHLORIDE 5 MG/ML
10 INJECTION INTRAMUSCULAR; INTRAVENOUS ONCE
Status: COMPLETED | OUTPATIENT
Start: 2023-11-16 | End: 2023-11-16

## 2023-11-16 RX ORDER — ACETAMINOPHEN 325 MG/1
975 TABLET ORAL EVERY 8 HOURS PRN
Status: DISCONTINUED | OUTPATIENT
Start: 2023-11-16 | End: 2023-11-16 | Stop reason: HOSPADM

## 2023-11-16 RX ORDER — DIPHENHYDRAMINE HYDROCHLORIDE 50 MG/ML
25 INJECTION INTRAMUSCULAR; INTRAVENOUS EVERY 6 HOURS PRN
Status: DISCONTINUED | OUTPATIENT
Start: 2023-11-16 | End: 2023-11-16 | Stop reason: HOSPADM

## 2023-11-16 RX ORDER — MAGNESIUM SULFATE HEPTAHYDRATE 40 MG/ML
2 INJECTION, SOLUTION INTRAVENOUS ONCE
Status: COMPLETED | OUTPATIENT
Start: 2023-11-16 | End: 2023-11-16

## 2023-11-16 RX ORDER — DIAPER,BRIEF,INFANT-TODD,DISP
1 EACH MISCELLANEOUS DAILY PRN
Refills: 0
Start: 2023-11-16

## 2023-11-16 RX ORDER — ACETAMINOPHEN 325 MG/1
975 TABLET ORAL EVERY 8 HOURS PRN
Status: DISCONTINUED | OUTPATIENT
Start: 2023-11-16 | End: 2023-11-16

## 2023-11-16 RX ORDER — ACETAMINOPHEN 325 MG/1
650 TABLET ORAL EVERY 4 HOURS PRN
Refills: 0
Start: 2023-11-16

## 2023-11-16 RX ORDER — ACETAMINOPHEN 325 MG/1
975 TABLET ORAL ONCE
Status: COMPLETED | OUTPATIENT
Start: 2023-11-16 | End: 2023-11-16

## 2023-11-16 RX ORDER — ADHESIVE BANDAGE 3/4"
BANDAGE TOPICAL 2 TIMES DAILY
Qty: 1 EACH | Refills: 0 | Status: SHIPPED | OUTPATIENT
Start: 2023-11-16

## 2023-11-16 RX ORDER — ACETAMINOPHEN 325 MG/1
650 TABLET ORAL EVERY 6 HOURS PRN
Qty: 30 TABLET | Refills: 0 | Status: CANCELLED | OUTPATIENT
Start: 2023-11-16

## 2023-11-16 RX ORDER — METOCLOPRAMIDE HYDROCHLORIDE 5 MG/ML
10 INJECTION INTRAMUSCULAR; INTRAVENOUS EVERY 6 HOURS PRN
Status: DISCONTINUED | OUTPATIENT
Start: 2023-11-16 | End: 2023-11-16 | Stop reason: HOSPADM

## 2023-11-16 RX ORDER — IBUPROFEN 600 MG/1
600 TABLET ORAL EVERY 6 HOURS
Qty: 30 TABLET | Refills: 0 | Status: SHIPPED | OUTPATIENT
Start: 2023-11-16

## 2023-11-16 RX ORDER — SUMATRIPTAN 50 MG/1
50 TABLET, FILM COATED ORAL ONCE
Status: COMPLETED | OUTPATIENT
Start: 2023-11-16 | End: 2023-11-16

## 2023-11-16 RX ORDER — CAFFEINE 200 MG
200 TABLET ORAL ONCE
Status: COMPLETED | OUTPATIENT
Start: 2023-11-16 | End: 2023-11-16

## 2023-11-16 RX ORDER — DIPHENHYDRAMINE HYDROCHLORIDE 50 MG/ML
25 INJECTION INTRAMUSCULAR; INTRAVENOUS ONCE
Status: CANCELLED | OUTPATIENT
Start: 2023-11-16

## 2023-11-16 RX ADMIN — CAFFEINE 200 MG: 200 TABLET ORAL at 05:33

## 2023-11-16 RX ADMIN — METOCLOPRAMIDE 10 MG: 5 INJECTION, SOLUTION INTRAMUSCULAR; INTRAVENOUS at 05:34

## 2023-11-16 RX ADMIN — SUMATRIPTAN SUCCINATE 50 MG: 50 TABLET ORAL at 09:32

## 2023-11-16 RX ADMIN — ONDANSETRON 4 MG: 2 INJECTION INTRAMUSCULAR; INTRAVENOUS at 10:15

## 2023-11-16 RX ADMIN — IBUPROFEN 600 MG: 600 TABLET, FILM COATED ORAL at 06:14

## 2023-11-16 RX ADMIN — DOCUSATE SODIUM 100 MG: 100 CAPSULE, LIQUID FILLED ORAL at 08:08

## 2023-11-16 RX ADMIN — DIPHENHYDRAMINE HYDROCHLORIDE 25 MG: 50 INJECTION, SOLUTION INTRAMUSCULAR; INTRAVENOUS at 12:00

## 2023-11-16 RX ADMIN — ACETAMINOPHEN 325MG 650 MG: 325 TABLET ORAL at 01:52

## 2023-11-16 RX ADMIN — IBUPROFEN 600 MG: 600 TABLET, FILM COATED ORAL at 11:22

## 2023-11-16 RX ADMIN — ACETAMINOPHEN 325MG 975 MG: 325 TABLET ORAL at 05:33

## 2023-11-16 RX ADMIN — MAGNESIUM SULFATE HEPTAHYDRATE 2 G: 40 INJECTION, SOLUTION INTRAVENOUS at 11:21

## 2023-11-16 RX ADMIN — ACETAMINOPHEN 325MG 975 MG: 325 TABLET ORAL at 12:00

## 2023-11-16 NOTE — PROGRESS NOTES
Progress Note - OB/GYN   Dariela Butler Mary 34 y.o. female MRN: [id number]  Unit/Bed#: L&D 36-1 Encounter: [id number]      Assessment/Plan    Victorina Holbrook is a 34 y.o. [id number] who is PPD 1 s/p  at 39w4d     Gestational hypertension  Assessment & Plan  Meeting criteria postpartum  CBC and CMP wnl, Urine P/C not collected    Systolic (82FBL), JUZ:340 , Min:104 , OKM:633   Diastolic (99SHD), HSM:29, Min:51, Max:81       (spontaneous vaginal delivery)  Assessment & Plan  Routine postpartum care  Encourage ambulation  Encourage familial bonding  Lactation support as needed  Pain: Motrin/Tylenol around the clock    GBS bacteriuria  Assessment & Plan  S/p Penicillin intrapartum, adequate treatment    Fetal muscular VSD - tiny  Assessment & Plan  -There is a tiny muscular VSD identified only on color doppler. Otherwise normal cardiac anatomy and function.   -Normal prenatal care, delivery, and  care are recommended. -Recommend  outpatient cardiology consult with echocardiogram at 1-2 weeks of life (Please schedule appointment prior to hospital discharge. Phone [phone]).     Pre-existing type 1 diabetes mellitus with hyperglycemia during pregnancy in third trimester [facility])  Assessment & Plan  Lab Results   Component Value Date    HGBA1C 5.1 10/24/2023       Recent Labs     11/15/23  1623 11/15/23  1738 11/15/23  2120 11/15/23  2226   POCGLU 77 78 288* 261*       Blood Sugar Average: Last 72 hrs:  (P) [id number]  Patient wishes to use subcutaneous insulin pump postpartum  Fingersticks TID before meals + QHS           Disposition: Anticipate discharge home postpartum Day #1-2  Barriers to discharge ongoing couplet care      Subjective/Objective     Subjective: Postpartum state    Pain: no  Tolerating PO: yes  Voiding: yes  Flatus: yes  BM: no  Ambulating: yes  Breastfeeding: Breastfeeding  Chest pain: no  Shortness of breath: no  Leg pain: no  Lochia: minimal    Objective: Vitals:  Vitals:    11/15/23 1915 11/15/23 1930 11/15/23 2300 11/16/23 0300   BP: 137/63 135/59 108/51 117/58   BP Location:   Left arm Right arm   Pulse: 68 87 77 88   Resp:   18 16   Temp:   (!) 97.4 °F (36.3 °C) 98.5 °F (36.9 °C)   TempSrc:   Temporal Oral   SpO2:    98%   Weight:       Height:           Physical Exam:   GEN: appears well, alert and oriented x 3, pleasant and cooperative   CV: Regular rate  RESP: non labored breathing  ABDOMEN: soft, no tenderness, no distention, Uterine fundus firm and non-tender, -2 cm below the umbilicus  EXTREMITIES: non-tender  NEURO Alert and oriented to person, place, and time.        Lab Results   Component Value Date    WBC 10.25 (H) 11/15/2023    HGB 11.9 11/15/2023    HCT 36.7 11/15/2023    MCV 84 11/15/2023     11/15/2023         Ana Irizarry MD  Obstetrics & Gynecology  11/16/23

## 2023-11-16 NOTE — PROGRESS NOTES
Philbert Duverney Haugh 34 y.o. female MRN: [id number]  Unit/Bed#: L&D 312-01 Encounter: [id number]    Called by nursing to evaluated patient headache. Buddy Fiore  Reports that her headache is still present but slightly improved from evaluation this morning. She typically takes Excedrin at home. She is not having any other symptoms or complaints today. Vitals:   /59   Pulse 68   Temp 97.9 °F (36.6 °C) (Oral)   Resp 16   Ht 5' 9" (1.753 m)   Wt 122 kg (270 lb)   LMP 02/11/2023   SpO2 98%   Breastfeeding Yes   BMI 39.87 kg/m²       Plan:   - Plan for another dose of tylenol when she is due for her next dose as well as reglan, benadryl. Plan for 2g bolus of magnesium. Will reassess headache in several hours or sooner if needed  - Discussed potential for head imaging or anesthesia consult if there is concern for spinal headache or acute process. - Discussed spectrum of HTN of pregnancy including potential for diagnosis of preeclampsia with severe features if her headache remains intractable, or if she develops severe range blood pressures. D/w Dr Chema Tatum who evaluated patient with me.        Marlene Simons DO  11/16/2023  11:16 AM

## 2023-11-16 NOTE — LACTATION NOTE
This note was copied from a baby's chart. 11/16/23 1400   Lactation Consultation   Reason for Consult Other (Comment)   Lactation Consultant Total Time 0   Other OB Lactation Documentation    Additional Problem Noted Experienced breastfeeding mother. Denies needs for lactation or questions  (Declines educational materials.)   Encouraged parents to call for assistance, questions, and concerns about breastfeeding. Extension provided.

## 2023-11-16 NOTE — PLAN OF CARE
Problem: POSTPARTUM  Goal: Experiences normal postpartum course  Description: INTERVENTIONS:  - Monitor maternal vital signs  - Assess uterine involution and lochia  Outcome: Progressing  Goal: Appropriate maternal -  bonding  Description: INTERVENTIONS:  - Identify family support  - Assess for appropriate maternal/infant bonding   -Encourage maternal/infant bonding opportunities  - Referral to  or  as needed  Outcome: Progressing  Goal: Establishment of infant feeding pattern  Description: INTERVENTIONS:  - Assess breast/bottle feeding  - Refer to lactation as needed  Outcome: Progressing  Goal: Incision(s), wounds(s) or drain site(s) healing without S/S of infection  Description: INTERVENTIONS  - Assess and document dressing, incision, wound bed, drain sites and surrounding tissue  - Provide patient and family education

## 2023-11-16 NOTE — PLAN OF CARE
Problem: BIRTH - VAGINAL/ SECTION  Goal: Fetal and maternal status remain reassuring during the birth process  Description: INTERVENTIONS:  - Monitor vital signs  - Monitor fetal heart rate  - Monitor uterine activity  - Monitor labor progression (vaginal delivery)  - DVT prophylaxis  - Antibiotic prophylaxis  Outcome: Completed  Goal: Emotionally satisfying birthing experience for mother/fetus  Description: Interventions:  - Assess, plan, implement and evaluate the nursing care given to the patient in labor  - Advocate the philosophy that each childbirth experience is a unique experience and support the family's chosen level of involvement and control during the labor process   - Actively participate in both the patient's and family's teaching of the birth process  - Consider cultural, [employer] and age-specific factors and plan care for the patient in labor  Outcome: Completed     Problem: POSTPARTUM  Goal: Experiences normal postpartum course  Description: INTERVENTIONS:  - Monitor maternal vital signs  - Assess uterine involution and lochia  Outcome: Progressing  Goal: Appropriate maternal -  bonding  Description: INTERVENTIONS:  - Identify family support  - Assess for appropriate maternal/infant bonding   -Encourage maternal/infant bonding opportunities  - Referral to  or  as needed  Outcome: Progressing  Goal: Establishment of infant feeding pattern  Description: INTERVENTIONS:  - Assess breast/bottle feeding  - Refer to lactation as needed  Outcome: Progressing  Goal: Incision(s), wounds(s) or drain site(s) healing without S/S of infection  Description: INTERVENTIONS  - Assess and document dressing, incision, wound bed, drain sites and surrounding tissue  - Provide patient and family education  Outcome: Progressing

## 2023-11-16 NOTE — ASSESSMENT & PLAN NOTE
Meeting criteria postpartum  CBC and CMP wnl, Urine P/C not collected    Systolic (83PEC), MGA:276 , Min:104 , EQB:728   Diastolic (94LGF), WGJ:28, Min:51, Max:81

## 2023-11-16 NOTE — CASE MANAGEMENT
Case Management Progress Note    Patient name Roxanne Montelongo  Location L&D 312/L&D 664-27 MRN [id number]  : 1990 Date 2023       LOS (days): 1  Geometric Mean LOS (GMLOS) (days):   Days to GMLOS:        OBJECTIVE:        Current admission status: Inpatient  Preferred Pharmacy:   [id number] [address]   [facility]  Phone: [phone] Fax: [phone] [facility], 1801 [facility],  3700 [facility],  1798 [facility] 1515 [facility]  Phone: [phone] Fax: [phone] 14200 [facility], [address] [place] [address] [address] [place] [address]  Phone: [phone] Fax: [phone]    [address] [address]  Phone: [phone] Fax: [phone]    Primary Care Provider: No primary care provider on file. Primary Insurance: BLUE CROSS  Secondary Insurance:     PROGRESS NOTE:    CM consulted for PPD score of 11. CM met w/ MOB who provided the following information:    Current mental health diagnosis: MOB denies   SI/HI: MOB denies   Currently receiving mental health treatment: MOB denies   Currently taking any medication:MOB denies   If so, who is the prescribing provider: N/A  History of PPD: MOB reported having PPD with her first two children. OBGYN: [facility]  Support system: FOB + family. FOB gets 6 weeks of paternity leave. Interested in resources: Provided PPD information packet, maternal [address] [facility] hotline, and baby and me center info. Encouraged to call back of insurance card for full list of available resources. CM met with MOB at bedside to review PPD score and resources. MOB reported she had PPD with her first two children. She participated in counseling and medication management at that time.  She believes the core of the issue was related to stress from work. She made adjustments with work schedule after her 3rd baby and has seen improvement. She no longer is in therapy or taking medication. CM reviewed resources with MOB and encouraged to reach out to CM or OB with any questions or concerns.

## 2023-11-16 NOTE — CASE MANAGEMENT
Case Management Progress Note    Patient name Higinio Haile  Location L&D 312/L&D 721-63 MRN [id number]  : 1990 Date 2023       LOS (days): 1  Geometric Mean LOS (GMLOS) (days):   Days to GMLOS:        OBJECTIVE:        Current admission status: Inpatient  Preferred Pharmacy:   [id number] [address]   [facility]  Phone: [phone] Fax: [phone] [facility], 1801 [facility],  3700 [facility],  [address] 1515 [facility]  Phone: [phone] Fax: [phone] 14200 [facility], [address] [place] [address] [address]  803 [facility] [address]  Phone: [phone] Fax: [phone]    [address] [address]  Phone: [phone] Fax: [phone]    Primary Care Provider: No primary care provider on file. Primary Insurance: BLUE CROSS  Secondary Insurance:     PROGRESS NOTE:  CM consulted for Zomee Z2 pump. HAL sent order to [address] [place] [address]. Chuckie needs to call MOB's insurance to confirm benefits prior to reporting whether order is approved. Will continue to follow.

## 2023-11-16 NOTE — CASE MANAGEMENT
Case Management Progress Note    Patient name Terri Saab  Location L&D 312/L&D 838-14 MRN [id number]  : 1990 Date 2023       LOS (days): 1  Geometric Mean LOS (GMLOS) (days):   Days to GMLOS:        OBJECTIVE:        Current admission status: Inpatient  [facility]erred Pharmacy:   [id number] [address]   [facility]  Phone: [phone] Fax: [phone] [facility], 1801 [facility],  3700 [facility],  1798 [facility] 1515 [facility]  Phone: [phone] Fax: [phone] 14200 [facility], [address] [place] [address] [address]  [address] [place] [address]  Phone: [phone] Fax: [phone]    [place] [address] [address]  Phone: [phone] Fax: [phone]    Primary Care Provider: No primary care provider on file. Primary Insurance: BLUE CROSS  Secondary Insurance:     PROGRESS NOTE:    Chuckie confirmed pt is eligible for Zomee. CM delivered to MOB at bedside. Delivery slip signed and placed in bin for DME liaison.

## 2023-11-16 NOTE — ASSESSMENT & PLAN NOTE
Routine postpartum care  Encourage ambulation  Encourage familial bonding  Lactation support as needed  Pain: Motrin/Tylenol around the clock

## 2023-11-16 NOTE — QUICK NOTE
Patient reports headache that started since she was moved from labor room to postpartum, that has progressively gotten worse. She has had this type of headache before. It typically resolved with Excedrin migraine. She reports nausea is starting as well. Headache is worse when lying flat. Vitals:    11/16/23 0300   BP: 117/58   Pulse: 88   Resp: 16   Temp: 98.5 °F (36.9 °C)   SpO2: 98%     Tylenol, Caffeine, and Reglan prescribed. Will continue to monitor symptoms and reassess.     Lisset Leonard MD  Obstetrics & Gynecology, PGY-2

## 2023-11-17 PROBLEM — D56.3 BETA THALASSEMIA MINOR: Status: RESOLVED | Noted: 2023-07-11 | Resolved: 2023-11-17

## 2023-11-17 LAB
MISCELLANEOUS LAB TEST RESULT: NORMAL
STONE ANALYSIS-IMP: NORMAL

## 2023-11-17 NOTE — UTILIZATION REVIEW
MOTHER AND BABY DISCHARGED     NOTIFICATION OF INPATIENT ADMISSION   MATERNITY/DELIVERY AUTHORIZATION REQUEST   SERVICING FACILITY:   [address] [place] [address] - L&D, [place], NICU  [address]  Tax ID: [id number]  NPI: [id number] ATTENDING PROVIDER:  Attending Name and NPI#: Ryan Antonio Md [[id number]]  Address: [address]  Phone: [phone]     ADMISSION INFORMATION:  Place of Service: Inpatient [address]  Place of Service Code: 21  Inpatient Admission Date/Time: 11/15/23  6:06 AM  Discharge Date/Time: 2023  8:48 PM  Admitting Diagnosis Code/Description:  Encounter for full-term uncomplicated delivery [K35]   Mother: Berenice Marquez 1990 Estimated Date of Delivery: 23  Delivering clinician: Ryan Antonio    OB History          6    Para   4    Term   4            AB   2    Living   4         SAB   1    IAB   1    Ectopic        Multiple   0    Live Births   4               [place] Name & MRN:   Information for the patient's :  iLlliana Kearns, [facility] Girl Triny Sales) [[id number]]    Delivery Information:  Sex: female  Delivered 11/15/2023 5:15 PM by Vaginal, Spontaneous; Gestational Age: 43w3d    [place] Measurements:  Weight: 7 lb 13.6 oz (3560 g); Height: 21"    APGAR 1 minute 5 minutes 10 minutes   Totals: 8 9      [place] Birth Information: 34 y.o. female MRN: [id number] Unit/Bed#: L&D 312-01   Birthweight: No birth weight on file. Gestational Age: <None> Delivery Type:    APGARS Totals:        UTILIZATION REVIEW CONTACT:  Antoinette Carvajal Utilization   Network Utilization Review Department  Phone: [phone]  Fax [phone]  Email: [email or web address]  Contact for approvals/pending authorizations, clinical reviews, and discharge.    PHYSICIAN ADVISORY SERVICES:  Medical Necessity Denial & Xqgz-iw-Wnqo Review  Phone: [phone] Fax: [phone]  Email: [email or web address]   DISCHARGE SUPPORT TEAM:  For Patients Discharge Needs & Updates  Phone: [phone] opt. 2 Fax: [phone]  Email: [email or web address]

## 2023-11-20 ENCOUNTER — TELEPHONE (OUTPATIENT)
Age: 33
End: 2023-11-20

## 2023-11-20 PROCEDURE — 88307 TISSUE EXAM BY PATHOLOGIST: CPT | Performed by: SPECIALIST

## 2023-11-20 NOTE — TELEPHONE ENCOUNTER
Phone call to patient to see how she is doing s/p  23. Patient says she and baby are well and no c/o. Vinny Spine she has 3 week postpartum visit scheduled but will call sooner if anything needed.

## 2023-12-13 NOTE — PROGRESS NOTES
The patient is a 34 y.o. who presents for a postpartum visit. She is 3 weeks postpartum following a vaginal delivery on 11/15/23. The delivery was at 39w4d gestational weeks. I have fully reviewed the prenatal and intrapartum course. Postpartum course has been good . Baby " Sriram Vogt" is doing good. Baby is feeding by breast. Bleeding down to light spotting. Bowel function is normal. Bladder function is normal. Patient has not been sexually active. Contraceptive plans: pill    Postpartum Depression Scale: 8  Patient feels well and no concerns about mood. SBIRT screen:  negative      Last pap : 12/2/22       ROS: negative    Current Outpatient Medications on File Prior to Visit   Medication Sig Dispense Refill    acetaminophen (TYLENOL) 325 mg tablet Take 2 tablets (650 mg total) by mouth every 4 (four) hours as needed for mild pain, headaches or fever  0    benzocaine-menthol-lanolin-aloe (DERMOPLAST) 20-0.5 % topical spray Apply 1 Application topically every 6 (six) hours as needed for mild pain or irritation  0    Blood Pressure Monitoring (B-D ASSURE BPM/AUTO ARM CUFF) MISC Use 2 (two) times a day Call if BP > 140/90 (Patient not taking: Reported on 11/2/2023) 1 each 0    Blood Pressure Monitoring (Blood Pressure Cuff) MISC Use 2 (two) times a day 1 each 0    Continuous Blood Gluc  (Dexcom G6 ) DENIZ Use as directed. 1 Device 0    Continuous Blood Gluc Sensor (Dexcom G6 Sensor) MISC 1 box=1 month supply or 3 sensors, use 1 sensor every 10 days. 1 each 12    Continuous Blood Gluc Transmit (Dexcom G6 Transmitter) MISC Transmitter change every 90 days. 1 each 3    docusate sodium (COLACE) 100 mg capsule Take 100 mg by mouth 2 (two) times a day      glucagon 1 MG injection Inject 1 mg under the skin      glucose blood test strip Use 1 each daily as needed Use as instructed      HumaLOG 100 UNIT/ML injection Use up to 200 units a day via insulin pump, to be titrated. T1DM and pregnancy. 90 day supply. 180 mL 0    hydrocortisone 1 % cream Apply 1 Application topically daily as needed for irritation  0    ibuprofen (MOTRIN) 600 mg tablet Take 1 tablet (600 mg total) by mouth every 6 (six) hours 30 tablet 0    insulin glargine (LANTUS) 100 units/mL subcutaneous injection Inject 10 Units under the skin (Patient not taking: Reported on 10/26/2023)      Lancets MISC by Does not apply route      Prenatal Vit-Fe Fumarate-FA (PRENATAL VITAMIN PO) Take 1 tablet by mouth daily      witch hazel-glycerin (TUCKS) topical pad Apply 1 Pad topically every 4 (four) hours as needed for irritation  0     No current facility-administered medications on file prior to visit.          Vitals:    12/14/23 0905   BP: 120/72   Pulse: 87   SpO2: 98%         Assessment:  Postpartum exam    Plan:  Return in 3 weeks for 6 week postpartum visit

## 2023-12-14 ENCOUNTER — POSTPARTUM VISIT (OUTPATIENT)
Dept: OBGYN CLINIC | Facility: CLINIC | Age: 33
End: 2023-12-14

## 2023-12-14 VITALS
OXYGEN SATURATION: 98 % | WEIGHT: 246 LBS | HEART RATE: 87 BPM | SYSTOLIC BLOOD PRESSURE: 120 MMHG | HEIGHT: 69 IN | BODY MASS INDEX: 36.43 KG/M2 | DIASTOLIC BLOOD PRESSURE: 72 MMHG

## 2023-12-14 PROCEDURE — 99024 POSTOP FOLLOW-UP VISIT: CPT | Performed by: PHYSICIAN ASSISTANT

## 2024-01-04 ENCOUNTER — POSTPARTUM VISIT (OUTPATIENT)
Dept: OBGYN CLINIC | Facility: CLINIC | Age: 34
End: 2024-01-04

## 2024-01-04 VITALS
TEMPERATURE: 97.3 F | HEIGHT: 69 IN | WEIGHT: 235.8 LBS | HEART RATE: 73 BPM | SYSTOLIC BLOOD PRESSURE: 116 MMHG | DIASTOLIC BLOOD PRESSURE: 70 MMHG | BODY MASS INDEX: 34.93 KG/M2 | OXYGEN SATURATION: 97 %

## 2024-01-04 PROBLEM — E66.9 OBESITY: Status: RESOLVED | Noted: 2022-01-07 | Resolved: 2024-01-04

## 2024-01-04 PROBLEM — R82.71 GBS BACTERIURIA: Status: RESOLVED | Noted: 2023-08-30 | Resolved: 2024-01-04

## 2024-01-04 PROBLEM — Z87.59 HISTORY OF GESTATIONAL HYPERTENSION: Status: RESOLVED | Noted: 2023-06-29 | Resolved: 2024-01-04

## 2024-01-04 PROBLEM — Z3A.39 39 WEEKS GESTATION OF PREGNANCY: Status: RESOLVED | Noted: 2023-05-25 | Resolved: 2024-01-04

## 2024-01-04 PROBLEM — O35.BXX0 FETAL CARDIAC ANOMALY AFFECTING PREGNANCY, ANTEPARTUM: Status: RESOLVED | Noted: 2023-07-08 | Resolved: 2024-01-04

## 2024-01-04 PROBLEM — O13.9 GESTATIONAL HYPERTENSION: Status: RESOLVED | Noted: 2023-11-16 | Resolved: 2024-01-04

## 2024-01-04 PROBLEM — O09.899 SUPERVISION OF OTHER HIGH RISK PREGNANCY, ANTEPARTUM: Status: RESOLVED | Noted: 2023-06-29 | Resolved: 2024-01-04

## 2024-01-04 PROCEDURE — 99024 POSTOP FOLLOW-UP VISIT: CPT | Performed by: PHYSICIAN ASSISTANT

## 2024-01-04 RX ORDER — ACETAMINOPHEN AND CODEINE PHOSPHATE 120; 12 MG/5ML; MG/5ML
1 SOLUTION ORAL DAILY
Qty: 84 TABLET | Refills: 1 | Status: SHIPPED | OUTPATIENT
Start: 2024-01-04

## 2024-01-04 RX ORDER — FLUCONAZOLE 100 MG/1
TABLET ORAL
COMMUNITY
Start: 2023-12-26

## 2024-01-04 NOTE — PROGRESS NOTES
"The patient is a 33 y.o. who presents for a postpartum visit. She is 6 weeks postpartum following a vaginal delivery on 11/15/23. The delivery was at 39 gestational weeks. I have fully reviewed the prenatal and intrapartum course. Complications include: fetal VSD, pre existing diabetes, gHTN.    Postpartum course has been good .     Baby \" Honey\" is doing well. Baby is feeding by breast.   Pediatrician: EMILE Rothman    Bleeding has not stopped was lighter now heavier again X >7 days. Bowel function is normal. Bladder function is normal. Patient has not been sexually active. Contraceptive options were reveiwed. Patient elects mircronor for contraception.      Postpartum Depression: Low Risk  (2024)    Rush  Depression Scale     Last EPDS Total Score: 1     Last EPDS Self Harm Result: Never   Recent Concern: Postpartum Depression - Medium Risk (2023)    Rush  Depression Scale     Last EPDS Total Score: 8     Last EPDS Self Harm Result: Never          SBIRT screen:  negative      Last pap : 22         ROS: negative     Current Outpatient Medications on File Prior to Visit   Medication Sig Dispense Refill    Blood Pressure Monitoring (B-D ASSURE BPM/AUTO ARM CUFF) MISC Use 2 (two) times a day Call if BP > 140/90 (Patient not taking: Reported on 2023) 1 each 0    Blood Pressure Monitoring (Blood Pressure Cuff) MISC Use 2 (two) times a day 1 each 0    Continuous Blood Gluc  (Dexcom G6 ) DENIZ Use as directed. 1 Device 0    Continuous Blood Gluc Sensor (Dexcom G6 Sensor) MISC 1 box=1 month supply or 3 sensors, use 1 sensor every 10 days. 1 each 12    Continuous Blood Gluc Transmit (Dexcom G6 Transmitter) MISC Transmitter change every 90 days. 1 each 3    glucagon 1 MG injection Inject 1 mg under the skin      glucose blood test strip Use 1 each daily as needed Use as instructed      HumaLOG 100 UNIT/ML injection Use up to 200 units a day via insulin pump, to be " titrated. T1DM and pregnancy. 90 day supply. 180 mL 0    insulin glargine (LANTUS) 100 units/mL subcutaneous injection Inject 10 Units under the skin (Patient not taking: Reported on 10/26/2023)      Lancets MISC by Does not apply route      Prenatal Vit-Fe Fumarate-FA (PRENATAL VITAMIN PO) Take 1 tablet by mouth daily       No current facility-administered medications on file prior to visit.         Vitals:    01/04/24 1447   BP: 116/70   Pulse: 73   Temp: (!) 97.3 °F (36.3 °C)   SpO2: 97%         Objective:  Neck: supple, no thyromegaly  Heart: regular rate and rhythm  Lungs: clear to auscultation  Breasts: no masses, no discoloration  Abdomen: soft, nontender  Ext genitalia: no lesion, no discoloration  Vagina: no discharge, no lesions  Cervix: post, closed, no lesions  Uterus: nontender, normal size and shape  Adnexa: nontender, no masses      Assessment:  Postpartum exam    Plan:  Return to full activity  Contraceptive options reviewed, patient elects micronor.  Pap up to date  Patient has appt with [facility] for continued diabetes managment  Return to office 4 months for routine gyn exam  US ordered due to continued bleeding to r/o retained products  Patient to call if pain, changing pad every hour or less, fever, pus like drainage

## 2024-12-17 NOTE — PROGRESS NOTES
Subjective  Patient ID: Antoinette Hernandez is a 34 y.o. female here for amenorrhea    LMP 10/5/24 giving her an TRI of 25 and a gestational age of  10 weeks 6 days (based on LMP)    Menstrual cycle: 28 cycle length:  6-7 days  Pregnancy was planned.   She has  started taking a prenatal vitamin      Signs and symptoms of pregnancy:   Breast tenderness: yes  Fatigue: no  Cramping or Pelvic Pain: no  Spotting or Vaginal Bleeding: bleeding Saturday pm.    Nausea or vomiting: + nausea no vomiting.      Blood type: o positive      OB History    Para Term  AB Living   6 4 4  2 4   SAB IAB Ectopic Multiple Live Births   1 1  0 4      # Outcome Date GA Lbr Abrahan/2nd Weight Sex Type Anes PTL Lv   6 Term 11/15/23 39w4d / 00:35 3560 g (7 lb 13.6 oz) F Vag-Spont EPI N ELROY   5 Term 22 39w1d  3742 g (8 lb 4 oz) F Vag-Spont EPI N ELROY   4 Term 21 38w5d / 00:07 3515 g (7 lb 12 oz) M Vag-Spont EPI N ELROY      Complications: History of gestational hypertension   3 Term 19 38w0d 05:45 / 02:51 2985 g (6 lb 9.3 oz) M Vag-Spont EPI  ELROY   2 SAB 18 10w3d          1 IAB                 The following portions of the patient's history were reviewed and updated as appropriate: allergies, current medications, past family history, past medical history, past social history, past surgical history, and problem list.    Perinent hx that may affect pregnancy:    Type 1 diabetes  Hx postpartum depression  Hx gHTN    Review of Systems   Constitutional: Negative.    HENT: Negative.    Eyes: Negative.    Respiratory: Negative.    Cardiovascular: Negative.    Gastrointestinal: Negative.    Endocrine: Negative.    Genitourinary:        As noted in HPI   Musculoskeletal: Negative.    Skin: Negative.    Allergic/Immunologic: Negative.    Neurological: Negative.    Hematological: Negative.    Psychiatric/Behavioral: Negative      See HPI for pertinent positives.               /74 (BP Location: Right arm,  "Patient Position: Sitting, Cuff Size: Adult)   Ht 5' 9\" (1.753 m)   Wt 90.3 kg (199 lb)   LMP 10/05/2024 (Exact Date)   BMI 29.39 kg/m²         FIRST TRIMESTER OBSTETRIC ULTRASOUND     LMP 10/5/24  INDICATION: Establish Gestational Age       FINDINGS:  A single intrauterine gestation is identified.  Cardiac activity is detected at 168 bpm.      YOLK SAC:  Present and normal in size and appearance.  MEAN CROWN RUMP LENGTH:  4.08 cm = 10 weeks 6 days   AMNIOTIC FLUID/SAC SHAPE:  Within expected normal range.     UTERUS/ADNEXA:   No adnexal mass or pathologic cyst.  No free fluid identified.    IMPRESSION:    Single intrauterine pregnancy of 10 weeks 6 days gestational age  Fetal cardiac activity detected.  No adnexal masses seen.  EDC by LMP: 25/  EDC by this Ultrasound: 25    Assigning a Final TRI  Please choose how you are assigning the TRI: The gestational age by LMP is </= 8w 6d and demonstrates 5 or fewer days difference from the gestational age by CRL, therefore the final TRI will be based on the LMP    Final TRI: 25 by LMP.    Jeffry Uriarte PA-C  OB/GYN COMPLETE WOMENS CARE  [facility]  [address] [place] [address] [address]  [place] [address]  Dept: [phone]  Dept Fax: [phone]  Loc Appt: [phone]  Loc: [phone]  Loc Fax: [phone]  Ultrasound Probe Disinfection    A transvaginal ultrasound was performed.   Prior to use, disinfection was performed with High Level Disinfection Process (Trophon).  Probe serial number : [id number] was used.          Assessment/Plan:  SIUP confirmed with TRI 25 by LMP  F/u in 1 weeks for ob intake and ob exam  MFM referral placed  Patient to [facility] OB list   SCB noted on US will recheck with NT scan in 1-2 wks, patient to call sooner if bleeding returns  Referral to diabetes team to take over diabetes management during pregnancy.    I have spent a total time of 30 minutes on 24 in caring for this patient " including Impressions, Counseling / Coordination of care, Documenting in the medical record, Reviewing / ordering tests, medicine, procedures  , and Obtaining or reviewing history  .

## 2024-12-20 ENCOUNTER — ULTRASOUND (OUTPATIENT)
Dept: OBGYN CLINIC | Facility: CLINIC | Age: 34
End: 2024-12-20
Payer: COMMERCIAL

## 2024-12-20 VITALS
BODY MASS INDEX: 29.47 KG/M2 | WEIGHT: 199 LBS | SYSTOLIC BLOOD PRESSURE: 132 MMHG | HEIGHT: 69 IN | DIASTOLIC BLOOD PRESSURE: 74 MMHG

## 2024-12-20 DIAGNOSIS — E10.69 TYPE 1 DIABETES MELLITUS WITH OTHER SPECIFIED COMPLICATION (HCC): ICD-10-CM

## 2024-12-20 DIAGNOSIS — N91.2 AMENORRHEA: Primary | ICD-10-CM

## 2024-12-20 DIAGNOSIS — Z34.90 EARLY STAGE OF PREGNANCY: ICD-10-CM

## 2024-12-20 LAB — SL AMB POCT URINE HCG: POSITIVE

## 2024-12-20 PROCEDURE — 99214 OFFICE O/P EST MOD 30 MIN: CPT | Performed by: PHYSICIAN ASSISTANT

## 2024-12-20 PROCEDURE — 81025 URINE PREGNANCY TEST: CPT | Performed by: PHYSICIAN ASSISTANT

## 2024-12-20 PROCEDURE — 76817 TRANSVAGINAL US OBSTETRIC: CPT | Performed by: PHYSICIAN ASSISTANT

## 2024-12-23 ENCOUNTER — RESULTS FOLLOW-UP (OUTPATIENT)
Dept: OBGYN CLINIC | Facility: CLINIC | Age: 34
End: 2024-12-23

## 2024-12-23 ENCOUNTER — TELEPHONE (OUTPATIENT)
Age: 34
End: 2024-12-23

## 2024-12-31 ENCOUNTER — TELEPHONE (OUTPATIENT)
Dept: OBGYN CLINIC | Facility: CLINIC | Age: 34
End: 2024-12-31

## 2024-12-31 NOTE — TELEPHONE ENCOUNTER
Left detailed vm in regard to:    -offered appt on 1/13 (ob intake)- virtually.  -would like to schedule initial with provider  -MFM has attempted to contact patient in regard to NT and diabetic management appts.

## 2025-01-07 ENCOUNTER — TELEPHONE (OUTPATIENT)
Age: 35
End: 2025-01-07

## 2025-01-07 NOTE — TELEPHONE ENCOUNTER
Pt called to sched appts per referral. Pt had 2 referrals one a possible duplicate. Sched pt for her Nuch on 1/21/25 pt is currently 13.3 weeks needs a sooner appt none were available please call pt if able to offer a sooner appt. The other referral had a note to sched Nuch and Diabetic classes per last person to access referral it stated to Sched one with Fidelia and other with Nutritionist. I Sched both and cancelled due to pt stating she preferred not to do the classes because it's her 5th pregnancy. She did not have to do them before. Please call pt with clarification as I informed her I believe it was to just sched with Fidelia not necessarily do the classes. Tried to transfer to office no answer please call pt back when able to.

## 2025-01-07 NOTE — TELEPHONE ENCOUNTER
Patient was called and scheduled for initial on 1/20 at 2:45 as patient could only do times after 2pm. Patient requested for intake to be done virtually, appt notes were updated.

## 2025-01-07 NOTE — TELEPHONE ENCOUNTER
Called patient to inform her that her Nuchal Ultrasound was cancelled, escalating to office clerical to see if she can be fit in anywhere by 1/10 (no openings seen - patient is Type 1 Diabetic). TRI 7/12/25.    Patient also needs Diabetic counseling for Type 1 diabetes prior to pregnancy, please advise patient this is a visit to go over nutrition and balancing meals with Type 1 Diabetes in pregnancy with a diabetic educator and is recommended by her OBGYN. Please schedule visits accordingly when she calls back.

## 2025-01-07 NOTE — TELEPHONE ENCOUNTER
Spoke with patient to review where we are at with appts.     She says she plans to call MFM today to get scheduled for US and diabetes care.   She is scheduled for OB intake but not exam.   Also prefers to do the intake virtually.      Patient says she is doing well without any concerns    Chart to staff to reschedule in take and exam

## 2025-01-09 NOTE — TELEPHONE ENCOUNTER
Called patient left voicemail to ask for insight on blood sugars. Noted low blood sugars from Dexcom report. Left voicemail asking patient to give insight if true low blood sugars, and if the blood sugars needed to be treated. Asked for response so insulin pump settings can be adjusted if needed. Also asked patient to download insulin pump information. Last download is from 9/1. Asked her to send [employer] message once download is completed. Will send a [employer] message to follow up.
Yes

## 2025-01-13 ENCOUNTER — INITIAL PRENATAL (OUTPATIENT)
Dept: OBGYN CLINIC | Facility: CLINIC | Age: 35
End: 2025-01-13

## 2025-01-13 VITALS — HEIGHT: 69 IN | BODY MASS INDEX: 29.39 KG/M2

## 2025-01-13 DIAGNOSIS — Z3A.14 14 WEEKS GESTATION OF PREGNANCY: Primary | ICD-10-CM

## 2025-01-13 DIAGNOSIS — Z34.82 PRENATAL CARE, SUBSEQUENT PREGNANCY, SECOND TRIMESTER: ICD-10-CM

## 2025-01-13 PROCEDURE — OBC

## 2025-01-13 NOTE — PROGRESS NOTES
OB INTAKE INTERVIEW 2025  The patient was identified by name and date of birth and was informed that this is a virtual visit being conducted through a secure, HIPAA-complaint platform. I am in a private office space with the door closed. Patient acknowledged understanding of privacy.  She agrees to proceed and is aware that she may discontinue the visit at any time.    Patient is 34 y.o. who presents for OB intake at 14w2d.  She is not accompanied by anyone during this encounter.  The father of her baby (Jesús) is involved in the pregnancy.      Patient's last menstrual period was 10/05/2024 (exact date).  Ultrasound: Measured 10 weeks 6 days on 2024  Estimated Date of Delivery: 25 confirmed by dating ultrasound.    Signs/Symptoms of Pregnancy:  Current pregnancy symptoms: breast tenderness, fatigue, and nausea  Constipation no  Headaches no  Cramping/spotting YES- spotting. Was seen by provider for this episode.  PICA cravings no    Diabetes:  Body mass index is 29.39 kg/m².  If patient has 1 or more, please order early 1 hour GTT  History of GDM no  BMI >35 no  History of PCOS or current metformin use no  History of LGA/macrosomic infant (4000g/9lbs) no    If patient has 2 or more, please order early 1 hour GTT  BMI>30 no  AMA no  First degree relative with type 2 diabetes no  History of chronic HTN, hyperlipidemia, elevated A1C no  High risk race (, , ,  or ) no    Hypertension: if you answer yes to any of the following, please order baseline preeclampsia labs (cbc, comprehensive metabolic panel, urine protein creatinine ratio, uric acid)  History of of chronic HTN no  History of gestational HTN YES  History of preeclampsia, eclampsia, or HELLP syndrome no  History of diabetes no  History of lupus, autoimmune disease, kidney disease no    Thyroid: if yes order TSH with reflex T4  History of thyroid disease no    Bleeding  Disorder or Hx of DVT - patient or first degree relative with history of. Order the following if not done previously.   (Factor V, antithrombin III, prothrombin gene mutation, protein C and S Ag, lupus anticoagulant, anticardiolipin, beta-2 glycoprotein)   no    OB/GYN:  History of abnormal pap smear no       Date of last pap smear 2022  History of HPV no  History of Herpes/HSV no  History of other STI (gonorrhea, chlamydia, trich) no  History of prior  YES  History of prior  no  History of  delivery prior to 36 weeks 6 days no  History of blood transfusion no  Ok for blood transfusion YES    Substance screening:   History of tobacco use no  Currently using tobacco no  Currently using alcohol no  Presently using drugs no  Past drug use  no  IV drug use- no  Partner drug use no  Parent/Family drug use no  Substance Use Screen Level N/A    MRSA Screening:   Does the pt have a hx of MRSA? no    Mental Health:  Hx of/or current dx of depression: no, Anxiety: no,  Medications: no   EPDS Screen:  Negative / score 0    Dental Health:  Patient has seen a dentist in the past 6 months YES    Immunizations:  Influenza vaccine given this season no- not interested  Discussed Tdap vaccine YES - would like to think about it until offered.  Discussed COVID Vaccine YES - not interested    Genetic/MFM:  Do you or your partner have a history of any of the following in yourselves or first degree relatives?  Cystic fibrosis no  Spinal muscular atrophy no  Hemoglobinopathy/Sickle Cell/Thalassemia no  Fragile X Intellectual Disability no    If yes, discuss Carrier Screening and recommend consultation with MFM/Genetic Counseling and place specific MFM Referral for.    Discussed Carrier Screening being completed once in a lifetime as a standard of care lab test. Place orders for Cystic Fibrosis Gene Test (YBQ466) and Spinal Muscular Atrophy DNA ([id number]).  Patient was informed that prior authorization needs to be  completed for these tests and this may take 7-10 business days.  Patient does not desire testing for Cystic Fibrosis and Spinal Muscular Atrophy.    ACOG Patient Education Cystic Fibrosis and Spinal Muscular Atrophy prenatal screening given.    Appointment for Nuchal Translucency Ultrasound at [facility] .. Missed out on the time frame. Patient is declining nipts  testing as well.     Interview education:  [facility] Pregnancy Essentials Book reviewed, discussed and attached to their AVS YES     Nurse/Family Partnership-patient may qualify NO; referral placed NO     Prenatal lab work scripts YES    Extra labs ordered:    pre-e labs    Aspirin/Preeclampsia Screen    Risk Level Risk Factor Recommendation   LOW Prior Uncomplicated full-term delivery YES No Aspirin recommendation        MODERATE Nulliparity no Recommend low-dose aspirin if     BMI>30 no 2 or more moderate risk factors    Family History Preeclampsia (mother/sister) no     35yr old or greater no      or Low Socioeconomic no     IVF Pregnancy  no     Personal History Risks (low birth weight, prior adverse preg outcome, >10yr preg interval) no         HIGH History of Preeclampsia no Recommend low-dose aspirin if     Multifetal gestation no 1 or more high risk factors    Chronic HTN no     Type 1 or 2 Diabetes YES     Renal Disease no     Autoimmune Disease  no      Contraindications to ASA therapy:  NSAID/ ASA allergy: no  Nasal polyps: no  Asthma with history of ASA induced bronchospasm: no    Relative contraindications:  History of GI bleed: no  Active peptic ulcer disease: no  Severe hepatic dysfunction: no    Patient does meet recommendation to take ASA 162mg during this pregnancy from 12-36 wks to lower her risk of preeclampsia.  Instructions given and patient verbalized understanding.    The patient has a history now or in prior pregnancy notable for:    Hx of gHTN  DM- type one        Details that I feel the provider should be aware of:  Antoinette and evelyn welcome their 5th child. Antoinette is overall feeling well. Antoinette is a type one diabetic. Reviewed the importance of establishing care with MFM to manage diabetes during pregnancy. Patient states she will call back to schedule with mfm. Pn1 labs ordered and reviewed. Pre-e labs ordered and reviewed. Blue folder reviewed but not given.    PN1 visit scheduled. The patient was oriented to our practice, the navigator role, reviewed delivering physicians and [facility] for delivery. All questions were answered.    Interviewed by: Nell Mann RN

## 2025-01-13 NOTE — PATIENT INSTRUCTIONS
Congratulations!! Please review our Pregnancy Essential Guide and [facility] L&D Virtual tour from our networks website.     [facility] Pregnancy Essentials Guide  [facility] ([email or web address])          Nell MILNER RN  OB Nurse Navigator

## 2025-01-14 ENCOUNTER — TELEPHONE (OUTPATIENT)
Age: 35
End: 2025-01-14

## 2025-01-23 ENCOUNTER — TELEMEDICINE (OUTPATIENT)
Facility: HOSPITAL | Age: 35
End: 2025-01-23
Payer: COMMERCIAL

## 2025-01-23 DIAGNOSIS — E10.649 HYPOGLYCEMIA DUE TO TYPE 1 DIABETES MELLITUS (HCC): ICD-10-CM

## 2025-01-23 DIAGNOSIS — O24.012 PRE-EXISTING TYPE 1 DIABETES MELLITUS WITH HYPERGLYCEMIA DURING PREGNANCY IN SECOND TRIMESTER (HCC): Primary | ICD-10-CM

## 2025-01-23 DIAGNOSIS — E10.65 PRE-EXISTING TYPE 1 DIABETES MELLITUS WITH HYPERGLYCEMIA DURING PREGNANCY IN SECOND TRIMESTER (HCC): Primary | ICD-10-CM

## 2025-01-23 DIAGNOSIS — Z46.81 INSULIN PUMP TITRATION: ICD-10-CM

## 2025-01-23 DIAGNOSIS — Z3A.15 15 WEEKS GESTATION OF PREGNANCY: ICD-10-CM

## 2025-01-23 PROCEDURE — 99215 OFFICE O/P EST HI 40 MIN: CPT | Performed by: NURSE PRACTITIONER

## 2025-01-23 PROCEDURE — 99417 PROLNG OP E/M EACH 15 MIN: CPT | Performed by: NURSE PRACTITIONER

## 2025-01-23 NOTE — ASSESSMENT & PLAN NOTE
-Humalog via Medtronic 770G insulin pump and Dexcom CGM in place.   -Due to hypoglycemia, basal settings decreased and encouraged not to stack insulin.  -Uses 15 by 15 rule to treat hypoglycemia.   Lab Results   Component Value Date    HGBA1C 5.1 10/24/2023       Orders:    Hemoglobin A1C; Future    Mychart glucose flowsheet

## 2025-01-23 NOTE — ASSESSMENT & PLAN NOTE
-Pre-pregnancy weight 187 lbs. BMI 27.62.  -Current weight 199 lbs.   -Recommended weight gain 15 to 25 lbs.  -Start GDM meal plan.   Orders:    Hemoglobin A1C; Future    Mychart glucose flowsheet

## 2025-01-23 NOTE — ASSESSMENT & PLAN NOTE
-Check A1c, CMP and UPCR.  -A1c goal less than 6% with minimal hypoglycemia.   -Humalog via Medtronic 770G insulin pump and Dexcom CGM.   -Due to hyperglycemia and hypoglycemia, insulin pump adjustments as follows:  MN-4 AM from 0.975 to 0.875 units/hour;  4 AM-8 AM@ 1.40 units/hour;  8 AM-2 PM from 1.85 to 1.55 units/hour;  2 PM-5:30 PM from 1.85 to 1.55 units/hour;  5:30 PM-MN from 1.85 to 1.55 units/hour.  BG target from 110 to 90 mg/dL.  ISF from 50 to 27.  Carb ratio from 12 to 7.   Active Insulin Time 3 hours.   Avoid mental math and use your pump for bolus insulin.   -Pending insulin pump download. Insulin pump adjustments made due to Dexcom CGM report where hypo and hyperglycemia noted.   -Schedule follow up with dietitian.  -Self monitoring blood glucose (SMBG) fasting; 2 hours after start of each meal and with hypoglycemia.   -Okay to Dexcom for pre-meal glucose readings.  -Glucose goals: fasting 60-90 mg/dL, 140 mg/dL or less 1 hour post meals, and 120 mg/dL or less 2 hours post meal.   -Weekly insulin pump download.   -Start GDM meal plan with 3 meals and 3 snacks including recommended combination of carb, protein and fat per meal/snack.  -Please eat meal or snack every 2-3.5 hours while awake.  -No more than 8 to 10 hours of fasting overnight.  -Refer to [employer] MyPlate online as a reference.  -2nd/3rd trimester minimum total daily carbohydrates 175 grams paired with half grams in protein.   -Stay active if no restriction from your OB, walk up to 30 minutes a day.  -Always have glucose available to treat hypoglycemia. Use 15:15 rule.   -Refer to hypoglycemia patient education sheet. SMBG when experiencing signs and symptoms of hypoglycemia and prior to driving.   -Serial fetal growth ultrasounds.  -20 weeks detailed fetal growth ultrasound.  -22-24 weeks fetal echo.  -At 32 weeks gestation; NST twice a week and VINOD weekly.   -Continue prenatal vitamin as recommended.  -At 36 weeks gestation, stop  baby aspirin.   -Continue follow-up with your OB and MFM as recommended.  -Stay in close contact with diabetes education team.  -Insulin requirements during pregnancy; basal/bolus concept and Metformin discussed.  -Very important to maintain tight glucose control during pregnancy to decrease risk factors including fetal macrosomia; birth injury; risk of ; polyhydramnios; pre-term labor; pre-eclampsia;  hypoglycemia; jaundice and stillbirth.   -Diabetes and pregnancy booklet; meal plan and hypoglycemia patient education.       Lab Results   Component Value Date    HGBA1C 5.1 10/24/2023       Orders:    Hemoglobin A1C; Future    Mychart glucose flowsheet

## 2025-01-23 NOTE — PROGRESS NOTES
Virtual Regular Visit  Name: Antoinette Hernandez      : 1990      MRN: [id number]  Encounter Provider: MARCO Mayer  Encounter Date: 2025   Encounter department: [facility]      Verification of patient location:  Patient is located at Home in the following state in which I hold an active license [place] :  Assessment & Plan  Pre-existing type 1 diabetes mellitus with hyperglycemia during pregnancy in second trimester (HCC)  -Check A1c, CMP and UPCR.  -A1c goal less than 6% with minimal hypoglycemia.   -Humalog via Medtronic 770G insulin pump and Dexcom CGM.   -Due to hyperglycemia and hypoglycemia, insulin pump adjustments as follows:  MN-4 AM from 0.975 to 0.875 units/hour;  4 AM-8 AM@ 1.40 units/hour;  8 AM-2 PM from 1.85 to 1.55 units/hour;  2 PM-5:30 PM from 1.85 to 1.55 units/hour;  5:30 PM-MN from 1.85 to 1.55 units/hour.  BG target from 110 to 90 mg/dL.  ISF from 50 to 27.  Carb ratio from 12 to 7.   Active Insulin Time 3 hours.   Avoid mental math and use your pump for bolus insulin.   -Pending insulin pump download. Insulin pump adjustments made due to Dexcom CGM report where hypo and hyperglycemia noted.   -Schedule follow up with dietitian.  -Self monitoring blood glucose (SMBG) fasting; 2 hours after start of each meal and with hypoglycemia.   -Okay to Dexcom for pre-meal glucose readings.  -Glucose goals: fasting 60-90 mg/dL, 140 mg/dL or less 1 hour post meals, and 120 mg/dL or less 2 hours post meal.   -Weekly insulin pump download.   -Start GDM meal plan with 3 meals and 3 snacks including recommended combination of carb, protein and fat per meal/snack.  -Please eat meal or snack every 2-3.5 hours while awake.  -No more than 8 to 10 hours of fasting overnight.  -Refer to [employer] MyPlate online as a reference.  -2nd/3rd trimester minimum total daily carbohydrates 175 grams paired with half grams in protein.   -Stay active if no restriction from your OB, walk  up to 30 minutes a day.  -Always have glucose available to treat hypoglycemia. Use 15:15 rule.   -Refer to hypoglycemia patient education sheet. SMBG when experiencing signs and symptoms of hypoglycemia and prior to driving.   -Serial fetal growth ultrasounds.  -20 weeks detailed fetal growth ultrasound.  -22-24 weeks fetal echo.  -At 32 weeks gestation; NST twice a week and VINOD weekly.   -Continue prenatal vitamin as recommended.  -At 36 weeks gestation, stop baby aspirin.   -Continue follow-up with your OB and MFM as recommended.  -Stay in close contact with diabetes education team.  -Insulin requirements during pregnancy; basal/bolus concept and Metformin discussed.  -Very important to maintain tight glucose control during pregnancy to decrease risk factors including fetal macrosomia; birth injury; risk of ; polyhydramnios; pre-term labor; pre-eclampsia;  hypoglycemia; jaundice and stillbirth.   -Diabetes and pregnancy booklet; meal plan and hypoglycemia patient education.       Lab Results   Component Value Date    HGBA1C 5.1 10/24/2023       Orders:    Hemoglobin A1C; Future    Mychart glucose flowsheet    Hypoglycemia due to type 1 diabetes mellitus (HCC)  -Humalog via CertiVox 770G insulin pump and Dexcom CGM in place.   -Due to hypoglycemia, basal settings decreased and encouraged not to stack insulin.  -Uses 15 by 15 rule to treat hypoglycemia.   Lab Results   Component Value Date    HGBA1C 5.1 10/24/2023       Orders:    Hemoglobin A1C; Future    Mychart glucose flowsheet    15 weeks gestation of pregnancy    Orders:    Hemoglobin A1C; Future    Mychart glucose flowsheet    BMI 29.0-29.9,adult  -Pre-pregnancy weight 187 lbs. BMI 27.62.  -Current weight 199 lbs.   -Recommended weight gain 15 to 25 lbs.  -Start GDM meal plan.   Orders:    Hemoglobin A1C; Future    Mychart glucose flowsheet    Insulin pump titration    Orders:    Hemoglobin A1C; Future    Mychart glucose flowsheet    Insulin  pump not available for review but recalculations completed using 90 kg times 0.7 units/hour equaled 63 units TDD and currently using about 74 units TDD.    Main basal 1.15 units/hour; max 1.35 units/hour; min 0.80 units/hour. Antoinette not willing to decrease main basal rate from 1.85 to 1.15 units/hour but will decrease to 1.55 units/hour.     Encounter provider MARCO Mayer    The patient was identified by name and date of birth. Antoinette Hernandez was informed that this is a telemedicine visit and that the visit is being conducted through the Epic Embedded platform. She agrees to proceed..  My office door was closed. No one else was in the room.  She acknowledged consent and understanding of privacy and security of the video platform. The patient has agreed to participate and understands they can discontinue the visit at any time.    Patient is aware this is a billable service.     History of Present Illness   Antoinette is a 34 you  female, 16 5/7 weeks gestation who presents with T1DM on Humalog via Medtronic 770G insulin with Dexcom CGM. She does not like Guardian CGM. Has a 4 yo, 3 yo, 1 yo and 2 yo at home. Unable able to view insulin pump download during visit. Insulin pump settings sent via [employer] and provided current TDD average of about 74 units daily. Did report self adjusting insulin pump but last adjustment completed greater than 1 week ago. Dexcom CGM report reviewed and insulin pump adjustments recommended based on CGM report. Having hypoglycemia, treating with fruit snacks. Reported pre-pregnancy weight 187 lbs and current weight 199 lbs. BP within normal. Missed NT appointment.   HPI  Review of Systems   Constitutional:  Positive for fatigue. Negative for fever.   HENT:  Negative for congestion and trouble swallowing.    Eyes:  Negative for visual disturbance.        Needs eye exam.    Respiratory:  Negative for cough and shortness of breath.    Cardiovascular:  Negative for chest pain, palpitations  and leg swelling.   Gastrointestinal:  Negative for constipation, nausea and vomiting.   Endocrine: Negative for polydipsia, polyphagia and polyuria.   Genitourinary:  Negative for difficulty urinating and vaginal bleeding.   Musculoskeletal:  Negative for back pain.   Skin:  Negative for rash.   Neurological:  Negative for numbness and headaches.   Psychiatric/Behavioral:  Negative for sleep disturbance.        Objective   LMP 10/05/2024 (Exact Date)   Refer to attached file for Dexcom AGP report.   CGM Interpretation  Antoientte Hernandez   Device used Dexcom for Personal Use  Indication: Type of Diabetes: Type 1 Diabetes  More than 72 hours of data was reviewed. Report to be scanned to chart.   Date Range: 1/17/2025 to 1/23/2025.   Analysis of data:   Average Glucose: 95 mg/dl  Coefficient of Variation: 38.9%  SD : 37 mg/dl  Time in Target Range: 67%  Time Above Range: 19%  Time Below Range: 14%   Interpretation of data:   Hyperglycemia and hypoglycemia noted; TIR 67% and goal is 70% or higher with minimal hypoglycemia; currently less than 60 about 14% of the time. Basal settings decreased and bolus settings adjusted in order for Antoinette to stop overcorrecting and stacking insulin resulting in hypoglycemia.   Physical Exam  HENT:      Head: Normocephalic.      Nose: Nose normal.   Eyes:      Conjunctiva/sclera: Conjunctivae normal.   Pulmonary:      Effort: Pulmonary effort is normal.   Neurological:      Mental Status: She is alert and oriented to person, place, and time.   Psychiatric:         Mood and Affect: Mood normal.         Behavior: Behavior normal.         Thought Content: Thought content normal.         Judgment: Judgment normal.         Visit Time  Total Visit Duration: 55 minutes with patient and 20 minutes charting/reviewing Dexcom report.

## 2025-02-03 ENCOUNTER — TELEPHONE (OUTPATIENT)
Facility: HOSPITAL | Age: 35
End: 2025-02-03

## 2025-02-03 NOTE — TELEPHONE ENCOUNTER
[facility] for insulin pump adjustments. Hypoglycemia noted on Dexcom CGM report and basal settings need to be decreased. Pending return phone call.

## 2025-02-07 ENCOUNTER — TELEPHONE (OUTPATIENT)
Facility: HOSPITAL | Age: 35
End: 2025-02-07

## 2025-02-07 ENCOUNTER — INITIAL PRENATAL (OUTPATIENT)
Dept: OBGYN CLINIC | Facility: CLINIC | Age: 35
End: 2025-02-07

## 2025-02-07 VITALS
HEIGHT: 69 IN | WEIGHT: 222 LBS | DIASTOLIC BLOOD PRESSURE: 60 MMHG | BODY MASS INDEX: 32.88 KG/M2 | SYSTOLIC BLOOD PRESSURE: 132 MMHG

## 2025-02-07 DIAGNOSIS — O24.012 PRE-EXISTING TYPE 1 DIABETES MELLITUS WITH HYPERGLYCEMIA DURING PREGNANCY IN SECOND TRIMESTER (HCC): Primary | ICD-10-CM

## 2025-02-07 DIAGNOSIS — Z3A.17 17 WEEKS GESTATION OF PREGNANCY: ICD-10-CM

## 2025-02-07 DIAGNOSIS — Z11.3 SCREENING FOR STDS (SEXUALLY TRANSMITTED DISEASES): ICD-10-CM

## 2025-02-07 DIAGNOSIS — O09.92 SUPERVISION OF HIGH RISK PREGNANCY IN SECOND TRIMESTER: ICD-10-CM

## 2025-02-07 DIAGNOSIS — E10.649 HYPOGLYCEMIA DUE TO TYPE 1 DIABETES MELLITUS (HCC): ICD-10-CM

## 2025-02-07 DIAGNOSIS — E10.65 PRE-EXISTING TYPE 1 DIABETES MELLITUS WITH HYPERGLYCEMIA DURING PREGNANCY IN SECOND TRIMESTER (HCC): Primary | ICD-10-CM

## 2025-02-07 PROCEDURE — 87591 N.GONORRHOEAE DNA AMP PROB: CPT | Performed by: OBSTETRICS & GYNECOLOGY

## 2025-02-07 PROCEDURE — PNV: Performed by: OBSTETRICS & GYNECOLOGY

## 2025-02-07 PROCEDURE — 87491 CHLMYD TRACH DNA AMP PROBE: CPT | Performed by: OBSTETRICS & GYNECOLOGY

## 2025-02-07 NOTE — ASSESSMENT & PLAN NOTE
Declines aneuploidy screening, level 2 US is scheduled  Accepts msAFP, reviewed indication and timing  Reviewed benefits of influenza vaccination in pregnancy including but not limited to reduction in maternal influenza hospitalization, reduction in risk of stillbirth, and reduction in influenza-related morbidity and mortality among infants. Reviewed safety of influenza vaccine in pregnancy and overall very low risk of reaction or adverse effects. Patient voiced understanding of all this and DECLINES vaccination today.   GC/CT collected  OB precautions reviewed

## 2025-02-07 NOTE — PROGRESS NOTES
Subjective   Patient ID: Antoinette Hernandez is a 34 y.o. female.    Patient is here for a visit - OB H&P     Chief Complaint   Patient presents with    Initial Prenatal Visit     17w6d. No concerns     Pap 2022 NILM/HPV neg     Needs GC/CT    Level 2 US, fetal echo  scheduled     No cramping or VB. Starting to feel flutters     Menstrual History:  OB History          7    Para   4    Term   4            AB   2    Living   4         SAB   1    IAB   1    Ectopic        Multiple   0    Live Births   4           Obstetric Comments   Menarche, 14                Patient's last menstrual period was 10/05/2024 (exact date).         Past Medical History:   Diagnosis Date    Diabetes (HCC)     type 1    Diabetes mellitus (HCC)     Varicella     had in childhood       Past Surgical History:   Procedure Laterality Date    LA NDSC WRST SURG W/RLS TRANSVRS CARPL LIGM Right 2/3/2021    Procedure: RELEASE CARPAL TUNNEL ENDOSCOPIC;  Surgeon: Papa Simpson MD;  Location: [facility];  Service: Orthopedics    LA NDSC WRST SURG W/RLS TRANSVRS CARPL LIGM Left 2021    Procedure: RELEASE CARPAL TUNNEL ENDOSCOPIC;  Surgeon: Papa Simpson MD;  Location: [facility];  Service: Orthopedics    WISDOM TOOTH EXTRACTION         Social History     Tobacco Use    Smoking status: Never    Smokeless tobacco: Never   Vaping Use    Vaping status: Never Used   Substance Use Topics    Alcohol use: Not Currently    Drug use: Never        No Known Allergies      Current Outpatient Medications:     Blood Pressure Monitoring (B-D ASSURE BPM/AUTO ARM CUFF) MISC, Use 2 (two) times a day Call if BP > 140/90 (Patient not taking: Reported on 2023), Disp: 1 each, Rfl: 0    Blood Pressure Monitoring (Blood Pressure Cuff) MISC, Use 2 (two) times a day (Patient not taking: Reported on 2024), Disp: 1 each, Rfl: 0    Continuous Blood Gluc  (Dexcom G6 ) DENIZ, Use as directed., Disp: 1 Device, Rfl: 0     "Continuous Blood Gluc Sensor (Dexcom G6 Sensor) MISC, 1 box=1 month supply or 3 sensors, use 1 sensor every 10 days., Disp: 1 each, Rfl: 12    Continuous Blood Gluc Transmit (Dexcom G6 Transmitter) MISC, Transmitter change every 90 days., Disp: 1 each, Rfl: 3    glucagon 1 MG injection, Inject 1 mg under the skin, Disp: , Rfl:     glucose blood test strip, Use 1 each daily as needed Use as instructed, Disp: , Rfl:     HumaLOG 100 UNIT/ML injection, Use up to 200 units a day via insulin pump, to be titrated. T1DM and pregnancy. 90 day supply., Disp: 180 mL, Rfl: 0    insulin glargine (LANTUS) 100 units/mL subcutaneous injection, Inject 10 Units under the skin, Disp: , Rfl:     Lancets MISC, by Does not apply route, Disp: , Rfl:     Prenatal Vit-Fe Fumarate-FA (PRENATAL VITAMIN PO), Take 1 tablet by mouth daily, Disp: , Rfl:       Review of Systems   Constitutional:  Negative for appetite change, chills and fever.   Eyes:  Negative for visual disturbance.   Respiratory:  Negative for cough, chest tightness and shortness of breath.    Cardiovascular:  Negative for chest pain.   Gastrointestinal:  Negative for abdominal distention, abdominal pain, constipation, diarrhea, nausea and vomiting.   Endocrine: Negative for cold intolerance and heat intolerance.   Genitourinary:  Negative for difficulty urinating, dyspareunia, dysuria, frequency, genital sores, pelvic pain, urgency, vaginal bleeding, vaginal discharge and vaginal pain.   Musculoskeletal:  Negative for arthralgias.   Neurological:  Negative for light-headedness and headaches.   Hematological:  Does not bruise/bleed easily.   Psychiatric/Behavioral:  Negative for behavioral problems.    All other systems reviewed and are negative.        /60 (BP Location: Right arm, Patient Position: Sitting, Cuff Size: Standard)   Ht 5' 9\" (1.753 m)   Wt 101 kg (222 lb)   LMP 10/05/2024 (Exact Date)   BMI 32.78 kg/m²       Physical Exam  Constitutional:       General: " She is not in acute distress.     Appearance: Normal appearance. She is not ill-appearing.   Genitourinary:      Bladder and urethral meatus normal.      Right Labia: No rash, tenderness, lesions or skin changes.     Left Labia: No tenderness, lesions, skin changes or rash.     No labial fusion noted.      No inguinal adenopathy present in the right or left side.     No vaginal discharge, erythema, tenderness, bleeding or ulceration.        Right Adnexa: not tender, not full and no mass present.     Left Adnexa: not tender, not full and no mass present.     No cervical motion tenderness, discharge, friability, lesion, polyp or eversion.      Uterus is enlarged.      Uterus is not fixed, tender or irregular.      No uterine mass detected.     Pelvic exam was performed with patient in the lithotomy position.   HENT:      Head: Normocephalic.   Cardiovascular:      Rate and Rhythm: Normal rate.      Heart sounds: Normal heart sounds.   Pulmonary:      Effort: Pulmonary effort is normal. No accessory muscle usage or respiratory distress.   Abdominal:      General: There is no distension.      Palpations: Abdomen is soft. There is no mass.      Tenderness: There is no abdominal tenderness. There is no guarding or rebound.   Musculoskeletal:         General: Normal range of motion.      Cervical back: No rigidity.   Lymphadenopathy:      Lower Body: No right inguinal adenopathy. No left inguinal adenopathy.   Neurological:      General: No focal deficit present.      Mental Status: She is alert. Mental status is at baseline.   Skin:     General: Skin is warm and dry.   Psychiatric:         Mood and Affect: Mood normal.         Behavior: Behavior normal.   Vitals and nursing note reviewed. Exam conducted with a chaperone present.               Appropriate laboratory testing, imaging studies, and prior external records were reviewed:     Assessment/Plan:       Problem List Items Addressed This Visit       Pre-existing type  1 diabetes mellitus with hyperglycemia during pregnancy in second trimester (HCC) - Primary    17 weeks gestation of pregnancy    BMI 29.0-29.9,adult    Hypoglycemia due to type 1 diabetes mellitus (HCC)    Supervision of high risk pregnancy in second trimester    Declines aneuploidy screening, level 2 US is scheduled  Accepts msAFP, reviewed indication and timing  Reviewed benefits of influenza vaccination in pregnancy including but not limited to reduction in maternal influenza hospitalization, reduction in risk of stillbirth, and reduction in influenza-related morbidity and mortality among infants. Reviewed safety of influenza vaccine in pregnancy and overall very low risk of reaction or adverse effects. Patient voiced understanding of all this and DECLINES vaccination today.   GC/CT collected  OB precautions reviewed          Relevant Orders    Alpha fetoprotein, maternal

## 2025-02-07 NOTE — TELEPHONE ENCOUNTER
Reason for Call: Diabetes Type 1 and Dexcom Report (Noted 8% Very Low, 4% Low per Dexcom report 1/1/25-1/7/25)      Outcome: Attempted to call patient, no answer, left voicemail with instructions for patient to call office at [phone] regarding hypoglycemia episodes. Reinforced iRule message sent on 1/30/25 from Fidelia Chopra regarding decreasing basal settings and request for hard insulin pump download. Noted insulin pump last uploaded on 1/23/25.    Annabella Roach, MPH, RDN, LDN, CDCES  Diabetes Educator   [facility]  Diabetes and Pregnancy Program

## 2025-02-08 ENCOUNTER — RESULTS FOLLOW-UP (OUTPATIENT)
Dept: LABOR AND DELIVERY | Facility: HOSPITAL | Age: 35
End: 2025-02-08

## 2025-02-08 LAB
C TRACH DNA SPEC QL NAA+PROBE: NEGATIVE
N GONORRHOEA DNA SPEC QL NAA+PROBE: NEGATIVE

## 2025-02-14 ENCOUNTER — TELEPHONE (OUTPATIENT)
Dept: PERINATAL CARE | Facility: CLINIC | Age: 35
End: 2025-02-14

## 2025-02-14 NOTE — TELEPHONE ENCOUNTER
Reason for Call: Reminder to Report Insulin Pump Download (Last Download via Medtronic Carelink: 1/23/25; On Medtronic Insulin Pump), Diabetes Type 1 (Currently Pregnant; 18w6d), CGM Results (Dexcom G7 2/8/25 - 2/14/25 = TIR: 65% (Goal: >70%), TBR: 13% (Goal: <4%), TAR: 22% (Goal: <25%)), and Hypoglycemia (Pattern of significant night-time lows between 1:35 AM and 5:05 AM; 7 low events contributed to this pattern; 2 of the contributing events were rebound lows./ Pattern of significant day-time lows between 8:50 AM and 9:20 AM; 3 low events contributed to this pattern; 3 of the contributing events were rebound lows.)    Outcome: No Answer. Left VM. Sent PeerPong Message.     Dexcom Report: Reporting period: Sat Feb 8, 2025 - Fri Feb 14, 2025    Glucose Details    Average glucose: 98 mg/dL    Standard deviation: 36 mg/dL    Coefficient of Variation: 36.4%  -----------------------------  Time in Range    Very High: 7%    High: 15%    In Range: 65%    Low: 5%    Very Low: 8%    Target Range  Day (8:00 AM - 11:00 PM):  mg/dL  Night (11:00 PM - 8:00 AM):  mg/dL  -----------------------------  Sensor usage    Days with data: 7/7    Time active: 94%    Avg. calibrations per day: 0.0          Toshia Garcia RD  Diabetes Educator   [facility]  Diabetes and Pregnancy Program

## 2025-02-27 ENCOUNTER — TELEPHONE (OUTPATIENT)
Dept: PERINATAL CARE | Facility: CLINIC | Age: 35
End: 2025-02-27

## 2025-02-27 NOTE — TELEPHONE ENCOUNTER
[facility] for Antoinette stating that Dr. Davis wrote a [employer] message on 2/24/25 wanted to make sure you received the message. Informed patient all of Dr. Davis's [employer] message and to please review the message when possible.     Provided phone number of [phone] for any additional questions    This Message will be routed to Dr. Davis

## 2025-03-01 ENCOUNTER — APPOINTMENT (OUTPATIENT)
Dept: LAB | Facility: MEDICAL CENTER | Age: 35
End: 2025-03-01
Payer: COMMERCIAL

## 2025-03-01 DIAGNOSIS — Z34.82 PRENATAL CARE, SUBSEQUENT PREGNANCY, SECOND TRIMESTER: ICD-10-CM

## 2025-03-01 DIAGNOSIS — Z46.81 INSULIN PUMP TITRATION: ICD-10-CM

## 2025-03-01 DIAGNOSIS — O09.92 SUPERVISION OF HIGH RISK PREGNANCY IN SECOND TRIMESTER: ICD-10-CM

## 2025-03-01 DIAGNOSIS — E10.65 PRE-EXISTING TYPE 1 DIABETES MELLITUS WITH HYPERGLYCEMIA DURING PREGNANCY IN SECOND TRIMESTER (HCC): ICD-10-CM

## 2025-03-01 DIAGNOSIS — E10.649 HYPOGLYCEMIA DUE TO TYPE 1 DIABETES MELLITUS (HCC): ICD-10-CM

## 2025-03-01 DIAGNOSIS — Z3A.15 15 WEEKS GESTATION OF PREGNANCY: ICD-10-CM

## 2025-03-01 DIAGNOSIS — Z3A.14 14 WEEKS GESTATION OF PREGNANCY: ICD-10-CM

## 2025-03-01 DIAGNOSIS — O24.012 PRE-EXISTING TYPE 1 DIABETES MELLITUS WITH HYPERGLYCEMIA DURING PREGNANCY IN SECOND TRIMESTER (HCC): ICD-10-CM

## 2025-03-01 LAB
ABO GROUP BLD: NORMAL
ALBUMIN SERPL BCG-MCNC: 3.7 G/DL (ref 3.5–5)
ALP SERPL-CCNC: 59 U/L (ref 34–104)
ALT SERPL W P-5'-P-CCNC: 8 U/L (ref 7–52)
ANION GAP SERPL CALCULATED.3IONS-SCNC: 10 MMOL/L (ref 4–13)
AST SERPL W P-5'-P-CCNC: 17 U/L (ref 13–39)
BACTERIA UR QL AUTO: ABNORMAL /HPF
BASOPHILS # BLD AUTO: 0.03 THOUSANDS/ÂΜL (ref 0–0.1)
BASOPHILS NFR BLD AUTO: 0 % (ref 0–1)
BILIRUB SERPL-MCNC: 0.33 MG/DL (ref 0.2–1)
BILIRUB UR QL STRIP: NEGATIVE
BLD GP AB SCN SERPL QL: NEGATIVE
BUN SERPL-MCNC: 11 MG/DL (ref 5–25)
CALCIUM SERPL-MCNC: 9.4 MG/DL (ref 8.4–10.2)
CHLORIDE SERPL-SCNC: 106 MMOL/L (ref 96–108)
CLARITY UR: CLEAR
CO2 SERPL-SCNC: 24 MMOL/L (ref 21–32)
COLOR UR: ABNORMAL
CREAT SERPL-MCNC: 0.59 MG/DL (ref 0.6–1.3)
CREAT UR-MCNC: 56.5 MG/DL
EOSINOPHIL # BLD AUTO: 0.23 THOUSAND/ÂΜL (ref 0–0.61)
EOSINOPHIL NFR BLD AUTO: 2 % (ref 0–6)
ERYTHROCYTE [DISTWIDTH] IN BLOOD BY AUTOMATED COUNT: 12.8 % (ref 11.6–15.1)
EST. AVERAGE GLUCOSE BLD GHB EST-MCNC: 97 MG/DL
GFR SERPL CREATININE-BSD FRML MDRD: 121 ML/MIN/1.73SQ M
GLUCOSE P FAST SERPL-MCNC: 69 MG/DL (ref 65–99)
GLUCOSE UR STRIP-MCNC: NEGATIVE MG/DL
HBA1C MFR BLD: 5 %
HBV SURFACE AG SER QL: NORMAL
HCT VFR BLD AUTO: 37 % (ref 34.8–46.1)
HCV AB SER QL: NORMAL
HGB BLD-MCNC: 12.7 G/DL (ref 11.5–15.4)
HGB UR QL STRIP.AUTO: NEGATIVE
HIV 1+2 AB+HIV1 P24 AG SERPL QL IA: NORMAL
IMM GRANULOCYTES # BLD AUTO: 0.07 THOUSAND/UL (ref 0–0.2)
IMM GRANULOCYTES NFR BLD AUTO: 1 % (ref 0–2)
KETONES UR STRIP-MCNC: NEGATIVE MG/DL
LEUKOCYTE ESTERASE UR QL STRIP: ABNORMAL
LYMPHOCYTES # BLD AUTO: 1.41 THOUSANDS/ÂΜL (ref 0.6–4.47)
LYMPHOCYTES NFR BLD AUTO: 14 % (ref 14–44)
MCH RBC QN AUTO: 30.3 PG (ref 26.8–34.3)
MCHC RBC AUTO-ENTMCNC: 34.3 G/DL (ref 31.4–37.4)
MCV RBC AUTO: 88 FL (ref 82–98)
MONOCYTES # BLD AUTO: 0.47 THOUSAND/ÂΜL (ref 0.17–1.22)
MONOCYTES NFR BLD AUTO: 5 % (ref 4–12)
NEUTROPHILS # BLD AUTO: 7.63 THOUSANDS/ÂΜL (ref 1.85–7.62)
NEUTS SEG NFR BLD AUTO: 78 % (ref 43–75)
NITRITE UR QL STRIP: NEGATIVE
NON-SQ EPI CELLS URNS QL MICRO: ABNORMAL /HPF
NRBC BLD AUTO-RTO: 0 /100 WBCS
PH UR STRIP.AUTO: 7 [PH]
PLATELET # BLD AUTO: 236 THOUSANDS/UL (ref 149–390)
PMV BLD AUTO: 11.1 FL (ref 8.9–12.7)
POTASSIUM SERPL-SCNC: 4 MMOL/L (ref 3.5–5.3)
PROT SERPL-MCNC: 7 G/DL (ref 6.4–8.4)
PROT UR STRIP-MCNC: NEGATIVE MG/DL
PROT UR-MCNC: 9 MG/DL
PROT/CREAT UR: 0.2 MG/G{CREAT} (ref 0–0.1)
RBC # BLD AUTO: 4.19 MILLION/UL (ref 3.81–5.12)
RBC #/AREA URNS AUTO: ABNORMAL /HPF
RH BLD: POSITIVE
RUBV IGG SERPL IA-ACNC: 93.9 IU/ML
SODIUM SERPL-SCNC: 140 MMOL/L (ref 135–147)
SP GR UR STRIP.AUTO: 1.01 (ref 1–1.03)
TREPONEMA PALLIDUM IGG+IGM AB [PRESENCE] IN SERUM OR PLASMA BY IMMUNOASSAY: NORMAL
UROBILINOGEN UR STRIP-ACNC: <2 MG/DL
WBC # BLD AUTO: 9.84 THOUSAND/UL (ref 4.31–10.16)
WBC #/AREA URNS AUTO: ABNORMAL /HPF

## 2025-03-01 PROCEDURE — 86901 BLOOD TYPING SEROLOGIC RH(D): CPT

## 2025-03-01 PROCEDURE — 87340 HEPATITIS B SURFACE AG IA: CPT

## 2025-03-01 PROCEDURE — 85025 COMPLETE CBC W/AUTO DIFF WBC: CPT

## 2025-03-01 PROCEDURE — 81001 URINALYSIS AUTO W/SCOPE: CPT

## 2025-03-01 PROCEDURE — 82570 ASSAY OF URINE CREATININE: CPT

## 2025-03-01 PROCEDURE — 80053 COMPREHEN METABOLIC PANEL: CPT

## 2025-03-01 PROCEDURE — 87389 HIV-1 AG W/HIV-1&-2 AB AG IA: CPT

## 2025-03-01 PROCEDURE — 86780 TREPONEMA PALLIDUM: CPT

## 2025-03-01 PROCEDURE — 86762 RUBELLA ANTIBODY: CPT

## 2025-03-01 PROCEDURE — 82105 ALPHA-FETOPROTEIN SERUM: CPT

## 2025-03-01 PROCEDURE — 86803 HEPATITIS C AB TEST: CPT

## 2025-03-01 PROCEDURE — 83036 HEMOGLOBIN GLYCOSYLATED A1C: CPT

## 2025-03-01 PROCEDURE — 86900 BLOOD TYPING SEROLOGIC ABO: CPT

## 2025-03-01 PROCEDURE — 36415 COLL VENOUS BLD VENIPUNCTURE: CPT

## 2025-03-01 PROCEDURE — 86850 RBC ANTIBODY SCREEN: CPT

## 2025-03-01 PROCEDURE — 84156 ASSAY OF PROTEIN URINE: CPT

## 2025-03-02 ENCOUNTER — RESULTS FOLLOW-UP (OUTPATIENT)
Facility: HOSPITAL | Age: 35
End: 2025-03-02

## 2025-03-02 LAB — BACTERIA UR CULT: NORMAL

## 2025-03-03 ENCOUNTER — ROUTINE PRENATAL (OUTPATIENT)
Dept: PERINATAL CARE | Facility: OTHER | Age: 35
End: 2025-03-03
Payer: COMMERCIAL

## 2025-03-03 VITALS
HEIGHT: 69 IN | HEART RATE: 102 BPM | BODY MASS INDEX: 34.39 KG/M2 | SYSTOLIC BLOOD PRESSURE: 134 MMHG | WEIGHT: 232.2 LBS | DIASTOLIC BLOOD PRESSURE: 70 MMHG

## 2025-03-03 DIAGNOSIS — Z3A.21 21 WEEKS GESTATION OF PREGNANCY: ICD-10-CM

## 2025-03-03 DIAGNOSIS — Z86.59 HISTORY OF POSTPARTUM DEPRESSION: ICD-10-CM

## 2025-03-03 DIAGNOSIS — Z36.3 ENCOUNTER FOR ANTENATAL SCREENING FOR MALFORMATIONS: ICD-10-CM

## 2025-03-03 DIAGNOSIS — E10.65 PRE-EXISTING TYPE 1 DIABETES MELLITUS WITH HYPERGLYCEMIA DURING PREGNANCY IN SECOND TRIMESTER (HCC): Primary | ICD-10-CM

## 2025-03-03 DIAGNOSIS — O24.012 PRE-EXISTING TYPE 1 DIABETES MELLITUS WITH HYPERGLYCEMIA DURING PREGNANCY IN SECOND TRIMESTER (HCC): Primary | ICD-10-CM

## 2025-03-03 DIAGNOSIS — Z36.86 ENCOUNTER FOR ANTENATAL SCREENING FOR CERVICAL LENGTH: ICD-10-CM

## 2025-03-03 DIAGNOSIS — O09.892 PREVIOUS PREGNANCY COMPLICATED BY PREGNANCY-INDUCED HYPERTENSION, ANTEPARTUM, SECOND TRIMESTER: ICD-10-CM

## 2025-03-03 DIAGNOSIS — Z87.59 HISTORY OF POSTPARTUM DEPRESSION: ICD-10-CM

## 2025-03-03 PROCEDURE — 99243 OFF/OP CNSLTJ NEW/EST LOW 30: CPT | Performed by: OBSTETRICS & GYNECOLOGY

## 2025-03-03 PROCEDURE — 76817 TRANSVAGINAL US OBSTETRIC: CPT | Performed by: OBSTETRICS & GYNECOLOGY

## 2025-03-03 PROCEDURE — 76811 OB US DETAILED SNGL FETUS: CPT | Performed by: OBSTETRICS & GYNECOLOGY

## 2025-03-03 RX ORDER — ASPIRIN 81 MG/1
162 TABLET ORAL DAILY
Start: 2025-03-03

## 2025-03-03 RX ORDER — ACYCLOVIR 400 MG/1
1 TABLET ORAL
COMMUNITY
Start: 2025-01-21

## 2025-03-03 NOTE — PROGRESS NOTES
Ultrasound Probe Disinfection    A transvaginal ultrasound was performed.   Prior to use, disinfection was performed with High Level Disinfection Process (swabr).  Probe serial number [id number]  was used.    Ema Botello  03/03/25  5:36 PM

## 2025-03-03 NOTE — LETTER
March 3, 2025     Jeffry Uriarte PA-C  [address] [facility]  [address] [place], [place] [address]    Patient: Antoinette Hernandez   YOB: 1990   Date of Visit: 3/3/2025       Dear Dr. Uriarte:    Thank you for referring Antoinette Hernandez to me for evaluation. Below are my notes for this consultation.    If you have questions, please do not hesitate to call me. I look forward to following your patient along with you.         Sincerely,        Lucía Davis MD        CC: No Recipients    Lucía Davis MD  3/3/2025  8:00 PM  Sign when Signing Visit  Antoinette Hernandez  has no complaints today at 21w2d. She reports fetal movements and does not report any vaginal bleeding or signs of labor.  She declined NIPT and MSAFP. She is here today for an ultrasound for fetal anatomy and [facility] consult for Type 1 IDDM from Jeffry Uriarte Pa-C. She has been following through our [facility] diabetes service since 1/23/25.    Problem list:  Type I IDDM-she reports no history of hypertension, heart problems, thyroid problems, eye problems or renal problems due to her diabetes.  Review of her baseline preeclamptic labs were normal. Urine cx contaminated.  Review of her CGM shows episodes of low blood sugars which she reports often occur when she puts on a new CGM and when she checks her blood sugars they are not as low as what the CGM reports.  Last hemoglobin A1c is 5.0 on 3/1/2025.  We tried to download her Medtronic pump today without success we need a special downloaded program on our computer here which we did not have.  Her prior pregnancy was complicated by pregnancy-induced hypertension.  She reports she has been on baby aspirin mention of preeclampsia so I added it to her medication list today.      Medications include aspirin 162 mg daily and prenatal vitamins and Humalog in her Medtronic pump.   Allergies none  Past medical history includes type 1 diabetes since 1994  Past surgical history includes left and right carpal tunnel  surgery list and tooth removal  Substance use none  Family history includes thyroid disease in her mother and hypertension in her father  OB history includes 4 prior vaginal deliveries that weighed between 6 pounds 9 ounces to 8 pounds 4 ounces and delivered between 0106-4505.  Her second pregnancy had gestational hypertension.  She also had 1 SAB and 1 termination.    Ultrasound findings:  The ultrasound today shows normal interval fetal growth and fluid, normal cervical length, and no malformations were detected.    Pregnancy ultrasound has limitations and is unable to detect all forms of fetal congenital abnormalities.        Follow up recommended:   Recommend a fetal echo at 24 weeks.  Recommend a growth scan at 28 weeks and then every 4 weeks.  Recommend starting fetal testing with twice weekly NST and weekly VINOD at 32 weeks.   Delivery recommended at 39 weeks.  Recommend her OB office repeat another urine culture with her 28-week lab work.  We reviewed how to do a good clean-catch urine.     Pre visit time reviewing her records   5 minutes  Face to face time 10 minutes  Post visit time on documentation of note, updating her problem list, adding orders and prescriptions 15 minutes.  Procedures that were completed today were charged separately.   The level of decision making was low complexity.    Lucía Davis MD

## 2025-03-03 NOTE — RESULT ENCOUNTER NOTE
Antoinette Hernandez   Your labs results below returned as normal.     Hgba1c of 5.0    Lucía Davis MD

## 2025-03-04 NOTE — PROGRESS NOTES
Antoinette Hernandez  has no complaints today at 21w2d. She reports fetal movements and does not report any vaginal bleeding or signs of labor.  She declined NIPT . She is here today for an ultrasound for fetal anatomy and [facility] consult for Type 1 IDDM from [facility]. She has been following through our [facility] diabetes service since 1/23/25.    Problem list:  Type I IDDM-she reports no history of hypertension, heart problems, thyroid problems, eye problems or renal problems due to her diabetes.  Review of her baseline preeclamptic labs were normal. Urine cx contaminated.  Review of her CGM shows episodes of low blood sugars which she reports often occur when she puts on a new CGM and when she checks her blood sugars they are not as low as what the CGM reports.  Last hemoglobin A1c is 5.0 on 3/1/2025.  We tried to download her Medtronic pump today without success we need a special downloaded program on our computer here which we did not have.  Her prior pregnancy was complicated by pregnancy-induced hypertension.  She reports she has been on baby aspirin mention of preeclampsia so I added it to her medication list today.      Medications include aspirin 162 mg daily and prenatal vitamins and Humalog in her Medtronic pump.   Allergies none  Past medical history includes type 1 diabetes since 1994  Past surgical history includes left and right carpal tunnel surgery list and tooth removal  Substance use none  Family history includes thyroid disease in her mother and hypertension in her father  OB history includes 4 prior vaginal deliveries that weighed between 6 pounds 9 ounces to 8 pounds 4 ounces and delivered between 9847-6076.  Her second pregnancy had gestational hypertension.  She also had 1 SAB and 1 termination.    Ultrasound findings:  The ultrasound today shows normal interval fetal growth and fluid, normal cervical length, and no malformations were detected.    Pregnancy ultrasound has limitations and is unable  to detect all forms of fetal congenital abnormalities.        Follow up recommended:   Recommend a fetal echo at 24 weeks.  Recommend a growth scan at 28 weeks and then every 4 weeks.  Recommend starting fetal testing with twice weekly NST and weekly VINOD at 32 weeks.   Delivery recommended at 39 weeks.  Recommend her OB office repeat another urine culture with her 28-week lab work.  We reviewed how to do a good clean-catch urine.     Pre visit time reviewing her records   5 minutes  Face to face time 10 minutes  Post visit time on documentation of note, updating her problem list, adding orders and prescriptions 15 minutes.  Procedures that were completed today were charged separately.   The level of decision making was low complexity.    Lucía Davis MD

## 2025-03-05 LAB
2ND TRIMESTER 4 SCREEN SERPL-IMP: NORMAL
AFP ADJ MOM SERPL: 1.16
AFP INTERP AMN-IMP: NORMAL
AFP INTERP SERPL-IMP: NORMAL
AFP INTERP SERPL-IMP: NORMAL
AFP SERPL-MCNC: 47.4 NG/ML
AGE AT DELIVERY: 33 YR
GA METHOD: NORMAL
GA: 21 WEEKS
IDDM PATIENT QL: YES
MULTIPLE PREGNANCY: NO
NEURAL TUBE DEFECT RISK FETUS: 2199 %

## 2025-03-10 ENCOUNTER — TELEPHONE (OUTPATIENT)
Facility: HOSPITAL | Age: 35
End: 2025-03-10

## 2025-03-10 ENCOUNTER — ROUTINE PRENATAL (OUTPATIENT)
Dept: OBGYN CLINIC | Facility: CLINIC | Age: 35
End: 2025-03-10

## 2025-03-10 VITALS
OXYGEN SATURATION: 99 % | SYSTOLIC BLOOD PRESSURE: 134 MMHG | HEART RATE: 97 BPM | BODY MASS INDEX: 34.75 KG/M2 | WEIGHT: 234.6 LBS | HEIGHT: 69 IN | DIASTOLIC BLOOD PRESSURE: 68 MMHG

## 2025-03-10 DIAGNOSIS — O24.012 PRE-EXISTING TYPE 1 DIABETES MELLITUS WITH HYPERGLYCEMIA DURING PREGNANCY IN SECOND TRIMESTER (HCC): Primary | ICD-10-CM

## 2025-03-10 DIAGNOSIS — E10.649 HYPOGLYCEMIA DUE TO TYPE 1 DIABETES MELLITUS (HCC): ICD-10-CM

## 2025-03-10 DIAGNOSIS — E10.65 PRE-EXISTING TYPE 1 DIABETES MELLITUS WITH HYPERGLYCEMIA DURING PREGNANCY IN SECOND TRIMESTER (HCC): Primary | ICD-10-CM

## 2025-03-10 DIAGNOSIS — O09.92 SUPERVISION OF HIGH RISK PREGNANCY IN SECOND TRIMESTER: ICD-10-CM

## 2025-03-10 DIAGNOSIS — O09.892 PREVIOUS PREGNANCY COMPLICATED BY PREGNANCY-INDUCED HYPERTENSION, ANTEPARTUM, SECOND TRIMESTER: ICD-10-CM

## 2025-03-10 DIAGNOSIS — Z3A.22 22 WEEKS GESTATION OF PREGNANCY: ICD-10-CM

## 2025-03-10 PROCEDURE — PNV: Performed by: OBSTETRICS & GYNECOLOGY

## 2025-03-10 NOTE — PROGRESS NOTES
"    S: 34 y.o.  22w2d here for routine prenatal visit.        Chief Complaint   Patient presents with    Routine Prenatal Visit     22w2d         OB complaints:  Contractions: no  Leakage: no  Bleeding: no  Fetal movement: yes      O:  /68 (BP Location: Right arm, Patient Position: Sitting, Cuff Size: Standard)   Pulse 97   Ht 5' 9\" (1.753 m)   Wt 106 kg (234 lb 9.6 oz)   LMP 10/05/2024 (Exact Date)   SpO2 99%   BMI 34.64 kg/m²       Review of Systems   Constitutional:  Negative for chills and fever.   Eyes:  Negative for visual disturbance.   Respiratory:  Negative for chest tightness and shortness of breath.    Cardiovascular:  Negative for chest pain.   Gastrointestinal:  Negative for abdominal pain, diarrhea, nausea and vomiting.   Genitourinary:  Negative for pelvic pain and vaginal bleeding.        As noted in HPI   Skin:  Negative for rash.   Neurological:  Negative for headaches.   All other systems reviewed and are negative.        Physical Exam  Constitutional:       General: She is not in acute distress.     Appearance: Normal appearance.   HENT:      Head: Normocephalic and atraumatic.   Cardiovascular:      Rate and Rhythm: Normal rate.   Pulmonary:      Effort: Pulmonary effort is normal. No respiratory distress.   Abdominal:      General: There is no distension.      Palpations: Abdomen is soft.      Tenderness: There is no abdominal tenderness. There is no guarding or rebound.      Comments: gravid   Neurological:      General: No focal deficit present.      Mental Status: She is alert.   Psychiatric:         Mood and Affect: Mood normal.         Behavior: Behavior normal.   Vitals and nursing note reviewed.           Fundal Height (cm): 22 cm  Fetal Heart Rate: 143    Pregravid Weight/BMI: 90.3 kg (199 lb) (BMI 29.37)  Current Weight: 106 kg (234 lb 9.6 oz)   Total Weight Gain: 16.1 kg (35 lb 9.6 oz)     Pre- Vitals      Flowsheet Row Most Recent Value   Prenatal Assessment  "   Fetal Heart Rate 143   Fundal Height (cm) 22 cm   Movement Present   Prenatal Vitals    Blood Pressure 134/68   Weight - Scale 106 kg (234 lb 9.6 oz)   Urine Albumin/Glucose    Dilation/Effacement/Station    Vaginal Drainage    Edema                     Problem List       History of postpartum depression    Overview   H/o postpartum depression with first and second children          Insulin pump titration    Pre-existing type 1 diabetes mellitus with hyperglycemia during pregnancy in second trimester (HCC)    Overview   -Last A1c 5.1% in 10/2023 in chart, did report 4.9% from endocrinology.   -Pending A1c, CMP. Baseline UPC 0.2   -Missed NT appointment.  -Serial fetal growths.  -20 weeks detailed.  -22-24 weeks fetal echo.  -32 weeks, NST twice a week and VINOD weekly.   -delivery @ 39 wks  -Humalog via Medtronic 770G insulin pump, Dexcom CGM.  1st visit pump settings:  MN-4 AM@ 0.975 units/hour;  4 AM-8 AM@ 1.40 units/hour;  8 AM-2 PM@ 1.85 units/hour;  2 PM-5:30 PM@ 1.85 units/hour;  5:30 PM-MN@ 1.85 units/hour.   BG target 110 mg/dL.  Carb ratio 12  ISF 50  Active Insulin Time 3 hours.          22 weeks gestation of pregnancy    Hypoglycemia due to type 1 diabetes mellitus (HCC)    Overview   -Humalog via Medtronic 770G insulin pump and Dexcom CGM in place.   -Due to hypoglycemia, basal settings decreased and encouraged not to stack insulin.  -Uses 15 by 15 rule to treat hypoglycemia.          Supervision of high risk pregnancy in second trimester    Overview   Declines flu vaccine  Declines aneuploidy screening. msAFP low risk   MFM recommending repeat urine culture with 28 wk labs         Current Assessment & Plan   Fetal echo, growth US scheduled  OB precautions reviewed         Previous pregnancy complicated by pregnancy-induced hypertension, antepartum, second trimester                         Savanna Hammond MD  3/10/2025  3:01 PM

## 2025-03-10 NOTE — TELEPHONE ENCOUNTER
Message left to call [phone] or send a [employer] message regarding insulin pump and providing a stronger carb ratio and ISF to decrease times of correction bolus insulin is needed. Also to decrease episodes of readings less than 60 due to correction bolus insulin. Pending return phone call or message.

## 2025-03-19 ENCOUNTER — TELEMEDICINE (OUTPATIENT)
Facility: HOSPITAL | Age: 35
End: 2025-03-19
Payer: COMMERCIAL

## 2025-03-19 VITALS — HEIGHT: 69 IN | WEIGHT: 234 LBS | BODY MASS INDEX: 34.66 KG/M2

## 2025-03-19 DIAGNOSIS — O24.012 PRE-EXISTING TYPE 1 DIABETES MELLITUS WITH HYPERGLYCEMIA DURING PREGNANCY IN SECOND TRIMESTER (HCC): Primary | ICD-10-CM

## 2025-03-19 DIAGNOSIS — Z3A.23 23 WEEKS GESTATION OF PREGNANCY: ICD-10-CM

## 2025-03-19 DIAGNOSIS — E10.649 HYPOGLYCEMIA DUE TO TYPE 1 DIABETES MELLITUS (HCC): ICD-10-CM

## 2025-03-19 DIAGNOSIS — E10.65 PRE-EXISTING TYPE 1 DIABETES MELLITUS WITH HYPERGLYCEMIA DURING PREGNANCY IN SECOND TRIMESTER (HCC): Primary | ICD-10-CM

## 2025-03-19 DIAGNOSIS — Z46.81 INSULIN PUMP TITRATION: ICD-10-CM

## 2025-03-19 PROCEDURE — 99215 OFFICE O/P EST HI 40 MIN: CPT | Performed by: NURSE PRACTITIONER

## 2025-03-19 NOTE — PROGRESS NOTES
Virtual Regular VisitName: Antoinette Hernandez      : 1990      MRN: [id number]  Encounter Provider: MARCO Mayer  Encounter Date: 3/19/2025   Encounter department: [facility]  :  Assessment & Plan  Pre-existing type 1 diabetes mellitus with hyperglycemia during pregnancy in second trimester (HCC)  -3/1/2025 A1c 5%. UPCR 0.20. CMP within normal.   -A1c better than at goal for pregestational diabetes.  -Humalog via Medtronic 770G insulin pump and Dexcom CGM.   -Basal settings self adjusted due to hyperglycemia.   -Due to hypoglycemia noted in the PM, basal settings adjusted as follows:  2 PM-5:30 PM from 2.40 to 2.15 units/hour;  5:30 PM-11 PM from 2.20 to 2.00 units/hour;  11 PM-MN from 2.20 to 1.80 units/hour.   Reported new basal total 43.725 during visit.  -Due to post breakfast above goal reading, carb ratio at 8 AM added.  MN at 7;   8 AM at 5;   10 AM at 7.  ISF from 27 to 23.   -Encouraged to avoid over correcting high readings that lead to hypoglycemia.   -Use decreased temp basal with increase in physical activity.  -Try to communicate with diabetes team weekly or check [employer] messages.   -Always have glucose available and use 15 by 15 rule.   -Fetal growth ultrasounds every 4 to 6 weeks gestation.   -Fetal echo as scheduled for 2025.  -Continue prenatal vitamin and baby aspirin.  -At 32 weeks gestation, NST twice a week and VINOD weekly.   -At 36 weeks gestation, stop baby aspirin.   -Continue follow up with MFM and OBGYN as recommended.   -Schedule postpartum, endocrinology appointment.   Lab Results   Component Value Date    HGBA1C 5.0 2025          Hypoglycemia due to type 1 diabetes mellitus (HCC)  -Humalog via Medtronic 770G insulin pump and Dexcom CGM in place.   -Due to hypoglycemia, basal settings decreased and encouraged not to over correct hyperglycemia.   -Always have glucose available for hypoglycemia; use 15 by 15 rule.   Lab Results   Component  "Value Date    HGBA1C 5.0 2025            BMI 34.0-34.9,adult  -Pre-pregnancy weight 187 lbs. BMI 27.62.  -Current weight 234 lbs.   -TWG 47 lbs.  -Recommended weight gain 15 to 25 lbs.  -Continue GDM meal plan.        Insulin pump titration         23 weeks gestation of pregnancy         Wt. 106 kg; using 0.70 units/hour equals about 74 units daily.   Basal settings recalculated using 60% for basal. Main 1.90; max 2.25; min 1.50.   Given Antoinette will self adjust insulin pump settings to maintain tight glucose control; 2 PM and 5:30 PM basal rates decreased by 10%.       History of Present Illness   Antoinette is a 35 yo  female, 23 4/7 weeks gestation who presents for follow up T1DM, on Humalog via Medtronic 770 G and Dexcom CGM. Last A1c 5%. Has self adjusted insulin pump with hyperglycemia. Currently having issues with hypoglycemia in the afternoon into the evening. Knows how to treat hypoglycemia. Current TDD 75 units; pump settings recalculated and provided during visit. Increase in physical activity and needs to use a decreased temp basal.   BMI 34- up to date weight gain 47 lbs.   HPI  Review of Systems   Constitutional:  Negative for fatigue and fever.   Eyes:  Negative for visual disturbance.   Respiratory:  Negative for cough and shortness of breath.    Cardiovascular:  Negative for chest pain, palpitations and leg swelling.   Gastrointestinal:  Negative for constipation, nausea and vomiting.   Endocrine: Negative for polydipsia, polyphagia and polyuria.   Genitourinary:  Negative for vaginal bleeding.   Musculoskeletal:  Negative for back pain.   Skin:  Negative for rash.   Neurological:  Negative for headaches.   Psychiatric/Behavioral:  Negative for sleep disturbance.        Objective   Ht 5' 9\" (1.753 m)   Wt 106 kg (234 lb)   LMP 10/05/2024 (Exact Date)   BMI 34.56 kg/m²   Refer to attached files for insulin pump download and Dexcom report.   Physical Exam  HENT:      Head: Normocephalic.      " Nose: Nose normal.   Eyes:      Conjunctiva/sclera: Conjunctivae normal.   Pulmonary:      Effort: Pulmonary effort is normal.   Neurological:      Mental Status: She is alert and oriented to person, place, and time.   Psychiatric:         Mood and Affect: Mood normal.         Behavior: Behavior normal.         Thought Content: Thought content normal.         Judgment: Judgment normal.         Administrative Statements   Encounter provider MARCO Mayer    The Patient is located at Home and in the following state in which I hold an active license [place].    The patient was identified by name and date of birth. Antoinette Hernandez was informed that this is a telemedicine visit and that the visit is being conducted through the Epic Embedded platform. She agrees to proceed..  My office door was closed. No one else was in the room.  She acknowledged consent and understanding of privacy and security of the video platform. The patient has agreed to participate and understands they can discontinue the visit at any time.    I have spent a total time of 40 minutes in caring for this patient on the day of the visit/encounter including Diagnostic results, Instructions for management, Patient and family education, Impressions, Counseling / Coordination of care, Documenting in the medical record, Reviewing/placing orders in the medical record (including tests, medications, and/or procedures), and Obtaining or reviewing history  , not including the time spent for establishing the audio/video connection.

## 2025-03-19 NOTE — PATIENT INSTRUCTIONS
-3/1/2025 A1c 5%. UPCR 0.20. CMP within normal.   -A1c better than at goal for pregestational diabetes.  -Humalog via Medtronic 770G insulin pump and Dexcom CGM.   -Basal settings self adjusted due to hyperglycemia.   -Due to hypoglycemia noted in the PM, basal settings adjusted as follows:  2 PM-5:30 PM from 2.40 to 2.15 units/hour;  5:30 PM-11 PM from 2.20 to 2.00 units/hour;  11 PM-MN from 2.20 to 1.80 units/hour.   Reported new basal total 43.725 during visit.  -Due to post breakfast above goal reading, carb ratio at 8 AM added.  MN at 7;   8 AM at 5;   10 AM at 7.  ISF from 27 to 23.   -Encouraged to avoid over correcting high readings that lead to hypoglycemia.   -Use decreased temp basal with increase in physical activity.  -Try to communicate with diabetes team weekly or check mychart messages.   -Always have glucose available and use 15 by 15 rule.   -Fetal growth ultrasounds every 4 to 6 weeks gestation.   -Fetal echo as scheduled for 4/1/2025.  -Continue prenatal vitamin and baby aspirin.  -At 32 weeks gestation, NST twice a week and VINOD weekly.   -At 36 weeks gestation, stop baby aspirin.   -Continue follow up with MFM and OBGYN as recommended.   -Schedule postpartum, endocrinology appointment.

## 2025-03-19 NOTE — ASSESSMENT & PLAN NOTE
-Humalog via Medtronic 770G insulin pump and Dexcom CGM in place.   -Due to hypoglycemia, basal settings decreased and encouraged not to over correct hyperglycemia.   -Always have glucose available for hypoglycemia; use 15 by 15 rule.   Lab Results   Component Value Date    HGBA1C 5.0 03/01/2025

## 2025-03-19 NOTE — ASSESSMENT & PLAN NOTE
-Pre-pregnancy weight 187 lbs. BMI 27.62.  -Current weight 234 lbs.   -TWG 47 lbs.  -Recommended weight gain 15 to 25 lbs.  -Continue GDM meal plan.

## 2025-03-19 NOTE — ASSESSMENT & PLAN NOTE
-3/1/2025 A1c 5%. UPCR 0.20. CMP within normal.   -A1c better than at goal for pregestational diabetes.  -Humalog via Medtronic 770G insulin pump and Dexcom CGM.   -Basal settings self adjusted due to hyperglycemia.   -Due to hypoglycemia noted in the PM, basal settings adjusted as follows:  2 PM-5:30 PM from 2.40 to 2.15 units/hour;  5:30 PM-11 PM from 2.20 to 2.00 units/hour;  11 PM-MN from 2.20 to 1.80 units/hour.   Reported new basal total 43.725 during visit.  -Due to post breakfast above goal reading, carb ratio at 8 AM added.  MN at 7;   8 AM at 5;   10 AM at 7.  ISF from 27 to 23.   -Encouraged to avoid over correcting high readings that lead to hypoglycemia.   -Use decreased temp basal with increase in physical activity.  -Try to communicate with diabetes team weekly or check [employer] messages.   -Always have glucose available and use 15 by 15 rule.   -Fetal growth ultrasounds every 4 to 6 weeks gestation.   -Fetal echo as scheduled for 4/1/2025.  -Continue prenatal vitamin and baby aspirin.  -At 32 weeks gestation, NST twice a week and VINOD weekly.   -At 36 weeks gestation, stop baby aspirin.   -Continue follow up with MFM and OBGYN as recommended.   -Schedule postpartum, endocrinology appointment.   Lab Results   Component Value Date    HGBA1C 5.0 03/01/2025

## 2025-03-24 ENCOUNTER — TELEPHONE (OUTPATIENT)
Facility: HOSPITAL | Age: 35
End: 2025-03-24

## 2025-03-24 NOTE — TELEPHONE ENCOUNTER
Message to call office or send a [employer] message regarding glucose readings since insulin pump adjustments need to be made due to hyper and hypoglycemia. Pending return phone call.

## 2025-03-27 DIAGNOSIS — Z3A.33 33 WEEKS GESTATION OF PREGNANCY: ICD-10-CM

## 2025-03-27 DIAGNOSIS — O24.013 TYPE 1 DIABETES MELLITUS DURING PREGNANCY IN THIRD TRIMESTER: Primary | ICD-10-CM

## 2025-03-28 RX ORDER — INSULIN LISPRO 100 [IU]/ML
INJECTION, SOLUTION INTRAVENOUS; SUBCUTANEOUS
Qty: 180 ML | Refills: 0 | Status: SHIPPED | OUTPATIENT
Start: 2025-03-28 | End: 2025-05-09

## 2025-04-01 ENCOUNTER — ROUTINE PRENATAL (OUTPATIENT)
Dept: PERINATAL CARE | Facility: OTHER | Age: 35
End: 2025-04-01
Payer: COMMERCIAL

## 2025-04-01 VITALS
BODY MASS INDEX: 36.2 KG/M2 | DIASTOLIC BLOOD PRESSURE: 66 MMHG | HEART RATE: 86 BPM | HEIGHT: 69 IN | SYSTOLIC BLOOD PRESSURE: 124 MMHG | WEIGHT: 244.4 LBS

## 2025-04-01 DIAGNOSIS — O24.012 PRE-EXISTING TYPE 1 DIABETES MELLITUS WITH HYPERGLYCEMIA DURING PREGNANCY IN SECOND TRIMESTER (HCC): Primary | ICD-10-CM

## 2025-04-01 DIAGNOSIS — Z3A.25 25 WEEKS GESTATION OF PREGNANCY: ICD-10-CM

## 2025-04-01 DIAGNOSIS — E10.65 PRE-EXISTING TYPE 1 DIABETES MELLITUS WITH HYPERGLYCEMIA DURING PREGNANCY IN SECOND TRIMESTER (HCC): Primary | ICD-10-CM

## 2025-04-01 PROCEDURE — 76827 ECHO EXAM OF FETAL HEART: CPT | Performed by: OBSTETRICS & GYNECOLOGY

## 2025-04-01 PROCEDURE — 99213 OFFICE O/P EST LOW 20 MIN: CPT | Performed by: OBSTETRICS & GYNECOLOGY

## 2025-04-01 PROCEDURE — 93325 DOPPLER ECHO COLOR FLOW MAPG: CPT | Performed by: OBSTETRICS & GYNECOLOGY

## 2025-04-01 PROCEDURE — 76825 ECHO EXAM OF FETAL HEART: CPT | Performed by: OBSTETRICS & GYNECOLOGY

## 2025-04-01 NOTE — PROGRESS NOTES
Antoinette Hernandez  has no complaints today at 25w3d. She reports fetal movements and does not report any vaginal bleeding or signs of labor.  She is here today for an ultrasound for a fetal echo.    Problem list:  Type I IDDM on insulin pump and Dexcom sensor.  Most recent hemoglobin A1c is 5.  Her second pregnancy developed PIH    Ultrasound findings:  The echo today was normal other than the velocity across the aortic arch which seemed more elevated than normal.  108-115 c,/sec. The measurements seem to mimic the ductal arch 103-110 cm/sec so question whether we were measuring the ductal arch instead since the blood vessels are so close to one another.    Pregnancy ultrasound has limitations and is unable to detect all forms of fetal congenital abnormalities.      Counseling-reviewed her Dexcom sensor which intermittently continues to show hypoglycemia alerts.  She reports that many times the sensor will falsely alert a low blood sugar when she is laying on it during sleeping but when she wakes up and checks her blood sugar it is fine.     We discussed that a high velocity across the aortic arch can be a sign of narrowing of the aortic arch called a coarct that does not cause problems during pregnancy but could cause problems after birth.  Review of the blood vessels in grayscale and color though show no evidence for narrowing of the aortic arch that we can see again suggesting that this is a false positive result.    Follow up recommended:   Recommend a repeat velocity across the aortic arch along with her next growth scan in 4 weeks.  Recommend twice weekly nonstress test and weekly fluid scans starting at 32 weeks  Recommend planning for delivery at 39 weeks.    Pre visit time reviewing her records   10 minutes  Face to face time 5 minutes  Post visit time on documentation of note, updating her problem list, adding orders and prescriptions 10 minutes.  Procedures that were completed today were charged  separately.   The level of decision making was low level complexity.    Lucía Davis MD

## 2025-04-01 NOTE — LETTER
April 1, 2025     Savanna Hammond MD  [address]  [address]  [place] [id number]    Patient: Antoinette Hernandez   YOB: 1990   Date of Visit: 4/1/2025       Dear Dr. Hammond:    Thank you for referring Antoinette Hernandez to me for evaluation. Below are my notes for this consultation.    If you have questions, please do not hesitate to call me. I look forward to following your patient along with you.         Sincerely,        Lucía Davis MD        CC: No Recipients    Lucía Davis MD  4/1/2025  4:18 PM  Sign when Signing Visit  Antoinette Hernandez  has no complaints today at 25w3d. She reports fetal movements and does not report any vaginal bleeding or signs of labor.  She is here today for an ultrasound for a fetal echo.    Problem list:  Type I IDDM on insulin pump and Dexcom sensor.  Most recent hemoglobin A1c is 5.  Her second pregnancy developed PIH    Ultrasound findings:  The echo today was normal other than the velocity across the aortic arch which seemed more elevated than normal.  108-115 c,/sec. The measurements seem to mimic the ductal arch 103-110 cm/sec so question whether we were measuring the ductal arch instead since the blood vessels are so close to one another.    Pregnancy ultrasound has limitations and is unable to detect all forms of fetal congenital abnormalities.      Counseling-reviewed her Dexcom sensor which intermittently continues to show hypoglycemia alerts.  She reports that many times the sensor will falsely alert a low blood sugar when she is laying on it during sleeping but when she wakes up and checks her blood sugar it is fine.     We discussed that a high velocity across the aortic arch can be a sign of narrowing of the aortic arch called a coarct that does not cause problems during pregnancy but could cause problems after birth.  Review of the blood vessels in grayscale and color though show no evidence for narrowing of the aortic arch that we can  see again suggesting that this is a false positive result.    Follow up recommended:   Recommend a repeat velocity across the aortic arch along with her next growth scan in 4 weeks.  Recommend twice weekly nonstress test and weekly fluid scans starting at 32 weeks  Recommend planning for delivery at 39 weeks.    Pre visit time reviewing her records   10 minutes  Face to face time 5 minutes  Post visit time on documentation of note, updating her problem list, adding orders and prescriptions 10 minutes.  Procedures that were completed today were charged separately.   The level of decision making was low level complexity.    Lucía Davis MD

## 2025-04-02 PROBLEM — Z03.73 FETAL ANOMALY SUSPECTED BUT NOT FOUND: Status: ACTIVE | Noted: 2025-04-02

## 2025-04-04 ENCOUNTER — TELEPHONE (OUTPATIENT)
Facility: HOSPITAL | Age: 35
End: 2025-04-04

## 2025-04-04 ENCOUNTER — TREATMENT (OUTPATIENT)
Facility: HOSPITAL | Age: 35
End: 2025-04-04

## 2025-04-04 DIAGNOSIS — Z3A.25 25 WEEKS GESTATION OF PREGNANCY: ICD-10-CM

## 2025-04-04 DIAGNOSIS — O24.012 TYPE 1 DIABETES MELLITUS DURING PREGNANCY IN SECOND TRIMESTER: Primary | ICD-10-CM

## 2025-04-04 NOTE — PROGRESS NOTES
Insulin Pump Log    Date: 25    Patient: Antoinette Hernandez  : 1990    Estimated Date of Delivery: 25  GA: 25w6d     Reason for Documentation: Diabetes Type 1 (25w6d) and Medication Management     ASSESSMENT - REVIEW OF BG LOG     BG Log: No fingersticks reported    Dexcom Report: See report attached (3/29/25-25)          Insulin Pump: Medtronic 770G (Manual mode); see reports attached (last uploaded 3/29/25 - [employer] message sent to patient to complete pump upload). Noted patient made self adjustments to basal settings as seen in [employer] message below:      Time Basal Rate (units/hr) - per patient adjustment (noted from pump report on 3/29/25) Basal rate (as recommended by Fidelia Chopra on 3/19/25)   00:00 1.30 0.975   04:00 1.90 1.40   08:30 3.30 2.15   14:00 3.20 2.00   17:30 3.00 2   23:00 2.75 1.8   Total Daily Basal:  62.350 43.725       Average Total Daily Dose (3/23/25-3/29/25): 107.9 units (0.97 units/111 kg) (48% Bolus, 52% Bolus)  Total Basal: 62.35 (per pump settings on 3/29/25) - Noted increase of 42.5% total basal since last adjustment made by diabetes team on 3/19/25    PLAN     Current Pump settings unknown (Last upload 3/29/25)- Refer to telephone encounter from today and [employer] message requesting patient complete pump upload    Decrease current basal settings by 20% (by setting a Temporary Basal decrease over the weekend ) given hypoglycemia noted per Dexcom and unknown current basal settings. Discussed plan/approved by Dr. Kristina Howard message sent to patient requesting patient complete pump upload and send a message to Fidelia Chopra on 25 for recommendations on further pump adjustments adjustment. Call office if blood sugar <60mg/dL or >160mg/dL.     MONITORING     EDUCATION: (Diabetes and Pregnancy Program)    Completed: Class 1    Needs Scheduled: F/UP DSMT with RD    WEIGHT:     Pre-Gravid Wt Pre-Gravid BMI TWG   90.3 kg (199 lb) 29.37 20.6 kg (45 lb 6.4 oz)      FETAL MONITORITNG - ULTRASOUNDS  Recent Ultrasounds Findings: NML Growth/VINOD -3/3/25; Fetal Echo - 4/1/24  US Follow-Ups:  4/21/25  Further Fetal Surveillance: Beginning at 32 weeks (NST twice weekly + VINOD once a week)     LABS  Lab Results   Component Value Date/Time    GLUF 69 03/01/2025 08:22 AM    HGBA1C 5.0 03/01/2025 08:22 AM    HGBA1C 5.9 (H) 03/06/2019 11:20 AM       Annabella Roach RD   Diabetes and Pregnancy Program   Maternal Fetal Medicine  [facility]

## 2025-04-04 NOTE — TELEPHONE ENCOUNTER
Reason for Call: Diabetes Type 1 (25w6d) and Dexcom report (Hypoglycemia noted per report)          Outcome: Called patient; no answer, left voicemail with instructions to call office at [phone] to discuss Dexcom report.     Annabella Roach RD  Diabetes Educator   formerly [facility]  Diabetes and Pregnancy Program

## 2025-04-07 ENCOUNTER — ROUTINE PRENATAL (OUTPATIENT)
Dept: OBGYN CLINIC | Facility: CLINIC | Age: 35
End: 2025-04-07

## 2025-04-07 VITALS
BODY MASS INDEX: 36.35 KG/M2 | SYSTOLIC BLOOD PRESSURE: 134 MMHG | HEART RATE: 96 BPM | HEIGHT: 69 IN | OXYGEN SATURATION: 98 % | DIASTOLIC BLOOD PRESSURE: 66 MMHG | WEIGHT: 245.4 LBS

## 2025-04-07 DIAGNOSIS — E10.65 PRE-EXISTING TYPE 1 DIABETES MELLITUS WITH HYPERGLYCEMIA DURING PREGNANCY IN SECOND TRIMESTER (HCC): ICD-10-CM

## 2025-04-07 DIAGNOSIS — O09.892 PREVIOUS PREGNANCY COMPLICATED BY PREGNANCY-INDUCED HYPERTENSION, ANTEPARTUM, SECOND TRIMESTER: ICD-10-CM

## 2025-04-07 DIAGNOSIS — O09.92 SUPERVISION OF HIGH RISK PREGNANCY IN SECOND TRIMESTER: Primary | ICD-10-CM

## 2025-04-07 DIAGNOSIS — Z03.73 FETAL ANOMALY SUSPECTED BUT NOT FOUND: ICD-10-CM

## 2025-04-07 DIAGNOSIS — Z3A.26 26 WEEKS GESTATION OF PREGNANCY: ICD-10-CM

## 2025-04-07 DIAGNOSIS — E10.649 HYPOGLYCEMIA DUE TO TYPE 1 DIABETES MELLITUS (HCC): ICD-10-CM

## 2025-04-07 DIAGNOSIS — O24.012 PRE-EXISTING TYPE 1 DIABETES MELLITUS WITH HYPERGLYCEMIA DURING PREGNANCY IN SECOND TRIMESTER (HCC): ICD-10-CM

## 2025-04-07 PROCEDURE — PNV: Performed by: OBSTETRICS & GYNECOLOGY

## 2025-04-07 NOTE — ASSESSMENT & PLAN NOTE
Midtrimester labs, urine culture ordered  Repeat US scheduled to assess fetal aortic arch per MFM  OB precautions reviewed

## 2025-04-07 NOTE — PROGRESS NOTES
"    S: 34 y.o.  26w2d here for routine prenatal visit.        Chief Complaint   Patient presents with    Routine Prenatal Visit     26w2d         OB complaints:  Contractions: no  Leakage: no  Bleeding: no  Fetal movement: yes      O:  /66 (BP Location: Right arm, Patient Position: Sitting, Cuff Size: Standard)   Pulse 96   Ht 5' 9\" (1.753 m)   Wt 111 kg (245 lb 6.4 oz)   LMP 10/05/2024 (Exact Date)   SpO2 98%   BMI 36.24 kg/m²       Review of Systems   Constitutional:  Negative for chills and fever.   Eyes:  Negative for visual disturbance.   Respiratory:  Negative for chest tightness and shortness of breath.    Cardiovascular:  Negative for chest pain.   Gastrointestinal:  Negative for abdominal pain, diarrhea, nausea and vomiting.   Genitourinary:  Negative for pelvic pain and vaginal bleeding.        As noted in HPI   Skin:  Negative for rash.   Neurological:  Negative for headaches.   All other systems reviewed and are negative.        Physical Exam  Constitutional:       General: She is not in acute distress.     Appearance: Normal appearance.   HENT:      Head: Normocephalic and atraumatic.   Cardiovascular:      Rate and Rhythm: Normal rate.   Pulmonary:      Effort: Pulmonary effort is normal. No respiratory distress.   Abdominal:      General: There is no distension.      Palpations: Abdomen is soft.      Tenderness: There is no abdominal tenderness. There is no guarding or rebound.      Comments: gravid   Neurological:      General: No focal deficit present.      Mental Status: She is alert.   Psychiatric:         Mood and Affect: Mood normal.         Behavior: Behavior normal.   Vitals and nursing note reviewed.           Fundal Height (cm): 26 cm  Fetal Heart Rate: 140    Pregravid Weight/BMI: 90.3 kg (199 lb) (BMI 29.37)  Current Weight: 111 kg (245 lb 6.4 oz)   Total Weight Gain: 21 kg (46 lb 6.4 oz)     Pre- Vitals      Flowsheet Row Most Recent Value   Prenatal Assessment  "   Fetal Heart Rate 140   Fundal Height (cm) 26 cm   Movement Present   Prenatal Vitals    Blood Pressure 134/66   Weight - Scale 111 kg (245 lb 6.4 oz)   Urine Albumin/Glucose    Dilation/Effacement/Station    Vaginal Drainage    Edema                     Problem List       History of postpartum depression    Overview   H/o postpartum depression with first and second children          Insulin pump titration    Pre-existing type 1 diabetes mellitus with hyperglycemia during pregnancy in second trimester (HCC)    Overview   Last A1c 5.0%CMP. Baseline UPC 0.2   Missed NT appointment.  -Serial fetal growths.  20 weeks detailed.  22-24 weeks fetal echo   -32 weeks, NST twice a week and VINOD weekly.   -delivery @ 39 wks    -Humalog via Medtronic 770G insulin pump, Dexcom CGM.  1st visit pump settings:  MN-4 AM@ 0.975 units/hour;  4 AM-8 AM@ 1.40 units/hour;  8 AM-2 PM@ 1.85 units/hour;  2 PM-5:30 PM@ 1.85 units/hour;  5:30 PM-MN@ 1.85 units/hour.   BG target 110 mg/dL.  Carb ratio 12  ISF 50  Active Insulin Time 3 hours.          26 weeks gestation of pregnancy    BMI 34.0-34.9,adult    Overview   -Pre-pregnancy weight 187 lbs. BMI 27.62.  -Current weight 234 lbs.   -TWG 47 lbs.  -Recommended weight gain 15 to 25 lbs.  -Continue GDM meal plan.          Hypoglycemia due to type 1 diabetes mellitus (HCC)    Overview   -Humalog via Medtronic 770G insulin pump and Dexcom CGM in place.   -Due to hypoglycemia, basal settings decreased and encouraged not to stack insulin.  -Uses 15 by 15 rule to treat hypoglycemia.          Supervision of high risk pregnancy in second trimester    Overview   Declines flu vaccine  Declines aneuploidy screening. msAFP low risk   MFM recommending repeat urine culture with 28 wk labs         Current Assessment & Plan   Midtrimester labs, urine culture ordered  Repeat US scheduled to assess fetal aortic arch per MFM  OB precautions reviewed         Previous pregnancy complicated by pregnancy-induced  hypertension, antepartum, second trimester    Fetal anomaly suspected but not found    Overview   We discussed that a high velocity across the aortic arch can be a sign of  narrowing of the aortic arch called a coarct that does not cause problems  during pregnancy but could cause problems after birth.  Review of the  blood vessels in grayscale and color though show no evidence for narrowing  of the aortic arch that we can see again suggesting that this is a false  positive result.  - repeat US 4 weeks                               Savanna Hammond MD  4/7/2025  3:05 PM

## 2025-04-10 ENCOUNTER — TELEPHONE (OUTPATIENT)
Facility: HOSPITAL | Age: 35
End: 2025-04-10

## 2025-04-10 NOTE — TELEPHONE ENCOUNTER
Antoinette is a  female, 26 5/7 weeks gestation on humalog via a Medtronic 770G insulin pump and Dexcom G7 CGM in place. Hypoglycemia noted on Dexcom report and last time insulin pump download noted was on 3/29/2025. Attempted to reach Antoinette via phone to check norma and complete a hard insulin pump download. Unknown if insulin pump in place or using back up insulin. Pending return phone call.

## 2025-04-20 PROBLEM — O09.893 PREVIOUS PREGNANCY COMPLICATED BY PREGNANCY-INDUCED HYPERTENSION, ANTEPARTUM, THIRD TRIMESTER: Status: ACTIVE | Noted: 2025-03-03

## 2025-04-20 PROBLEM — Z3A.28 28 WEEKS GESTATION OF PREGNANCY: Status: ACTIVE | Noted: 2025-01-23

## 2025-04-20 PROBLEM — O24.013 PREGNANCY COMPLICATED BY PRE-EXISTING TYPE 1 DIABETES IN THIRD TRIMESTER: Status: ACTIVE | Noted: 2025-01-23

## 2025-04-21 ENCOUNTER — ULTRASOUND (OUTPATIENT)
Dept: PERINATAL CARE | Facility: OTHER | Age: 35
End: 2025-04-21
Payer: COMMERCIAL

## 2025-04-21 VITALS
BODY MASS INDEX: 37.47 KG/M2 | HEIGHT: 69 IN | SYSTOLIC BLOOD PRESSURE: 138 MMHG | HEART RATE: 97 BPM | DIASTOLIC BLOOD PRESSURE: 70 MMHG | WEIGHT: 253 LBS

## 2025-04-21 DIAGNOSIS — O24.013 PREGNANCY COMPLICATED BY PRE-EXISTING TYPE 1 DIABETES IN THIRD TRIMESTER: Primary | ICD-10-CM

## 2025-04-21 DIAGNOSIS — O09.893 PREVIOUS PREGNANCY COMPLICATED BY PREGNANCY-INDUCED HYPERTENSION, ANTEPARTUM, THIRD TRIMESTER: ICD-10-CM

## 2025-04-21 DIAGNOSIS — Z36.89 ENCOUNTER FOR ULTRASOUND TO ASSESS FETAL GROWTH: ICD-10-CM

## 2025-04-21 DIAGNOSIS — Z3A.28 28 WEEKS GESTATION OF PREGNANCY: ICD-10-CM

## 2025-04-21 PROCEDURE — 76816 OB US FOLLOW-UP PER FETUS: CPT | Performed by: OBSTETRICS & GYNECOLOGY

## 2025-04-21 PROCEDURE — 99214 OFFICE O/P EST MOD 30 MIN: CPT | Performed by: OBSTETRICS & GYNECOLOGY

## 2025-04-21 NOTE — Clinical Note
I had Antoinette reconnected the pump to the norma and in the future just text her if you can not down load norma.

## 2025-04-21 NOTE — Clinical Note
Saw Antoinette on 4/21/2025.  She did not realize her Medtronic pump was disconnected so we had her reconnect and now we can see her pump data.  Her meals are becoming more elevated and her overnight time blood sugars are elevated requiring titration of her pump.  I am in [place] tomorrow and will discuss more with you

## 2025-04-21 NOTE — LETTER
April 22, 2025     Jeffry Uriarte PA-C  [address] [facility]  [address] [place] [id number]    Patient: Antoinette Hernandez   YOB: 1990   Date of Visit: 4/21/2025       Dear Dr. Jeffry Uriarte PA-C:    Thank you for referring Antoinette Hernandez to me for evaluation. Below are my notes for this consultation.    If you have questions, please do not hesitate to call me. I look forward to following your patient along with you.         Sincerely,        Lucía Davis MD        CC: No Recipients    Lucía Davis MD  4/22/2025 12:44 AM  Sign when Signing Visit    Antointete Hernandez has no complaints today.  She reports regular fetal movements and does not report any problems.  She is here today at 28w2d for an ultrasound for fetal growth.    Problem list:  Type 1 diabetes-reviewed clarity norma which shows 72% of sugars in range.  It appears she is having higher postprandial blood sugars and higher blood sugars overnight.  Sometimes her Medtronic pump disconnects and so we were unable to download her settings.  She was able to reconnect so that we can see her settings.   History of gestational hypertension at term    Ultrasound findings:  The ultrasound today shows normal interval fetal growth and fluid.  Aortic arch velocity is lower today and in the top range of normal.     Pregnancy ultrasound has limitations and is unable to detect all forms of fetal congenital abnormalities.  The inaccuracy in the EFW can be off by 1 lb either way in the third trimester.    Specific counseling was provided on the following problems:  1.  Encouraged her to get a blood pressure cuff due to her history of gestational hypertension.  2.   Will have diabetes education reconnect with Antoinette after we review her pump data together with new pump settings.    Follow up recommended:   Recommend a follow-up ultrasound for growth in 4 weeks and will start twice weekly NSTs and weekly VINOD's.  Encouraged her to get a blood pressure  cuff that she will calibrate with her OB doctors office.    Pre visit time reviewing her records   5 minutes  Face to face time 15 minutes  Post visit time on documentation of note, updating her problem list, adding orders and prescriptions 15 minutes.  Procedures that were completed today were charged separately.   The level of decision making was moderate complexity.    Lucía Davis MD

## 2025-04-21 NOTE — PROGRESS NOTES
Antoinette Hernandez has no complaints today.  She reports regular fetal movements and does not report any problems.  She is here today at 28w2d for an ultrasound for fetal growth.    Problem list:  Type 1 diabetes-reviewed clarity norma which shows 72% of sugars in range.  It appears she is having higher postprandial blood sugars and higher blood sugars overnight.  Sometimes her Medtronic pump disconnects and so we were unable to download her settings.  She was able to reconnect so that we can see her settings.   History of gestational hypertension at term    Ultrasound findings:  The ultrasound today shows normal interval fetal growth and fluid.  Aortic arch velocity is lower today and in the top range of normal.     Pregnancy ultrasound has limitations and is unable to detect all forms of fetal congenital abnormalities.  The inaccuracy in the EFW can be off by 1 lb either way in the third trimester.    Specific counseling was provided on the following problems:  1.  Encouraged her to get a blood pressure cuff due to her history of gestational hypertension.  2.   Will have diabetes education reconnect with Antoinette after we review her pump data together with new pump settings.    Follow up recommended:   Recommend a follow-up ultrasound for growth in 4 weeks and will start twice weekly NSTs and weekly VINOD's.  Encouraged her to get a blood pressure cuff that she will calibrate with her OB doctors office.    Pre visit time reviewing her records   5 minutes  Face to face time 15 minutes  Post visit time on documentation of note, updating her problem list, adding orders and prescriptions 15 minutes.  Procedures that were completed today were charged separately.   The level of decision making was moderate complexity.    Lucía Davis MD

## 2025-04-22 ENCOUNTER — ROUTINE PRENATAL (OUTPATIENT)
Dept: OBGYN CLINIC | Facility: CLINIC | Age: 35
End: 2025-04-22

## 2025-04-22 ENCOUNTER — DOCUMENTATION (OUTPATIENT)
Facility: HOSPITAL | Age: 35
End: 2025-04-22

## 2025-04-22 VITALS
BODY MASS INDEX: 37.23 KG/M2 | SYSTOLIC BLOOD PRESSURE: 118 MMHG | DIASTOLIC BLOOD PRESSURE: 74 MMHG | OXYGEN SATURATION: 98 % | WEIGHT: 251.4 LBS | HEIGHT: 69 IN | HEART RATE: 96 BPM

## 2025-04-22 DIAGNOSIS — O24.013 PRE-EXISTING TYPE 1 DIABETES MELLITUS WITH HYPERGLYCEMIA DURING PREGNANCY IN THIRD TRIMESTER (HCC): Primary | ICD-10-CM

## 2025-04-22 DIAGNOSIS — O09.93 SUPERVISION OF HIGH RISK PREGNANCY IN THIRD TRIMESTER: Primary | ICD-10-CM

## 2025-04-22 DIAGNOSIS — Z3A.28 28 WEEKS GESTATION OF PREGNANCY: ICD-10-CM

## 2025-04-22 DIAGNOSIS — E10.65 PRE-EXISTING TYPE 1 DIABETES MELLITUS WITH HYPERGLYCEMIA DURING PREGNANCY IN THIRD TRIMESTER (HCC): ICD-10-CM

## 2025-04-22 DIAGNOSIS — E10.65 PRE-EXISTING TYPE 1 DIABETES MELLITUS WITH HYPERGLYCEMIA DURING PREGNANCY IN THIRD TRIMESTER (HCC): Primary | ICD-10-CM

## 2025-04-22 DIAGNOSIS — O24.013 PRE-EXISTING TYPE 1 DIABETES MELLITUS WITH HYPERGLYCEMIA DURING PREGNANCY IN THIRD TRIMESTER (HCC): ICD-10-CM

## 2025-04-22 DIAGNOSIS — Z46.81 INSULIN PUMP TITRATION: ICD-10-CM

## 2025-04-22 PROCEDURE — PNV: Performed by: PHYSICIAN ASSISTANT

## 2025-04-22 RX ORDER — ADHESIVE BANDAGE 3/4"
BANDAGE TOPICAL DAILY
Qty: 1 EACH | Refills: 0 | Status: SHIPPED | OUTPATIENT
Start: 2025-04-22

## 2025-04-22 NOTE — PROGRESS NOTES
Problem List Items Addressed This Visit       Insulin pump titration    Pre-existing type 1 diabetes mellitus with hyperglycemia during pregnancy in third trimester (HCC) - Primary    -Humalog via Medtronic 770G with DexBiogenic Reagents G7 CGM in place.  -Due to reported post meal glucose readings above goal with requiring extra correction bolus doses, recalculated TDD with new carb ratio and correction factor.   -Current TDD about 116 units with increase by 10% to equal about 128 units daily.   -Decrease carb ratio to 3.5 for all settings.  -Decrease correction factor or insulin sensitivity to 12.   -Please follow recommendation of insulin pump and do not provide extra bolus units.   -Always have glucose available for hyperglycemia and use 15 by 15 rule.  -Serial fetal growths.  -At 32 weeks gestation, NST twice a week and VINOD weekly.   -Continue follow up with OBGYN and MFM as recommended.   Lab Results   Component Value Date    HGBA1C 5.0 03/01/2025            28 weeks gestation of pregnancy    BMI 37.0-37.9, adult    Refer to attached files.   Pregnancy BG targets day  mg/dL and night  mg/dL TIR 66% and goal is >70%. Stronger carb ratio and correction factor to decrease extra bolus doses and decreasing episodes of readings less than 60 mg/dL requiring treatment.

## 2025-04-22 NOTE — PROGRESS NOTES
28w3d pregnant female, here for prenatal visit. Pt well, without complaints.   She is feeling fetal movement.  Fetal kick counts reviewed.    28 wk labs:NOT done,  encouraged patient to complete      Requests blood pressure cuff .  Feels her blood pressure is always right at cut off when here for OB visits and would like to check at home.   Reviewed normal BP numbers and signs and symptoms of pre eclampsia/ high blood pressure.  Patient aware to bring cuff to next office visit so we can check it for accuracy.    OB folder given and reviewed contents: fetal kick counting, perineal/ vaginal massage, [facility] providers, consent for delivery,  birth plan guide, photography release, birth room support person form, birth certificate worksheet    Reviewed breastfeeding, pediatrician & classes.  Recommendation for Tdap vaccine in 3rd trimester reviewed.  Pt declines Tdap vaccine .  Pediatrician: Dr. Nancy Smith [facility]  Plans to breastfeed: yes.   Breastpump_ has at home      Current Outpatient Medications:     aspirin (ECOTRIN LOW STRENGTH) 81 mg EC tablet, Take 2 tablets (162 mg total) by mouth daily, Disp: , Rfl:     Continuous Glucose Sensor (Dexcom G7 Sensor), Use 1 each every 10 days, Disp: , Rfl:     glucagon 1 MG injection, Inject 1 mg under the skin, Disp: , Rfl:     glucose blood test strip, Use 1 each daily as needed (4x daily) Use as instructed, Disp: 300 strip, Rfl: 0    HumaLOG 100 UNIT/ML injection, Use up to 200 units a day via insulin pump, to be titrated. T1DM and pregnancy. 90 day supply., Disp: 180 mL, Rfl: 0    insulin glargine (LANTUS) 100 units/mL subcutaneous injection, Inject 10 Units under the skin, Disp: , Rfl:     Lancets MISC, by Does not apply route, Disp: , Rfl:     Prenatal Vit-Fe Fumarate-FA (PRENATAL VITAMIN PO), Take 1 tablet by mouth daily, Disp: , Rfl:     Pregravid Weight/BMI: 90.3 kg (199 lb) (BMI 29.37)  Current Weight:     Total Weight Gain:  24.5 kg (54 lb)          Vitals:    04/22/25 1420   BP: 118/74   Pulse: 96   SpO2: 98%       Fundal height: 29  Fetal Heart Rate: 136      Problem List          Endocrine    Pregnancy complicated by pre-existing type 1 diabetes in third trimester    Overview   Last A1c 5.0%CMP. Baseline UPC 0.2   Missed NT appointment.  -Serial fetal growths.  20 weeks detailed.  22-24 weeks fetal echo   -32 weeks, NST twice a week and VINOD weekly.   -delivery @ 39 wks    -Humalog via Medtronic 770G insulin pump, Dexcom CGM.  1st visit pump settings:  MN-4 AM@ 0.975 units/hour;  4 AM-8 AM@ 1.40 units/hour;  8 AM-2 PM@ 1.85 units/hour;  2 PM-5:30 PM@ 1.85 units/hour;  5:30 PM-MN@ 1.85 units/hour.   BG target 110 mg/dL.  Carb ratio 12  ISF 50  Active Insulin Time 3 hours.          Hypoglycemia due to type 1 diabetes mellitus (HCC)    Overview   -Humalog via Medtronic 770G insulin pump and Dexcom CGM in place.   -Due to hypoglycemia, basal settings decreased and encouraged not to stack insulin.  -Uses 15 by 15 rule to treat hypoglycemia.             Behavioral Health    History of postpartum depression    Overview   H/o postpartum depression with first and second children             Obstetrics/Gynecology    28 weeks gestation of pregnancy    Supervision of high risk pregnancy in third trimester    Overview   Declines flu vaccine  Declines aneuploidy screening. msAFP low risk   MFM recommending repeat urine culture with 28 wk labs         Previous pregnancy complicated by pregnancy-induced hypertension, antepartum, third trimester       Other    Insulin pump titration    BMI 34.0-34.9,adult    Overview   -Pre-pregnancy weight 187 lbs. BMI 27.62.  -Current weight 234 lbs.   -TWG 47 lbs.  -Recommended weight gain 15 to 25 lbs.  -Continue GDM meal plan.          Fetal anomaly suspected but not found    Overview   We discussed that a high velocity across the aortic arch can be a sign of  narrowing of the aortic arch called a coarct that  does not cause problems  during pregnancy but could cause problems after birth.  Review of the  blood vessels in grayscale and color though show no evidence for narrowing  of the aortic arch that we can see again suggesting that this is a false  positive result.  - repeat US 4 weeks             Ob visit in 2 weeks  US scheduled 5/23/25  Tdap- declines  Continue care with diabetes team  Ob consents given for patient to review and bring to next visit

## 2025-04-22 NOTE — ASSESSMENT & PLAN NOTE
-Humalog via GaN Systemstronic 770G with DexOfferpop G7 CGM in place.  -Due to reported post meal glucose readings above goal with requiring extra correction bolus doses, recalculated TDD with new carb ratio and correction factor.   -Current TDD about 116 units with increase by 10% to equal about 128 units daily.   -Decrease carb ratio to 3.5 for all settings.  -Decrease correction factor or insulin sensitivity to 12.   -Please follow recommendation of insulin pump and do not provide extra bolus units.   -Always have glucose available for hyperglycemia and use 15 by 15 rule.  -Serial fetal growths.  -At 32 weeks gestation, NST twice a week and VINOD weekly.   -Continue follow up with OBGYN and MFM as recommended.   Lab Results   Component Value Date    HGBA1C 5.0 03/01/2025

## 2025-05-01 PROBLEM — Z3A.29 29 WEEKS GESTATION OF PREGNANCY: Status: ACTIVE | Noted: 2025-01-23

## 2025-05-01 NOTE — PROGRESS NOTES
"OB/GYN  PN Visit  Antoinette Hernandez  [id number]  2025  4:25 PM  Dr. Renée Yi MD    S: 34 y.o.  30 2/7 d here for PN visit.       Chief Complaint   Patient presents with    Routine Prenatal Visit         OB complaints:  Contractions: no  Leakage: no  Bleeding: no  Fetal movement: yes      O:  /60   Pulse 88   Ht 5' 9\" (1.753 m)   Wt 114 kg (252 lb)   LMP 10/05/2024 (Exact Date)   SpO2 98%   BMI 37.21 kg/m²       Review of Systems   Constitutional: Negative.    HENT: Negative.     Eyes: Negative.    Respiratory: Negative.     Cardiovascular: Negative.    Gastrointestinal: Negative.    Endocrine: Negative.    Genitourinary:         As noted in HPI   Musculoskeletal: Negative.    Skin: Negative.    Allergic/Immunologic: Negative.    Neurological: Negative.    Hematological: Negative.    Psychiatric/Behavioral: Negative.           Physical Exam  Constitutional:       General: She is not in acute distress.     Appearance: Normal appearance. She is well-developed.   Abdominal:      Palpations: Abdomen is soft.      Tenderness: There is no abdominal tenderness. There is no guarding.   Neurological:      Mental Status: She is alert and oriented to person, place, and time.   Skin:     General: Skin is warm and dry.   Psychiatric:         Behavior: Behavior normal.             Pregravid Weight/BMI: 90.3 kg (199 lb) (BMI 29.37)  Current Weight: 114 kg (252 lb)   Total Weight Gain: 24 kg (53 lb)   Pre-Enoc Vitals      Flowsheet Row Most Recent Value   Prenatal Assessment    Fetal Heart Rate 140   Fundal Height (cm) 30 cm   Movement Present   Prenatal Vitals    Blood Pressure 120/60   Weight - Scale 114 kg (252 lb)   Urine Albumin/Glucose    Dilation/Effacement/Station    Vaginal Drainage    Edema              Problem List          Endocrine    Pre-existing type 1 diabetes mellitus with hyperglycemia during pregnancy in third trimester (HCC)    Overview   Last A1c 5.0%CMP. Baseline UPC 0.2   Missed " NT appointment.  -Serial fetal growths.  20 weeks detailed.  22-24 weeks fetal echo   -32 weeks, NST twice a week and VINOD weekly.   -delivery @ 39 wks    -Humalog via Medtronic 770G insulin pump, Dexcom CGM.  1st visit pump settings:  MN-4 AM@ 0.975 units/hour;  4 AM-8 AM@ 1.40 units/hour;  8 AM-2 PM@ 1.85 units/hour;  2 PM-5:30 PM@ 1.85 units/hour;  5:30 PM-MN@ 1.85 units/hour.   BG target 110 mg/dL.  Carb ratio 12  ISF 50  Active Insulin Time 3 hours.          Hypoglycemia due to type 1 diabetes mellitus (HCC)    Overview   -Humalog via Medtronic 770G insulin pump and Dexcom CGM in place.   -Due to hypoglycemia, basal settings decreased and encouraged not to stack insulin.  -Uses 15 by 15 rule to treat hypoglycemia.             Behavioral Health    History of postpartum depression    Overview   H/o postpartum depression with first and second children             Obstetrics/Gynecology    29 weeks gestation of pregnancy    Supervision of high risk pregnancy in third trimester    Overview   Declines flu vaccine  Declines aneuploidy screening. msAFP low risk   MFM recommending repeat urine culture with 28 wk labs  Declines Tdap           Previous pregnancy complicated by pregnancy-induced hypertension, antepartum, third trimester       Other    Insulin pump titration    BMI 37.0-37.9, adult    Overview   -Pre-pregnancy weight 187 lbs. BMI 27.62.  -Current weight 253 lbs.   -TWG 66 lbs.  -Recommended weight gain 15 to 25 lbs.  -Continue GDM meal plan.          Fetal anomaly suspected but not found    Overview   We discussed that a high velocity across the aortic arch can be a sign of  narrowing of the aortic arch called a coarct that does not cause problems  during pregnancy but could cause problems after birth.  Review of the  blood vessels in grayscale and color though show no evidence for narrowing  of the aortic arch that we can see again suggesting that this is a false  positive result.  - repeat US 4 weeks             Initial BP was 1  Repeat BP was normal  Patient is scheduled for her MFM appointments accordingly.  She is in touch with the diabetic educator regarding blood sugar testing and reporting  She has growth scans planned with MFM as well as antepartum fetal surveillance  She is hoping not to be induced but she understands timing of delivery between 39-39 and 6 over 7 weeks.  He does not need a breast pump.  Patient states she does have her 28-week paperwork but has not yet brought it in.    Follow-up in 2 weeks    Future Appointments   Date Time Provider Department Center   2025  2:30 PM [facility]CO [facility]   2025  8:30 AM Jeffry Uriarte PA-C [facility]   2025  1:30 PM  [facility]   2025  2:00 PM Jeffry Uriarte PA-C [facility]   2025  3:15 PM  [facility]   6/3/2025  2:30 PM Jeffry Uriarte PA-C [facility]   [facility]  [facility]   6/10/2025  3:00 PM Renée Yi MD [facility]   2025  3:15 [facility]   2025  9:15 AM Renée Yi MD [facility]   2025  9:00 AM Savanna Hammond MD [facility]   2025  2:30 PM Savanna Hammond MD [facility]   2025  2:30 PM Savanna Hammond MD [facility]               Renée Yi MD  2025  4:25 PM

## 2025-05-05 ENCOUNTER — ROUTINE PRENATAL (OUTPATIENT)
Dept: OBGYN CLINIC | Facility: CLINIC | Age: 35
End: 2025-05-05

## 2025-05-05 VITALS
WEIGHT: 252 LBS | BODY MASS INDEX: 37.33 KG/M2 | DIASTOLIC BLOOD PRESSURE: 60 MMHG | HEART RATE: 88 BPM | HEIGHT: 69 IN | SYSTOLIC BLOOD PRESSURE: 120 MMHG | OXYGEN SATURATION: 98 %

## 2025-05-05 DIAGNOSIS — E10.649 HYPOGLYCEMIA DUE TO TYPE 1 DIABETES MELLITUS (HCC): ICD-10-CM

## 2025-05-05 DIAGNOSIS — E10.65 PRE-EXISTING TYPE 1 DIABETES MELLITUS WITH HYPERGLYCEMIA DURING PREGNANCY IN THIRD TRIMESTER (HCC): ICD-10-CM

## 2025-05-05 DIAGNOSIS — O09.93 SUPERVISION OF HIGH RISK PREGNANCY IN THIRD TRIMESTER: ICD-10-CM

## 2025-05-05 DIAGNOSIS — O24.013 PRE-EXISTING TYPE 1 DIABETES MELLITUS WITH HYPERGLYCEMIA DURING PREGNANCY IN THIRD TRIMESTER (HCC): ICD-10-CM

## 2025-05-05 DIAGNOSIS — Z3A.29 29 WEEKS GESTATION OF PREGNANCY: Primary | ICD-10-CM

## 2025-05-05 DIAGNOSIS — Z46.81 INSULIN PUMP TITRATION: ICD-10-CM

## 2025-05-06 NOTE — PATIENT INSTRUCTIONS
-Humalog via MaXwaretronic 770G with Dexcom G7 CGM in place.  -Current TDD about 121 units with increase by 10% to equal about 133 units daily.   -Due reported elevated glucose readings overnight, adjust basal settings as follows: MN from 1.50 to 1.65 units/hour; change 11 PM to 10 PM@ 3.50 units/hour.   -Due to post meal glucose elevations requiring multiple correction bolus doses; decrease carb ratio to 2.70 for all settings.   -Please follow recommendation of insulin pump and do not provide extra bolus units.   -Please input actual finger sticks into your insulin pump PDM.   -Always have glucose available for hyperglycemia and use 15 by 15 rule.  -Serial fetal growths.  -At 32 weeks gestation, NST twice a week and VINOD weekly.   -Continue follow up with OBGYN and MFM as recommended.   -Follow up on Monday, 2025 via phone.   -Schedule endocrinology appointment for end of 2025.     Thank you for choosing us for your  care today.  If you have any questions about your ultrasound or care, please do not hesitate to contact us or your primary obstetrician.        Some general instructions for your pregnancy are:    Exercise: Aim for 150 minutes per week of regular exercise.  Walking is great!  Nutrition: Choose healthy sources of calcium, iron, and protein.  Avoid ultraprocessed foods and added sugar.  Learn about Preeclampsia: preeclampsia is a common, potentially serious high blood pressure complication in pregnancy.  A blood pressure of 140mmHg (systolic or top number) or 90mmHg (diastolic or bottom number) should be evaluated by your doctor.  Aspirin is sometimes prescribed in early pregnancy to prevent preeclampsia in women with risk factors - ask your obstetrician if you should be on this medication.  For more resources, visit:  https://[email or web address].[email or web address]  If you smoke, please try to quit completely but also try to reduce your smoking by as much as possible (as soon as  possible).  Do not vape.  Please also avoid cannabis products.  Other warning signs to watch out for in pregnancy or postpartum: chest pain, obstructed breathing or shortness of breath, seizures, thoughts of hurting yourself or your baby, bleeding, a painful or swollen leg, fever, or headache (see [facility] POST-BIRTH Warning Signs campaign).  If these happen call 911.  Itching is also not normal in pregnancy and if you experience this, especially over your hands and feet, potentially worse at night, notify your doctors.

## 2025-05-07 ENCOUNTER — TELEMEDICINE (OUTPATIENT)
Facility: HOSPITAL | Age: 35
End: 2025-05-07
Payer: COMMERCIAL

## 2025-05-07 DIAGNOSIS — E10.65 PRE-EXISTING TYPE 1 DIABETES MELLITUS WITH HYPERGLYCEMIA DURING PREGNANCY IN THIRD TRIMESTER (HCC): Primary | ICD-10-CM

## 2025-05-07 DIAGNOSIS — Z3A.30 30 WEEKS GESTATION OF PREGNANCY: ICD-10-CM

## 2025-05-07 DIAGNOSIS — E10.649 HYPOGLYCEMIA DUE TO TYPE 1 DIABETES MELLITUS (HCC): ICD-10-CM

## 2025-05-07 DIAGNOSIS — Z46.81 INSULIN PUMP TITRATION: ICD-10-CM

## 2025-05-07 DIAGNOSIS — O24.013 PRE-EXISTING TYPE 1 DIABETES MELLITUS WITH HYPERGLYCEMIA DURING PREGNANCY IN THIRD TRIMESTER (HCC): Primary | ICD-10-CM

## 2025-05-07 PROCEDURE — 99215 OFFICE O/P EST HI 40 MIN: CPT | Performed by: NURSE PRACTITIONER

## 2025-05-07 NOTE — ASSESSMENT & PLAN NOTE
-Pre-pregnancy weight 187 lbs. BMI 27.62.  -Current weight 252 lbs.   -TWG 65 lbs.  -Recommended weight gain 15 to 25 lbs.  -Continue GDM meal plan.     Orders:    glucose blood (Contour Next Test) test strip; Tests up to 6 times a day. T1DM/pregnancy and hypoglycemia.    Protein / creatinine ratio, urine; Future    Comprehensive metabolic panel; Future    Hemoglobin A1C; Future

## 2025-05-07 NOTE — ASSESSMENT & PLAN NOTE
Orders:    glucose blood (Contour Next Test) test strip; Tests up to 6 times a day. T1DM/pregnancy and hypoglycemia.    Protein / creatinine ratio, urine; Future    Comprehensive metabolic panel; Future    Hemoglobin A1C; Future

## 2025-05-07 NOTE — ASSESSMENT & PLAN NOTE
-Humalog via PATHEOStronic 770G with Dexcom G7 CGM in place.  -Current TDD about 121 units with increase by 10% to equal about 133 units daily.   -Due reported elevated glucose readings overnight, adjust basal settings as follows: MN from 1.50 to 1.65 units/hour; change 11 PM to 10 PM@ 3.50 units/hour.   -Due to post meal glucose elevations requiring multiple correction bolus doses; decrease carb ratio to 2.70 for all settings.   -Please follow recommendation of insulin pump and do not provide extra bolus units.   -Please input actual finger sticks into your insulin pump PDM.   -Always have glucose available for hyperglycemia and use 15 by 15 rule.  -Serial fetal growths.  -At 32 weeks gestation, NST twice a week and VINOD weekly.   -Continue follow up with OBGYN and MFM as recommended.   -Follow up on Monday, 5/12/2025 via phone.   -Schedule endocrinology appointment for end of July 2025.   Lab Results   Component Value Date    HGBA1C 5.0 03/01/2025       Orders:    glucose blood (Contour Next Test) test strip; Tests up to 6 times a day. T1DM/pregnancy and hypoglycemia.    Protein / creatinine ratio, urine; Future    Comprehensive metabolic panel; Future    Hemoglobin A1C; Future

## 2025-05-07 NOTE — PROGRESS NOTES
Virtual Regular VisitName: Antoinette Hernandez      : 1990      MRN: [id number]  Encounter Provider: MARCO Mayer  Encounter Date: 2025   Encounter department: [facility]  :  Assessment & Plan  Pre-existing type 1 diabetes mellitus with hyperglycemia during pregnancy in third trimester (HCC)  -Humalog via Medtronic 770G with Dexcom G7 CGM in place.  -Current TDD about 121 units with increase by 10% to equal about 133 units daily.   -Due reported elevated glucose readings overnight, adjust basal settings as follows: MN from 1.50 to 1.65 units/hour; change 11 PM to 10 PM@ 3.50 units/hour.   -Due to post meal glucose elevations requiring multiple correction bolus doses; decrease carb ratio to 2.70 for all settings.   -Please follow recommendation of insulin pump and do not provide extra bolus units.   -Please input actual finger sticks into your insulin pump PDM.   -Always have glucose available for hyperglycemia and use 15 by 15 rule.  -Serial fetal growths.  -At 32 weeks gestation, NST twice a week and VINOD weekly.   -Continue follow up with OBGYN and MFM as recommended.   -Follow up on Monday, 2025 via phone.   -Schedule endocrinology appointment for end of 2025.   Lab Results   Component Value Date    HGBA1C 5.0 2025       Orders:    glucose blood (Contour Next Test) test strip; Tests up to 6 times a day. T1DM/pregnancy and hypoglycemia.    Protein / creatinine ratio, urine; Future    Comprehensive metabolic panel; Future    Hemoglobin A1C; Future    30 weeks gestation of pregnancy    Orders:    glucose blood (Contour Next Test) test strip; Tests up to 6 times a day. T1DM/pregnancy and hypoglycemia.    Protein / creatinine ratio, urine; Future    Comprehensive metabolic panel; Future    Hemoglobin A1C; Future    Hypoglycemia due to type 1 diabetes mellitus (HCC)    Lab Results   Component Value Date    HGBA1C 5.0 2025       Orders:    glucose blood  (Contour Next Test) test strip; Tests up to 6 times a day. T1DM/pregnancy and hypoglycemia.    Protein / creatinine ratio, urine; Future    Comprehensive metabolic panel; Future    Hemoglobin A1C; Future    Insulin pump titration    Orders:    glucose blood (Contour Next Test) test strip; Tests up to 6 times a day. T1DM/pregnancy and hypoglycemia.    Protein / creatinine ratio, urine; Future    Comprehensive metabolic panel; Future    Hemoglobin A1C; Future    BMI 37.0-37.9, adult  -Pre-pregnancy weight 187 lbs. BMI 27.62.  -Current weight 252 lbs.   -TWG 65 lbs.  -Recommended weight gain 15 to 25 lbs.  -Continue GDM meal plan.     Orders:    glucose blood (Contour Next Test) test strip; Tests up to 6 times a day. T1DM/pregnancy and hypoglycemia.    Protein / creatinine ratio, urine; Future    Comprehensive metabolic panel; Future    Hemoglobin A1C; Future          History of Present Illness   Antoinette is a 34 you  female, 30 4/7 weeks gestation for follow up T1DM during pregnancy, on humalog via Medtronic 770G insulin pump and Dexcom CGM in place. Reported having issues with recent Dexcom sensor and CGM reading lower than actual finger sticks. Is completing actual finger sticks but not reporting via PDM and encouraged to input data into PDM. Treats hypoglycemia with fruit snacks. Currently overriding insulin pump and providing extra bolus doses due to hyperglycemia that at times results in glucose readings less than 60. Reported overnight glucose readings above goal with basal adjustments made during visit. Reported FBS 70-90 mg/dL and post meal glucose readings above goal, a new carb ratio provided and encouraged not to override pump and follow pump recommendations.   Up to date weight gain 65 lbs. BP within normal. Positive fetal movements. Last fetal growth within normal.   HPI  Review of Systems   Constitutional:  Negative for fatigue and fever.   HENT:  Negative for congestion and trouble swallowing.    Eyes:   Negative for visual disturbance.   Respiratory:  Negative for cough and shortness of breath.    Cardiovascular:  Negative for chest pain, palpitations and leg swelling.   Gastrointestinal:  Negative for constipation, nausea and vomiting.   Endocrine: Negative for polydipsia, polyphagia and polyuria.   Genitourinary:  Negative for difficulty urinating and vaginal bleeding.   Musculoskeletal:  Negative for back pain.   Skin:  Negative for rash.   Neurological:  Negative for headaches.   Psychiatric/Behavioral:  Negative for sleep disturbance.        Objective   LMP 10/05/2024 (Exact Date)   Refer to attached files for insulin pump download and Dexcom CGM report.   Physical Exam  HENT:      Head: Normocephalic.      Nose: Nose normal.   Eyes:      Conjunctiva/sclera: Conjunctivae normal.   Pulmonary:      Effort: Pulmonary effort is normal.   Neurological:      Mental Status: She is alert and oriented to person, place, and time.         Administrative Statements   Encounter provider MARCO Mayer    The Patient is located at Other and in the following state in which I hold an active license [place].    The patient was identified by name and date of birth. Antoinette Hernandez was informed that this is a telemedicine visit and that the visit is being conducted through the Epic Embedded platform. She agrees to proceed..  My office door was closed. The patient was notified the following individuals were present in the room endocrinology fellow Dr. Chance.  She acknowledged consent and understanding of privacy and security of the video platform. The patient has agreed to participate and understands they can discontinue the visit at any time.    I have spent a total time of 40 minutes in caring for this patient on the day of the visit/encounter including Instructions for management, Patient and family education, Risk factor reductions, Counseling / Coordination of care, Documenting in the medical record, Reviewing/placing  orders in the medical record (including tests, medications, and/or procedures), and Obtaining or reviewing history  , not including the time spent for establishing the audio/video connection.

## 2025-05-07 NOTE — ASSESSMENT & PLAN NOTE
Lab Results   Component Value Date    HGBA1C 5.0 03/01/2025       Orders:    glucose blood (Contour Next Test) test strip; Tests up to 6 times a day. T1DM/pregnancy and hypoglycemia.    Protein / creatinine ratio, urine; Future    Comprehensive metabolic panel; Future    Hemoglobin A1C; Future

## 2025-05-08 ENCOUNTER — TELEPHONE (OUTPATIENT)
Dept: PERINATAL CARE | Facility: OTHER | Age: 35
End: 2025-05-08

## 2025-05-08 ENCOUNTER — TELEPHONE (OUTPATIENT)
Age: 35
End: 2025-05-08

## 2025-05-08 NOTE — TELEPHONE ENCOUNTER
Called patient to re-schedule a [facility] u/s appointment on 05/23 .  Left voicemail request patient to call back to schedule at [phone].     We received a message from Carl Morales RN  pt would like to re-schedule the her [facility] U/S on 05/23.

## 2025-05-08 NOTE — TELEPHONE ENCOUNTER
Pt calling in saying that she's 30w5d , pt is requesting to know if its OK for her to go on a trip to [place] by car, but she would be missing a NST/MFM appt for Growth scan.

## 2025-05-08 NOTE — TELEPHONE ENCOUNTER
Pt was contacted and notified of the providers recommendations, pt verbalized understanding.     Pt was notified to make sure to stop every 1-2 hours to be able to walk around, to prevent blood clots, and to hydrate, pt verbalized understanding.    Attempted to warm transfer to [facility], unable to warm transfer, pt was notified a message will be sent to call back to reschedule growth scan for 5/23/25.

## 2025-05-12 ENCOUNTER — TELEPHONE (OUTPATIENT)
Facility: HOSPITAL | Age: 35
End: 2025-05-12

## 2025-05-12 NOTE — TELEPHONE ENCOUNTER
Attempted to reach [facility] to discuss current glucose readings without success. Will try to reach tomorrow after 2 PM. Encouraged to share any insights related to glucose readings. Lack of actual finger sticks. No improvement noted and increased in hypoglycemia noted. Pending message from [facility] or returned phone.

## 2025-05-15 ENCOUNTER — TELEPHONE (OUTPATIENT)
Age: 35
End: 2025-05-15

## 2025-05-15 NOTE — TELEPHONE ENCOUNTER
PT called in needing to reschedule ultrasound she has on 5/23. PT will be away 5/22-5/26 there was no available appointments around that time. Please follow up.

## 2025-05-19 ENCOUNTER — ROUTINE PRENATAL (OUTPATIENT)
Dept: OBGYN CLINIC | Facility: CLINIC | Age: 35
End: 2025-05-19

## 2025-05-19 VITALS
HEIGHT: 69 IN | WEIGHT: 259 LBS | DIASTOLIC BLOOD PRESSURE: 70 MMHG | BODY MASS INDEX: 38.36 KG/M2 | SYSTOLIC BLOOD PRESSURE: 132 MMHG

## 2025-05-19 DIAGNOSIS — E10.65 PRE-EXISTING TYPE 1 DIABETES MELLITUS WITH HYPERGLYCEMIA DURING PREGNANCY IN THIRD TRIMESTER (HCC): Primary | ICD-10-CM

## 2025-05-19 DIAGNOSIS — O24.013 PRE-EXISTING TYPE 1 DIABETES MELLITUS WITH HYPERGLYCEMIA DURING PREGNANCY IN THIRD TRIMESTER (HCC): Primary | ICD-10-CM

## 2025-05-19 DIAGNOSIS — O09.893 PREVIOUS PREGNANCY COMPLICATED BY PREGNANCY-INDUCED HYPERTENSION, ANTEPARTUM, THIRD TRIMESTER: ICD-10-CM

## 2025-05-19 DIAGNOSIS — E10.649 HYPOGLYCEMIA DUE TO TYPE 1 DIABETES MELLITUS (HCC): ICD-10-CM

## 2025-05-19 DIAGNOSIS — Z03.73 FETAL ANOMALY SUSPECTED BUT NOT FOUND: ICD-10-CM

## 2025-05-19 DIAGNOSIS — O09.93 SUPERVISION OF HIGH RISK PREGNANCY IN THIRD TRIMESTER: ICD-10-CM

## 2025-05-19 DIAGNOSIS — Z3A.32 32 WEEKS GESTATION OF PREGNANCY: ICD-10-CM

## 2025-05-19 NOTE — PROGRESS NOTES
"    S: 34 y.o.  32w2d here for routine prenatal visit.        Chief Complaint   Patient presents with    Routine Prenatal Visit         OB complaints:  Contractions: no  Leakage: no  Bleeding: no  Fetal movement: yes      O:  /70 (BP Location: Right arm, Patient Position: Sitting, Cuff Size: Adult)   Ht 5' 9\" (1.753 m)   Wt 117 kg (259 lb)   LMP 10/05/2024 (Exact Date)   BMI 38.25 kg/m²       Review of Systems   Constitutional:  Negative for chills and fever.   Eyes:  Negative for visual disturbance.   Respiratory:  Negative for chest tightness and shortness of breath.    Cardiovascular:  Negative for chest pain.   Gastrointestinal:  Negative for abdominal pain, diarrhea, nausea and vomiting.   Genitourinary:  Negative for pelvic pain and vaginal bleeding.        As noted in HPI   Skin:  Negative for rash.   Neurological:  Negative for headaches.   All other systems reviewed and are negative.        Physical Exam  Constitutional:       General: She is not in acute distress.     Appearance: Normal appearance.   HENT:      Head: Normocephalic and atraumatic.     Cardiovascular:      Rate and Rhythm: Normal rate.   Pulmonary:      Effort: Pulmonary effort is normal. No respiratory distress.   Abdominal:      General: There is no distension.      Palpations: Abdomen is soft.      Tenderness: There is no abdominal tenderness. There is no guarding or rebound.      Comments: gravid     Neurological:      General: No focal deficit present.      Mental Status: She is alert.     Psychiatric:         Mood and Affect: Mood normal.         Behavior: Behavior normal.   Vitals and nursing note reviewed.              Fetal Heart Rate: NST    Pregravid Weight/BMI: 90.3 kg (199 lb) (BMI 29.37)  Current Weight: 117 kg (259 lb)   Total Weight Gain: 27.2 kg (60 lb)     Pre- Vitals      Flowsheet Row Most Recent Value   Prenatal Assessment    Fetal Heart Rate NST   Movement Present   Prenatal Vitals    Blood " Pressure 132/70   Weight - Scale 117 kg (259 lb)   Urine Albumin/Glucose    Dilation/Effacement/Station    Vaginal Drainage    Edema                     Problem List       History of postpartum depression    Overview   H/o postpartum depression with first and second children          Insulin pump titration    Pre-existing type 1 diabetes mellitus with hyperglycemia during pregnancy in third trimester (HCC)    Overview   Last A1c 5.0%CMP. Baseline UPC 0.2   Missed NT appointment.  -Serial fetal growths.  20 weeks detailed.  22-24 weeks fetal echo   -32 weeks, NST twice a week and VINOD weekly.   -delivery @ 39 wks    -Humalog via Medtronic 770G insulin pump, Dexcom CGM.  1st visit pump settings:  MN-4 AM@ 0.975 units/hour;  4 AM-8 AM@ 1.40 units/hour;  8 AM-2 PM@ 1.85 units/hour;  2 PM-5:30 PM@ 1.85 units/hour;  5:30 PM-MN@ 1.85 units/hour.   BG target 110 mg/dL.  Carb ratio 12  ISF 50  Active Insulin Time 3 hours.          32 weeks gestation of pregnancy    BMI 37.0-37.9, adult    Overview   -Pre-pregnancy weight 187 lbs. BMI 27.62.  -Current weight 252 lbs.   -TWG 65 lbs.  -Recommended weight gain 15 to 25 lbs.  -Continue GDM meal plan.          Hypoglycemia due to type 1 diabetes mellitus (HCC)    Overview   -Humalog via Medtronic 770G insulin pump and Dexcom CGM in place.   -Due to hypoglycemia, basal settings decreased and encouraged not to stack insulin.  -Uses 15 by 15 rule to treat hypoglycemia.          Supervision of high risk pregnancy in third trimester    Overview   Declines flu vaccine  Declines aneuploidy screening. msAFP low risk   [facility] recommending repeat urine culture with 28 wk labs  Declines Tdap  Delivery consent signed          Current Assessment & Plan   Delivery consent reviewed and signed   Midtrimester labs previously ordered, reminded to complete  She has an NST/VINOD scheduled at [facility] later this week   NST today reactive/reassuring   OB precautions reviewed         Previous pregnancy complicated  by pregnancy-induced hypertension, antepartum, third trimester    Fetal anomaly suspected but not found    Overview   We discussed that a high velocity across the aortic arch can be a sign of  narrowing of the aortic arch called a coarct that does not cause problems  during pregnancy but could cause problems after birth.  Review of the  blood vessels in grayscale and color though show no evidence for narrowing  of the aortic arch that we can see again suggesting that this is a false  positive result.  - repeat US 4 weeks                               Savanna Hammond MD  5/19/2025  3:15 PM

## 2025-05-19 NOTE — ASSESSMENT & PLAN NOTE
Delivery consent reviewed and signed   Midtrimester labs previously ordered, reminded to complete  She has an NST/VINOD scheduled at [facility] later this week   NST today reactive/reassuring   OB precautions reviewed

## 2025-05-23 ENCOUNTER — TELEPHONE (OUTPATIENT)
Dept: PERINATAL CARE | Facility: CLINIC | Age: 35
End: 2025-05-23

## 2025-05-23 NOTE — TELEPHONE ENCOUNTER
Reason for Call: Hypoglycemia (Per Dexcom Report; No finger-sticks reported) and Diabetes Type 1 (Currently Pregnant; 32w6d)    Outcome: No Answer. Left VM. Instructed pt to give us a call or send us a message regarding her low blood sugars and finger-sticks.     Toshai Garcia RD  Diabetes Educator   [facility]  Diabetes and Pregnancy Program

## 2025-05-29 ENCOUNTER — TELEPHONE (OUTPATIENT)
Facility: HOSPITAL | Age: 35
End: 2025-05-29

## 2025-05-29 NOTE — TELEPHONE ENCOUNTER
Phone call to Antoinette regarding Dexcom CGM and Medtronic insulin pump download where hypoglycemia is noted and an increase in basal units along with multiple bolus doses. In the past, has reported Dexcom readings being lower than actual finger sticks. No actual finger sticks are noted. Encouraged to call the office or reach out via [facility] for recommendations. Pending return phone call.

## 2025-05-30 ENCOUNTER — ANCILLARY PROCEDURE (OUTPATIENT)
Dept: PERINATAL CARE | Facility: OTHER | Age: 35
End: 2025-05-30
Attending: PHYSICIAN ASSISTANT
Payer: COMMERCIAL

## 2025-05-30 ENCOUNTER — ULTRASOUND (OUTPATIENT)
Dept: PERINATAL CARE | Facility: OTHER | Age: 35
End: 2025-05-30
Payer: COMMERCIAL

## 2025-05-30 VITALS
SYSTOLIC BLOOD PRESSURE: 112 MMHG | WEIGHT: 260.8 LBS | DIASTOLIC BLOOD PRESSURE: 56 MMHG | HEIGHT: 69 IN | HEART RATE: 99 BPM | BODY MASS INDEX: 38.63 KG/M2

## 2025-05-30 DIAGNOSIS — O24.013 PRE-EXISTING TYPE 1 DIABETES MELLITUS WITH HYPERGLYCEMIA DURING PREGNANCY IN THIRD TRIMESTER (HCC): Primary | ICD-10-CM

## 2025-05-30 DIAGNOSIS — E10.65 PRE-EXISTING TYPE 1 DIABETES MELLITUS WITH HYPERGLYCEMIA DURING PREGNANCY IN THIRD TRIMESTER (HCC): Primary | ICD-10-CM

## 2025-05-30 DIAGNOSIS — Z3A.32 32 WEEKS GESTATION OF PREGNANCY: ICD-10-CM

## 2025-05-30 DIAGNOSIS — Z3A.33 33 WEEKS GESTATION OF PREGNANCY: ICD-10-CM

## 2025-05-30 PROCEDURE — 76815 OB US LIMITED FETUS(S): CPT | Performed by: OBSTETRICS & GYNECOLOGY

## 2025-05-30 PROCEDURE — 59025 FETAL NON-STRESS TEST: CPT | Performed by: PHYSICIAN ASSISTANT

## 2025-05-30 NOTE — PROGRESS NOTES
[facility]:     Ms. Hernandez was seen today at 33w6d gestational age for NST (found under the pregnancy episode). NST was reactive. I reviewed the RN assessment and agree. She was also seen for VINOD (see ultrasound report under imaging tab).     Cherry Kitchen PA-C

## 2025-05-30 NOTE — PROGRESS NOTES
Repeat Non-Stress Testing:    Patient verbalizes +FM. Pt denies ALL:               Leaking of fluid   Contractions   Vaginal bleeding   Decreased fetal movement    Patient is performing daily kick counts. Patient has no questions or concerns.   NST strip reviewed by BRUCE Duron in-person.

## 2025-06-04 ENCOUNTER — ULTRASOUND (OUTPATIENT)
Facility: HOSPITAL | Age: 35
End: 2025-06-04
Payer: COMMERCIAL

## 2025-06-04 ENCOUNTER — APPOINTMENT (OUTPATIENT)
Dept: LAB | Facility: MEDICAL CENTER | Age: 35
End: 2025-06-04
Payer: COMMERCIAL

## 2025-06-04 ENCOUNTER — APPOINTMENT (OUTPATIENT)
Facility: HOSPITAL | Age: 35
End: 2025-06-04
Payer: COMMERCIAL

## 2025-06-04 ENCOUNTER — ANCILLARY PROCEDURE (OUTPATIENT)
Facility: HOSPITAL | Age: 35
End: 2025-06-04
Attending: PHYSICIAN ASSISTANT
Payer: COMMERCIAL

## 2025-06-04 VITALS
BODY MASS INDEX: 38.57 KG/M2 | DIASTOLIC BLOOD PRESSURE: 58 MMHG | SYSTOLIC BLOOD PRESSURE: 136 MMHG | HEART RATE: 91 BPM | WEIGHT: 260.4 LBS | HEIGHT: 69 IN

## 2025-06-04 DIAGNOSIS — Z36.89 ENCOUNTER FOR ULTRASOUND TO ASSESS FETAL GROWTH: ICD-10-CM

## 2025-06-04 DIAGNOSIS — O09.92 SUPERVISION OF HIGH RISK PREGNANCY IN SECOND TRIMESTER: ICD-10-CM

## 2025-06-04 DIAGNOSIS — O24.013 PRE-EXISTING TYPE 1 DIABETES MELLITUS WITH HYPERGLYCEMIA DURING PREGNANCY IN THIRD TRIMESTER (HCC): Primary | ICD-10-CM

## 2025-06-04 DIAGNOSIS — Z3A.34 34 WEEKS GESTATION OF PREGNANCY: ICD-10-CM

## 2025-06-04 DIAGNOSIS — E10.649 HYPOGLYCEMIA DUE TO TYPE 1 DIABETES MELLITUS (HCC): ICD-10-CM

## 2025-06-04 DIAGNOSIS — Z3A.34 34 WEEKS GESTATION OF PREGNANCY: Primary | ICD-10-CM

## 2025-06-04 DIAGNOSIS — E10.65 PRE-EXISTING TYPE 1 DIABETES MELLITUS WITH HYPERGLYCEMIA DURING PREGNANCY IN THIRD TRIMESTER (HCC): Primary | ICD-10-CM

## 2025-06-04 DIAGNOSIS — Z46.81 INSULIN PUMP TITRATION: ICD-10-CM

## 2025-06-04 DIAGNOSIS — O24.013 PRE-EXISTING TYPE 1 DIABETES MELLITUS WITH HYPERGLYCEMIA DURING PREGNANCY IN THIRD TRIMESTER (HCC): ICD-10-CM

## 2025-06-04 DIAGNOSIS — Z3A.30 30 WEEKS GESTATION OF PREGNANCY: ICD-10-CM

## 2025-06-04 DIAGNOSIS — E10.65 PRE-EXISTING TYPE 1 DIABETES MELLITUS WITH HYPERGLYCEMIA DURING PREGNANCY IN THIRD TRIMESTER (HCC): ICD-10-CM

## 2025-06-04 LAB
ALBUMIN SERPL BCG-MCNC: 3.5 G/DL (ref 3.5–5)
ALP SERPL-CCNC: 106 U/L (ref 34–104)
ALT SERPL W P-5'-P-CCNC: 9 U/L (ref 7–52)
ANION GAP SERPL CALCULATED.3IONS-SCNC: 8 MMOL/L (ref 4–13)
AST SERPL W P-5'-P-CCNC: 16 U/L (ref 13–39)
BILIRUB SERPL-MCNC: 0.27 MG/DL (ref 0.2–1)
BUN SERPL-MCNC: 8 MG/DL (ref 5–25)
CALCIUM SERPL-MCNC: 8.8 MG/DL (ref 8.4–10.2)
CHLORIDE SERPL-SCNC: 107 MMOL/L (ref 96–108)
CO2 SERPL-SCNC: 25 MMOL/L (ref 21–32)
CREAT SERPL-MCNC: 0.52 MG/DL (ref 0.6–1.3)
CREAT UR-MCNC: 33.9 MG/DL
ERYTHROCYTE [DISTWIDTH] IN BLOOD BY AUTOMATED COUNT: 12.7 % (ref 11.6–15.1)
GFR SERPL CREATININE-BSD FRML MDRD: 126 ML/MIN/1.73SQ M
GLUCOSE P FAST SERPL-MCNC: 62 MG/DL (ref 65–99)
HCT VFR BLD AUTO: 37.7 % (ref 34.8–46.1)
HGB BLD-MCNC: 12.1 G/DL (ref 11.5–15.4)
MCH RBC QN AUTO: 29.8 PG (ref 26.8–34.3)
MCHC RBC AUTO-ENTMCNC: 32.1 G/DL (ref 31.4–37.4)
MCV RBC AUTO: 93 FL (ref 82–98)
PLATELET # BLD AUTO: 241 THOUSANDS/UL (ref 149–390)
PMV BLD AUTO: 11 FL (ref 8.9–12.7)
POTASSIUM SERPL-SCNC: 4.3 MMOL/L (ref 3.5–5.3)
PROT SERPL-MCNC: 6.5 G/DL (ref 6.4–8.4)
PROT UR-MCNC: 5.1 MG/DL
PROT/CREAT UR: 0.2 MG/G{CREAT}
RBC # BLD AUTO: 4.06 MILLION/UL (ref 3.81–5.12)
SODIUM SERPL-SCNC: 140 MMOL/L (ref 135–147)
WBC # BLD AUTO: 13.5 THOUSAND/UL (ref 4.31–10.16)

## 2025-06-04 PROCEDURE — 82570 ASSAY OF URINE CREATININE: CPT

## 2025-06-04 PROCEDURE — 80053 COMPREHEN METABOLIC PANEL: CPT

## 2025-06-04 PROCEDURE — 59025 FETAL NON-STRESS TEST: CPT | Performed by: OBSTETRICS & GYNECOLOGY

## 2025-06-04 PROCEDURE — 36415 COLL VENOUS BLD VENIPUNCTURE: CPT

## 2025-06-04 PROCEDURE — 86780 TREPONEMA PALLIDUM: CPT

## 2025-06-04 PROCEDURE — 99213 OFFICE O/P EST LOW 20 MIN: CPT | Performed by: OBSTETRICS & GYNECOLOGY

## 2025-06-04 PROCEDURE — 76816 OB US FOLLOW-UP PER FETUS: CPT | Performed by: OBSTETRICS & GYNECOLOGY

## 2025-06-04 PROCEDURE — 87086 URINE CULTURE/COLONY COUNT: CPT

## 2025-06-04 PROCEDURE — 85027 COMPLETE CBC AUTOMATED: CPT

## 2025-06-04 PROCEDURE — 84156 ASSAY OF PROTEIN URINE: CPT

## 2025-06-04 PROCEDURE — 83036 HEMOGLOBIN GLYCOSYLATED A1C: CPT

## 2025-06-04 NOTE — PROGRESS NOTES
Repeat Non-Stress Testing:    Patient verbalizes +FM. Pt denies ALL:               Leaking of fluid   Contractions   Vaginal bleeding   Decreased fetal movement    Patient is performing daily kick counts. Patient has no questions or concerns.   NST strip reviewed by Dr Esparza in-person.

## 2025-06-05 ENCOUNTER — RESULTS FOLLOW-UP (OUTPATIENT)
Facility: HOSPITAL | Age: 35
End: 2025-06-05

## 2025-06-05 ENCOUNTER — RESULTS FOLLOW-UP (OUTPATIENT)
Dept: LABOR AND DELIVERY | Facility: HOSPITAL | Age: 35
End: 2025-06-05

## 2025-06-05 ENCOUNTER — RESULTS FOLLOW-UP (OUTPATIENT)
Age: 35
End: 2025-06-05

## 2025-06-05 ENCOUNTER — TELEPHONE (OUTPATIENT)
Age: 35
End: 2025-06-05

## 2025-06-05 ENCOUNTER — TELEMEDICINE (OUTPATIENT)
Facility: HOSPITAL | Age: 35
End: 2025-06-05
Payer: COMMERCIAL

## 2025-06-05 DIAGNOSIS — Z3A.34 34 WEEKS GESTATION OF PREGNANCY: ICD-10-CM

## 2025-06-05 DIAGNOSIS — E10.65 PRE-EXISTING TYPE 1 DIABETES MELLITUS WITH HYPERGLYCEMIA DURING PREGNANCY IN THIRD TRIMESTER (HCC): Primary | ICD-10-CM

## 2025-06-05 DIAGNOSIS — Z46.81 INSULIN PUMP TITRATION: ICD-10-CM

## 2025-06-05 DIAGNOSIS — O24.013 PRE-EXISTING TYPE 1 DIABETES MELLITUS WITH HYPERGLYCEMIA DURING PREGNANCY IN THIRD TRIMESTER (HCC): Primary | ICD-10-CM

## 2025-06-05 LAB
BACTERIA UR CULT: NORMAL
EST. AVERAGE GLUCOSE BLD GHB EST-MCNC: 103 MG/DL
HBA1C MFR BLD: 5.2 %
TREPONEMA PALLIDUM IGG+IGM AB [PRESENCE] IN SERUM OR PLASMA BY IMMUNOASSAY: NORMAL

## 2025-06-05 PROCEDURE — 99215 OFFICE O/P EST HI 40 MIN: CPT | Performed by: NURSE PRACTITIONER

## 2025-06-05 PROCEDURE — 95251 CONT GLUC MNTR ANALYSIS I&R: CPT | Performed by: NURSE PRACTITIONER

## 2025-06-05 NOTE — ASSESSMENT & PLAN NOTE
-Pre-pregnancy weight 187 lbs. BMI 27.62.  -Current weight 260 lbs.   -TWG 73 lbs.  -Recommended weight gain 15 to 25 lbs.  -Continue GDM meal plan.

## 2025-06-05 NOTE — PATIENT INSTRUCTIONS
-A1c 5.2% normal. CMP and UPCR 0.20 within normal.  -Humalog via Medtronic 770G insulin pump with Dexcom G7 CGM in place.   -Has self adjusted basal settings due to hyperglycemia. Maintains very tight glucose control.  -Due to lower glucose readings needed in the evening and overnight; same basal settings have been decreased.   MN@ 2.60 units/hour;  Add 1 AM@2.20 units/hour;  4 AM@2.95 units/hour;  Add 6 AM@3.15 units/hour;  8:30 AM@ 5.45 units/hour;  2 PM@ 5.35 units/hour;  5:30 PM@ 4.75 units/hour;  10 PM@ 5.30 units/hour.  -Stronger carb ratio and correction factor provided to decrease correction bolus insulin doses.  -Bolus settings: carb ratio to 2.2 g/U and correction factor to 10 mg/dL per unit.   -Always have glucose available for hypoglycemia, use 15 by 15 rule.   -NST twice a week and VINOD weekly.  -Fetal growth ultrasounds as recommended.   -At 36 weeks gestation, stop baby aspirin.   -Continue follow up with OB and MFM as recommended.  -Aim to stay in contact with diabetes team.  -Follow up with your endocrinologist postpartum.

## 2025-06-05 NOTE — ASSESSMENT & PLAN NOTE
-A1c 5.2% normal. CMP and UPCR 0.20 within normal.  -Humalog via Medtronic 770G insulin pump with Dexcom G7 CGM in place.   -Has self adjusted basal settings due to hyperglycemia. Maintains very tight glucose control.  -Due to lower glucose readings needed in the evening and overnight; same basal settings have been decreased.   MN@ 2.60 units/hour;  Add 1 AM@2.20 units/hour;  4 AM@2.95 units/hour;  Add 6 AM@3.15 units/hour;  8:30 AM@ 5.45 units/hour;  2 PM@ 5.35 units/hour;  5:30 PM@ 4.75 units/hour;  10 PM@ 5.30 units/hour.  -Stronger carb ratio and correction factor provided to decrease correction bolus insulin doses.  -Bolus settings: carb ratio to 2.2 g/U and correction factor to 10 mg/dL per unit.   -Always have glucose available for hypoglycemia, use 15 by 15 rule.   -NST twice a week and VINOD weekly.  -Fetal growth ultrasounds as recommended.   -At 36 weeks gestation, stop baby aspirin.   -Continue follow up with OB and MFM as recommended.  -Aim to stay in contact with diabetes team.  -Follow up with your endocrinologist postpartum.   Lab Results   Component Value Date    HGBA1C 5.2 06/04/2025

## 2025-06-05 NOTE — PROGRESS NOTES
Virtual Regular Visit  Name: Antoinette Hernandez      : 1990      MRN: [id number]  Encounter Provider: MARCO Mayer  Encounter Date: 2025   Encounter department: [facility]  :  Assessment & Plan  Pre-existing type 1 diabetes mellitus with hyperglycemia during pregnancy in third trimester (HCC)  -A1c 5.2% normal. CMP and UPCR 0.20 within normal.  -Humalog via Medtronic 770G insulin pump with Dexcom G7 CGM in place.   -Has self adjusted basal settings due to hyperglycemia. Maintains very tight glucose control.  -Due to lower glucose readings needed in the evening and overnight; same basal settings have been decreased.   MN@ 2.60 units/hour;  Add 1 AM@2.20 units/hour;  4 AM@2.95 units/hour;  Add 6 AM@3.15 units/hour;  8:30 AM@ 5.45 units/hour;  2 PM@ 5.35 units/hour;  5:30 PM@ 4.75 units/hour;  10 PM@ 5.30 units/hour.  -Stronger carb ratio and correction factor provided to decrease correction bolus insulin doses.  -Bolus settings: carb ratio to 2.2 g/U and correction factor to 10 mg/dL per unit.   -Always have glucose available for hypoglycemia, use 15 by 15 rule.   -NST twice a week and VINOD weekly.  -Fetal growth ultrasounds as recommended.   -At 36 weeks gestation, stop baby aspirin.   -Continue follow up with OB and MFM as recommended.  -Aim to stay in contact with diabetes team.  -Follow up with your endocrinologist postpartum.   Lab Results   Component Value Date    HGBA1C 5.2 2025            34 weeks gestation of pregnancy         Insulin pump titration         BMI 38.0-38.9,adult    -Pre-pregnancy weight 187 lbs. BMI 27.62.  -Current weight 260 lbs.   -TWG 73 lbs.  -Recommended weight gain 15 to 25 lbs.  -Continue GDM meal plan.         History of Present Illness   Kerri is a 33 yo  female, 34 5/7 weeks gestation who presents for follow up T1DM on humalog via Medtronic 770G insulin pump and Dexcom G7 CGM who prefers very tight glucose control and does self  adjust insulin pump. Currently on  units +/- 20 units. Did report CGM has been reporting lower than actual finger sticks. Insulin pump/CGM reviewed during visit with adjustments made due to lower and higher glucose readings. Has been needing to give correction bolus insulin. Encouraged to add protein to bedtime snack. Does know how to treat hypoglycemia. Up to date weight gain 73 lbs above goal. BP within normal.   Positive fetal movement. Last fetal growth ultrasound within normal.   HPI  Review of Systems   Constitutional:  Negative for fatigue and fever.   Eyes:  Negative for visual disturbance.   Respiratory:  Negative for cough and shortness of breath.    Cardiovascular:  Negative for chest pain, palpitations and leg swelling.   Gastrointestinal:  Negative for constipation, nausea and vomiting.   Endocrine: Negative for polydipsia, polyphagia and polyuria.   Genitourinary:  Negative for difficulty urinating and vaginal bleeding.   Musculoskeletal:  Negative for back pain.   Skin:  Negative for rash.   Neurological:  Negative for headaches.   Psychiatric/Behavioral:  Negative for sleep disturbance.        Objective   LMP 10/05/2024 (Exact Date)   Refer to attached files for insulin pump report and Dexcom CGM.   CGM Interpretation  Antoinette Hernandez   Device used Dexcom for Personal Use  Indication: Type of Diabetes: Type 1 Diabetes  More than 72 hours of data was reviewed. Report to be scanned to chart.   Date Range: 5/30/2025 to 6/5/2025.   Analysis of data:   Average Glucose: 85 mg/dl  Coefficient of Variation: 30.5%  SD : 26mg/dl  Time in Target Range: 71%  Time Above Range: 12%  Time Below Range: 17%   Interpretation of data:   TIR 71% of the time and at goal except for below range is 17% and should be <4%. Antoinette reported Dexcom inaccurately reporting lower glucose readings. Did agree to decrease basal settings due to hypoglycemia. She prefers to maintain very tight glucose control during pregnancy.     Physical Exam  HENT:      Head: Normocephalic.      Nose: Nose normal.     Eyes:      Conjunctiva/sclera: Conjunctivae normal.     Pulmonary:      Effort: Pulmonary effort is normal.     Neurological:      Mental Status: She is alert and oriented to person, place, and time.     Psychiatric:         Mood and Affect: Mood normal.         Behavior: Behavior normal.         Thought Content: Thought content normal.         Judgment: Judgment normal.         Administrative Statements   Encounter provider MARCO Mayer    The Patient is located at Other and in the following state in which I hold an active license [place].    The patient was identified by name and date of birth. Antoinette Hernandez was informed that this is a telemedicine visit and that the visit is being conducted through the Epic Embedded platform. She agrees to proceed..  My office door was closed. No one else was in the room.  She acknowledged consent and understanding of privacy and security of the video platform. The patient has agreed to participate and understands they can discontinue the visit at any time.    I have spent a total time of 45 minutes in caring for this patient on the day of the visit/encounter including Diagnostic results, Instructions for management, Patient and family education, Risk factor reductions, Impressions, Counseling / Coordination of care, and Documenting in the medical record, not including the time spent for establishing the audio/video connection.

## 2025-06-06 ENCOUNTER — ROUTINE PRENATAL (OUTPATIENT)
Dept: PERINATAL CARE | Facility: OTHER | Age: 35
End: 2025-06-06
Payer: COMMERCIAL

## 2025-06-06 VITALS
BODY MASS INDEX: 38.95 KG/M2 | DIASTOLIC BLOOD PRESSURE: 68 MMHG | HEIGHT: 69 IN | WEIGHT: 263 LBS | HEART RATE: 87 BPM | SYSTOLIC BLOOD PRESSURE: 124 MMHG

## 2025-06-06 DIAGNOSIS — O24.013 PRE-EXISTING TYPE 1 DIABETES MELLITUS WITH HYPERGLYCEMIA DURING PREGNANCY IN THIRD TRIMESTER (HCC): ICD-10-CM

## 2025-06-06 DIAGNOSIS — E10.65 PRE-EXISTING TYPE 1 DIABETES MELLITUS WITH HYPERGLYCEMIA DURING PREGNANCY IN THIRD TRIMESTER (HCC): ICD-10-CM

## 2025-06-06 DIAGNOSIS — Z3A.34 34 WEEKS GESTATION OF PREGNANCY: Primary | ICD-10-CM

## 2025-06-06 PROCEDURE — 59025 FETAL NON-STRESS TEST: CPT | Performed by: NURSE PRACTITIONER

## 2025-06-06 NOTE — PROGRESS NOTES
[facility]:     Antoinette Hernandez was seen today at 34w6d gestational age for NST (found under the pregnancy episode). for indication of preexisting type 1 diabetes.  NST was reactive. I reviewed the RN assessment and agree.       Silva LARA

## 2025-06-06 NOTE — ASSESSMENT & PLAN NOTE
Lab Results   Component Value Date    HGBA1C 5.2 06/04/2025     Encouraged continued close followup with diabetes education and congratulated her on outstanding A1C.  Reviewed that pump can be used intrapartum and gave her a sample copy of the form that would be filled out just prior to delivery.

## 2025-06-06 NOTE — PROGRESS NOTES
Repeat Non-Stress Testing:    Patient verbalizes +FM. Pt denies ALL:               Leaking of fluid   Contractions   Vaginal bleeding   Decreased fetal movement    Patient is performing daily kick counts. Patient has no questions or concerns.   NST strip reviewed by MARCO Hinojosa in-person.

## 2025-06-06 NOTE — PROGRESS NOTES
[facility]: Antoinette was seen today for NST (found under the pregnancy episode) which I reviewed the RN assessment and agree, and fetal growth ultrasound (report with images are contained in the ultrasound report located under Chart Review tab in Epic)..       -----------------------------------------    Problem List Items Addressed This Visit          Endocrine    Pre-existing type 1 diabetes mellitus with hyperglycemia during pregnancy in third trimester ([facility]) - Primary      Lab Results   Component Value Date    HGBA1C 5.2 2025     Encouraged continued close followup with diabetes education and congratulated her on outstanding A1C.  Reviewed that pump can be used intrapartum and gave her a sample copy of the form that would be filled out just prior to delivery.            Obstetrics/Gynecology    34 weeks gestation of pregnancy    Discussed delivery timing which if well-controlled can be 39 weeks.          Other Visit Diagnoses         Encounter for ultrasound to assess fetal growth                The time spent on this established patient on the encounter date included 5 minutes previsit service time reviewing records and precharting, 10 minutes face-to-face service time counseling regarding results and coordinating care, and  5 minutes charting, totalling 20 minutes.  Please don't hesitate to contact our office with any concerns or questions.  -Payton Keith MD

## 2025-06-09 ENCOUNTER — TELEPHONE (OUTPATIENT)
Age: 35
End: 2025-06-09

## 2025-06-09 NOTE — TELEPHONE ENCOUNTER
Pt called to reschedule 6/10 Ob with NST. Pt aware of no availability in office this week per Gifty. Pt stated she can do visit and NST separate if need be. Pt made aware of message sent.

## 2025-06-10 PROBLEM — Z3A.35 35 WEEKS GESTATION OF PREGNANCY: Status: ACTIVE | Noted: 2025-01-23

## 2025-06-11 ENCOUNTER — ROUTINE PRENATAL (OUTPATIENT)
Dept: OBGYN CLINIC | Facility: CLINIC | Age: 35
End: 2025-06-11
Payer: COMMERCIAL

## 2025-06-11 VITALS
BODY MASS INDEX: 39.13 KG/M2 | WEIGHT: 264.2 LBS | HEART RATE: 89 BPM | HEIGHT: 69 IN | OXYGEN SATURATION: 99 % | DIASTOLIC BLOOD PRESSURE: 64 MMHG | SYSTOLIC BLOOD PRESSURE: 118 MMHG

## 2025-06-11 DIAGNOSIS — O09.893 PREVIOUS PREGNANCY COMPLICATED BY PREGNANCY-INDUCED HYPERTENSION, ANTEPARTUM, THIRD TRIMESTER: ICD-10-CM

## 2025-06-11 DIAGNOSIS — O09.93 SUPERVISION OF HIGH RISK PREGNANCY IN THIRD TRIMESTER: ICD-10-CM

## 2025-06-11 DIAGNOSIS — E10.649 HYPOGLYCEMIA DUE TO TYPE 1 DIABETES MELLITUS (HCC): ICD-10-CM

## 2025-06-11 DIAGNOSIS — Z3A.35 35 WEEKS GESTATION OF PREGNANCY: ICD-10-CM

## 2025-06-11 DIAGNOSIS — O24.013 PRE-EXISTING TYPE 1 DIABETES MELLITUS WITH HYPERGLYCEMIA DURING PREGNANCY IN THIRD TRIMESTER (HCC): Primary | ICD-10-CM

## 2025-06-11 DIAGNOSIS — Z03.73 FETAL ANOMALY SUSPECTED BUT NOT FOUND: ICD-10-CM

## 2025-06-11 DIAGNOSIS — E10.65 PRE-EXISTING TYPE 1 DIABETES MELLITUS WITH HYPERGLYCEMIA DURING PREGNANCY IN THIRD TRIMESTER (HCC): Primary | ICD-10-CM

## 2025-06-11 PROCEDURE — PNV: Performed by: OBSTETRICS & GYNECOLOGY

## 2025-06-11 PROCEDURE — 59025 FETAL NON-STRESS TEST: CPT | Performed by: OBSTETRICS & GYNECOLOGY

## 2025-06-11 NOTE — PATIENT INSTRUCTIONS
Thank you for choosing us for your  care today.  If you have any questions about your ultrasound or care, please do not hesitate to contact us or your primary obstetrician.        Some general instructions for your pregnancy are:    Exercise: Aim for 150 minutes per week of regular exercise.  Walking is great!  Nutrition: Choose healthy sources of calcium, iron, and protein.  Avoid ultraprocessed foods and added sugar.  Learn about Preeclampsia: preeclampsia is a common, potentially serious high blood pressure complication in pregnancy.  A blood pressure of 140mmHg (systolic or top number) or 90mmHg (diastolic or bottom number) should be evaluated by your doctor.  Aspirin is sometimes prescribed in early pregnancy to prevent preeclampsia in women with risk factors - ask your obstetrician if you should be on this medication.  For more resources, visit:  https://www.highriskpregnancyinfo.org/preeclampsia  If you smoke, please try to quit completely but also try to reduce your smoking by as much as possible (as soon as possible).  Do not vape.  Please also avoid cannabis products.  Other warning signs to watch out for in pregnancy or postpartum: chest pain, obstructed breathing or shortness of breath, seizures, thoughts of hurting yourself or your baby, bleeding, a painful or swollen leg, fever, or headache (see [facility] Warning Signs campaign).  If these happen call 911.  Itching is also not normal in pregnancy and if you experience this, especially over your hands and feet, potentially worse at night, notify your doctors.     Kick Counts in Pregnancy   AMBULATORY CARE:   Kick counts  measure how much your baby is moving in your womb. A kick from your baby can be felt as a twist, turn, swish, roll, or jab. It is common to feel your baby kicking at 26 to 28 weeks of pregnancy. You may feel your baby kick as early as 20 weeks of pregnancy. You may want to start counting at 28 weeks.   Contact your  doctor immediately if:   You feel a change in the number of kicks or movements of your baby.      You feel fewer than 10 kicks within 2 hours.      You have questions or concerns about your baby's movements.     Why measure kick counts:  Your baby's movement may provide information about your baby's health. He or she may move less, or not at all, if there are problems. Your baby may move less if he or she is not getting enough oxygen or nutrition from the placenta. Do not smoke while you are pregnant. Smoking decreases the amount of oxygen that gets to your baby. Talk to your healthcare provider if you need help to quit smoking. Tell your healthcare provider as soon as you feel a change in your baby's movements.  When to measure kick counts:   Measure kick counts at the same time every day.       Measure kick counts when your baby is awake and most active. Your baby may be most active in the evening.     How to measure kick counts:  Check that your baby is awake before you measure kick counts. You can wake up your baby by lightly pushing on your belly, walking, or drinking something cold. Your healthcare provider may tell you different ways to measure kick counts. You may be told to do the following:  Use a chart or clock to keep track of the time you start and finish counting.      Sit in a chair or lie on your left side.      Place your hands on the largest part of your belly.      Count until you reach 10 kicks. Write down how much time it takes to count 10 kicks.      It may take 30 minutes to 2 hours to count 10 kicks. It should not take more than 2 hours to count 10 kicks.     Follow up with your doctor as directed:  Write down your questions so you remember to ask them during your visits.   © Copyright [employer] 2023 Information is for End User's use only and may not be sold, redistributed or otherwise used for commercial purposes.  The above information is an  only. It is not intended as  medical advice for individual conditions or treatments. Talk to your doctor, nurse or pharmacist before following any medical regimen to see if it is safe and effective for you.

## 2025-06-11 NOTE — PROGRESS NOTES
"OB/GYN  PN Visit  Antoinette Hernandez  [id number]  2025  3:37 PM  Dr. Renée Yi MD    S: 34 y.o.  35w4d here for PN visit.       Chief Complaint   Patient presents with    Routine Prenatal Visit     35w4d.          OB complaints:  Contractions: no  Leakage: no  Bleeding: no  Fetal movement: yes      O:  /64 (BP Location: Right arm, Patient Position: Sitting, Cuff Size: Standard)   Pulse 89   Ht 5' 9\" (1.753 m)   Wt 120 kg (264 lb 3.2 oz)   LMP 10/05/2024 (Exact Date)   SpO2 99%   BMI 39.02 kg/m²       Review of Systems   Constitutional: Negative.    HENT: Negative.     Eyes: Negative.    Respiratory: Negative.     Cardiovascular: Negative.    Gastrointestinal: Negative.    Endocrine: Negative.    Genitourinary:         As noted in HPI   Musculoskeletal: Negative.    Skin: Negative.    Allergic/Immunologic: Negative.    Neurological: Negative.    Hematological: Negative.    Psychiatric/Behavioral: Negative.           Physical Exam  Constitutional:       General: She is not in acute distress.     Appearance: Normal appearance. She is well-developed.   Abdominal:      Palpations: Abdomen is soft.      Tenderness: There is no abdominal tenderness. There is no guarding.     Neurological:      Mental Status: She is alert and oriented to person, place, and time.     Skin:     General: Skin is warm and dry.     Psychiatric:         Behavior: Behavior normal.             Pregravid Weight/BMI: 90.3 kg (199 lb) (BMI 29.37)  Current Weight: 120 kg (264 lb 3.2 oz)   Total Weight Gain: 29.6 kg (65 lb 3.2 oz)   Pre-Enoc Vitals      Flowsheet Row Most Recent Value   Prenatal Assessment    Prenatal Vitals    Blood Pressure 118/64   Weight - Scale 120 kg (264 lb 3.2 oz)   Urine Albumin/Glucose    Dilation/Effacement/Station    Vaginal Drainage    Edema              Problem List          Endocrine    Pre-existing type 1 diabetes mellitus with hyperglycemia during pregnancy in third trimester (HCC)    " Overview   Last A1c 5.0%CMP. Baseline UPC 0.2   Missed NT appointment.  -Serial fetal growths.  20 weeks detailed.  22-24 weeks fetal echo   -32 weeks, NST twice a week and VINOD weekly.   -delivery @ 39 wks    -Humalog via Medtronic 770G insulin pump, Dexcom CGM.  1st visit pump settings:  MN-4 AM@ 0.975 units/hour;  4 AM-8 AM@ 1.40 units/hour;  8 AM-2 PM@ 1.85 units/hour;  2 PM-5:30 PM@ 1.85 units/hour;  5:30 PM-MN@ 1.85 units/hour.   BG target 110 mg/dL.  Carb ratio 12  ISF 50  Active Insulin Time 3 hours.          Hypoglycemia due to type 1 diabetes mellitus (HCC)    Overview   -Humalog via Medtronic 770G insulin pump and Dexcom CGM in place.   -Due to hypoglycemia, basal settings decreased and encouraged not to stack insulin.  -Uses 15 by 15 rule to treat hypoglycemia.             Behavioral Health    History of postpartum depression    Overview   H/o postpartum depression with first and second children             Obstetrics/Gynecology    35 weeks gestation of pregnancy    Supervision of high risk pregnancy in third trimester    Overview   Declines flu vaccine  Declines aneuploidy screening. msAFP low risk   MFM recommending repeat urine culture with 28 wk labs - no growth   Declines Tdap  Delivery consent signed          Previous pregnancy complicated by pregnancy-induced hypertension, antepartum, third trimester       Other    Insulin pump titration    BMI 38.0-38.9,adult    Overview   -Pre-pregnancy weight 187 lbs. BMI 27.62.  -Current weight 260 lbs.   -TWG 73 lbs.  -Recommended weight gain 15 to 25 lbs.  -Continue GDM meal plan.          Fetal anomaly suspected but not found    Overview   We discussed that a high velocity across the aortic arch can be a sign of  narrowing of the aortic arch called a coarct that does not cause problems  during pregnancy but could cause problems after birth.  Review of the  blood vessels in grayscale and color though show no evidence for narrowing  of the aortic arch that we  can see again suggesting that this is a false  positive result.  - repeat US 4 weeks            Pregnancy: Alfaro  Support person: Jesús     Delivery Plans  Planned delivery method: Vaginal  Planned delivery location: [facility]  Acceptable blood products: All     Post-Delivery Plans  Feeding intentions: Breast Milk      NST done today  GBS next visit            Future Appointments   Date Time Provider Department Center   2025  3:15 PM  [facility]   2025  9:15 AM Renée Yi MD [facility]   2025  3:15 PM  [facility]   2025  9:00 AM Savanna Hammond MD [facility]   2025  8:45 AM  [facility]   2025  2:30 PM Savanna Hammond MD [facility]   7/3/2025  1:30 PM  [facility]   2025  8:30 AM SA[facility] [place] [facility]   2025  3:15 PM  [facility]               Renée Yi MD  2025  3:37 PM

## 2025-06-13 ENCOUNTER — DOCUMENTATION (OUTPATIENT)
Facility: HOSPITAL | Age: 35
End: 2025-06-13

## 2025-06-13 ENCOUNTER — ANCILLARY PROCEDURE (OUTPATIENT)
Dept: PERINATAL CARE | Facility: OTHER | Age: 35
End: 2025-06-13
Attending: PHYSICIAN ASSISTANT
Payer: COMMERCIAL

## 2025-06-13 ENCOUNTER — ULTRASOUND (OUTPATIENT)
Dept: PERINATAL CARE | Facility: OTHER | Age: 35
End: 2025-06-13
Payer: COMMERCIAL

## 2025-06-13 VITALS
HEART RATE: 91 BPM | WEIGHT: 263.2 LBS | BODY MASS INDEX: 38.98 KG/M2 | HEIGHT: 69 IN | SYSTOLIC BLOOD PRESSURE: 106 MMHG | DIASTOLIC BLOOD PRESSURE: 56 MMHG

## 2025-06-13 DIAGNOSIS — Z3A.35 35 WEEKS GESTATION OF PREGNANCY: ICD-10-CM

## 2025-06-13 DIAGNOSIS — O24.013 PRE-EXISTING TYPE 1 DIABETES MELLITUS WITH HYPERGLYCEMIA DURING PREGNANCY IN THIRD TRIMESTER (HCC): Primary | ICD-10-CM

## 2025-06-13 DIAGNOSIS — E10.65 PRE-EXISTING TYPE 1 DIABETES MELLITUS WITH HYPERGLYCEMIA DURING PREGNANCY IN THIRD TRIMESTER (HCC): Primary | ICD-10-CM

## 2025-06-13 PROCEDURE — 59025 FETAL NON-STRESS TEST: CPT | Performed by: PHYSICIAN ASSISTANT

## 2025-06-13 PROCEDURE — 76815 OB US LIMITED FETUS(S): CPT | Performed by: PHYSICIAN ASSISTANT

## 2025-06-13 NOTE — ASSESSMENT & PLAN NOTE
-A1c 5.2% normal.  -Humalog via Medtronic 770G insulin pump with Dexcom G7 CGM.  -Due to hypoglycemia, insulin pump adjustments made on 6/5/2025 during virtual visit.   -No insulin pump data noted since 6/7/2025 early morning.  -Per Dexcom report overnight hypoglycemia did improve with <60 mg/dL down to 7% vs 16%. TIR 71% BG  mg/dL.  -Refer to attached file for Dexcom CGM report.   -NST twice a week and VINOD weekly.  Lab Results   Component Value Date    HGBA1C 5.2 06/04/2025

## 2025-06-13 NOTE — PROGRESS NOTES
"[facility]:     Antoinette Hernandez was seen today at 35w6d gestational age for NST (found under the pregnancy episode). NST was reactive. I reviewed the RN assessment and agree. She was also seen for VINOD. Please see ultrasound report under the \"imaging\" tab.     She denies any obsetric complaints today and reports active fetal movement. We discussed the plan to continue with twice weekly NST and weekly VINOD for the indication of pre-existing type 1 diabetes.     Cherry Kitchen PA-C  "

## 2025-06-13 NOTE — PROGRESS NOTES
Problem List Items Addressed This Visit       Pre-existing type 1 diabetes mellitus with hyperglycemia during pregnancy in third trimester (HCC) - Primary    -A1c 5.2% normal.  -Humalog via Medtronic 770G insulin pump with Dexcom G7 CGM.  -Due to hypoglycemia, insulin pump adjustments made on 6/5/2025 during virtual visit.   -No insulin pump data noted since 6/7/2025 early morning.  -Per Dexcom report overnight hypoglycemia did improve with <60 mg/dL down to 7% vs 16%. TIR 71% BG  mg/dL.  -Refer to attached file for Dexcom CGM report.   -NST twice a week and VINOD weekly.  Lab Results   Component Value Date    HGBA1C 5.2 06/04/2025            35 weeks gestation of pregnancy

## 2025-06-14 PROBLEM — Z3A.36 36 WEEKS GESTATION OF PREGNANCY: Status: ACTIVE | Noted: 2025-01-23

## 2025-06-15 NOTE — PROGRESS NOTES
"OB/GYN  PN Visit  Antoinette Hernandez  [id number]  2025  9:47 AM  Dr. Renée Yi MD    S: 34 y.o.  36w0d here for PN visit.       Chief Complaint   Patient presents with    Routine Prenatal Visit     36w3d         OB complaints:  Contractions: no  Leakage: no  Bleeding: no  Fetal movement: yes      O:  /72 (BP Location: Right arm, Patient Position: Sitting, Cuff Size: Standard)   Pulse (!) 109   Ht 5' 9\" (1.753 m)   Wt 120 kg (264 lb)   LMP 10/05/2024 (Exact Date)   SpO2 98%   BMI 38.99 kg/m²       Review of Systems   Constitutional: Negative.    HENT: Negative.     Eyes: Negative.    Respiratory: Negative.     Cardiovascular: Negative.    Gastrointestinal: Negative.    Endocrine: Negative.    Genitourinary:         As noted in HPI   Musculoskeletal: Negative.    Skin: Negative.    Allergic/Immunologic: Negative.    Neurological: Negative.    Hematological: Negative.    Psychiatric/Behavioral: Negative.           Physical Exam  Constitutional:       General: She is not in acute distress.     Appearance: Normal appearance.   Genitourinary:      Right Labia: No rash, tenderness, lesions, skin changes or Bartholin's cyst.     Left Labia: No tenderness, lesions, skin changes, Bartholin's cyst or rash.     No labial fusion noted.      No inguinal adenopathy present in the right or left side.     Pelvic exam was performed with patient in the lithotomy position.   HENT:      Head: Normocephalic.     Cardiovascular:      Rate and Rhythm: Normal rate.   Pulmonary:      Effort: Pulmonary effort is normal.   Abdominal:      General: There is no distension.      Palpations: Abdomen is soft.      Tenderness: There is no abdominal tenderness. There is no guarding.      Hernia: There is no hernia in the left inguinal area or right inguinal area.     Musculoskeletal:         General: No swelling or deformity.   Lymphadenopathy:      Lower Body: No right inguinal adenopathy. No left inguinal adenopathy. "     Neurological:      General: No focal deficit present.      Mental Status: She is alert and oriented to person, place, and time.     Skin:     General: Skin is warm and dry.     Psychiatric:         Mood and Affect: Mood normal.         Behavior: Behavior normal.   Vitals reviewed.             Pregravid Weight/BMI: 90.3 kg (199 lb) (BMI 29.37)  Current Weight: 120 kg (264 lb)   Total Weight Gain: 29.5 kg (65 lb)   Pre- Vitals      Flowsheet Row Most Recent Value   Prenatal Assessment    Fetal Heart Rate 152   Fundal Height (cm) 36 cm   Movement Present   Presentation Vertex   Prenatal Vitals    Blood Pressure 130/72   Weight - Scale 120 kg (264 lb)   Urine Albumin/Glucose    Dilation/Effacement/Station    Cervical Dilation .5   Cervical Effacement 50   Fetal Station -3   Vaginal Drainage    Edema              Problem List          Endocrine    Pre-existing type 1 diabetes mellitus with hyperglycemia during pregnancy in third trimester (HCC)    Overview   Last A1c 5.0%CMP. Baseline UPC 0.2   Missed NT appointment.  -Serial fetal growths.  20 weeks detailed.  22-24 weeks fetal echo   -32 weeks, NST twice a week and VINOD weekly.   -delivery @ 39 wks    -Humalog via Medtronic 770G insulin pump, Dexcom CGM.  1st visit pump settings:  MN-4 AM@ 0.975 units/hour;  4 AM-8 AM@ 1.40 units/hour;  8 AM-2 PM@ 1.85 units/hour;  2 PM-5:30 PM@ 1.85 units/hour;  5:30 PM-MN@ 1.85 units/hour.   BG target 110 mg/dL.  Carb ratio 12  ISF 50  Active Insulin Time 3 hours.          Hypoglycemia due to type 1 diabetes mellitus (HCC)    Overview   -Humalog via Medtronic 770G insulin pump and Dexcom CGM in place.   -Due to hypoglycemia, basal settings decreased and encouraged not to stack insulin.  -Uses 15 by 15 rule to treat hypoglycemia.             Behavioral Health    History of postpartum depression    Overview   H/o postpartum depression with first and second children             Obstetrics/Gynecology    36 weeks gestation of  pregnancy    Supervision of high risk pregnancy in third trimester    Overview   Declines flu vaccine  Declines aneuploidy screening. msAFP low risk   MFM recommending repeat urine culture with 28 wk labs - no growth   Declines Tdap  Delivery consent signed          Previous pregnancy complicated by pregnancy-induced hypertension, antepartum, third trimester       Other    Insulin pump titration    BMI 38.0-38.9,adult    Overview   -Pre-pregnancy weight 187 lbs. BMI 27.62.  -Current weight 260 lbs.   -TWG 73 lbs.  -Recommended weight gain 15 to 25 lbs.  -Continue GDM meal plan.          Fetal anomaly suspected but not found    Overview   We discussed that a high velocity across the aortic arch can be a sign of  narrowing of the aortic arch called a coarct that does not cause problems  during pregnancy but could cause problems after birth.  Review of the  blood vessels in grayscale and color though show no evidence for narrowing  of the aortic arch that we can see again suggesting that this is a false  positive result.  - repeat US 4 weeks           Other Visit Diagnoses         Encounter for  screening for Streptococcus B               GBS was collected.  No known drug allergies.  Follow-up for routine visits including biweekly NST and weekly VINOD.    NST performed today    Future Appointments   Date Time Provider Department Center   2025  3:15 PM  NURSE [facility]   2025  9:00 AM Savanna Hammond MD Complete  [facility]   2025  8:45 AM  NURSE [facility] 2 BE PER[facility]   2025  2:30 PM Savanna Hammond MD Complete  Practice[facility]   7/3/2025  1:30 PM  [facility]SE [facility]   [facility]   2025  8:30 AM SAUL Tru[facility]   2025  3:15 PM  NURSE [facility]               Renée Yi MD  2025  9:47 AM

## 2025-06-17 ENCOUNTER — ROUTINE PRENATAL (OUTPATIENT)
Dept: OBGYN CLINIC | Facility: CLINIC | Age: 35
End: 2025-06-17
Payer: COMMERCIAL

## 2025-06-17 VITALS
HEART RATE: 109 BPM | DIASTOLIC BLOOD PRESSURE: 70 MMHG | BODY MASS INDEX: 39.1 KG/M2 | OXYGEN SATURATION: 98 % | SYSTOLIC BLOOD PRESSURE: 130 MMHG | HEIGHT: 69 IN | WEIGHT: 264 LBS

## 2025-06-17 DIAGNOSIS — Z03.73 FETAL ANOMALY SUSPECTED BUT NOT FOUND: ICD-10-CM

## 2025-06-17 DIAGNOSIS — Z3A.36 36 WEEKS GESTATION OF PREGNANCY: ICD-10-CM

## 2025-06-17 DIAGNOSIS — O09.893 PREVIOUS PREGNANCY COMPLICATED BY PREGNANCY-INDUCED HYPERTENSION, ANTEPARTUM, THIRD TRIMESTER: ICD-10-CM

## 2025-06-17 DIAGNOSIS — E10.65 PRE-EXISTING TYPE 1 DIABETES MELLITUS WITH HYPERGLYCEMIA DURING PREGNANCY IN THIRD TRIMESTER (HCC): ICD-10-CM

## 2025-06-17 DIAGNOSIS — O09.93 SUPERVISION OF HIGH RISK PREGNANCY IN THIRD TRIMESTER: Primary | ICD-10-CM

## 2025-06-17 DIAGNOSIS — E10.649 HYPOGLYCEMIA DUE TO TYPE 1 DIABETES MELLITUS (HCC): ICD-10-CM

## 2025-06-17 DIAGNOSIS — O24.013 PRE-EXISTING TYPE 1 DIABETES MELLITUS WITH HYPERGLYCEMIA DURING PREGNANCY IN THIRD TRIMESTER (HCC): ICD-10-CM

## 2025-06-17 DIAGNOSIS — Z36.85 ENCOUNTER FOR ANTENATAL SCREENING FOR STREPTOCOCCUS B: ICD-10-CM

## 2025-06-17 PROCEDURE — PNV: Performed by: OBSTETRICS & GYNECOLOGY

## 2025-06-17 PROCEDURE — 87150 DNA/RNA AMPLIFIED PROBE: CPT | Performed by: OBSTETRICS & GYNECOLOGY

## 2025-06-17 PROCEDURE — 59025 FETAL NON-STRESS TEST: CPT | Performed by: OBSTETRICS & GYNECOLOGY

## 2025-06-18 NOTE — PATIENT INSTRUCTIONS
Thank you for choosing us for your  care today.  If you have any questions about your ultrasound or care, please do not hesitate to contact us or your primary obstetrician.        Some general instructions for your pregnancy are:    Exercise: Aim for 150 minutes per week of regular exercise.  Walking is great!  Nutrition: Choose healthy sources of calcium, iron, and protein.  Avoid ultraprocessed foods and added sugar.  Learn about Preeclampsia: preeclampsia is a common, potentially serious high blood pressure complication in pregnancy.  A blood pressure of 140mmHg (systolic or top number) or 90mmHg (diastolic or bottom number) should be evaluated by your doctor.  Aspirin is sometimes prescribed in early pregnancy to prevent preeclampsia in women with risk factors - ask your obstetrician if you should be on this medication.  For more resources, visit:  https://www.highriskpregnancyinfo.org/preeclampsia  If you smoke, please try to quit completely but also try to reduce your smoking by as much as possible (as soon as possible).  Do not vape.  Please also avoid cannabis products.  Other warning signs to watch out for in pregnancy or postpartum: chest pain, obstructed breathing or shortness of breath, seizures, thoughts of hurting yourself or your baby, bleeding, a painful or swollen leg, fever, or headache (see [facility] Sign[facility]).  If these happen call 911.  Itching is also not normal in pregnancy and if you experience this, especially over your hands and feet, potentially worse at night, notify your doctors.     Kick Counts in Pregnancy   AMBULATORY CARE:   Kick counts  measure how much your baby is moving in your womb. A kick from your baby can be felt as a twist, turn, swish, roll, or jab. It is common to feel your baby kicking at 26 to 28 weeks of pregnancy. You may feel your baby kick as early as 20 weeks of pregnancy. You may want to start counting at 28 weeks.   Contact your  doctor immediately if:   You feel a change in the number of kicks or movements of your baby.      You feel fewer than 10 kicks within 2 hours.      You have questions or concerns about your baby's movements.     Why measure kick counts:  Your baby's movement may provide information about your baby's health. He or she may move less, or not at all, if there are problems. Your baby may move less if he or she is not getting enough oxygen or nutrition from the placenta. Do not smoke while you are pregnant. Smoking decreases the amount of oxygen that gets to your baby. Talk to your healthcare provider if you need help to quit smoking. Tell your healthcare provider as soon as you feel a change in your baby's movements.  When to measure kick counts:   Measure kick counts at the same time every day.       Measure kick counts when your baby is awake and most active. Your baby may be most active in the evening.     How to measure kick counts:  Check that your baby is awake before you measure kick counts. You can wake up your baby by lightly pushing on your belly, walking, or drinking something cold. Your healthcare provider may tell you different ways to measure kick counts. You may be told to do the following:  Use a chart or clock to keep track of the time you start and finish counting.      Sit in a chair or lie on your left side.      Place your hands on the largest part of your belly.      Count until you reach 10 kicks. Write down how much time it takes to count 10 kicks.      It may take 30 minutes to 2 hours to count 10 kicks. It should not take more than 2 hours to count 10 kicks.     Follow up with your doctor as directed:  Write down your questions so you remember to ask them during your visits.   © Copyright [employer] 2023 Information is for End User's use only and may not be sold, redistributed or otherwise used for commercial purposes.  The above information is an  only. It is not intended as  medical advice for individual conditions or treatments. Talk to your doctor, nurse or pharmacist before following any medical regimen to see if it is safe and effective for you.

## 2025-06-19 ENCOUNTER — ULTRASOUND (OUTPATIENT)
Dept: PERINATAL CARE | Facility: OTHER | Age: 35
End: 2025-06-19
Payer: COMMERCIAL

## 2025-06-19 ENCOUNTER — ANCILLARY PROCEDURE (OUTPATIENT)
Dept: PERINATAL CARE | Facility: OTHER | Age: 35
End: 2025-06-19
Attending: PHYSICIAN ASSISTANT
Payer: COMMERCIAL

## 2025-06-19 VITALS
WEIGHT: 264 LBS | SYSTOLIC BLOOD PRESSURE: 112 MMHG | DIASTOLIC BLOOD PRESSURE: 58 MMHG | BODY MASS INDEX: 39.1 KG/M2 | HEIGHT: 69 IN | HEART RATE: 97 BPM

## 2025-06-19 DIAGNOSIS — O24.013 PRE-EXISTING TYPE 1 DIABETES MELLITUS WITH HYPERGLYCEMIA DURING PREGNANCY IN THIRD TRIMESTER (HCC): Primary | ICD-10-CM

## 2025-06-19 DIAGNOSIS — Z33.1 NORMAL PREGNANCY, INCIDENTAL: ICD-10-CM

## 2025-06-19 DIAGNOSIS — E10.65 PRE-EXISTING TYPE 1 DIABETES MELLITUS WITH HYPERGLYCEMIA DURING PREGNANCY IN THIRD TRIMESTER (HCC): Primary | ICD-10-CM

## 2025-06-19 DIAGNOSIS — Z3A.36 36 WEEKS GESTATION OF PREGNANCY: ICD-10-CM

## 2025-06-19 LAB — GP B STREP DNA SPEC QL NAA+PROBE: POSITIVE

## 2025-06-19 PROCEDURE — 59025 FETAL NON-STRESS TEST: CPT | Performed by: OBSTETRICS & GYNECOLOGY

## 2025-06-19 PROCEDURE — 76815 OB US LIMITED FETUS(S): CPT | Performed by: OBSTETRICS & GYNECOLOGY

## 2025-06-19 NOTE — PROGRESS NOTES
Repeat Non-Stress Testing:    Patient verbalizes +FM. Pt denies ALL:               Leaking of fluid   Contractions   Vaginal bleeding   Decreased fetal movement    Patient is performing daily kick counts. Patient has no questions or concerns.   NST strip reviewed by Dr. London in-person.  Reminded Antoinette to down load her pump readings for the Diabetes Team.

## 2025-06-20 ENCOUNTER — RESULTS FOLLOW-UP (OUTPATIENT)
Age: 35
End: 2025-06-20

## 2025-06-20 ENCOUNTER — RESULTS FOLLOW-UP (OUTPATIENT)
Dept: LABOR AND DELIVERY | Facility: HOSPITAL | Age: 35
End: 2025-06-20

## 2025-06-25 ENCOUNTER — ROUTINE PRENATAL (OUTPATIENT)
Dept: OBGYN CLINIC | Facility: CLINIC | Age: 35
End: 2025-06-25
Payer: COMMERCIAL

## 2025-06-25 VITALS
SYSTOLIC BLOOD PRESSURE: 118 MMHG | OXYGEN SATURATION: 99 % | BODY MASS INDEX: 39.25 KG/M2 | DIASTOLIC BLOOD PRESSURE: 64 MMHG | WEIGHT: 265 LBS | HEIGHT: 69 IN | HEART RATE: 98 BPM

## 2025-06-25 DIAGNOSIS — O24.013 PRE-EXISTING TYPE 1 DIABETES MELLITUS WITH HYPERGLYCEMIA DURING PREGNANCY IN THIRD TRIMESTER (HCC): ICD-10-CM

## 2025-06-25 DIAGNOSIS — Z3A.37 37 WEEKS GESTATION OF PREGNANCY: Primary | ICD-10-CM

## 2025-06-25 DIAGNOSIS — O09.893 PREVIOUS PREGNANCY COMPLICATED BY PREGNANCY-INDUCED HYPERTENSION, ANTEPARTUM, THIRD TRIMESTER: ICD-10-CM

## 2025-06-25 DIAGNOSIS — E10.65 PRE-EXISTING TYPE 1 DIABETES MELLITUS WITH HYPERGLYCEMIA DURING PREGNANCY IN THIRD TRIMESTER (HCC): ICD-10-CM

## 2025-06-25 DIAGNOSIS — E10.649 HYPOGLYCEMIA DUE TO TYPE 1 DIABETES MELLITUS (HCC): ICD-10-CM

## 2025-06-25 DIAGNOSIS — Z03.73 FETAL ANOMALY SUSPECTED BUT NOT FOUND: ICD-10-CM

## 2025-06-25 DIAGNOSIS — O09.93 SUPERVISION OF HIGH RISK PREGNANCY IN THIRD TRIMESTER: ICD-10-CM

## 2025-06-25 DIAGNOSIS — O99.820 GBS (GROUP B STREPTOCOCCUS CARRIER), +RV CULTURE, CURRENTLY PREGNANT: ICD-10-CM

## 2025-06-25 PROCEDURE — 59025 FETAL NON-STRESS TEST: CPT | Performed by: OBSTETRICS & GYNECOLOGY

## 2025-06-25 PROCEDURE — PNV: Performed by: OBSTETRICS & GYNECOLOGY

## 2025-06-25 NOTE — PROGRESS NOTES
"    S: 34 y.o.  37w4d here for routine prenatal visit.        Chief Complaint   Patient presents with    Routine Prenatal Visit     37w4d. No concerns, would like to be checked for dilation after NST.          OB complaints:  Contractions: no  Leakage: no  Bleeding: no  Fetal movement: yes      O:  /64 (BP Location: Right arm, Patient Position: Sitting, Cuff Size: Standard)   Pulse 98   Ht 5' 9\" (1.753 m)   Wt 120 kg (265 lb)   LMP 10/05/2024 (Exact Date)   SpO2 99%   BMI 39.13 kg/m²       Review of Systems   Constitutional:  Negative for chills and fever.   Eyes:  Negative for visual disturbance.   Respiratory:  Negative for chest tightness and shortness of breath.    Cardiovascular:  Negative for chest pain.   Gastrointestinal:  Negative for abdominal pain, diarrhea, nausea and vomiting.   Genitourinary:  Negative for pelvic pain and vaginal bleeding.        As noted in HPI   Skin:  Negative for rash.   Neurological:  Negative for headaches.   All other systems reviewed and are negative.        Physical Exam  Constitutional:       General: She is not in acute distress.     Appearance: Normal appearance.   HENT:      Head: Normocephalic and atraumatic.     Cardiovascular:      Rate and Rhythm: Normal rate.   Pulmonary:      Effort: Pulmonary effort is normal. No respiratory distress.   Abdominal:      General: There is no distension.      Palpations: Abdomen is soft.      Tenderness: There is no abdominal tenderness. There is no guarding or rebound.      Comments: gravid     Neurological:      General: No focal deficit present.      Mental Status: She is alert.     Psychiatric:         Mood and Affect: Mood normal.         Behavior: Behavior normal.   Vitals and nursing note reviewed.              Fetal Heart Rate: NST    Pregravid Weight/BMI: 90.3 kg (199 lb) (BMI 29.37)  Current Weight: 120 kg (265 lb)   Total Weight Gain: 29.9 kg (66 lb)     Pre-Enoc Vitals      Flowsheet Row Most Recent " Value   Prenatal Assessment    Fetal Heart Rate NST   Movement Present   Prenatal Vitals    Blood Pressure 118/64   Weight - Scale 120 kg (265 lb)   Urine Albumin/Glucose    Dilation/Effacement/Station    Vaginal Drainage    Edema               NST Completed today:  Baseline: 125 BPM  Variability: Moderate  Accelerations: Yes  Decelerations: None  Contractions: Not present  Nonstress Test Interpretation: Reactive          Problem List       History of postpartum depression    Overview   H/o postpartum depression with first and second children          Insulin pump titration    Pre-existing type 1 diabetes mellitus with hyperglycemia during pregnancy in third trimester (HCC)    Overview   Last A1c 5.0%CMP. Baseline UPC 0.2   Missed NT appointment.  -Serial fetal growths.  20 weeks detailed.  22-24 weeks fetal echo   -32 weeks, NST twice a week and VINOD weekly.   -delivery @ 39 wks    -Humalog via Medtronic 770G insulin pump, Dexcom CGM.  1st visit pump settings:  MN-4 AM@ 0.975 units/hour;  4 AM-8 AM@ 1.40 units/hour;  8 AM-2 PM@ 1.85 units/hour;  2 PM-5:30 PM@ 1.85 units/hour;  5:30 PM-MN@ 1.85 units/hour.   BG target 110 mg/dL.  Carb ratio 12  ISF 50  Active Insulin Time 3 hours.          37 weeks gestation of pregnancy    BMI 38.0-38.9,adult    Overview   -Pre-pregnancy weight 187 lbs. BMI 27.62.  -Current weight 260 lbs.   -TWG 73 lbs.  -Recommended weight gain 15 to 25 lbs.  -Continue GDM meal plan.          Hypoglycemia due to type 1 diabetes mellitus (HCC)    Overview   -Humalog via Medtronic 770G insulin pump and Dexcom CGM in place.   -Due to hypoglycemia, basal settings decreased and encouraged not to stack insulin.  -Uses 15 by 15 rule to treat hypoglycemia.          Supervision of high risk pregnancy in third trimester    Overview   Declines flu vaccine  Declines aneuploidy screening. msAFP low risk   MFM recommending repeat urine culture with 28 wk labs - no growth   Declines Tdap  Delivery consent signed           Current Assessment & Plan   NST today reactive/reassuring   Has NST/VINOD with MFM later this week  Discussed recommendation for 39 week IOL - scheduled. Consent reviewed and signed  Prefers to defer SVE to next visit   OB precautions reviewed           Previous pregnancy complicated by pregnancy-induced hypertension, antepartum, third trimester    Fetal anomaly suspected but not found    Overview   We discussed that a high velocity across the aortic arch can be a sign of  narrowing of the aortic arch called a coarct that does not cause problems  during pregnancy but could cause problems after birth.  Review of the  blood vessels in grayscale and color though show no evidence for narrowing  of the aortic arch that we can see again suggesting that this is a false  positive result.  - repeat US 4 weeks          GBS (group B Streptococcus carrier), +RV culture, currently pregnant                         Savanna Hammond MD  6/25/2025  9:17 AM

## 2025-06-25 NOTE — ASSESSMENT & PLAN NOTE
NST today reactive/reassuring   Has NST/VINOD with MFM later this week  Discussed recommendation for 39 week IOL - scheduled. Consent reviewed and signed  Prefers to defer SVE to next visit   OB precautions reviewed

## 2025-06-26 NOTE — PATIENT INSTRUCTIONS
Kick Counts in Pregnancy   AMBULATORY CARE:   Kick counts  measure how much your baby is moving in your womb. A kick from your baby can be felt as a twist, turn, swish, roll, or jab. It is common to feel your baby kicking at 26 to 28 weeks of pregnancy. You may feel your baby kick as early as 20 weeks of pregnancy. You may want to start counting at 28 weeks.   Contact your doctor immediately if:   You feel a change in the number of kicks or movements of your baby.      You feel fewer than 10 kicks within 2 hours.      You have questions or concerns about your baby's movements.     Why measure kick counts:  Your baby's movement may provide information about your baby's health. He or she may move less, or not at all, if there are problems. Your baby may move less if he or she is not getting enough oxygen or nutrition from the placenta. Do not smoke while you are pregnant. Smoking decreases the amount of oxygen that gets to your baby. Talk to your healthcare provider if you need help to quit smoking. Tell your healthcare provider as soon as you feel a change in your baby's movements.  When to measure kick counts:   Measure kick counts at the same time every day.       Measure kick counts when your baby is awake and most active. Your baby may be most active in the evening.     How to measure kick counts:  Check that your baby is awake before you measure kick counts. You can wake up your baby by lightly pushing on your belly, walking, or drinking something cold. Your healthcare provider may tell you different ways to measure kick counts. You may be told to do the following:  Use a chart or clock to keep track of the time you start and finish counting.      Sit in a chair or lie on your left side.      Place your hands on the largest part of your belly.      Count until you reach 10 kicks. Write down how much time it takes to count 10 kicks.      It may take 30 minutes to 2 hours to count 10 kicks. It should not take more  than 2 hours to count 10 kicks.     Follow up with your doctor as directed:  Write down your questions so you remember to ask them during your visits.   ©  [employer]2023 Information is for End User's use only and may not be sold, redistributed or otherwise used for commercial purposes.  The above information is an  only. It is not intended as medical advice for individual conditions or treatments. Talk to your doctor, nurse or pharmacist before following any medical regimen to see if it is safe and effective for you. Thank you for choosing us for your  care today.  If you have any questions about your ultrasound or care, please do not hesitate to contact us or your primary obstetrician.        Some general instructions for your pregnancy are:    Exercise: Aim for 22 minutes per day (150 minutes per week) of regular exercise.  Walking is great!  Nutrition: Choose healthy sources of calcium, iron, and protein.  Learn about Preeclampsia: preeclampsia is a common, potentially serious high blood pressure complication in pregnancy.  A blood pressure of 140mmHg (systolic or top number) or 90mmHg (diastolic or bottom number) should be evaluated by your doctor.  Aspirin is sometimes prescribed in early pregnancy to prevent preeclampsia in women with risk factors - ask your obstetrician if you should be on this medication.  For more resources, visit:  https://www.highriskpregnancyinfo.org/preeclampsia  If you smoke, please try to quit completely but also try to reduce your smoking by as much as possible (as soon as possible).  Do not vape.  Please also avoid cannabis products.  Other warning signs to watch out for in pregnancy or postpartum: chest pain, obstructed breathing or shortness of breath, seizures, thoughts of hurting yourself or your baby, bleeding, a painful or swollen leg, fever, or headache (see [facility] POST-BIRTH Warning Signs campaign).  If these happen call 911.  Itching is also  not normal in pregnancy and if you experience this, especially over your hands and feet, potentially worse at night, notify your doctors.

## 2025-06-27 ENCOUNTER — ULTRASOUND (OUTPATIENT)
Dept: PERINATAL CARE | Facility: OTHER | Age: 35
End: 2025-06-27
Payer: COMMERCIAL

## 2025-06-27 ENCOUNTER — ANCILLARY PROCEDURE (OUTPATIENT)
Dept: PERINATAL CARE | Facility: OTHER | Age: 35
End: 2025-06-27
Attending: PHYSICIAN ASSISTANT
Payer: COMMERCIAL

## 2025-06-27 VITALS
HEART RATE: 98 BPM | BODY MASS INDEX: 39.37 KG/M2 | DIASTOLIC BLOOD PRESSURE: 60 MMHG | SYSTOLIC BLOOD PRESSURE: 122 MMHG | WEIGHT: 265.8 LBS | HEIGHT: 69 IN

## 2025-06-27 DIAGNOSIS — Z3A.37 37 WEEKS GESTATION OF PREGNANCY: ICD-10-CM

## 2025-06-27 DIAGNOSIS — O24.013 PRE-EXISTING TYPE 1 DIABETES MELLITUS WITH HYPERGLYCEMIA DURING PREGNANCY IN THIRD TRIMESTER (HCC): Primary | ICD-10-CM

## 2025-06-27 DIAGNOSIS — E10.65 PRE-EXISTING TYPE 1 DIABETES MELLITUS WITH HYPERGLYCEMIA DURING PREGNANCY IN THIRD TRIMESTER (HCC): Primary | ICD-10-CM

## 2025-06-27 PROCEDURE — 76815 OB US LIMITED FETUS(S): CPT | Performed by: NURSE PRACTITIONER

## 2025-06-27 PROCEDURE — 59025 FETAL NON-STRESS TEST: CPT | Performed by: NURSE PRACTITIONER

## 2025-06-27 PROCEDURE — 99212 OFFICE O/P EST SF 10 MIN: CPT | Performed by: NURSE PRACTITIONER

## 2025-06-27 NOTE — PROGRESS NOTES
"[facility]:     Antoinette Hernandez was seen today at 37w6d gestational age for NST (found under the pregnancy episode). NST was reactive. I reviewed the RN assessment and agree. She was also seen for VINOD. Please see ultrasound report under the \"imaging\" tab.     .Pre-existing type 1 diabetes mellitus with hyperglycemia during pregnancy in third trimester (HCC)  She is scheduled for an induction of labor on 7/10/25 at 39w5d.   Lab Results   Component Value Date    HGBA1C 5.2 2025        She denies any obsetric complaints today and reports active fetal movement. We discussed the plan to continue with twice weekly NST and weekly VINOD for the indication of pre-existing type I diabetes.     Silva LARA    I spent 10 minutes devoted to patient care (3 minutes of chart preparation, 4 minutes face to face, and 3 minutes documenting)   "

## 2025-06-27 NOTE — ASSESSMENT & PLAN NOTE
She is scheduled for an induction of labor on 7/10/25 at 39w5d.   Lab Results   Component Value Date    HGBA1C 5.2 06/04/2025

## 2025-06-30 ENCOUNTER — ROUTINE PRENATAL (OUTPATIENT)
Dept: OBGYN CLINIC | Facility: CLINIC | Age: 35
End: 2025-06-30
Payer: COMMERCIAL

## 2025-06-30 VITALS
OXYGEN SATURATION: 98 % | HEART RATE: 97 BPM | DIASTOLIC BLOOD PRESSURE: 72 MMHG | HEIGHT: 69 IN | SYSTOLIC BLOOD PRESSURE: 122 MMHG | BODY MASS INDEX: 39.25 KG/M2

## 2025-06-30 DIAGNOSIS — O09.93 SUPERVISION OF HIGH RISK PREGNANCY IN THIRD TRIMESTER: Primary | ICD-10-CM

## 2025-06-30 PROBLEM — Z3A.38 38 WEEKS GESTATION OF PREGNANCY: Status: ACTIVE | Noted: 2025-01-23

## 2025-06-30 PROCEDURE — 59025 FETAL NON-STRESS TEST: CPT | Performed by: OBSTETRICS & GYNECOLOGY

## 2025-06-30 PROCEDURE — PNV: Performed by: OBSTETRICS & GYNECOLOGY

## 2025-06-30 NOTE — PROGRESS NOTES
"    S: 34 y.o.  38w2d here for routine prenatal visit.        Chief Complaint   Patient presents with    Routine Prenatal Visit     38wks NST         OB complaints:  Contractions: no  Leakage: no  Bleeding: no  Fetal movement: yes      O:  /72 (BP Location: Right arm, Patient Position: Sitting, Cuff Size: Standard)   Pulse 97   Ht 5' 9\" (1.753 m)   LMP 10/05/2024 (Exact Date)   SpO2 98%   BMI 39.25 kg/m²       Review of Systems   Constitutional:  Negative for chills and fever.   Eyes:  Negative for visual disturbance.   Respiratory:  Negative for chest tightness and shortness of breath.    Cardiovascular:  Negative for chest pain.   Gastrointestinal:  Negative for abdominal pain, diarrhea, nausea and vomiting.   Genitourinary:  Negative for pelvic pain and vaginal bleeding.        As noted in HPI   Skin:  Negative for rash.   Neurological:  Negative for headaches.   All other systems reviewed and are negative.        Physical Exam  Constitutional:       General: She is not in acute distress.     Appearance: Normal appearance.   Genitourinary:      Right Labia: No rash, tenderness, lesions or skin changes.     Left Labia: No tenderness, lesions, skin changes or rash.     Pelvic exam was performed with patient in the lithotomy position.   HENT:      Head: Normocephalic and atraumatic.     Cardiovascular:      Rate and Rhythm: Normal rate.   Pulmonary:      Effort: Pulmonary effort is normal. No respiratory distress.   Abdominal:      General: There is no distension.      Palpations: Abdomen is soft.      Tenderness: There is no abdominal tenderness. There is no guarding or rebound.      Comments: gravid     Neurological:      General: No focal deficit present.      Mental Status: She is alert.     Psychiatric:         Mood and Affect: Mood normal.         Behavior: Behavior normal.   Vitals and nursing note reviewed.              Fetal Heart Rate: NST    Pregravid Weight/BMI: 90.3 kg (199 lb) (BMI " 29.37)  Current Weight:     Total Weight Gain: 30.3 kg (66 lb 12.8 oz)     Pre- Vitals      Flowsheet Row Most Recent Value   Prenatal Assessment    Fetal Heart Rate NST   Movement Present   Prenatal Vitals    Blood Pressure 122/72   Urine Albumin/Glucose    Dilation/Effacement/Station    Cervical Dilation 1   Cervical Effacement 20   Fetal Station -3   Vaginal Drainage    Edema               NST Completed today:  Baseline: 125 BPM  Variability: Moderate  Accelerations: Yes  Decelerations: None  Contractions: Not present  Nonstress Test Interpretation: Reactive          Problem List       History of postpartum depression    Overview   H/o postpartum depression with first and second children          Insulin pump titration    Pre-existing type 1 diabetes mellitus with hyperglycemia during pregnancy in third trimester (HCC)    Overview   Last A1c 5.0%CMP. Baseline UPC 0.2   Missed NT appointment.  -Serial fetal growths.  20 weeks detailed.  22-24 weeks fetal echo   -32 weeks, NST twice a week and VINOD weekly.   -delivery @ 39 wks    -Humalog via Medtronic 770G insulin pump, Dexcom CGM.  1st visit pump settings:  MN-4 AM@ 0.975 units/hour;  4 AM-8 AM@ 1.40 units/hour;  8 AM-2 PM@ 1.85 units/hour;  2 PM-5:30 PM@ 1.85 units/hour;  5:30 PM-MN@ 1.85 units/hour.   BG target 110 mg/dL.  Carb ratio 12  ISF 50  Active Insulin Time 3 hours.          38 weeks gestation of pregnancy    BMI 38.0-38.9,adult    Overview   -Pre-pregnancy weight 187 lbs. BMI 27.62.  -Current weight 260 lbs.   -TWG 73 lbs.  -Recommended weight gain 15 to 25 lbs.  -Continue GDM meal plan.          Hypoglycemia due to type 1 diabetes mellitus (HCC)    Overview   -Humalog via Medtronic 770G insulin pump and Dexcom CGM in place.   -Due to hypoglycemia, basal settings decreased and encouraged not to stack insulin.  -Uses 15 by 15 rule to treat hypoglycemia.          Supervision of high risk pregnancy in third trimester    Overview   Declines flu  vaccine  Declines aneuploidy screening. msAFP low risk   MFM recommending repeat urine culture with 28 wk labs - no growth   Declines Tdap  Delivery consent signed          Current Assessment & Plan   NST today reactive/reassuring  - has NST/VINOD later this week with MFM   IOL is scheduled   SVE as above   OB precautions reviewed         Previous pregnancy complicated by pregnancy-induced hypertension, antepartum, third trimester    Fetal anomaly suspected but not found    Overview   We discussed that a high velocity across the aortic arch can be a sign of  narrowing of the aortic arch called a coarct that does not cause problems  during pregnancy but could cause problems after birth.  Review of the  blood vessels in grayscale and color though show no evidence for narrowing  of the aortic arch that we can see again suggesting that this is a false  positive result.  - repeat US 4 weeks          GBS (group B Streptococcus carrier), +RV culture, currently pregnant                         Savanna Hammond MD  6/30/2025  3:09 PM

## 2025-06-30 NOTE — ASSESSMENT & PLAN NOTE
NST today reactive/reassuring  - has NST/VINOD later this week with MFM   IOL is scheduled   SVE as above   OB precautions reviewed

## 2025-07-03 ENCOUNTER — ULTRASOUND (OUTPATIENT)
Age: 35
End: 2025-07-03
Attending: OBSTETRICS & GYNECOLOGY
Payer: COMMERCIAL

## 2025-07-03 ENCOUNTER — ANCILLARY PROCEDURE (OUTPATIENT)
Age: 35
End: 2025-07-03
Attending: OBSTETRICS & GYNECOLOGY
Payer: COMMERCIAL

## 2025-07-03 VITALS
SYSTOLIC BLOOD PRESSURE: 112 MMHG | HEART RATE: 101 BPM | HEIGHT: 69 IN | DIASTOLIC BLOOD PRESSURE: 62 MMHG | BODY MASS INDEX: 39.96 KG/M2 | WEIGHT: 269.8 LBS

## 2025-07-03 DIAGNOSIS — Z3A.38 38 WEEKS GESTATION OF PREGNANCY: ICD-10-CM

## 2025-07-03 DIAGNOSIS — E10.65 PRE-EXISTING TYPE 1 DIABETES MELLITUS WITH HYPERGLYCEMIA DURING PREGNANCY IN THIRD TRIMESTER (HCC): Primary | ICD-10-CM

## 2025-07-03 DIAGNOSIS — O24.013 PRE-EXISTING TYPE 1 DIABETES MELLITUS WITH HYPERGLYCEMIA DURING PREGNANCY IN THIRD TRIMESTER (HCC): Primary | ICD-10-CM

## 2025-07-03 PROCEDURE — 76815 OB US LIMITED FETUS(S): CPT | Performed by: PHYSICIAN ASSISTANT

## 2025-07-03 PROCEDURE — 59025 FETAL NON-STRESS TEST: CPT | Performed by: PHYSICIAN ASSISTANT

## 2025-07-07 DIAGNOSIS — O24.013 TYPE 1 DIABETES MELLITUS DURING PREGNANCY IN THIRD TRIMESTER: ICD-10-CM

## 2025-07-07 DIAGNOSIS — Z3A.33 33 WEEKS GESTATION OF PREGNANCY: ICD-10-CM

## 2025-07-07 RX ORDER — INSULIN LISPRO 100 [IU]/ML
INJECTION, SOLUTION INTRAVENOUS; SUBCUTANEOUS
Qty: 180 ML | Refills: 0 | Status: SHIPPED | OUTPATIENT
Start: 2025-07-07 | End: 2025-08-18

## 2025-07-08 ENCOUNTER — ROUTINE PRENATAL (OUTPATIENT)
Age: 35
End: 2025-07-08
Payer: COMMERCIAL

## 2025-07-08 ENCOUNTER — HOSPITAL ENCOUNTER (OUTPATIENT)
Facility: HOSPITAL | Age: 35
Discharge: HOME | End: 2025-07-08
Attending: OBSTETRICS & GYNECOLOGY | Admitting: OBSTETRICS & GYNECOLOGY
Payer: COMMERCIAL

## 2025-07-08 VITALS
TEMPERATURE: 98.3 F | HEIGHT: 69 IN | BODY MASS INDEX: 39.92 KG/M2 | DIASTOLIC BLOOD PRESSURE: 60 MMHG | HEART RATE: 96 BPM | OXYGEN SATURATION: 98 % | SYSTOLIC BLOOD PRESSURE: 112 MMHG | WEIGHT: 269.5 LBS

## 2025-07-08 VITALS — TEMPERATURE: 98.3 F | HEART RATE: 94 BPM | DIASTOLIC BLOOD PRESSURE: 71 MMHG | SYSTOLIC BLOOD PRESSURE: 139 MMHG

## 2025-07-08 DIAGNOSIS — Z3A.39 39 WEEKS GESTATION OF PREGNANCY: ICD-10-CM

## 2025-07-08 DIAGNOSIS — O09.93 SUPERVISION OF HIGH RISK PREGNANCY IN THIRD TRIMESTER: Primary | ICD-10-CM

## 2025-07-08 DIAGNOSIS — Z03.73 FETAL ANOMALY SUSPECTED BUT NOT FOUND: ICD-10-CM

## 2025-07-08 DIAGNOSIS — E10.65 PRE-EXISTING TYPE 1 DIABETES MELLITUS WITH HYPERGLYCEMIA DURING PREGNANCY IN THIRD TRIMESTER (HCC): ICD-10-CM

## 2025-07-08 DIAGNOSIS — O24.013 PRE-EXISTING TYPE 1 DIABETES MELLITUS WITH HYPERGLYCEMIA DURING PREGNANCY IN THIRD TRIMESTER (HCC): ICD-10-CM

## 2025-07-08 DIAGNOSIS — E10.649 HYPOGLYCEMIA DUE TO TYPE 1 DIABETES MELLITUS (HCC): ICD-10-CM

## 2025-07-08 DIAGNOSIS — O99.820 GBS (GROUP B STREPTOCOCCUS CARRIER), +RV CULTURE, CURRENTLY PREGNANT: ICD-10-CM

## 2025-07-08 DIAGNOSIS — O09.893 PREVIOUS PREGNANCY COMPLICATED BY PREGNANCY-INDUCED HYPERTENSION, ANTEPARTUM, THIRD TRIMESTER: ICD-10-CM

## 2025-07-08 PROCEDURE — NC001 PR NO CHARGE: Performed by: OBSTETRICS & GYNECOLOGY

## 2025-07-08 PROCEDURE — PNV: Performed by: PHYSICIAN ASSISTANT

## 2025-07-08 PROCEDURE — 59025 FETAL NON-STRESS TEST: CPT | Performed by: PHYSICIAN ASSISTANT

## 2025-07-08 PROCEDURE — G0463 HOSPITAL OUTPT CLINIC VISIT: HCPCS

## 2025-07-08 PROCEDURE — 99202 OFFICE O/P NEW SF 15 MIN: CPT

## 2025-07-08 NOTE — ASSESSMENT & PLAN NOTE
Antoinette Hernandez is a 35 y.o.  at 39w3d sent in from clinic due to concern for FHR reaching 100 on NST  - NST from office reviewed and reactive throughout with baseline hitting 115bpm for a brief period and other times of HR being lower were discontinuous  - NST in triage reactive throughout with moderate variability and many accelerations, very reassuring  - Patient scheduled for induction on 6/10/25  - Reviewed return precautions of rupture of membranes, false vs true labor, decreased fetal movement, and vaginal bleeding

## 2025-07-08 NOTE — PROGRESS NOTES
Progress Note - OB/GYN   Name: Antoinette Hernandez 35 y.o. female I MRN: [id number]  Unit/Bed#: LD TRIAGE 2- I Date of Admission: 2025   Date of Service: 2025 I Hospital Day: 0     Assessment & Plan  39 weeks gestation of pregnancy  Antoinette Hernandez is a 35 y.o.  at 39w3d sent in from clinic due to concern for FHR reaching 100 on NST  - NST from office reviewed and reactive throughout with baseline hitting 115bpm for a brief period and other times of HR being lower were discontinuous  - NST in triage reactive throughout with moderate variability and many accelerations, very reassuring  - Patient scheduled for induction on 6/10/25  - Reviewed return precautions of rupture of membranes, false vs true labor, decreased fetal movement, and vaginal bleeding    Case discussed with Dr. Mathew    SUBJECTIVE:    Antoinette Hernandez 35 y.o.  at 39w3d with an Estimated Date of Delivery: 25 who presents to triage after concern for low HR reaching 100 in the office. She otherwise denies contractions, leakage of fluid, vaginal bleeding, and decreased fetal movement. She notes a cold with at home temp of 100.7 yesterday. She feels recovered from the cold today and has not taken medications aside from insulin for T1DM. She does note congestionToday she denies fever, headache, vision change, chest pain, trouble breathing, abdominal pain, and increased leg swelling.    Her current obstetrical history is significant for T1DM, obesity    Her past obstetrical history is significant for postpartum depression, history of hypertension    OBJECTIVE:    Vitals:    25 1100   BP: 139/71   Pulse: 94   Temp: 98.3 °F (36.8 °C)       ROS:  Constitutional: Positive for fever yesterday  Respiratory: Positive for congestion  Cardiovascular: Negative    Gastrointestinal: Negative    General Physical Exam:  General: Well appearing, no distress  Respiratory: Unlabored breathing  Cardiovascular: Regular rate.  Abdomen:  Soft, gravid, nontender  Fundal Height: Appropriate for gestational age.  Extremities: Warm and well perfused.  Non tender.      FETAL ASSESSMENT:  Fetal heart rate: Baseline Rate (FHR): 125 bpm  Variability: Moderate  Accelerations: 15 x 15 or greater  Decelerations: None  [place]: Contraction Frequency (minutes): 0        Antonieta Sawyer MD  7/8/2025  11:38 AM

## 2025-07-08 NOTE — PROGRESS NOTES
Patient here for prenatal visit.  She is 39w3d pregnant.     Fetal movement: yes  Bleeding: no  Contractions: no  Edema: some ankle swelling towards end of day.      + congestion / head cold X3 days.  Temp yesterday 100.7, did not take anything.  No temp elevation today. Feeling better today.        Medications Ordered Prior to Encounter[1]    Pregravid Weight/BMI: 90.3 kg (199 lb) (BMI 29.37)  Current Weight:     Total Weight Gain: 32 kg (70 lb 8 oz)          Vitals:    07/08/25 0833   BP: 112/60   Pulse: 96   Temp: 98.3 °F (36.8 °C)   SpO2: 98%       Fetal Heart Rate: 120    NST Completed today:  Baseline: 120 BPM  Variability: Moderate  Accelerations: Yes  Decelerations: None  Contractions: Not present  Nonstress Test Interpretation: Reactive    NST with multiple accelerations.   Baseline  with dips down to 115.   Several times fetal rate decreased to100 but not sustained and not >15 seconds at a time.    To L&D for extended monitoring.          Problem List          Endocrine    Pre-existing type 1 diabetes mellitus with hyperglycemia during pregnancy in third trimester (HCC)    Overview   Last A1c 5.0%CMP. Baseline UPC 0.2   Missed NT appointment.  -Serial fetal growths.  20 weeks detailed.  22-24 weeks fetal echo   -32 weeks, NST twice a week and VINOD weekly.   -delivery @ 39 wks    -Humalog via Medtronic 770G insulin pump, Dexcom CGM.  1st visit pump settings:  MN-4 AM@ 0.975 units/hour;  4 AM-8 AM@ 1.40 units/hour;  8 AM-2 PM@ 1.85 units/hour;  2 PM-5:30 PM@ 1.85 units/hour;  5:30 PM-MN@ 1.85 units/hour.   BG target 110 mg/dL.  Carb ratio 12  ISF 50  Active Insulin Time 3 hours.          Hypoglycemia due to type 1 diabetes mellitus (HCC)    Overview   -Humalog via Medtronic 770G insulin pump and Dexcom CGM in place.   -Due to hypoglycemia, basal settings decreased and encouraged not to stack insulin.  -Uses 15 by 15 rule to treat hypoglycemia.             Behavioral Health    History of postpartum  depression    Overview   H/o postpartum depression with first and second children             Obstetrics/Gynecology    39 weeks gestation of pregnancy    Supervision of high risk pregnancy in third trimester    Overview   Declines flu vaccine  Declines aneuploidy screening. msAFP low risk   MFM recommending repeat urine culture with 28 wk labs - no growth   Declines Tdap  Delivery consent signed          Previous pregnancy complicated by pregnancy-induced hypertension, antepartum, third trimester    GBS (group B Streptococcus carrier), +RV culture, currently pregnant       Other    Insulin pump titration    BMI 38.0-38.9,adult    Overview   -Pre-pregnancy weight 187 lbs. BMI 27.62.  -Current weight 260 lbs.   -TWG 73 lbs.  -Recommended weight gain 15 to 25 lbs.  -Continue GDM meal plan.          Fetal anomaly suspected but not found    Overview   We discussed that a high velocity across the aortic arch can be a sign of  narrowing of the aortic arch called a coarct that does not cause problems  during pregnancy but could cause problems after birth.  Review of the  blood vessels in grayscale and color though show no evidence for narrowing  of the aortic arch that we can see again suggesting that this is a false  positive result.  - repeat US 4 weeks             NST with dips in fetal heart rate to 100.  Patient to L&D for extended monitoring.  Reviewed signs and symptoms of labor.  Advised to call with bleeding, leakage of fluids, decreased movements, contractions every 5 minutes.   Induction scheduled 7/10/25           [1]   Current Outpatient Medications on File Prior to Visit   Medication Sig Dispense Refill    Blood Pressure Monitoring (Blood Pressure Cuff) MISC Use in the morning (Patient not taking: Reported on 7/3/2025) 1 each 0    Continuous Glucose Sensor (Dexcom G7 Sensor) Use 1 each every 10 days      glucagon 1 MG injection Inject 1 mg under the skin (Patient taking differently: Inject 1 mg under the skin  once as needed for low blood sugar As needed for hypogylcemia)      glucose blood (Contour Next Test) test strip Tests up to 6 times a day. T1DM/pregnancy and hypoglycemia. (Patient not taking: Reported on 6/19/2025) 500 strip 1    HumaLOG 100 UNIT/ML injection Use up to 200 units a day via insulin pump, to be titrated. T1DM and pregnancy. 90 day supply. 180 mL 0    insulin glargine (LANTUS) 100 units/mL subcutaneous injection Inject 10 Units under the skin (Patient not taking: Reported on 6/6/2025)      Lancets MISC Use (Patient not taking: Reported on 6/19/2025)      Prenatal Vit-Fe Fumarate-FA (PRENATAL VITAMIN PO) Take 1 tablet by mouth in the morning.       No current facility-administered medications on file prior to visit.

## 2025-07-10 ENCOUNTER — HOSPITAL ENCOUNTER (INPATIENT)
Facility: HOSPITAL | Age: 35
LOS: 1 days | Discharge: HOME | End: 2025-07-11
Attending: OBSTETRICS & GYNECOLOGY | Admitting: OBSTETRICS & GYNECOLOGY
Payer: COMMERCIAL

## 2025-07-10 ENCOUNTER — HOSPITAL ENCOUNTER (OUTPATIENT)
Dept: LABOR AND DELIVERY | Facility: HOSPITAL | Age: 35
End: 2025-07-10
Payer: COMMERCIAL

## 2025-07-10 DIAGNOSIS — Z3A.39 39 WEEKS GESTATION OF PREGNANCY: Primary | ICD-10-CM

## 2025-07-10 PROBLEM — R03.0 ELEVATED BLOOD-PRESSURE READING WITHOUT DIAGNOSIS OF HYPERTENSION: Status: ACTIVE | Noted: 2025-07-10

## 2025-07-10 LAB
ABO GROUP BLD: NORMAL
ALBUMIN SERPL BCG-MCNC: 3.4 G/DL (ref 3.5–5)
ALP SERPL-CCNC: 114 U/L (ref 34–104)
ALT SERPL W P-5'-P-CCNC: 10 U/L (ref 7–52)
ANION GAP SERPL CALCULATED.3IONS-SCNC: 7 MMOL/L (ref 4–13)
AST SERPL W P-5'-P-CCNC: 18 U/L (ref 13–39)
BASE EXCESS BLDCOA CALC-SCNC: -2.3 MMOL/L (ref 3–11)
BASE EXCESS BLDCOV CALC-SCNC: -1.6 MMOL/L (ref 1–9)
BILIRUB SERPL-MCNC: 0.36 MG/DL (ref 0.2–1)
BLD GP AB SCN SERPL QL: NEGATIVE
BUN SERPL-MCNC: 10 MG/DL (ref 5–25)
CALCIUM ALBUM COR SERPL-MCNC: 9.3 MG/DL (ref 8.3–10.1)
CALCIUM SERPL-MCNC: 8.8 MG/DL (ref 8.4–10.2)
CHLORIDE SERPL-SCNC: 107 MMOL/L (ref 96–108)
CO2 SERPL-SCNC: 22 MMOL/L (ref 21–32)
CREAT SERPL-MCNC: 0.54 MG/DL (ref 0.6–1.3)
ERYTHROCYTE [DISTWIDTH] IN BLOOD BY AUTOMATED COUNT: 13.2 % (ref 11.6–15.1)
GFR SERPL CREATININE-BSD FRML MDRD: 124 ML/MIN/1.73SQ M
GLUCOSE SERPL-MCNC: 122 MG/DL (ref 65–140)
GLUCOSE SERPL-MCNC: 126 MG/DL (ref 65–140)
GLUCOSE SERPL-MCNC: 45 MG/DL (ref 65–140)
GLUCOSE SERPL-MCNC: 48 MG/DL (ref 65–140)
GLUCOSE SERPL-MCNC: 49 MG/DL (ref 65–140)
GLUCOSE SERPL-MCNC: 58 MG/DL (ref 65–140)
GLUCOSE SERPL-MCNC: 61 MG/DL (ref 65–140)
GLUCOSE SERPL-MCNC: 70 MG/DL (ref 65–140)
GLUCOSE SERPL-MCNC: 73 MG/DL (ref 65–140)
GLUCOSE SERPL-MCNC: 94 MG/DL (ref 65–140)
HCO3 BLDCOA-SCNC: 24.6 MMOL/L (ref 17.3–27.3)
HCO3 BLDCOV-SCNC: 22.7 MMOL/L (ref 12.2–28.6)
HCT VFR BLD AUTO: 36.4 % (ref 34.8–46.1)
HGB BLD-MCNC: 12.6 G/DL (ref 11.5–15.4)
HOLD SPECIMEN: YES
MCH RBC QN AUTO: 30.2 PG (ref 26.8–34.3)
MCHC RBC AUTO-ENTMCNC: 34.6 G/DL (ref 31.4–37.4)
MCV RBC AUTO: 87 FL (ref 82–98)
O2 CT VFR BLDCOA CALC: 6.4 ML/DL
OXYHGB MFR BLDCOA: 32 %
OXYHGB MFR BLDCOV: 77.3 %
PCO2 BLDCOA: 50.6 MM[HG] (ref 30–60)
PCO2 BLDCOV: 37.2 MM HG (ref 27–43)
PH BLDCOA: 7.3 [PH] (ref 7.23–7.43)
PH BLDCOV: 7.4 [PH] (ref 7.19–7.49)
PLATELET # BLD AUTO: 217 THOUSANDS/UL (ref 149–390)
PMV BLD AUTO: 10.8 FL (ref 8.9–12.7)
PO2 BLDCOA: 17.4 MM HG (ref 5–25)
PO2 BLDCOV: 32.8 MM HG (ref 15–45)
POTASSIUM SERPL-SCNC: 3.8 MMOL/L (ref 3.5–5.3)
PROT SERPL-MCNC: 6.2 G/DL (ref 6.4–8.4)
RBC # BLD AUTO: 4.17 MILLION/UL (ref 3.81–5.12)
RH BLD: POSITIVE
SAO2 % BLDCOV: 15 ML/DL
SODIUM SERPL-SCNC: 136 MMOL/L (ref 135–147)
SPECIMEN EXPIRATION DATE: NORMAL
TREPONEMA PALLIDUM IGG+IGM AB [PRESENCE] IN SERUM OR PLASMA BY IMMUNOASSAY: NORMAL
WBC # BLD AUTO: 12.69 THOUSAND/UL (ref 4.31–10.16)

## 2025-07-10 PROCEDURE — 86900 BLOOD TYPING SEROLOGIC ABO: CPT

## 2025-07-10 PROCEDURE — 10907ZC DRAINAGE OF AMNIOTIC FLUID, THERAPEUTIC FROM PRODUCTS OF CONCEPTION, VIA NATURAL OR ARTIFICIAL OPENING: ICD-10-PCS | Performed by: OBSTETRICS & GYNECOLOGY

## 2025-07-10 PROCEDURE — 86901 BLOOD TYPING SEROLOGIC RH(D): CPT

## 2025-07-10 PROCEDURE — 80053 COMPREHEN METABOLIC PANEL: CPT

## 2025-07-10 PROCEDURE — 59400 OBSTETRICAL CARE: CPT | Performed by: OBSTETRICS & GYNECOLOGY

## 2025-07-10 PROCEDURE — NC001 PR NO CHARGE: Performed by: OBSTETRICS & GYNECOLOGY

## 2025-07-10 PROCEDURE — 85027 COMPLETE CBC AUTOMATED: CPT

## 2025-07-10 PROCEDURE — 82805 BLOOD GASES W/O2 SATURATION: CPT | Performed by: OBSTETRICS & GYNECOLOGY

## 2025-07-10 PROCEDURE — 86850 RBC ANTIBODY SCREEN: CPT

## 2025-07-10 PROCEDURE — 86780 TREPONEMA PALLIDUM: CPT

## 2025-07-10 PROCEDURE — 82948 REAGENT STRIP/BLOOD GLUCOSE: CPT

## 2025-07-10 RX ORDER — MAGNESIUM HYDROXIDE/ALUMINUM HYDROXICE/SIMETHICONE 120; 1200; 1200 MG/30ML; MG/30ML; MG/30ML
15 SUSPENSION ORAL EVERY 6 HOURS PRN
Status: DISCONTINUED | OUTPATIENT
Start: 2025-07-10 | End: 2025-07-12 | Stop reason: HOSPADM

## 2025-07-10 RX ORDER — OXYTOCIN/0.9 % SODIUM CHLORIDE 30/500 ML
1-30 PLASTIC BAG, INJECTION (ML) INTRAVENOUS
Status: DISCONTINUED | OUTPATIENT
Start: 2025-07-10 | End: 2025-07-10

## 2025-07-10 RX ORDER — OXYTOCIN/0.9 % SODIUM CHLORIDE 30/500 ML
250 PLASTIC BAG, INJECTION (ML) INTRAVENOUS ONCE
Status: DISCONTINUED | OUTPATIENT
Start: 2025-07-10 | End: 2025-07-12 | Stop reason: HOSPADM

## 2025-07-10 RX ORDER — BUPIVACAINE HYDROCHLORIDE 2.5 MG/ML
30 INJECTION, SOLUTION EPIDURAL; INFILTRATION; INTRACAUDAL; PERINEURAL ONCE AS NEEDED
Status: DISCONTINUED | OUTPATIENT
Start: 2025-07-10 | End: 2025-07-10

## 2025-07-10 RX ORDER — BENZOCAINE/MENTHOL 6 MG-10 MG
1 LOZENGE MUCOUS MEMBRANE DAILY PRN
Status: DISCONTINUED | OUTPATIENT
Start: 2025-07-10 | End: 2025-07-12 | Stop reason: HOSPADM

## 2025-07-10 RX ORDER — ONDANSETRON 2 MG/ML
4 INJECTION INTRAMUSCULAR; INTRAVENOUS EVERY 6 HOURS PRN
Status: DISCONTINUED | OUTPATIENT
Start: 2025-07-10 | End: 2025-07-10

## 2025-07-10 RX ORDER — POLYETHYLENE GLYCOL 3350 17 G/17G
17 POWDER, FOR SOLUTION ORAL DAILY PRN
Status: DISCONTINUED | OUTPATIENT
Start: 2025-07-10 | End: 2025-07-12 | Stop reason: HOSPADM

## 2025-07-10 RX ORDER — CITRIC ACID/SODIUM CITRATE 334-500MG
30 SOLUTION, ORAL ORAL 4 TIMES DAILY PRN
Status: DISCONTINUED | OUTPATIENT
Start: 2025-07-10 | End: 2025-07-12 | Stop reason: HOSPADM

## 2025-07-10 RX ORDER — IBUPROFEN 600 MG/1
600 TABLET, FILM COATED ORAL EVERY 6 HOURS
Status: DISCONTINUED | OUTPATIENT
Start: 2025-07-10 | End: 2025-07-12 | Stop reason: HOSPADM

## 2025-07-10 RX ORDER — ACETAMINOPHEN 325 MG/1
650 TABLET ORAL EVERY 6 HOURS
Status: DISCONTINUED | OUTPATIENT
Start: 2025-07-10 | End: 2025-07-12 | Stop reason: HOSPADM

## 2025-07-10 RX ORDER — SIMETHICONE 80 MG
80 TABLET,CHEWABLE ORAL 4 TIMES DAILY PRN
Status: DISCONTINUED | OUTPATIENT
Start: 2025-07-10 | End: 2025-07-12 | Stop reason: HOSPADM

## 2025-07-10 RX ORDER — SODIUM CHLORIDE, SODIUM LACTATE, POTASSIUM CHLORIDE, CALCIUM CHLORIDE 600; 310; 30; 20 MG/100ML; MG/100ML; MG/100ML; MG/100ML
125 INJECTION, SOLUTION INTRAVENOUS CONTINUOUS
Status: DISCONTINUED | OUTPATIENT
Start: 2025-07-10 | End: 2025-07-10

## 2025-07-10 RX ORDER — CALCIUM CARBONATE 500 MG/1
1000 TABLET, CHEWABLE ORAL DAILY PRN
Status: DISCONTINUED | OUTPATIENT
Start: 2025-07-10 | End: 2025-07-12 | Stop reason: HOSPADM

## 2025-07-10 RX ORDER — DIPHENHYDRAMINE HCL 25 MG
25 TABLET ORAL EVERY 6 HOURS PRN
Status: DISCONTINUED | OUTPATIENT
Start: 2025-07-10 | End: 2025-07-12 | Stop reason: HOSPADM

## 2025-07-10 RX ORDER — ONDANSETRON 2 MG/ML
4 INJECTION INTRAMUSCULAR; INTRAVENOUS EVERY 8 HOURS PRN
Status: DISCONTINUED | OUTPATIENT
Start: 2025-07-10 | End: 2025-07-12 | Stop reason: HOSPADM

## 2025-07-10 RX ADMIN — SODIUM CHLORIDE 2.5 MILLION UNITS: 9 INJECTION, SOLUTION INTRAVENOUS at 16:46

## 2025-07-10 RX ADMIN — Medication 25 MCG: at 09:53

## 2025-07-10 RX ADMIN — BENZOCAINE AND LEVOMENTHOL 1 APPLICATION: 200; 5 SPRAY TOPICAL at 22:26

## 2025-07-10 RX ADMIN — Medication 2 MILLI-UNITS/MIN: at 14:03

## 2025-07-10 RX ADMIN — IBUPROFEN 600 MG: 600 TABLET, FILM COATED ORAL at 20:31

## 2025-07-10 RX ADMIN — SODIUM CHLORIDE 2.5 MILLION UNITS: 9 INJECTION, SOLUTION INTRAVENOUS at 13:16

## 2025-07-10 RX ADMIN — PENICILLIN G POTASSIUM 5 MILLION UNITS: 20000000 INJECTION, POWDER, FOR SOLUTION INTRAVENOUS at 09:12

## 2025-07-10 NOTE — PLAN OF CARE
Problem: Knowledge Deficit  Goal: Verbalizes understanding of labor plan  Description: Assess patient/family/caregiver's baseline knowledge level and ability to understand information.  Provide education via patient/family/caregiver's preferred learning method at appropriate level of understanding.     1. Provide teaching at level of understanding.  2. Provide teaching via preferred learning method(s).  7/10/2025 0945 by Krystal Rueda RN  Outcome: Progressing  7/10/2025 0944 by Krystal Rueda RN  Outcome: Progressing  Goal: Patient/family/caregiver demonstrates understanding of disease process, treatment plan, medications, and discharge instructions  Description: Complete learning assessment and assess knowledge base.  Interventions:  - Provide teaching at level of understanding  - Provide teaching via preferred learning methods  7/10/2025 0945 by Krystal Rueda RN  Outcome: Progressing  7/10/2025 0944 by Krystal Rueda RN  Outcome: Progressing     Problem: Labor & Delivery  Goal: Manages discomfort  Description: Assess and monitor for signs and symptoms of discomfort.  Assess patient's pain level regularly and per hospital policy.  Administer medications as ordered. Support use of nonpharmacological methods to help control pain such as distraction, imagery, relaxation, and application of heat and cold.  Collaborate with interdisciplinary team and patient to determine appropriate pain management plan.    1. Include patient in decisions related to comfort.  2. Offer non-pharmacological pain management interventions.  3. Report ineffective pain management to physician.  7/10/2025 0945 by Krystal Rueda RN  Outcome: Progressing  7/10/2025 0944 by Krystal Rueda RN  Outcome: Progressing  Goal: Patient vital signs are stable  Description: 1. Assess vital signs - vaginal delivery.  7/10/2025 0945 by Krystal Rueda RN  Outcome: Progressing  7/10/2025 0944 by Krystal Rueda RN  Outcome: Progressing     Problem: BIRTH -  VAGINAL/ SECTION  Goal: Fetal and maternal status remain reassuring during the birth process  Description: INTERVENTIONS:  - Monitor vital signs  - Monitor fetal heart rate  - Monitor uterine activity  - Monitor labor progression (vaginal delivery)  - DVT prophylaxis  - Antibiotic prophylaxis  7/10/2025 0945 by Krystal Rueda RN  Outcome: Progressing  7/10/2025 0944 by Krystal Rueda RN  Outcome: Progressing  Goal: Emotionally satisfying birthing experience for mother/fetus  Description: Interventions:  - Assess, plan, implement and evaluate the nursing care given to the patient in labor  - Advocate the philosophy that each childbirth experience is a unique experience and support the family's chosen level of involvement and control during the labor process   - Actively participate in both the patient's and family's teaching of the birth process  - Consider cultural, [employer] and age-specific factors and plan care for the patient in labor  7/10/2025 0945 by Krystal Rueda RN  Outcome: Progressing  7/10/2025 0944 by Krystal Rueda RN  Outcome: Progressing     Problem: PAIN - ADULT  Goal: Verbalizes/displays adequate comfort level or baseline comfort level  Description: Interventions:  - Encourage patient to monitor pain and request assistance  - Assess pain using appropriate pain scale  - Administer analgesics as ordered based on type and severity of pain and evaluate response  - Implement non-pharmacological measures as appropriate and evaluate response  - Consider cultural and social influences on pain and pain management  - Notify physician/advanced practitioner if interventions unsuccessful or patient reports new pain  - Educate patient/family on pain management process including their role and importance of  reporting pain   - Provide non-pharmacologic/complimentary pain relief interventions  7/10/2025 0945 by Krystal Rueda RN  Outcome: Progressing  7/10/2025 0944 by Krystal Rueda RN  Outcome:  Progressing     Problem: INFECTION - ADULT  Goal: Absence or prevention of progression during hospitalization  Description: INTERVENTIONS:  - Assess and monitor for signs and symptoms of infection  - Monitor lab/diagnostic results  - Monitor all insertion sites, i.e. indwelling lines, tubes, and drains  - Monitor endotracheal if appropriate and nasal secretions for changes in amount and color  - [place] appropriate cooling/warming therapies per order  - Administer medications as ordered  - Instruct and encourage patient and family to use good hand hygiene technique  - Identify and instruct in appropriate isolation precautions for identified infection/condition  7/10/2025 0945 by Krystal Rueda RN  Outcome: Progressing  7/10/2025 0944 by Krystal Rueda RN  Outcome: Progressing  Goal: Absence of fever/infection during neutropenic period  Description: INTERVENTIONS:  - Monitor WBC  - Perform strict hand hygiene  - Limit to healthy visitors only  - No plants, dried, fresh or silk flowers with taylor in patient room  7/10/2025 0945 by Krystal Rueda RN  Outcome: Progressing  7/10/2025 0944 by Krystal Rueda RN  Outcome: Progressing     Problem: SAFETY ADULT  Goal: Patient will remain free of falls  Description: INTERVENTIONS:  - Educate patient/family on patient safety including physical limitations  - Instruct patient to call for assistance with activity   - Consider consulting OT/PT to assist with strengthening/mobility based on AM PAC & JH-HLM score  - Consult OT/PT to assist with strengthening/mobility   - Keep Call bell within reach  - Keep bed low and locked with side rails adjusted as appropriate  - Keep care items and personal belongings within reach  - Initiate and maintain comfort rounds  - Make Fall Risk Sign visible to staff    - Apply yellow socks and bracelet for high fall risk patients  - Consider moving patient to room near nurses station  7/10/2025 0945 by Krystal Rueda RN  Outcome:  Progressing  7/10/2025 0944 by Krystal Rueda RN  Outcome: Progressing  Goal: Maintain or return to baseline ADL function  Description: INTERVENTIONS:  -  Assess patient's ability to carry out ADLs; assess patient's baseline for ADL function and identify physical deficits which impact ability to perform ADLs (bathing, care of mouth/teeth, toileting, grooming, dressing, etc.)  - Assess/evaluate cause of self-care deficits   - Assess range of motion  - Assess patient's mobility; develop plan if impaired  - Assess patient's need for assistive devices and provide as appropriate  - Encourage maximum independence but intervene and supervise when necessary  - Involve family in performance of ADLs  - Assess for home care needs following discharge   - Consider OT consult to assist with ADL evaluation and planning for discharge  - Provide patient education as appropriate  - Monitor functional capacity and physical performance, use of AM PAC & JH-HLM   - Monitor gait, balance and fatigue with ambulation    7/10/2025 0945 by Krystal Rueda RN  Outcome: Progressing  7/10/2025 0944 by Krystal Rueda RN  Outcome: Progressing  Goal: Maintains/Returns to pre admission functional level  Description: INTERVENTIONS:  - Perform AM-PAC 6 Click Basic Mobility/ Daily Activity assessment daily.  - Set and communicate daily mobility goal to care team and patient/family/caregiver.   - Collaborate with rehabilitation services on mobility goals if consulted  - Out of bed for toileting  - Record patient progress and toleration of activity level   7/10/2025 0945 by Krystal Rueda RN  Outcome: Progressing  7/10/2025 0944 by Krystal Rueda RN  Outcome: Progressing     Problem: DISCHARGE PLANNING  Goal: Discharge to home or other facility with appropriate resources  Description: INTERVENTIONS:  - Identify barriers to discharge w/patient and caregiver  - Arrange for needed discharge resources and transportation as appropriate  - Identify discharge  learning needs (meds, wound care, etc.)  - Arrange for interpretive services to assist at discharge as needed  - Refer to Case Management Department for coordinating discharge planning if the patient needs post-hospital services based on physician/advanced practitioner order or complex needs related to functional status, cognitive ability, or social support system  Outcome: Progressing

## 2025-07-10 NOTE — ASSESSMENT & PLAN NOTE
Lab Results   Component Value Date    HGBA1C 5.2 06/04/2025       Recent Labs     07/10/25  0851   POCGLU 126   Dexcon pump and Insulin pump  Will recheck BG in 4 hours due to minor discrepancy with Dexcom    Blood Sugar Average: Last 72 hrs:

## 2025-07-10 NOTE — OB LABOR/OXYTOCIN SAFETY PROGRESS
Oxytocin Safety Progress Check Note - Antoinette Hernandez 35 y.o. female MRN: [id number]    Unit/Bed#: -01 Encounter: [id number]    Dose (frank-units/min) Oxytocin: 10 frank-units/min  Contraction Frequency (minutes): 2  Contraction Intensity: Mild  Uterine Activity Characteristics: Regular  Cervical Dilation: 6        Cervical Effacement: 70  Fetal Station: -2  Baseline Rate (FHR): 130 bpm  Fetal Heart Rate (FHT): 125 BPM  FHR Category: I               Vital Signs:   Vitals:    07/10/25 1735   BP:    Pulse:    Resp:    Temp: 98.3 °F (36.8 °C)       Notes/comments:   Patient is comfortable with contractions Sve unchanged, discussed about the labor process  Plan: pitocin titration.    Fannie Saeed Jr, MD 7/10/2025 6:05 PM

## 2025-07-10 NOTE — ASSESSMENT & PLAN NOTE
Admit to OBGYN   Clear liquid diet   F/u T&S, CBC, RPR   IVF LR 125cc/hr   Continuous fetal monitoring and tocometry   Analgesia at maternal request   Vertex by TAUS  Induction plan FB, cytotec, and alejandro   Surprise gender!!   Plan discussed with Dr. Lovett

## 2025-07-10 NOTE — ASSESSMENT & PLAN NOTE
"Lab Results   Component Value Date    HGBA1C 5.2 06/04/2025       No results for input(s): \"POCGLU\" in the last 72 hours.    Blood Sugar Average: Last 72 hrs:      "

## 2025-07-10 NOTE — OB LABOR/OXYTOCIN SAFETY PROGRESS
Oxytocin Safety Progress Check Note - Antoinette Hernandez 35 y.o. female MRN: [id number]    Unit/Bed#: -01 Encounter: [id number]    Dose (frank-units/min) Oxytocin: 6 frank-units/min  Contraction Frequency (minutes): 8  Contraction Intensity: Mild  Uterine Activity Characteristics: Irregular  Cervical Dilation: 4-5        Cervical Effacement: 50  Fetal Station: -2  Baseline Rate (FHR): 120 bpm  Fetal Heart Rate (FHT): 130 BPM  FHR Category: 1           Vital Signs:   Vitals:    07/10/25 1355   BP: 114/60   Pulse: 86   Resp:    Temp: 98.6 °F (37 °C)       Notes/comments:   SVE as above. FHT is cat 1 with moderate variability, positive accelerations, no decelerations, with periods of dropout. Dr. Santana is aware.     Plan to AROM and continue to titrate pitocin.     Triny Arredondo MD 7/10/2025 3:29 PM

## 2025-07-10 NOTE — OB LABOR/OXYTOCIN SAFETY PROGRESS
Labor Progress Note - Antoinette Hernandez 35 y.o. female MRN: [id number]    Unit/Bed#: -01 Encounter: [id number]       Contraction Frequency (minutes): 8  Contraction Intensity: Mild  Uterine Activity Characteristics: Irregular  Cervical Dilation: 2        Cervical Effacement: 60  Fetal Station: -3  Baseline Rate (FHR): 120 bpm  Fetal Heart Rate (FHT): 130 BPM  FHR Category: 1               Vital Signs:   Vitals:    07/10/25 1205   BP: 132/66   Pulse: 87   Resp:    Temp: 98.6 °F (37 °C)       Notes/comments:   FB still in place but on it's way out. Plan to start Pitocin at the 4h emily 1353     Dahlia Taylor MD 7/10/2025 1:07 PM

## 2025-07-10 NOTE — H&P
H & P- Obstetrics   Antoinette Hernandez 35 y.o. female MRN: [id number]  Unit/Bed#: -01 Encounter: [id number]    Assessment: 35 y.o.  at 39w5d admitted for IOL for T1DM.  SVE: /-4  FHT: 140 bpm  Clinical EFW: 5lb 9oz (53%) on  ; Cephalic confirmed by TAUS  GBS status: positive   Postpartum contraception plan: POP's    By Problem:   Assessment & Plan  39 weeks gestation of pregnancy  Admit to OBGYN   Clear liquid diet   F/u T&S, CBC, RPR   IVF LR 125cc/hr   Continuous fetal monitoring and tocometry   Analgesia at maternal request   Vertex by TAUS  Induction plan FB, cytotec, and pitocen   Surprise gender!!   Plan discussed with Dr. Lovett    History of postpartum depression  Plan for early EPDS  Was on medication during 1st and second deliveries.       Pre-existing type 1 diabetes mellitus with hyperglycemia during pregnancy in third trimester ([facility])  Lab Results   Component Value Date    HGBA1C 5.2 2025       Recent Labs     07/10/25  0851   POCGLU 126   Dexcon pump and Insulin pump  Will recheck BG in 4 hours due to minor discrepancy with Dexcom    Blood Sugar Average: Last 72 hrs:    GBS (group B Streptococcus carrier), +RV culture, currently pregnant  GBS positive   PCN during labor       Discussed case and plan w/ Dr. Lovett      Chief Complaint: here for induction of labor for T1DM     HPI: Antoinette Hernandez is a 35 y.o.  with an TRI of 2025, by Last Menstrual Period at 39w5d who is being admitted for IOLD. She denies having uterine contractions, has no LOF, and reports no VB. She states she has felt good FM. Patient does know the gender of this baby. She denes a history of bleeding or clotting issues. She has no history of asthma. She has no history of elevated blood pressure in this pregnancy. She previously had gestational hypertension in a prior pregnancy.     Previosuly /-3 on . She is not interested in pain control at this time.     Problem List[1]    Baby  complications/comments: none    Review of Systems   Constitutional:  Negative for chills and fever.   Eyes:  Negative for visual disturbance.   Respiratory:  Negative for chest tightness and shortness of breath.    Cardiovascular:  Negative for chest pain and palpitations.   Gastrointestinal:  Negative for abdominal pain, constipation, diarrhea, nausea and vomiting.   Genitourinary:  Negative for dysuria.   Neurological:  Negative for seizures.   Hematological:  Does not bruise/bleed easily.       OB Hx:  OB History    Para Term  AB Living   7 4 4  2 4   SAB IAB Ectopic Multiple Live Births   1 1  0 4      # Outcome Date GA Lbr Abrahan/2nd Weight Sex Type Anes PTL Lv   7 Current            6 Term 11/15/23 39w4d / 00:35 3560 g (7 lb 13.6 oz) F Vag-Spont EPI N ELROY   5 Term 22 39w1d  3742 g (8 lb 4 oz) F Vag-Spont EPI N ELROY   4 Term 21 38w5d / 00:07 3515 g (7 lb 12 oz) M Vag-Spont EPI N ELROY      Complications: History of gestational hypertension   3 Term 19 38w0d 05:45 / 02:51 2985 g (6 lb 9.3 oz) M Vag-Spont EPI  ELROY   2 SAB 18 10w3d          1 IAB 2013              Obstetric Comments   Menarche, 14       Past Medical Hx:  Past Medical History[2]    Past Surgical hx:  Past Surgical History[3]    Social Hx:  Alcohol use: no  Tobacco use: no  Other substance use: no      Allergies[4]      Medications Prior to Admission:     Continuous Glucose Sensor (Dexcom G7 Sensor)    HumaLOG 100 UNIT/ML injection    Prenatal Vit-Fe Fumarate-FA (PRENATAL VITAMIN PO)    Blood Pressure Monitoring (Blood Pressure Cuff) MISC    glucagon 1 MG injection    Lancets MISC    Objective:     Body mass index is 39.87 kg/m².     Physical Exam:  Physical Exam  Constitutional:       Appearance: Normal appearance.   HENT:      Head: Normocephalic.     Cardiovascular:      Rate and Rhythm: Normal rate and regular rhythm.   Pulmonary:      Effort: Pulmonary effort is normal.      Breath sounds: Normal breath sounds.    Abdominal:      Palpations: Abdomen is soft.      Comments: Gravid.      Musculoskeletal:      Cervical back: Normal range of motion.      Comments: Dexcom CGM on left upper arm. Insulin pump on Left upper leg     Neurological:      Mental Status: She is alert and oriented to person, place, and time.     Skin:     General: Skin is warm and dry.      Capillary Refill: Capillary refill takes less than 2 seconds.          FHT: 135 bpm     TOCO: no contractions       Lab Results   Component Value Date    WBC 13.50 (H) 06/04/2025    HGB 12.1 06/04/2025    HCT 37.7 06/04/2025     06/04/2025     Lab Results   Component Value Date    K 4.3 06/04/2025     06/04/2025    CO2 25 06/04/2025    BUN 8 06/04/2025    CREATININE 0.52 (L) 06/04/2025    AST 16 06/04/2025    ALT 9 06/04/2025     Prenatal Labs: Reviewed      Blood type: O positive  Antibody: negative  GBS: positive  HIV: nonreactive  Rubella: immune  Syphilis IgM/IgG: non-reactive  HBsAg: non-reactive  HCAb: non-reactive  Chlamydia: negative  Gonorrhea: negative  Diabetes 1 hour screen: Type 1 DM  3 hour glucose: N/A  Platelets: 217  Hgb: 12.6  >2 Midnights  INPATIENT     Signature/Title: Sandra Diana MD  Date: 7/10/2025  Time: 8:47 AM          [1]   Patient Active Problem List  Diagnosis    History of postpartum depression    Insulin pump titration    Pre-existing type 1 diabetes mellitus with hyperglycemia during pregnancy in third trimester ([facility])    39 weeks gestation of pregnancy    BMI 38.0-38.9,adult    Hypoglycemia due to type 1 diabetes mellitus ([facility])    Supervision of high risk pregnancy in third trimester    Previous pregnancy complicated by pregnancy-induced hypertension, antepartum, third trimester    Fetal anomaly suspected but not found    GBS (group B Streptococcus carrier), +RV culture, currently pregnant   [2]   Past Medical History:  Diagnosis Date    Diabetes ([facility]) 1994    type 1    Diabetes mellitus ([facility])     Varicella     had in  childhood   [3]   Past Surgical History:  Procedure Laterality Date    ND NDSC WRST SURG W/RLS TRANSVRS CARPL LIGM Right 2/3/2021    Procedure: RELEASE CARPAL TUNNEL ENDOSCOPIC;  Surgeon: Papa Simpson MD;  Location: [facility];  Service: Orthopedics    ND NDSC WRST SURG W/RLS TRANSVRS CARPL LIGM Left 2/23/2021    Procedure: RELEASE CARPAL TUNNEL ENDOSCOPIC;  Surgeon: Papa Simpson MD;  Location: [facility];  Service: Orthopedics    WISDOM TOOTH EXTRACTION     [4] No Known Allergies

## 2025-07-11 VITALS
RESPIRATION RATE: 12 BRPM | TEMPERATURE: 98.2 F | OXYGEN SATURATION: 98 % | SYSTOLIC BLOOD PRESSURE: 129 MMHG | HEIGHT: 69 IN | HEART RATE: 88 BPM | DIASTOLIC BLOOD PRESSURE: 76 MMHG | BODY MASS INDEX: 39.99 KG/M2 | WEIGHT: 270 LBS

## 2025-07-11 PROCEDURE — NC001 PR NO CHARGE: Performed by: OBSTETRICS & GYNECOLOGY

## 2025-07-11 PROCEDURE — 99024 POSTOP FOLLOW-UP VISIT: CPT | Performed by: OBSTETRICS & GYNECOLOGY

## 2025-07-11 RX ADMIN — IBUPROFEN 600 MG: 600 TABLET, FILM COATED ORAL at 02:35

## 2025-07-11 RX ADMIN — ACETAMINOPHEN 650 MG: 325 TABLET ORAL at 00:01

## 2025-07-11 NOTE — NURSING NOTE
"Discharge education completed at bedside with patient. \"Save A Life\" magnet provided and reviewed with the patient. No questions at this time.  "

## 2025-07-11 NOTE — ASSESSMENT & PLAN NOTE
Lab Results   Component Value Date    HGBA1C 5.2 06/04/2025       Recent Labs     07/10/25  1608 07/10/25  1717 07/10/25  1825 07/10/25  2019   POCGLU 61* 94 48* 58*   Dexcon pump and Insulin pump  Goal range  mg/dL  Will recheck BG in AM    Blood Sugar Average: Last 72 hrs:  (P) [id number]

## 2025-07-11 NOTE — L&D DELIVERY NOTE
OBGYN Vaginal Delivery Summary  Antoinette Hernandez 35 y.o. female MRN: [id number]  Unit/Bed#: -01 Encounter: [id number]    Predelivery Diagnosis:  1. Pregnancy at 39 weeks  2.  Type 1 diabetes mellitus  3.  History of postpartum depression    Postdelivery Diagnosis:  1. Same as above  2. Delivery of term     Procedure: spontaneous vaginal delivery, repair of no laceration(s)    Attending: Dr. Santana    Assistant: Dr. Fannie Saeed    Anesthesia: No    QBL: 77 mL  Admission H.6 g/dL  Admission platelets: 217 thousands/uL    Complications: none apparent    Specimens: cord blood, arterial and venous cord blood gases, placenta to storage    Findings:   1. Viable male at , with APGARS of 8 and 9 at 1 and 5 minutes respectively. Weight pending at time of dictation.  2. Spontaneous delivery of intact placenta at .  Central insertion 3 vessel umbilical cord  3.  No laceration repaired.  4. Blood gases:  Umbilical Cord Venous Blood Gas:  Results from last 7 days   Lab Units 07/10/25  1953   PH COV  7.404   PCO2 COV mm HG 37.2   HCO3 COV mmol/L 22.7   BASE EXC COV mmol/L -1.6*   O2 CT CD VB mL/dL 15.0   O2 HGB, VENOUS CORD % 77.3     Umbilical Cord Arterial Blood Gas:  Results from last 7 days   Lab Units 07/10/25  1953   PH COA  7.305   PCO2 COA  50.6   PO2 COA mm HG 17.4   HCO3 COA mmol/L 24.6   BASE EXC COA mmol/L -2.3*   O2 CONTENT CORD ART ml/dl 6.4   O2 HGB, ARTERIAL CORD % 32.0     Disposition:  Patient tolerated the procedure well and was recovering in labor and delivery room.    Brief history and labor course:  Antoinette Hernandez is a 35 y.o.  at 39wk5d. She presented to labor and delivery for induction of labor in the setting of type 1 diabetes mellitus, SVE on admission  she received a Murphy balloon and Cytotec, when the Murphy balloon was out she received Pitocin, AROM was performed.  Patient declined an epidural. She progressed to complete cervical dilation and began  pushing.     Description of procedure  After pushing for 1 minute, the patient delivered a viable male  at  on 7/10/2025, weight pending at time of dictation, apgars of 8 (1 min) and 9 (5 min). The fetal vertex delivered spontaneously. Baby restituted  to LOT. There was one loose nuchal cord, which was reduced. The anterior (right) shoulder delivered atraumatically with maternal expulsive forces and the assistance of gentle downward traction. The posterior shoulder delivered with maternal expulsive forces and the assistance of gentle upward traction. The remainder of the fetus delivered spontaneously.     Upon delivery the infant was placed on the mother's abdomen and delayed cord clamping was performed. The umbilical cord was then doubly clamped and cut. The infant was noted to cry spontaneously and was moving all extremities appropriately. There was no evidence for injury. Awaiting nurse resuscitators evaluated the . Arterial and venous cord blood gases and cord blood were collected for analysis and were promptly sent to the lab. In the immediate post-partum, IV pitocin was administered, and the uterus was noted to contract down well with massage and pitocin. The placenta delivered spontaneously at  and was noted to be intact and had a centrally inserted 3 vessel cord. The placenta was sent to storage.    The vagina, cervix, perineum, and rectum were inspected. A brush burn on the perineum which no need for suture. No laceration(s) were noted. Repair was needed.    At the conclusion of the procedure, all needle, sponge, and instrument counts were noted to be correct. Patient tolerated the procedure well and was allowed to recover in labor and delivery room with family and  before being transferred to the post-partum floor.     Dr. Santana was present and participated in all key portions of the case.    Fannie Saeed MD  7/10/2025  8:14 PM

## 2025-07-11 NOTE — ASSESSMENT & PLAN NOTE
Continue routine post partum care  Encourage ambulation  Encourage breastfeeding  Contraception: POPs  Anticipate discharge PPD 1 vs 2

## 2025-07-11 NOTE — DISCHARGE INSTR - AVS FIRST PAGE
Vaginal Delivery   WHAT YOU SHOULD KNOW:   A vaginal delivery is the birth of your baby through your vagina (birth canal).        AFTER YOU LEAVE:   Medicines:  NSAIDs  help decrease swelling and pain or fever. This medicine is available with or without a doctor's order. NSAIDs can cause stomach bleeding or kidney problems in certain people. If you take blood thinner medicine, always ask your healthcare provider if NSAIDs are safe for you. Always read the medicine label and follow directions.    Take your medicine as directed.  Call your healthcare provider if you think your medicine is not helping or if you have side effects. Tell him if you are allergic to any medicine. Keep a list of the medicines, vitamins, and herbs you take. Include the amounts, and when and why you take them. Bring the list or the pill bottles to follow-up visits. Carry your medicine list with you in case of an emergency.  Follow up with your primary healthcare provider:  Most women need to return 6 weeks after a vaginal delivery. Ask about how to care for your wounds or stitches. Write down your questions so you remember to ask them during your visits.  Activity:  Rest as much as possible. Try to keep all activities short. You may be able to do some exercise soon after you have your baby. Talk with your primary healthcare provider before you start exercising. If you work outside the home, ask when you can return to your job.  Kegel exercises:  Kegel exercises may help your vaginal and rectal muscles heal faster. You can do Kegel exercises by tightening and relaxing the muscles around your vagina. Kegel exercises help make the muscles stronger.   Breast care:  When your milk comes in, your breasts may feel full and hard. Ask how to care for your breasts, even if you are not breastfeeding.   Constipation:  Do not try to push the bowel movement out if it is too hard. High-fiber foods, extra liquids, and regular exercise can help you prevent  constipation. Examples of high-fiber foods are fruit and bran. Prune juice and water are good liquids to drink. Regular exercise helps your digestive system work. You may also be told to take over-the-counter fiber and stool softener medicines. Take these items as directed.   Hemorrhoids:  Pregnancy can cause severe hemorrhoids. You may have rectal pain because of the hemorrhoids. Ask how to prevent or treat hemorrhoids.  Perineum care:  Your perineum is the area between your vagina and anus. Keep the area clean and dry to help it heal and to prevent infection. Wash the area gently with soap and water when you bathe or shower. Rinse your perineum with warm water when you use the toilet. Your primary healthcare provider may suggest you use a warm sitz bath to help decrease pain. A sitz bath is a bathtub or basin filled to hip level. Stay in the sitz bath for 20 to 30 minutes, or as directed.   Vaginal discharge:  You will have vaginal discharge, called lochia, after your delivery. The lochia is bright red the first day or two after the birth. By the fourth day, the amount decreases, and it turns red-brown. Use a sanitary pad rather than a tampon to prevent a vaginal infection. It is normal to have lochia up to 8 weeks after your baby is born.   Monthly periods:  Your period may start again within 7 to 12 weeks after your baby is born. If you are breastfeeding, it may take longer for your period to start again. You can still get pregnant again even though you do not have your monthly period. Talk with your primary healthcare provider about a birth control method that will be good for you if you do not want to get pregnant.  Mood changes:  Many new mothers have some kind of mood changes after delivery. Some of these changes occur because of lack of sleep, hormone changes, and caring for a new baby. Some mood changes can be more serious, such as postpartum depression. Talk with your primary healthcare provider if you  feel unable to care for yourself or your baby.  Sexual activity:  You may need to avoid sex for 6 to 7 weeks after you have your baby. You may notice you have a decreased desire for sex, or sex may be painful. You may need to use a vaginal lubricant (gel) to help make sex more comfortable.  Contact your primary healthcare provider if:   You have heavy vaginal bleeding that fills 1 or more sanitary pads in 1 hour.    You have a fever.    Your pain does not go away, or gets worse.    The skin between your vagina and rectum is swollen, warm, or red.    You have swollen, hard, or painful breasts.    You feel very sad or depressed.    You feel more tired than usual.     You have questions or concerns about your condition or care.  Seek care immediately or call 911 if:   You have pus or yellow drainage coming from your vagina or wound.    You are urinating very little, or not at all.    Your arm or leg feels warm, tender, and painful. It may look swollen and red.    You feel lightheaded, have sudden and worsening chest pain, or trouble breathing. You may have more pain when you take deep breaths or cough, or you may cough up blood.  © 2014 [employer]. Information is for End User's use only and may not be sold, redistributed or otherwise used for commercial purposes. All illustrations and images included in CareNotes® are the copyrighted property of [employer]. or [employer].  The above information is an  only. It is not intended as medical advice for individual conditions or treatments. Talk to your doctor, nurse or pharmacist before following any medical regimen to see if it is safe and effective for you.    Postpartum Perineal Care   WHAT YOU NEED TO KNOW:   Postpartum perineal care is care for your perineum after you have a baby. The perineum is your vagina and anus.   DISCHARGE INSTRUCTIONS:   Care for your perineum:  Healthcare providers will give you a small squirt  bottle and show you how to use it. Do the following after you use the toilet and before you put on a new pad:  Remove the soiled pad    Use the squirt bottle to rinse your perineum from front to back while you sit on the toilet     Pat the area dry from front to back with toilet paper or a cotton cloth     Put on a fresh pad    Wash your hands  Decrease pain:  Ask your healthcare provider about these and other ways to decrease perineal pain:  Sitz baths:  Healthcare providers may give you a portable sitz bath. This is a small tub that fits in the toilet. Fill the sitz bath or bathtub with 4 to 6 inches of warm water. Sit in the warm water for 20 minutes 2 to 3 times a day.    Ice:  Ice helps decrease swelling and pain. Ice may also help prevent tissue damage. Use an ice pack, or put crushed ice in a plastic bag. Cover it with a towel and place it on your perineum for 15 to 20 minutes every hour, or as directed.    Medicine spray, wipes, or pads:  Healthcare providers may give you a medicine spray or wipes soaked with numbing medicine to decrease the pain. Pads that contain an herb called witch hazel may also help reduce pain. Use these after perineal care or a sitz bath.  Follow up with your healthcare provider as directed:  Write down your questions so you remember to ask them during your visits.   Contact your healthcare provider if:   You have heavy vaginal bleeding that fills 1 or more sanitary pads in 1 hour.    You have foul-smelling vaginal discharge.    You feel weak or lightheaded.    You have questions or concerns about your condition or care.  Seek care immediately or call 911 if:   You have large blood clots or bright red blood coming from your vagina.    You have abdominal pain, vomiting, and a fever.  © 2017 [employer] Information is for End User's use only and may not be sold, redistributed or otherwise used for commercial purposes. All illustrations and images included in CareNotes®  are the copyrighted property of [employer]. or [employer].  The above information is an  only. It is not intended as medical advice for individual conditions or treatments. Talk to your doctor, nurse or pharmacist before following any medical regimen to see if it is safe and effective for you.      Postpartum Depression   WHAT YOU NEED TO KNOW:   What is postpartum depression?  Postpartum depression is a mood disorder that occurs after giving birth. A mood is an emotion or a feeling. Moods affect your behavior and how you feel about yourself and life in general. Depression is a sad mood that you cannot control. Women often feel sad, afraid, or nervous after their baby is born. These feelings are called postpartum blues or baby blues, and they usually go away in 1 to 2 weeks. With postpartum depression, these symptoms get worse and continue for more than 2 weeks. Postpartum depression is a serious condition that affects your daily activities and relationships.   What causes postpartum depression?  Healthcare providers do not know exactly what causes postpartum depression. It may be caused by a sudden drop in hormone levels after childbirth. A previous episode of postpartum depression or a family history of depression may increase your risk. Several things may trigger postpartum depression:  Lack of support from the baby's father or other family members    Feeling more tired than usual    Stress, a poor diet, or lack of sleep    Pain after childbirth or pain during breastfeeding    Sudden change in lifestyle  How is postpartum depression diagnosed?  Postpartum depression affects your daily activities and your relationships with other people. Healthcare providers will ask you questions about your signs and symptoms and how they are affecting your life. The symptoms of postpartum depression usually begin within 1 month after childbirth. You feel depressed or lose interest in activities you  enjoy nearly every day for at least 2 weeks. You also have 4 or more of the following symptoms:  You feel tired or have less energy than usual.     You feel unimportant or guilty most of the time.    You think about hurting or killing yourself.    Your appetite changes. You may lose your appetite and lose weight without trying. Your appetite may also increase and you may gain weight.    You are restless, irritable, or withdrawn.    You have trouble concentrating and remembering things. You have trouble doing daily tasks or making decisions.    You have trouble sleeping, even after the baby is asleep.  How is postpartum depression treated?   Psychotherapy:  During therapy, you will talk with healthcare providers about how to cope with your feelings and moods. This can be done alone or in a group. It may also be done with family members or your partner.     Antidepressants:  This medicine is given to decrease or stop the symptoms of depression. You usually need to take antidepressants for several weeks before you begin to feel better. Do not stop taking antidepressants unless your healthcare provider tells you to. Healthcare providers may try a different antidepressant if one type does not work.  What can I do to feel better?   Rest:  Do not try to do everything all at the same time. Do only what is needed and let other things wait until later. Ask your family or friends for help, especially if you have other children. Ask your partner to help with night feedings or other baby care. Try to sleep when the baby naps.     Get emotional support:  Share your feelings with your partner, a friend, or another mother.     Take care of yourself:  Shower and dress each day. Do not skip meals. Try to get out of the house a little each day. Get regular exercise. Eat a healthy diet. Avoid alcohol because it can make your depression worse. Do not isolate yourself. Go for a walk or meet with a friend. It is also important that you  have some time by yourself each day.  How do I find support and more information?   [facility] [place] [facility] ([facility]), Public Information & Communication Branch  [address], [address], [address]  [place] [address]   Phone: 7- [phone] - [phone]  Phone: 2- 408 - [phone]  Web Address: http://[email or web address].gov/  When should I contact my healthcare provider?   You cannot make it to your next visit.    Your depression does not get better with treatment or it gets worse.     You have questions or concerns about your condition or care.  When should I seek immediate care or call 911?   You think about hurting or killing yourself, your baby, or someone else.    You feel like other people want to hurt you.     You hear voices telling you to hurt yourself or your baby.  CARE AGREEMENT:   You have the right to help plan your care. Learn about your health condition and how it may be treated. Discuss treatment options with your caregivers to decide what care you want to receive. You always have the right to refuse treatment. The above information is an  only. It is not intended as medical advice for individual conditions or treatments. Talk to your doctor, nurse or pharmacist before following any medical regimen to see if it is safe and effective for you.  © 2017 [employer] Information is for End User's use only and may not be sold, redistributed or otherwise used for commercial purposes. All illustrations and images included in CareNotes® are the copyrighted property of [employer].D.A.M., Inc. or [employer].      Postpartum Bleeding   WHAT YOU NEED TO KNOW:   Postpartum bleeding is vaginal bleeding after childbirth. This bleeding is normal, whether your baby was born vaginally or by . It contains blood and the tissue that lined the inside of your uterus when you were pregnant.   DISCHARGE INSTRUCTIONS:   What to expect with postpartum bleeding:  Postpartum  bleeding usually lasts at least 10 days, and may last longer than 6 weeks. Your bleeding may range from light (barely staining a pad) to heavy (soaking a pad in 1 hour). Usually, you have heavier bleeding right after childbirth, which slows over the next few weeks until it stops. The bleeding is red or dark brown with clots for the first 1 to 3 days. It then turns pink for several days, and then becomes a white or yellow discharge until it ends.  Follow up with your obstetrician as directed:  Do not have sex until your obstetrician says it is okay. Write down your questions so you remember to ask them during your visits.  Contact your healthcare provider or obstetrician if:   Your bleeding increases, or you have heavy bleeding that soaks a pad in 1 hour for 2 hours in a row.    You pass large blood clots.    You are breathing faster than normal, or your heart is beating faster than normal.    You are urinating less than usual, or not at all.    You feel dizzy.    You have questions or concerns about your condition or care.  Seek immediate care or call 911 if:   You are suddenly short of breath and feel lightheaded.    You have sudden chest pain.  © 2017 [employer] Information is for End User's use only and may not be sold, redistributed or otherwise used for commercial purposes. All illustrations and images included in CareNotes® are the copyrighted property of [employer] or [employer].  The above information is an  only. It is not intended as medical advice for individual conditions or treatments. Talk to your doctor, nurse or pharmacist before following any medical regimen to see if it is safe and effective for you.      Breast Care for the Breast Feeding Mother   WHAT YOU SHOULD KNOW:   Your breasts will go through normal changes while you are breastfeeding. Sometimes breast and nipple problems can develop while you are breastfeeding. Learn about changes that are  normal and those that may be a problem. Breast care can help you prevent and manage problems so you and your baby can enjoy the benefits of breastfeeding.  AFTER YOU LEAVE:   Breast changes while you are breastfeeding:   For the first few days after your baby is born, your body makes a small amount of breast milk (colostrum). Within about 2 to 5 days, your body will begin making mature milk. It may take up to 10 days or longer for mature milk to come in. When your mature milk comes in, your breasts will become full and firm. They may feel tender.     Breastfeeding your baby will decrease the full feeling in your breasts. You may feel a tingly sensation during feedings as milk is released from your breasts. This is called the milk let-down reflex. After 7 or more days, the fullness may feel like it has decreased. Your nipples should look the same as they did before you started breastfeeding. Breasts that feel full before and empty after breastfeeding are signs that breastfeeding is going well.  Breast problems that can occur while you are breastfeeding:   Nipple soreness  may occur when you begin to breastfeed your baby. You may also have nipple soreness if your baby is not latched on to your breast correctly. Correct positioning and latch-on may decrease or stop the pain in your nipples. Work with your caregivers to help your baby latch on correctly. It may also be helpful to place warm, wet compresses on your nipples to help decrease pain.     Plugged milk ducts  may cause painful breast lumps. Plugged ducts may be caused by not emptying your breasts completely during feedings. When your baby pauses during breastfeeding, massage and gently squeeze your breast. Gentle massage may unplug a blocked milk duct. Pump out any milk left in your breasts after your baby is done breastfeeding. Avoid wearing tight tops, tight bras, or under-wire bras, because they may put pressure on your breasts.    Engorgement  may occur as  your milk comes in soon after you begin breastfeeding. Engorgement may cause your breasts to become swollen and painful. Your breasts may also become engorged if you miss a feeding or you do not breastfeed on demand. The best way to decrease engorgement symptoms is to empty your breasts by feeding your baby often. Engorgement can make it hard for your baby to latch on to your breast. If this happens, express a small amount of milk and then have your baby latch on. Cold compresses, gel packs, or ice packs on your breasts can help decrease pain and swelling. Ask your caregiver how often and how long you should use cold, or ice packs.     A breast infection called mastitis  can develop if you have plugged milk ducts or engorgement. Mastitis causes your breasts to become red, swollen, and painful. You may also have flu-like symptoms, such as chills and a fever. Place heat on your breasts to help decrease the pain. You may want to place a moist, warm cloth on the painful breast or both of your breasts. Ask how often to do this. Your primary healthcare provider (PHP) may suggest that you take an NSAID, such as ibuprofen, to decrease pain and swelling. He may also order antibiotics to treat mastitis. Ask about feeding your baby when you have a breast infection.  How to help prevent or manage breast problems while you are breastfeeding:   Learn how to position your baby and latch him on correctly.  To latch your baby correctly to your breast, make sure that his mouth covers most of your areola (dark area around your nipple). He should not be attached only to the nipple. Your baby is latched on well if you feel comfortable and do not feel pain. A correct latch helps him get enough milk and can help to prevent sore nipples and other breast problems. There are several breastfeeding positions that you can try. Find the position that works best for you and your baby. Ask your caregiver for more information about how to hold and  breastfeed your baby.     Prevent biting.  Your baby may get teeth at about 3 to 4 months of age. To help prevent biting, break his suction once he is finished or if he has fallen asleep. To break his suction, slip a finger into the side of his mouth. If your baby bites you, respond with surprise or unhappiness. Offer praise when he does not bite you.     Breastfeed your baby regularly.  Feed your baby 8 to 12 times a day. You may need to wake up your baby at night to feed him. It is okay to feed from 1 or both breasts at each feeding. Your baby should breastfeed from both breasts equally over the course of a day. If your baby only feeds from 1 side during a feeding, offer your other breast to him first for the next feeding.     Schedule and keep follow-up visits.  Talk to your baby's pediatrician or your PHP during follow-up visits if you have breast problems. Caregivers may suggest that you, or you and your partner, attend classes on breastfeeding. You also may want to join a breastfeeding support group. Caregivers may suggest that you see a lactation consultant. This is a caregiver who can help you with breastfeeding.  Contact your PHP if:   You have a fever and chills.    You have body aches and you feel like you do not have any energy.    One or both of your breasts is red, swollen or hard, painful, and feels warm or hot.    You have breast engorgement that does not get better within 24 hours.     You see or feel a lump in your breast that hurts when you touch it.    You have nipple pain during breastfeeding or between feedings.     Your nipples are red, dry, cracked, or bleeding, or they have scabs on them.     You have questions or concerns about your condition or care.  © 2014 [employer] Information is for End User's use only and may not be sold, redistributed or otherwise used for commercial purposes. All illustrations and images included in CareNotes® are the copyrighted property of  [employer].M., Inc. or [employer].  The above information is an  only. It is not intended as medical advice for individual conditions or treatments. Talk to your doctor, nurse or pharmacist before following any medical regimen to see if it is safe and effective for you.

## 2025-07-11 NOTE — PROGRESS NOTES
Progress Note - OB/GYN   Name: Antoinette Hernandez 35 y.o. female I MRN: [id number]  Unit/Bed#: -01 I Date of Admission: 7/10/2025   Date of Service: 2025 I Hospital Day: 1     Assessment & Plan   (spontaneous vaginal delivery)  Continue routine post partum care  Encourage ambulation  Encourage breastfeeding  Contraception: POPs  Anticipate discharge PPD 1 vs 2   History of postpartum depression  Plan for early EPDS  Was on medication during 1st and second deliveries.     Pre-existing type 1 diabetes mellitus with hyperglycemia during pregnancy in third trimester ([facility])  Lab Results   Component Value Date    HGBA1C 5.2 2025       Recent Labs     07/10/25  1608 07/10/25  1717 07/10/25  1825 07/10/25  2019   POCGLU 61* 94 48* 58*   Dexcon pump and Insulin pump  Goal range  mg/dL  Will recheck BG in AM    Blood Sugar Average: Last 72 hrs:  (P) [id number]  Elevated blood-pressure reading without diagnosis of hypertension  BP monitoring  Check for signs and symptoms of preeclampsia  CBC wnl, CMP wnl    OB Post-Partum Progress Note  Subjective   Post delivery. Patient is doing well. Has no concerns this AM. Lochia WNL. Pain well controlled. Denies HA, blurry vision, epigastric/RUQ pain, CP, SOB, dizziness.    Pain: yes, cramping, improved with meds  Tolerating PO: yes  Voiding: yes   Flatus: yes  BM: no  Ambulating: yes  Breastfeeding:  yes  Chest pain: no  Shortness of breath: no  Leg pain: no  Lochia: WNL    Objective :  Temp:  [98.1 °F (36.7 °C)-99.4 °F (37.4 °C)] 98.1 °F (36.7 °C)  HR:  [73-99] 86  BP: (114-149)/(56-80) 121/73  Resp:  [18] 18  SpO2:  [95 %-99 %] 97 %  O2 Device: None (Room air)    GEN: Antoinette Hernandez appears well, alert and oriented x 3, pleasant and cooperative   CARDIO: Regular Rate  RESP:  Non-labored breathing  ABDOMEN: soft, appropriate tenderness, firm fundus 3 cm below umbilicus  EXTREMITIES: Non tender, trace pitting edema appropriate for post-pregnancy, no  erythema       Lab Results: I have reviewed the following results:  Lab Results   Component Value Date    WBC 12.69 (H) 07/10/2025    HGB 12.6 07/10/2025    HCT 36.4 07/10/2025    MCV 87 07/10/2025     07/10/2025

## 2025-07-11 NOTE — PLAN OF CARE

## 2025-07-11 NOTE — DISCHARGE SUMMARY
Discharge Summary - OB/GYN   Name: Antoinette Hernandez 35 y.o. female I MRN: [id number]  Unit/Bed#: -01 I Date of Admission: 7/10/2025   Date of Service: 2025 I Hospital Day: 1    Obstetrics Discharge Summary  Antoinette Hernandez 35 y.o. female MRN: [id number]  Unit/Bed#: -01 Encounter: [id number]    Admission Date: 7/10/2025     Discharge Date: 25    Admitting Attending: Dr. Santana  Delivery Attending: Dr. Santana  Discharging Attending: Dr Fuentes    Admitting Diagnoses:   1. Pregnancy at 39 weeks  2.  Type 1 diabetes mellitus  3.  History of postpartum depression    Discharge Diagnoses:   1. Same as above  2. Delivery of term     Delivery  Route of Delivery: Vaginal, Spontaneous    Anesthesia: None,   QBL: 77 ml    Delivery: Vaginal, Spontaneous at 7/10/2025 7:52 PM  No Laceration: Perineal: None Repaired?      Baby's Weight: 3275 g (7 lb 3.5 oz); 115.52    Apgar scores: 8  and 9  at 1 and 5 minutes, respectively      Hospital course: Antoinette Hernandez is now a 35 y.o.  who was initially admitted at 39w5d for induction of labor in the setting of type 1 diabetes mellitus, SVE on admission 4 she received a Murphy balloon and Cytotec, when the Murphy balloon was out she received Pitocin, AROM was performed.  Patient declined an epidural. She progressed to complete cervical dilation and began pushing.     Her post-delivery course was uncomplicated. Her postpartum pain was well controlled with oral analgesics. Maternal blood type is Positive so RhoGAM wasn't indicated.    On day of discharge, she was ambulating and able to reasonably perform all ADLs. She was voiding and had appropriate bowel function. Pain was well controlled. She was discharged home on postpartum day #1 without complications. Patient was instructed to follow up with her OBGYN as an outpatient and was given appropriate warnings to call provider if she develops signs of infection or uncontrolled  pain.    Complications: none apparent    Condition at discharge: good     Provisions for Follow-Up Care:  Please see after visit summary for information related to follow-up care and any pertinent home health orders.      Disposition: Home    Planned Readmission: No    Discharge Medications:   Please see AVS for a complete list of discharge medications.    Discharge instructions :   -Do not place anything (no partner, tampons or douche) in your vagina for 6 weeks  -You may walk for exercise for the first 6 weeks then gradually return to your usual activities   -Please do not drive for 1 week if you have no stitches and for 2 weeks if you have stitches    -You may take baths or shower per your preference   -Please examine your breasts in the mirror daily and call your doctor for redness or tenderness or increased warmth    -Please call your doctor's office if temperature > 100.4*F or 38* C, worsening pain or a foul discharge.

## 2025-07-11 NOTE — PLAN OF CARE
Problem: Knowledge Deficit  Goal: Verbalizes understanding of labor plan  Description: Assess patient/family/caregiver's baseline knowledge level and ability to understand information.  Provide education via patient/family/caregiver's preferred learning method at appropriate level of understanding.     1. Provide teaching at level of understanding.  2. Provide teaching via preferred learning method(s).  7/10/2025 2251 by Peggy Baker RN  Outcome: Progressing  7/10/2025 2207 by Peggy Baker RN  Outcome: Progressing  Goal: Patient/family/caregiver demonstrates understanding of disease process, treatment plan, medications, and discharge instructions  Description: Complete learning assessment and assess knowledge base.  Interventions:  - Provide teaching at level of understanding  - Provide teaching via preferred learning methods  7/10/2025 2251 by Peggy Baker RN  Outcome: Progressing  7/10/2025 2207 by Peggy Baker RN  Outcome: Progressing     Problem: PAIN - ADULT  Goal: Verbalizes/displays adequate comfort level or baseline comfort level  Description: Interventions:  - Encourage patient to monitor pain and request assistance  - Assess pain using appropriate pain scale  - Administer analgesics as ordered based on type and severity of pain and evaluate response  - Implement non-pharmacological measures as appropriate and evaluate response  - Consider cultural and social influences on pain and pain management  - Notify physician/advanced practitioner if interventions unsuccessful or patient reports new pain  - Educate patient/family on pain management process including their role and importance of  reporting pain   - Provide non-pharmacologic/complimentary pain relief interventions  7/10/2025 2251 by Peggy Baker RN  Outcome: Progressing  7/10/2025 2207 by Peggy Baker RN  Outcome: Progressing     Problem: INFECTION - ADULT  Goal: Absence or prevention of progression during hospitalization  Description:  INTERVENTIONS:  - Assess and monitor for signs and symptoms of infection  - Monitor lab/diagnostic results  - Monitor all insertion sites, i.e. indwelling lines, tubes, and drains  - Monitor endotracheal if appropriate and nasal secretions for changes in amount and color  - [place] appropriate cooling/warming therapies per order  - Administer medications as ordered  - Instruct and encourage patient and family to use good hand hygiene technique  - Identify and instruct in appropriate isolation precautions for identified infection/condition  7/10/2025 2251 by Peggy Baker RN  Outcome: Progressing  7/10/2025 2207 by Peggy Baker RN  Outcome: Progressing  Goal: Absence of fever/infection during neutropenic period  Description: INTERVENTIONS:  - Monitor WBC  - Perform strict hand hygiene  - Limit to healthy visitors only  - No plants, dried, fresh or silk flowers with taylor in patient room  7/10/2025 2251 by Peggy Baker RN  Outcome: Progressing  7/10/2025 2207 by Peggy Baker RN  Outcome: Progressing     Problem: SAFETY ADULT  Goal: Patient will remain free of falls  Description: INTERVENTIONS:  - Educate patient/family on patient safety including physical limitations  - Instruct patient to call for assistance with activity   - Consider consulting OT/PT to assist with strengthening/mobility based on AM PAC & JH-HLM score  - Consult OT/PT to assist with strengthening/mobility   - Keep Call bell within reach  - Keep bed low and locked with side rails adjusted as appropriate  - Keep care items and personal belongings within reach  - Initiate and maintain comfort rounds  - Make Fall Risk Sign visible to staff  - Apply yellow socks and bracelet for high fall risk patients  - Consider moving patient to room near nurses station  7/10/2025 2251 by Peggy Baker RN  Outcome: Progressing  7/10/2025 2207 by Peggy Baker RN  Outcome: Progressing  Goal: Maintain or return to baseline ADL function  Description:  INTERVENTIONS:  -  Assess patient's ability to carry out ADLs; assess patient's baseline for ADL function and identify physical deficits which impact ability to perform ADLs (bathing, care of mouth/teeth, toileting, grooming, dressing, etc.)  - Assess/evaluate cause of self-care deficits   - Assess range of motion  - Assess patient's mobility; develop plan if impaired  - Assess patient's need for assistive devices and provide as appropriate  - Encourage maximum independence but intervene and supervise when necessary  - Involve family in performance of ADLs  - Assess for home care needs following discharge   - Consider OT consult to assist with ADL evaluation and planning for discharge  - Provide patient education as appropriate  - Monitor functional capacity and physical performance, use of AM PAC & JH-HLM   - Monitor gait, balance and fatigue with ambulation    7/10/2025 2251 by Peggy Baker RN  Outcome: Progressing  7/10/2025 2207 by Peggy Baker RN  Outcome: Progressing  Goal: Maintains/Returns to pre admission functional level  Description: INTERVENTIONS:  - Perform AM-PAC 6 Click Basic Mobility/ Daily Activity assessment daily.  - Set and communicate daily mobility goal to care team and patient/family/caregiver.   - Collaborate with rehabilitation services on mobility goals if consulted  - Out of bed for toileting  - Record patient progress and toleration of activity level   7/10/2025 2251 by Peggy Baker RN  Outcome: Progressing  7/10/2025 2207 by Peggy Baker RN  Outcome: Progressing     Problem: DISCHARGE PLANNING  Goal: Discharge to home or other facility with appropriate resources  Description: INTERVENTIONS:  - Identify barriers to discharge w/patient and caregiver  - Arrange for needed discharge resources and transportation as appropriate  - Identify discharge learning needs (meds, wound care, etc.)  - Arrange for interpretive services to assist at discharge as needed  - Refer to Case Management  Department for coordinating discharge planning if the patient needs post-hospital services based on physician/advanced practitioner order or complex needs related to functional status, cognitive ability, or social support system  7/10/2025 2251 by Peggy Baker RN  Outcome: Progressing  7/10/2025 2207 by Peggy Baker, RN  Outcome: Progressing

## 2025-07-11 NOTE — PLAN OF CARE
Problem: PAIN - ADULT  Goal: Verbalizes/displays adequate comfort level or baseline comfort level  Description: Interventions:  - Encourage patient to monitor pain and request assistance  - Assess pain using appropriate pain scale  - Administer analgesics as ordered based on type and severity of pain and evaluate response  - Implement non-pharmacological measures as appropriate and evaluate response  - Consider cultural and social influences on pain and pain management  - Notify physician/advanced practitioner if interventions unsuccessful or patient reports new pain  - Educate patient/family on pain management process including their role and importance of  reporting pain   - Provide non-pharmacologic/complimentary pain relief interventions  Outcome: Progressing     Problem: INFECTION - ADULT  Goal: Absence or prevention of progression during hospitalization  Description: INTERVENTIONS:  - Assess and monitor for signs and symptoms of infection  - Monitor lab/diagnostic results  - Monitor all insertion sites, i.e. indwelling lines, tubes, and drains  - Monitor endotracheal if appropriate and nasal secretions for changes in amount and color  - [place] appropriate cooling/warming therapies per order  - Administer medications as ordered  - Instruct and encourage patient and family to use good hand hygiene technique  - Identify and instruct in appropriate isolation precautions for identified infection/condition  Outcome: Progressing  Goal: Absence of fever/infection during neutropenic period  Description: INTERVENTIONS:  - Monitor WBC  - Perform strict hand hygiene  - Limit to healthy visitors only  - No plants, dried, fresh or silk flowers with taylor in patient room  Outcome: Progressing     Problem: SAFETY ADULT  Goal: Patient will remain free of falls  Description: INTERVENTIONS:  - Educate patient/family on patient safety including physical limitations  - Instruct patient to call for assistance with activity   -  Consider consulting OT/PT to assist with strengthening/mobility based on AM PAC & JH-HLM score  - Consult OT/PT to assist with strengthening/mobility   - Keep Call bell within reach  - Keep bed low and locked with side rails adjusted as appropriate  - Keep care items and personal belongings within reach  - Initiate and maintain comfort rounds  - Make Fall Risk Sign visible to staff  - Apply yellow socks and bracelet for high fall risk patients  - Consider moving patient to room near nurses station  Outcome: Progressing  Goal: Maintain or return to baseline ADL function  Description: INTERVENTIONS:  -  Assess patient's ability to carry out ADLs; assess patient's baseline for ADL function and identify physical deficits which impact ability to perform ADLs (bathing, care of mouth/teeth, toileting, grooming, dressing, etc.)  - Assess/evaluate cause of self-care deficits   - Assess range of motion  - Assess patient's mobility; develop plan if impaired  - Assess patient's need for assistive devices and provide as appropriate  - Encourage maximum independence but intervene and supervise when necessary  - Involve family in performance of ADLs  - Assess for home care needs following discharge   - Consider OT consult to assist with ADL evaluation and planning for discharge  - Provide patient education as appropriate  - Monitor functional capacity and physical performance, use of AM PAC & JH-HLM   - Monitor gait, balance and fatigue with ambulation    Outcome: Progressing  Goal: Maintains/Returns to pre admission functional level  Description: INTERVENTIONS:  - Perform AM-PAC 6 Click Basic Mobility/ Daily Activity assessment daily.  - Set and communicate daily mobility goal to care team and patient/family/caregiver.   - Collaborate with rehabilitation services on mobility goals if consulted  - Out of bed for toileting  - Record patient progress and toleration of activity level   Outcome: Progressing     Problem: DISCHARGE  PLANNING  Goal: Discharge to home or other facility with appropriate resources  Description: INTERVENTIONS:  - Identify barriers to discharge w/patient and caregiver  - Arrange for needed discharge resources and transportation as appropriate  - Identify discharge learning needs (meds, wound care, etc.)  - Arrange for interpretive services to assist at discharge as needed  - Refer to Case Management Department for coordinating discharge planning if the patient needs post-hospital services based on physician/advanced practitioner order or complex needs related to functional status, cognitive ability, or social support system  Outcome: Progressing     Problem: POSTPARTUM  Goal: Experiences normal postpartum course  Description: INTERVENTIONS:  - Monitor maternal vital signs  - Assess uterine involution and lochia  Outcome: Progressing  Goal: Appropriate maternal -  bonding  Description: INTERVENTIONS:  - Identify family support  - Assess for appropriate maternal/infant bonding   -Encourage maternal/infant bonding opportunities  - Referral to  or  as needed  Outcome: Progressing  Goal: Establishment of infant feeding pattern  Description: INTERVENTIONS:  - Assess breast/bottle feeding  - Refer to lactation as needed  Outcome: Progressing  Goal: Incision(s), wounds(s) or drain site(s) healing without S/S of infection  Description: INTERVENTIONS  - Assess and document dressing, incision, wound bed, drain sites and surrounding tissue  - Provide patient and family education  Outcome: Progressing

## 2025-07-11 NOTE — PROGRESS NOTES
Discharge packet reviewed with patient.  Verbalized understanding. No further questions at this time.

## 2025-07-19 LAB — PLACENTA IN STORAGE: NORMAL

## 2025-08-04 ENCOUNTER — TELEPHONE (OUTPATIENT)
Dept: OBGYN CLINIC | Facility: CLINIC | Age: 35
End: 2025-08-04

## (undated) DEVICE — CHLORAPREP HI-LITE 26ML ORANGE

## (undated) DEVICE — RETROGRADE KNIFE BOX OF 6: Brand: ECTRA

## (undated) DEVICE — STERILE BETHLEHEM PLASTIC HAND: Brand: CARDINAL HEALTH

## (undated) DEVICE — OCCLUSIVE GAUZE STRIP,3% BISMUTH TRIBROMOPHENATE IN PETROLATUM BLEND: Brand: XEROFORM

## (undated) DEVICE — SPONGE PVP SCRUB WING STERILE

## (undated) DEVICE — GAUZE SPONGES,16 PLY: Brand: CURITY

## (undated) DEVICE — PADDING CAST 4 IN  COTTON STRL

## (undated) DEVICE — ADHESIVE SKN CLSR HISTOACRYL FLEX 0.5ML LF

## (undated) DEVICE — STRL COTTON TIP APPLCTR 6IN PK: Brand: CARDINAL HEALTH

## (undated) DEVICE — GLOVE SRG BIOGEL 7.5

## (undated) DEVICE — CUFF TOURNIQUET 18 X 4 IN QUICK CONNECT DISP 1 BLADDER

## (undated) DEVICE — DISPOSABLE EQUIPMENT COVER: Brand: SMALL TOWEL DRAPE

## (undated) DEVICE — NEEDLE 25G X 1 1/2

## (undated) DEVICE — PADDING CAST 3IN COTTON STRL

## (undated) DEVICE — INTENDED FOR TISSUE SEPARATION, AND OTHER PROCEDURES THAT REQUIRE A SHARP SURGICAL BLADE TO PUNCTURE OR CUT.: Brand: BARD-PARKER SAFETY BLADES SIZE 15, STERILE

## (undated) DEVICE — GLOVE INDICATOR PI UNDERGLOVE SZ 8 BLUE

## (undated) DEVICE — ACE WRAP 3 IN STERILE